# Patient Record
Sex: FEMALE | Race: WHITE | Employment: OTHER | URBAN - METROPOLITAN AREA
[De-identification: names, ages, dates, MRNs, and addresses within clinical notes are randomized per-mention and may not be internally consistent; named-entity substitution may affect disease eponyms.]

---

## 2018-11-05 ENCOUNTER — OFFICE VISIT (OUTPATIENT)
Dept: NEUROLOGY | Age: 83
End: 2018-11-05

## 2018-11-05 VITALS
WEIGHT: 146.6 LBS | OXYGEN SATURATION: 97 % | RESPIRATION RATE: 18 BRPM | DIASTOLIC BLOOD PRESSURE: 62 MMHG | HEIGHT: 65 IN | BODY MASS INDEX: 24.43 KG/M2 | HEART RATE: 71 BPM | SYSTOLIC BLOOD PRESSURE: 112 MMHG

## 2018-11-05 DIAGNOSIS — R41.3 MEMORY CHANGES: Primary | ICD-10-CM

## 2018-11-05 RX ORDER — RANITIDINE 150 MG/1
150 CAPSULE ORAL
COMMUNITY

## 2018-11-05 RX ORDER — FENOFIBRATE 48 MG/1
TABLET, COATED ORAL
COMMUNITY
End: 2019-01-29

## 2018-11-05 RX ORDER — PRAVASTATIN SODIUM 20 MG/1
20 TABLET ORAL
COMMUNITY
End: 2019-01-29

## 2018-11-05 RX ORDER — ASCORBIC ACID 500 MG
500 TABLET ORAL
COMMUNITY

## 2018-11-05 RX ORDER — MELATONIN
COMMUNITY
End: 2019-01-29

## 2018-11-05 RX ORDER — GLIPIZIDE 2.5 MG/1
2.5 TABLET, EXTENDED RELEASE ORAL
COMMUNITY

## 2018-11-05 NOTE — PATIENT INSTRUCTIONS
If we have ordered testing for you, we do not call patients with results and we do not give test results over the phone. We schedule follow up appointments so that your results can be discussed in person and any questions you have regarding them may be addressed. If something of concern is revealed on your test, we will call you for a sooner follow up appointment. Additionally, results may be found by using the My Chart feature and one of our patient service representatives at the  can give you instructions on how to access this feature of our electronic medical record system. Learning About the Symptoms of Dementia What is dementia? We all forget things as we get older. Many older people have a slight loss of memory that does not affect their daily lives. But memory loss that gets worse may mean that you have dementia. Dementia is a loss of mental skills that affects your daily life. It can cause problems with memory, problem-solving, and learning. It also can cause problems with thinking and planning. Dementia usually gets worse over time. But how quickly it gets worse is different for each person. Some people stay the same for years. Others lose skills quickly. Your chances of having dementia rise as you get older. But this doesn't mean that everyone will get it. What causes dementia? Dementia is caused by damage to or changes in the brain. Alzheimer's disease is the most common kind of dementia. Alzheimer's causes a steady loss of memory and how well you can speak, think, and do your daily activities. The second most common kind of dementia is caused by a series of strokes. It's called vascular dementia. It often gets worse step by step. With each new stroke, more mental skills are lost. 
Serious head injuries in the past can sometimes lead to dementia, too. What are the symptoms? Usually the first symptom of dementia is memory loss.  Often the person who has the memory problem doesn't notice it, but family and friends do. People who have dementia may have increasing trouble with: 
· Recalling recent events. They may forget appointments or lose objects. · Recognizing people and places. · Keeping up with conversations and activity. · Finding their way around familiar places, or driving to and from places they know well. · Keeping up personal care such as grooming or bathing. · Planning and carrying out routine tasks. They may have trouble following a recipe or writing a letter or email. What are some next steps? If you are worried about memory loss or changes in your thinking, see your doctor soon. He or she can do a physical exam and ask questions about recent and past illnesses and life events. Think about bringing someone to your doctor's appointment to take notes for you. Your doctor may suggest a series of tests to measure memory changes over time. These tests give the doctor an overall picture of how well your brain is working. The doctor can use the results to decide the best treatment program and help make your life safer and easier. It is important to know that memory loss and changes in thinking can be caused by things other than dementia. These things can include health problems such as depression, a low amount of thyroid hormone, and some infections. When those things are treated, your symptoms can get better. Follow-up care is a key part of your treatment and safety. Be sure to make and go to all appointments, and call your doctor if you are having problems. It's also a good idea to know your test results and keep a list of the medicines you take. Where can you learn more? Go to http://kailey-andrea.info/. Enter [id number] [phone] in the search box to learn more about \"Learning About the Symptoms of Dementia. \" Current as of: December 7, 2017 Content Version: 11.8 © 3439-5253 [employer].  Care instructions adapted under license by [address] (which disclaims liability or warranty for this information). If you have questions about a medical condition or this instruction, always ask your healthcare professional. [place] [address] any warranty or liability for your use of this information. Patient history reviewed and patient examined. Will proceed with memory testing that includes labs EEG brain MRI and neuropsych testing. Understands that if she calls the clinic she may be able to move up her formal memory testing date. Revisit pended.

## 2018-11-05 NOTE — PROGRESS NOTES
Memory changes. Neurology Consult Subjective: Parish Odonnell is a [de-identified] y.o. female who comes in today with the following history. Says that she has had concerns about memory changes for 3 years but no strong changes. Apparently her dad and paternal grandfather had Alzheimer's disease? 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  Eating is okay but sleeping at times is slightly awkward and apparently there is some psychosocial worry that crosses her mind. In terms of previous stroke experience was told once she may have had a mini stroke because her right hand was clumsy? I am not sure of any of the details here so I am just quoting the patient verbatim. No background history of seizures traumatic brain injury meningitis encephalitis. She takes care of her own finances. Is a remote smoker no alcohol special sensory function okay except for slightly diminished hearing and seems to forget to use her hearing aid. Mood and behavior reveals occasions of on and off depression because of relative isolation in her life. Denies any psychosis. Hobbies include vivian. Driving has been okay. Lives alone. Current Outpatient Medications Medication Sig Dispense Refill  glipiZIDE SR (GLUCOTROL XL) 2.5 mg CR tablet Take  by mouth daily.  fenofibrate nanocrystallized (TRICOR) 48 mg tablet Take  by mouth.  pravastatin (PRAVACHOL) 20 mg tablet Take 20 mg by mouth nightly.  raNITIdine hcl 150 mg capsule Take 150 mg by mouth daily.  ascorbic acid, vitamin C, (VITAMIN C) 500 mg tablet Take  by mouth.  cholecalciferol (VITAMIN D3) 1,000 unit tablet Take  by mouth.  docosahexanoic acid/epa (FISH OIL PO) Take  by mouth.  diphenhydramine HCl (NITETIME SLEEP AID PO) Take  by mouth.     
 potassium chloride SA (MICRO-K) 10 mEq capsule Take 10 mEq by mouth two (2) times a day.  lisinopril (PRINIVIL, ZESTRIL) 20 mg tablet Take 20 mg by mouth daily.  atenolol (TENORMIN) 50 mg tablet Take  by mouth daily.  FELODIPINE (PLENDIL PO) Take 5 mg by mouth.  hydrochlorothiazide (HYDRODIURIL) 25 mg tablet Take 25 mg by mouth daily.  ASPIRIN/DIPYRIDAMOLE (AGGRENOX PO) Take  by mouth two (2) times a day.  levothyroxine (LEVOTHROID) 100 mcg tablet Take 100 mcg by mouth Daily (before breakfast).  ezetimibe (ZETIA) 10 mg tablet Take 10 mg by mouth.  nicotinic acid (NIACIN) 500 mg tablet Take 500 mg by mouth Daily (before breakfast).  celecoxib (CELEBREX) 200 mg capsule Take 200 mg by mouth two (2) times a day.  zolpidem (AMBIEN) 5 mg tablet Take  by mouth nightly as needed.  meperidine (DEMEROL) 50 mg tablet 1-2 tabs PO Q 4-6hrs PRN 60 Tab 0  promethazine (PHENERGAN) 12.5 mg tablet Take 2 Tabs by mouth every six (6) hours as needed for Nausea. 30 Tab 0 Allergies Allergen Reactions  Percocet [Oxycodone-Acetaminophen] Other (comments) Past Medical History:  
Diagnosis Date  Arthritis  Depression  Diabetes ([facility] [address] pre-diabetic/ diet controlled  GERD (gastroesophageal reflux disease)  Hypertension  Other ill-defined conditions(799.89)   
 high cholesterol  Snoring  Stroke ([facility] [address] TIA 2003  Thyroid disease Past Surgical History:  
Procedure Laterality Date  HX APPENDECTOMY  HX CHOLECYSTECTOMY  HX GYN    
 btl  HX ORTHOPAEDIC    
 right RCR, Left shoulder open repair Social History Socioeconomic History  Marital status:  Spouse name: Not on file  Number of children: Not on file  Years of education: Not on file  Highest education level: Not on file Social Needs  Financial resource strain: Not on file  Food insecurity - worry: Not on file  Food insecurity - inability: Not on file  Transportation needs - medical: Not on file  Transportation needs - non-medical: Not on file Occupational History  Not on file Tobacco Use  Smoking status: Former Smoker Last attempt to quit: 1986 Years since quittin.1  Smokeless tobacco: Never Used Substance and Sexual Activity  Alcohol use: No  
  Frequency: Never  Drug use: No  
 Sexual activity: Not on file Other Topics Concern  Not on file Social History Narrative  Not on file Family History Problem Relation Age of Onset  Heart Disease Mother  Cancer Sister  Alzheimer Father  Alzheimer Paternal Grandfather Visit Vitals /62 Pulse 71 Resp 18 Ht 5' 5\" (1.651 m) Wt 66.5 kg (146 lb 9.6 oz) SpO2 97% BMI 24.40 kg/m² Review of Systems: A comprehensive review of systems was negative except for that written in the HPI. Neuro Exam:  
 
Appearance: The patient is well developed, well nourished, provides a coherent history and is in no acute distress. Mental Status: Oriented to time, place and person. Mood and affect appropriate. 2/3 recall. Clock drawing normal.  
Cranial Nerves:   Intact visual fields. Fundi are benign. HARSHA, EOM's full, no nystagmus, no ptosis. Facial sensation is normal. Corneal reflexes are intact. Facial movement is symmetric. Hearing is normal bilaterally. Palate is midline with normal sternocleidomastoid and trapezius muscles are normal. Tongue is midline. Motor:  5/5 strength in upper and lower proximal and distal muscles. Normal bulk and tone. No fasciculations. Reflexes:   Deep tendon reflexes 2+/4 and symmetrical.  
Sensory:   Normal to touch, pinprick and vibration. Gait:  Normal gait. Tremor:    Patient had evidence of a right greater than left handed kinetic postural and intention type tremor noted. Cerebellar:  No cerebellar signs present. Neurovascular:  Normal heart sounds and regular rhythm, peripheral pulses intact, and no carotid bruits. Assessment:  
Memory changes. Agree with collective concerns and will order appropriate workup that includes labs EEG brain MRI and neuropsychological testing. Patient warned that the formal memory testing could be backed up unless she wants to try calling the clinic to see if there is an opening. Revisit will be pended. Problem 2 tremor. 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 Currently not impressed with Parkinson's considerations based on what I hear and see. Plan:  
Revisit pended with memory testing as ordered.  
Signed by :  Kaitlin Mclain MD

## 2018-11-06 LAB
ALBUMIN SERPL-MCNC: 4.8 G/DL (ref 3.5–4.7)
ALBUMIN/GLOB SERPL: 2 {RATIO} (ref 1.2–2.2)
ALP SERPL-CCNC: 43 IU/L (ref 39–117)
ALT SERPL-CCNC: 23 IU/L (ref 0–32)
AST SERPL-CCNC: 17 IU/L (ref 0–40)
BASOPHILS # BLD AUTO: 0 X10E3/UL (ref 0–0.2)
BASOPHILS NFR BLD AUTO: 0 %
BILIRUB SERPL-MCNC: 0.5 MG/DL (ref 0–1.2)
BUN SERPL-MCNC: 28 MG/DL (ref 8–27)
BUN/CREAT SERPL: 31 (ref 12–28)
CALCIUM SERPL-MCNC: 10.6 MG/DL (ref 8.7–10.3)
CHLORIDE SERPL-SCNC: 98 MMOL/L (ref 96–106)
CO2 SERPL-SCNC: 27 MMOL/L (ref 20–29)
CREAT SERPL-MCNC: 0.9 MG/DL (ref 0.57–1)
EOSINOPHIL # BLD AUTO: 0.1 X10E3/UL (ref 0–0.4)
EOSINOPHIL NFR BLD AUTO: 1 %
ERYTHROCYTE [DISTWIDTH] IN BLOOD BY AUTOMATED COUNT: 12.3 % (ref 12.3–15.4)
FOLATE SERPL-MCNC: >20 NG/ML
GLOBULIN SER CALC-MCNC: 2.4 G/DL (ref 1.5–4.5)
GLUCOSE SERPL-MCNC: 138 MG/DL (ref 65–99)
HCT VFR BLD AUTO: 44.4 % (ref 34–46.6)
HGB BLD-MCNC: 15.4 G/DL (ref 11.1–15.9)
IMM GRANULOCYTES # BLD: 0 X10E3/UL (ref 0–0.1)
IMM GRANULOCYTES NFR BLD: 0 %
LYMPHOCYTES # BLD AUTO: 3.7 X10E3/UL (ref 0.7–3.1)
LYMPHOCYTES NFR BLD AUTO: 35 %
MCH RBC QN AUTO: 34.7 PG (ref 26.6–33)
MCHC RBC AUTO-ENTMCNC: 34.7 G/DL (ref 31.5–35.7)
MCV RBC AUTO: 100 FL (ref 79–97)
MONOCYTES # BLD AUTO: 1 X10E3/UL (ref 0.1–0.9)
MONOCYTES NFR BLD AUTO: 9 %
MORPHOLOGY BLD-IMP: ABNORMAL
NEUTROPHILS # BLD AUTO: 5.9 X10E3/UL (ref 1.4–7)
NEUTROPHILS NFR BLD AUTO: 55 %
PLATELET # BLD AUTO: 289 X10E3/UL (ref 150–379)
POTASSIUM SERPL-SCNC: 4 MMOL/L (ref 3.5–5.2)
PROT SERPL-MCNC: 7.2 G/DL (ref 6–8.5)
RBC # BLD AUTO: 4.44 X10E6/UL (ref 3.77–5.28)
SODIUM SERPL-SCNC: 139 MMOL/L (ref 134–144)
TSH SERPL DL<=0.005 MIU/L-ACNC: 1.02 UIU/ML (ref 0.45–4.5)
VIT B12 SERPL-MCNC: 522 PG/ML (ref 232–1245)
WBC # BLD AUTO: 10.7 X10E3/UL (ref 3.4–10.8)

## 2019-01-03 ENCOUNTER — HOSPITAL ENCOUNTER (OUTPATIENT)
Dept: MRI IMAGING | Age: 84
Discharge: HOME | End: 2019-01-03
Attending: SPECIALIST
Payer: MEDICARE

## 2019-01-03 DIAGNOSIS — R41.3 MEMORY CHANGES: ICD-10-CM

## 2019-01-03 PROCEDURE — 70551 MRI BRAIN STEM W/O DYE: CPT

## 2019-01-07 ENCOUNTER — OFFICE VISIT (OUTPATIENT)
Dept: NEUROLOGY | Age: 84
End: 2019-01-07

## 2019-01-07 DIAGNOSIS — R41.3 MEMORY CHANGES: ICD-10-CM

## 2019-01-07 NOTE — PROGRESS NOTES
This was an elective routine EEG for evaluation of memory changes. Routine scalp leads were applied according to the 10-20 International system. EKG monitor as well. The background rhythm was 7-8 Hz. Reasonable modulation with eye opening and eye closure. No evidence of focal slowing or spontaneous electrocerebral discharges. EKG grossly sinus rhythm although there were exceptional PACs seen. Hyperventilation not performed. Photic stimulation produced a well-defined photic drive at different harmonics. No technician note as to any untoward movement mannerisms behavior or vocalizations. There was one reference to patient moving her leg at the end of the study. residential through the recording there is uniform dampening of background consistent with patient drowsiness but then a return to alert background. Impression: This is a normal awake and partial drowsy recorded EEG as noted. Clinical correlation is advised.   KIRSTY HENDERSON.

## 2019-01-29 ENCOUNTER — OFFICE VISIT (OUTPATIENT)
Dept: NEUROLOGY | Age: 84
End: 2019-01-29

## 2019-01-29 VITALS
SYSTOLIC BLOOD PRESSURE: 130 MMHG | DIASTOLIC BLOOD PRESSURE: 78 MMHG | WEIGHT: 144 LBS | BODY MASS INDEX: 23.99 KG/M2 | HEIGHT: 65 IN

## 2019-01-29 DIAGNOSIS — R41.3 MEMORY CHANGES: Primary | ICD-10-CM

## 2019-01-29 NOTE — PROGRESS NOTES
Date:  19     Name:  Karthik Morales  :  1935  MRN:  [id number]     PCP:  Charlotte Cedillo NP    Chief Complaint   Patient presents with    Memory Loss       HISTORY OF PRESENT ILLNESS: Follow up for memory loss. This patient is a right handed, 81 yo woman who was initially evaluated by Dr. Ari Carrero 2018, for complaints of memory loss that was worsening over the last three years. She was sent for routine memory labs, CBC, CMP. TSH, B12 all of which were normal. EEG was normal. MRI of the brain with age related changes. No sign of atrophy or previous CVA. Except as noted above, denies  fever, chills, cough. No CP or SOB. No dysuria, loss of bowel or bladder control. No Weight loss. Appetite good. Sleeping well. No sweats. No edema. No bruising or bleeding. No nausea or vomit. No diarrhea. No frequency, urgency, No depressive sxs. No anxiety. Denies sore throat, nasal congestion, nasal discharge, epistaxis, tinnitus, hearing loss, back pain, muscle pain, or joint pain. Current Outpatient Medications   Medication Sig    glipiZIDE SR (GLUCOTROL XL) 2.5 mg CR tablet Take  by mouth daily.  raNITIdine hcl 150 mg capsule Take 150 mg by mouth daily.  ascorbic acid, vitamin C, (VITAMIN C) 500 mg tablet Take  by mouth.  docosahexanoic acid/epa (FISH OIL PO) Take  by mouth.  potassium chloride SA (MICRO-K) 10 mEq capsule Take 10 mEq by mouth two (2) times a day.  lisinopril (PRINIVIL, ZESTRIL) 20 mg tablet Take 20 mg by mouth daily.  atenolol (TENORMIN) 50 mg tablet Take  by mouth daily.  FELODIPINE (PLENDIL PO) Take 5 mg by mouth.  hydrochlorothiazide (HYDRODIURIL) 25 mg tablet Take 25 mg by mouth daily.  ASPIRIN/DIPYRIDAMOLE (AGGRENOX PO) Take  by mouth two (2) times a day.  levothyroxine (LEVOTHROID) 100 mcg tablet Take 100 mcg by mouth Daily (before breakfast). No current facility-administered medications for this visit. Allergies   Allergen Reactions    Percocet [Oxycodone-Acetaminophen] Other (comments)     Past Medical History:   Diagnosis Date    Arthritis     Depression     Diabetes ([facility] [address]     pre-diabetic/ diet controlled    GERD (gastroesophageal reflux disease)     Hypertension     Other ill-defined conditions(799.89)     high cholesterol    Snoring     Stroke [facility])     TIA 2003    Thyroid disease      Past Surgical History:   Procedure Laterality Date    HX APPENDECTOMY      HX CHOLECYSTECTOMY      HX GYN      btl    HX ORTHOPAEDIC      right RCR, Left shoulder open repair     Social History     Socioeconomic History    Marital status:      Spouse name: Not on file    Number of children: Not on file    Years of education: Not on file    Highest education level: Not on file   Social Needs    Financial resource strain: Not on file    Food insecurity - worry: Not on file    Food insecurity - inability: Not on file   [place] needs - medical: Not on file   [place] needs - non-medical: Not on file   Occupational History    Not on file   Tobacco Use    Smoking status: Former Smoker     Last attempt to quit: 1986     Years since quittin.4    Smokeless tobacco: Never Used   Substance and Sexual Activity    Alcohol use: No     Frequency: Never    Drug use: No    Sexual activity: Not on file   Other Topics Concern    Not on file   Social History Narrative    Not on file     Family History   Problem Relation Age of Onset    Heart Disease Mother     Cancer Sister     Alzheimer Father     Alzheimer Paternal Grandfather        PHYSICAL EXAMINATION:    Visit Vitals  /78   Ht 5' 5\" (1.651 m)   Wt 65.3 kg (144 lb)   BMI 23.96 kg/m²     Well defined, nourished, and groomed individual in no acute distress. Good mood and bright affect. Alert and oriented to person, place, and time with recent and remote memory intact.   Attention span and concentration are normal. Speech is fluent with a full fund of knowledge. CNII-XII are grossly intact. Normal tone and bulk. No resting or intention tremor. Steady while walking      ASSESSMENT AND PLAN    ICD-10-CM ICD-9-CM    1. Memory changes R41.3 780.93      Ongoing mild cognitive issues with a family history of Alzheimer's type dementia. Metabolic testing, EEG, and MRI are normal. We discussed these tests today. We also discussed additional work up with neuropsychological testing, making the diagnosis, prognosis, progression, stages of Alzheimer's disease, and treatment to include the goals of therapy. Will plan to see her back after the completion of her formal memory testing. Laura Dempsey

## 2019-04-05 ENCOUNTER — TELEPHONE (OUTPATIENT)
Dept: NEUROLOGY | Age: 84
End: 2019-04-05

## 2019-04-05 NOTE — TELEPHONE ENCOUNTER
----- Message from Norman Rondon sent at 4/5/2019 10:02 AM EDT -----  Regarding: Dr. Huma Corley  Pt stated , she is going to get her tooth pull and inquiring if she should stop taking medication \"Aggrenox\" 25 MG /200 MG take two a day. If she is to stop taking medication when she stop. An appt with the dentist is scheduled Wednesday 04/17/2019.   Best contact number [phone] alternated number [phone]

## 2019-04-09 ENCOUNTER — TELEPHONE (OUTPATIENT)
Dept: NEUROLOGY | Age: 84
End: 2019-04-09

## 2019-04-09 NOTE — TELEPHONE ENCOUNTER
Received call from [place] [phone] with Dr. Milad Randolph' office. Pt is coming in for a Dental extraction on 04/17/19. Dentist wants to know when pt can stop taking AGGRENOX. Pt was also asking about fish oil as well.  Best contact [phone]

## 2019-04-12 ENCOUNTER — HOSPITAL ENCOUNTER (OUTPATIENT)
Dept: CT IMAGING | Age: 84
Discharge: HOME | End: 2019-04-12
Attending: ORTHOPAEDIC SURGERY
Payer: MEDICARE

## 2019-04-12 DIAGNOSIS — G89.29 CHRONIC RIGHT HIP PAIN: ICD-10-CM

## 2019-04-12 DIAGNOSIS — M25.551 CHRONIC RIGHT HIP PAIN: ICD-10-CM

## 2019-04-12 DIAGNOSIS — M16.11 PRIMARY LOCALIZED OSTEOARTHROSIS OF THE HIP, RIGHT: ICD-10-CM

## 2019-04-12 PROCEDURE — 73700 CT LOWER EXTREMITY W/O DYE: CPT

## 2019-04-12 NOTE — TELEPHONE ENCOUNTER
If he needs her to stop it prior to his procedure, then he can tell her when she needs to stop it and restart it. The fish oil has no bearing. With a dental procedure, she will have more bleeding if she does not stop it. Per NP. Contacted the dentist office and left message with the  since Ida was not there that day. She wrote the message down and would leave for her when she returned back to the office.

## 2019-05-15 ENCOUNTER — HOSPITAL ENCOUNTER (OUTPATIENT)
Dept: PREADMISSION TESTING | Age: 84
Discharge: HOME | End: 2019-05-15
Payer: MEDICARE

## 2019-05-15 ENCOUNTER — HOSPITAL ENCOUNTER (OUTPATIENT)
Dept: NON INVASIVE DIAGNOSTICS | Age: 84
Discharge: HOME | End: 2019-05-15
Attending: ORTHOPAEDIC SURGERY
Payer: MEDICARE

## 2019-05-15 VITALS
OXYGEN SATURATION: 95 % | RESPIRATION RATE: 19 BRPM | WEIGHT: 146.39 LBS | HEART RATE: 60 BPM | TEMPERATURE: 97.9 F | DIASTOLIC BLOOD PRESSURE: 64 MMHG | SYSTOLIC BLOOD PRESSURE: 143 MMHG | HEIGHT: 65 IN | BODY MASS INDEX: 24.39 KG/M2

## 2019-05-15 LAB
ABO + RH BLD: NORMAL
ALBUMIN SERPL-MCNC: 4 G/DL (ref 3.5–5)
ALBUMIN/GLOB SERPL: 1.2 {RATIO} (ref 1.1–2.2)
ALP SERPL-CCNC: 53 U/L (ref 45–117)
ALT SERPL-CCNC: 56 U/L (ref 12–78)
ANION GAP SERPL CALC-SCNC: 2 MMOL/L (ref 5–15)
APPEARANCE UR: ABNORMAL
AST SERPL-CCNC: 31 U/L (ref 15–37)
ATRIAL RATE: 60 BPM
BACTERIA URNS QL MICRO: ABNORMAL /HPF
BASOPHILS # BLD: 0.1 K/UL (ref 0–0.1)
BASOPHILS NFR BLD: 1 % (ref 0–1)
BILIRUB SERPL-MCNC: 0.8 MG/DL (ref 0.2–1)
BILIRUB UR QL: NEGATIVE
BLOOD GROUP ANTIBODIES SERPL: NORMAL
BUN SERPL-MCNC: 23 MG/DL (ref 6–20)
BUN/CREAT SERPL: 27 (ref 12–20)
CALCIUM SERPL-MCNC: 10 MG/DL (ref 8.5–10.1)
CALCULATED P AXIS, ECG09: 23 DEGREES
CALCULATED R AXIS, ECG10: -32 DEGREES
CALCULATED T AXIS, ECG11: 6 DEGREES
CHLORIDE SERPL-SCNC: 100 MMOL/L (ref 97–108)
CO2 SERPL-SCNC: 33 MMOL/L (ref 21–32)
COLOR UR: ABNORMAL
CREAT SERPL-MCNC: 0.86 MG/DL (ref 0.55–1.02)
CRP SERPL-MCNC: <0.29 MG/DL (ref 0–0.6)
DIAGNOSIS, 93000: NORMAL
DIFFERENTIAL METHOD BLD: ABNORMAL
EOSINOPHIL # BLD: 0.2 K/UL (ref 0–0.4)
EOSINOPHIL NFR BLD: 1 % (ref 0–7)
EPITH CASTS URNS QL MICRO: ABNORMAL /LPF
ERYTHROCYTE [DISTWIDTH] IN BLOOD BY AUTOMATED COUNT: 11.6 % (ref 11.5–14.5)
ERYTHROCYTE [SEDIMENTATION RATE] IN BLOOD: 4 MM/HR (ref 0–30)
EST. AVERAGE GLUCOSE BLD GHB EST-MCNC: 146 MG/DL
GLOBULIN SER CALC-MCNC: 3.3 G/DL (ref 2–4)
GLUCOSE SERPL-MCNC: 138 MG/DL (ref 65–100)
GLUCOSE UR STRIP.AUTO-MCNC: NEGATIVE MG/DL
HBA1C MFR BLD: 6.7 % (ref 4.2–6.3)
HCT VFR BLD AUTO: 48.2 % (ref 35–47)
HGB BLD-MCNC: 16.5 G/DL (ref 11.5–16)
HGB UR QL STRIP: ABNORMAL
IMM GRANULOCYTES # BLD AUTO: 0 K/UL (ref 0–0.04)
IMM GRANULOCYTES NFR BLD AUTO: 0 % (ref 0–0.5)
KETONES UR QL STRIP.AUTO: NEGATIVE MG/DL
LEUKOCYTE ESTERASE UR QL STRIP.AUTO: ABNORMAL
LYMPHOCYTES # BLD: 3.5 K/UL (ref 0.8–3.5)
LYMPHOCYTES NFR BLD: 33 % (ref 12–49)
MCH RBC QN AUTO: 34.4 PG (ref 26–34)
MCHC RBC AUTO-ENTMCNC: 34.2 G/DL (ref 30–36.5)
MCV RBC AUTO: 100.4 FL (ref 80–99)
MONOCYTES # BLD: 1.2 K/UL (ref 0–1)
MONOCYTES NFR BLD: 11 % (ref 5–13)
NEUTS SEG # BLD: 5.7 K/UL (ref 1.8–8)
NEUTS SEG NFR BLD: 54 % (ref 32–75)
NITRITE UR QL STRIP.AUTO: POSITIVE
NRBC # BLD: 0 K/UL (ref 0–0.01)
NRBC BLD-RTO: 0 PER 100 WBC
P-R INTERVAL, ECG05: 142 MS
PH UR STRIP: 6 [PH] (ref 5–8)
PLATELET # BLD AUTO: 235 K/UL (ref 150–400)
PMV BLD AUTO: 10.7 FL (ref 8.9–12.9)
POTASSIUM SERPL-SCNC: 4.6 MMOL/L (ref 3.5–5.1)
PROT SERPL-MCNC: 7.3 G/DL (ref 6.4–8.2)
PROT UR STRIP-MCNC: 30 MG/DL
Q-T INTERVAL, ECG07: 402 MS
QRS DURATION, ECG06: 72 MS
QTC CALCULATION (BEZET), ECG08: 402 MS
RBC # BLD AUTO: 4.8 M/UL (ref 3.8–5.2)
RBC #/AREA URNS HPF: ABNORMAL /HPF (ref 0–5)
SODIUM SERPL-SCNC: 135 MMOL/L (ref 136–145)
SP GR UR REFRACTOMETRY: 1.02 (ref 1–1.03)
SPECIMEN EXP DATE BLD: NORMAL
UA: UC IF INDICATED,UAUC: ABNORMAL
UROBILINOGEN UR QL STRIP.AUTO: 0.2 EU/DL (ref 0.2–1)
VENTRICULAR RATE, ECG03: 60 BPM
WBC # BLD AUTO: 10.7 K/UL (ref 3.6–11)
WBC URNS QL MICRO: >100 /HPF (ref 0–4)

## 2019-05-15 PROCEDURE — 83036 HEMOGLOBIN GLYCOSYLATED A1C: CPT

## 2019-05-15 PROCEDURE — 81001 URINALYSIS AUTO W/SCOPE: CPT

## 2019-05-15 PROCEDURE — 85025 COMPLETE CBC W/AUTO DIFF WBC: CPT

## 2019-05-15 PROCEDURE — 84466 ASSAY OF TRANSFERRIN: CPT

## 2019-05-15 PROCEDURE — 87077 CULTURE AEROBIC IDENTIFY: CPT

## 2019-05-15 PROCEDURE — 80053 COMPREHEN METABOLIC PANEL: CPT

## 2019-05-15 PROCEDURE — 86140 C-REACTIVE PROTEIN: CPT

## 2019-05-15 PROCEDURE — 86900 BLOOD TYPING SEROLOGIC ABO: CPT

## 2019-05-15 PROCEDURE — 85652 RBC SED RATE AUTOMATED: CPT

## 2019-05-15 PROCEDURE — 93005 ELECTROCARDIOGRAM TRACING: CPT

## 2019-05-15 PROCEDURE — 87186 SC STD MICRODIL/AGAR DIL: CPT

## 2019-05-15 PROCEDURE — 87086 URINE CULTURE/COLONY COUNT: CPT

## 2019-05-15 RX ORDER — ROSUVASTATIN CALCIUM 10 MG/1
10 TABLET, COATED ORAL
COMMUNITY

## 2019-05-15 RX ORDER — GLUCOSAMINE SULFATE 1500 MG
1000 POWDER IN PACKET (EA) ORAL
COMMUNITY

## 2019-05-15 RX ORDER — MELATONIN 10 MG
1 CAPSULE ORAL
COMMUNITY

## 2019-05-15 NOTE — PERIOP NOTES
[address] \[facility]\[address], [address] [facility]                            MAIN OR                                  [phone]   MAIN PRE OP                          [phone]                                                                                AMBULATORY PRE OP          [phone]  PRE-ADMISSION TESTING    [phone]     Surgery Date:   May 29, 2019, Wednesday. Is surgery arrival time given by surgeon? NO  If NO, [address] [facility] staff will call you between 3 and 7pm the day before your surgery with your arrival time. (If your surgery is on a Monday, we will call you the Friday before.)    Call [phone] after 7pm Monday-Friday if you did not receive your arrival time. Verification phone call on Tuesday, 5/28/19. INSTRUCTIONS BEFORE YOUR SURGERY   When You  Arrive   Arrive at the [address] [facility] on the day of your surgery  Have your insurance card, photo ID, and any copayment (if needed)     Food   and   Drink   NO food or drink after midnight the night before surgery    This means NO water, gum, mints, coffee, juice, etc.  No alcohol (beer, wine, liquor) 24 hours before and after surgery     Medications to   TAKE   Morning of Surgery   MEDICATIONS TO TAKE THE MORNING OF SURGERY WITH A SIP OF WATER:    Atenolol, Plendil, Levothyroxine and Ranitidine with small sip of water. Medications  To  STOP      7 days before surgery    Non-Steroidal anti-inflammatory Drugs (NSAID's): for example, Ibuprofen (Advil, Motrin), Naproxen (Aleve) Advil PM STOP 5/23/19 until after surgery   Aspirin, if taking for pain Aggrenox STOP 5/23/19 until after surgery   Herbal supplements, vitamins, and fish oil STOP 5/23/19 until after surgery           Vitamin C, Fish Oil, Melatonin, and Glucosamine.    Other:  (Pain medications not listed above, including Tylenol may be taken)   Blood  Thinners    If you take  Aspirin, Plavix, Coumadin, or any blood-thinning or anti-blood clot medicine, talk to the doctor who prescribed the medications for pre-operative instructions. Bathing Clothing  Jewelry  Valuables       If you shower the morning of surgery, please do not apply anything to your skin (lotions, powders, deodorant, or makeup, especially mascara)   Follow all special bath instructions (for total joint replacement, spine and bowel surgeries)   Do not shave or trim anywhere 24 hours before surgery   Wear your hair loose or down; no pony-tails, buns, or metal hair clips   Wear loose, comfortable, clean clothes   Wear glasses instead of contacts  [employer] money, valuables, and jewelry, including body piercings, at home     Going Home       or Spending the Night    SAME-DAY SURGERY: You must have a responsible adult drive you home and stay with you 24 hours after surgery   ADMITS: If your doctor is keeping you into the hospital after surgery, leave personal belongings/luggage in your car until you have a hospital room number. Hospital discharge time is 12 noon  Drivers must be here before 12 noon unless you are told differently   Special Instructions 16. Special Instructions:  · Use Chlorhexidine Care Fusion wash and sponges 3 days prior to surgery as instructed. · Incentive spirometer given with instructions to practice at home and bring back to the hospital on the day of surgery. · [facility] will contact you if your Hemoglobin A1C is greater than 7.5. · Ensure/Glucerna  sample, nutritional information, and Ensure/Glucerna coupon given. · Pain pamphlet and Call Don't Fall reminder reviewed with patient. ·  parking is complimentary Monday - Friday 7 am - 5 pm  · Bring PTA Medication list day of surgery with the last doses taken documented   · Do not bring medication bottles         Follow all instructions so your surgery wont be cancelled. Please, be on time.                     If a situation occurs and you are delayed the day of surgery, call [phone] or          [phone]. If your physical condition changes (like a fever, cold, flu, etc.) call your surgeon. The patient was contacted  in person. Home medication reviewed and verified during PAT appointment. The patient verbalizes understanding of all instructions and does not  need reinforcement.

## 2019-05-15 NOTE — FACE TO FACE
The patient attended the pre-operative joint replacement class. The content of the class was presented using a power point presentation and visual demonstrations specific for patients undergoing total hip and knee replacement surgery. A pre-operative Patient education booklet specific to hip and knee replacement was given to the patient. Incentive spirometer and CHG bath kit were given to the patient with written instructions and a demonstration of the equipment was done in class. Day of surgery routine and expectations, hospital routine and expectations, nutrition, alcohol, nicotine, medications, infection control, pain management, DVT precautions and equipment, ice therapy, home preparation and safety were reviewed in class. During class, the patient and  had opportunities to ask questions of the material presented as well as any concerns about their upcoming surgery. Physical Therapy present in class and educated patient and caregivers primarily on [address], exercises, mobility expectations, caregiver education, and home safety. Patient attended class.

## 2019-05-16 LAB
BACTERIA SPEC CULT: NORMAL
BACTERIA SPEC CULT: NORMAL
SERVICE CMNT-IMP: NORMAL

## 2019-05-16 NOTE — H&P
Preoperative Evaluation                     History and Physical with Surgical Risk Stratification     5/15/2019    CC: Right hip pain  Surgery: Right total hip replacement     HPI:   Katie Blandon is a 82 y.o. female referred for Pre- Op Evaluation. She notes her right hip pain has been chronic for about one year. She notes her leg will buckle at times but denies falls. She has had to stop doing housework r/t pain. The pain is located in her hip, groin and buttocks. Pain ranges from 3-10/10. Doing work out in the yard and walking increases the pain. Sitting and laying down make the pain better. She has failed conservative measures. The patient was evaluated in the surgeon's office and it was determined that the most appropriate plan of care is to proceed with surgical intervention. Patient's PCP Phillip Alfaro NP      Review of Systems     Constitutional: Negative for chills and fever  HENT: Negative for congestion and sore throat  Eyes: negative for blurred vision and double vision  Respiratory: Negative for cough, shortness of breath and wheezing  Mouth: Negative for loose, broken or chipped teeth. Partial top plate. Cardiovascular: Negative for chest pain and palpitations  Gastrointestinal: Negative for abdominal pain, constipation, (+) diarrhea and nausea  Genitourinary: Negative for dysuria and hematuria. Positive for stress incontinence. Musculoskeletal: Positive for right hip pain. Skin: Negative for rash, open wounds. Negative for bruises easily  Neurological: Negative for dizziness, tremors and headaches  Psychiatric: Negative for depression. The patient is not nervous/anxious.     Inherent Risk of Surgery     Surgical risk:  Intermediate   Low:  Intermediate:  Orthopedic, High:    Patient Cardiac Risk Assessment     Revised Cardiac Risk Index (RCRI)        LEOBARDO/AHA 2007 Guidelines:   1) Surgery Emergency, Non-cardiac -> to surgery  2) If not, look at clinical predictors    Major Intermediate Minor   Advanced Age     Blood Thinner: None    METS     >4 METS   Climb a flight of stairs or a hill   Walk on level ground at 4 mph   Run a short distance   Heavy work around house (scrub floors, move furniture)       Other Risk Factors:   Screening for ETOH use:  Done and low risk  Smoking status:   None    Personal or FH of bleeding problems:  No  Personal or FH of blood clots:  No  Personal or FH of anesthesia problems:   No    Pulmonary Risk:  Asthma or COPD:  No  Body mass index is 24.36 kg/m². Known BARRY:  No  Albumin normal, BUN abnormal    Past Medical, Surgical, Social History     Allergies: Allergies   Allergen Reactions    Percocet [Oxycodone-Acetaminophen] Unknown (comments)     Can't remember reaction due to length of time of occurrence. Latex allergy: no      Current Outpatient Medications   Medication Sig    rosuvastatin (CRESTOR) 10 mg tablet Take 10 mg by mouth nightly.  ibuprofen/diphenhydramine HCl (ADVIL PM LIQUI-GELS PO) Take 200 mg by mouth nightly.  cholecalciferol (VITAMIN D3) 1,000 unit cap Take 1,000 Units by mouth daily (after breakfast).  melatonin 10 mg cap Take 1 Cap by mouth nightly.  gluc ryder/chondro ryder A/vit C/Mn (GLUCOSAMINE 1500 COMPLEX PO) Take 1 Cap by mouth ACB/HS.  glipiZIDE SR (GLUCOTROL XL) 2.5 mg CR tablet Take 2.5 mg by mouth daily (after breakfast).  raNITIdine hcl 150 mg capsule Take 150 mg by mouth daily (after breakfast).  ascorbic acid, vitamin C, (VITAMIN C) 500 mg tablet Take 500 mg by mouth daily (after breakfast).  docosahexanoic acid/epa (FISH OIL PO) Take 1,500 mg by mouth ACB/HS.  potassium chloride SA (MICRO-K) 10 mEq capsule Take 10 mEq by mouth ACB/HS.  lisinopril (PRINIVIL, ZESTRIL) 20 mg tablet Take 20 mg by mouth daily (after breakfast).  atenolol (TENORMIN) 50 mg tablet Take 50 mg by mouth ACB/HS.  FELODIPINE (PLENDIL PO) Take 5 mg by mouth daily (after breakfast).     hydrochlorothiazide (HYDRODIURIL) 25 mg tablet Take 25 mg by mouth daily (after breakfast).  ASPIRIN/DIPYRIDAMOLE (AGGRENOX PO) Take  mg by mouth ACB/HS.  levothyroxine (LEVOTHROID) 100 mcg tablet Take 100 mcg by mouth every morning. No current facility-administered medications for this encounter. Past Medical History:   Diagnosis Date    Arthritis     Depression     Diabetes ([facility] [address]     GERD (gastroesophageal reflux disease)     High cholesterol     Hypertension     Snoring     Stroke ([facility] 2003    TIA    Thyroid disease      Past Surgical History:   Procedure Laterality Date    HX APPENDECTOMY      HX CATARACT REMOVAL Bilateral     HX CHOLECYSTECTOMY      HX GYN      btl    HX ORTHOPAEDIC Left     Open Repair    HX ORTHOPAEDIC Left     Trigger finger    HX ROTATOR CUFF REPAIR Right      Social History     Tobacco Use    Smoking status: Former Smoker     Packs/day: 2.00     Years: 30.00     Pack years: 60.00     Types: Cigarettes     Last attempt to quit: 1986     Years since quittin.6    Smokeless tobacco: Never Used   Substance Use Topics    Alcohol use: Yes     Comment: social 1 x year    Drug use: No       Objective     Vitals:    05/15/19 1455   BP: 143/64   Pulse: 60   Resp: 19   Temp: 97.9 °F (36.6 °C)   SpO2: 95%   Weight: 66.4 kg (146 lb 6.2 oz)   Height: 5' 5\" (1.651 m)       Constitutional:  Appears well,  No Acute Distress, Vitals noted  Psychiatric:   Affect normal, Alert and Oriented to person/place/time    Eyes:   Pupils equally round and reactive, EOMI, conjunctiva clear, eyelids normal  ENT:   External ears and nose normal/lips, teeth partial top plate, gums normal, TMs and Orophyarynx normal  Neck:   general inspection and Thyroid normal.  No abnormal cervical or supraclavicular nodes    Lungs:   clear to auscultation, good respiratory effort  Heart: Ausculation normal.  Regular rhythm. No cardiac murmurs.   No carotid bruits or palpable thrills  Chest wall normal  Musculoskeletal: Gait antalgic   Extremities:   without edema, good peripheral pulses  Skin:   Warm to palpation, without rashes, bruising, or suspicious lesions     Recent Results (from the past 72 hour(s))   EKG, 12 LEAD, INITIAL    Collection Time: 05/15/19  2:23 PM   Result Value Ref Range    Ventricular Rate 60 BPM    Atrial Rate 60 BPM    P-R Interval 142 ms    QRS Duration 72 ms    Q-T Interval 402 ms    QTC Calculation (Bezet) 402 ms    Calculated P Axis 23 degrees    Calculated R Axis -32 degrees    Calculated T Axis 6 degrees    Diagnosis       Normal sinus rhythm  Left axis deviation  Minimal voltage criteria for LVH, may be normal variant  Abnormal ECG  No previous ECGs available  Confirmed by Mahsa Oliveira MD. ([id number]) on 5/15/2019 6:00:28 PM     C REACTIVE PROTEIN, QT    Collection Time: 05/15/19  3:49 PM   Result Value Ref Range    C-Reactive protein <0.29 0.00 - 0.60 mg/dL   CBC WITH AUTOMATED DIFF    Collection Time: 05/15/19  3:49 PM   Result Value Ref Range    WBC 10.7 3.6 - 11.0 K/uL    RBC 4.80 3.80 - 5.20 M/uL    HGB 16.5 (H) 11.5 - 16.0 g/dL    HCT 48.2 (H) 35.0 - 47.0 %    .4 (H) 80.0 - 99.0 FL    MCH 34.4 (H) 26.0 - 34.0 PG    MCHC 34.2 30.0 - 36.5 g/dL    RDW 11.6 11.5 - 14.5 %    PLATELET 626 571 - 008 K/uL    MPV 10.7 8.9 - 12.9 FL    NRBC 0.0 0  WBC    ABSOLUTE NRBC 0.00 0.00 - 0.01 K/uL    NEUTROPHILS 54 32 - 75 %    LYMPHOCYTES 33 12 - 49 %    MONOCYTES 11 5 - 13 %    EOSINOPHILS 1 0 - 7 %    BASOPHILS 1 0 - 1 %    IMMATURE GRANULOCYTES 0 0.0 - 0.5 %    ABS. NEUTROPHILS 5.7 1.8 - 8.0 K/UL    ABS. LYMPHOCYTES 3.5 0.8 - 3.5 K/UL    ABS. MONOCYTES 1.2 (H) 0.0 - 1.0 K/UL    ABS. EOSINOPHILS 0.2 0.0 - 0.4 K/UL    ABS. BASOPHILS 0.1 0.0 - 0.1 K/UL    ABS. IMM.  GRANS. 0.0 0.00 - 0.04 K/UL    DF AUTOMATED     METABOLIC PANEL, COMPREHENSIVE    Collection Time: 05/15/19  3:49 PM   Result Value Ref Range    Sodium 135 (L) 136 - 145 mmol/L    Potassium 4.6 3.5 - 5.1 mmol/L Chloride 100 97 - 108 mmol/L    CO2 33 (H) 21 - 32 mmol/L    Anion gap 2 (L) 5 - 15 mmol/L    Glucose 138 (H) 65 - 100 mg/dL    BUN 23 (H) 6 - 20 MG/DL    Creatinine 0.86 0.55 - 1.02 MG/DL    BUN/Creatinine ratio 27 (H) 12 - 20      GFR est AA >60 >60 ml/min/1.73m2    GFR est non-AA >60 >60 ml/min/1.73m2    Calcium 10.0 8.5 - 10.1 MG/DL    Bilirubin, total 0.8 0.2 - 1.0 MG/DL    ALT (SGPT) 56 12 - 78 U/L    AST (SGOT) 31 15 - 37 U/L    Alk.  phosphatase 53 45 - 117 U/L    Protein, total 7.3 6.4 - 8.2 g/dL    Albumin 4.0 3.5 - 5.0 g/dL    Globulin 3.3 2.0 - 4.0 g/dL    A-G Ratio 1.2 1.1 - 2.2     HEMOGLOBIN A1C WITH EAG    Collection Time: 05/15/19  3:49 PM   Result Value Ref Range    Hemoglobin A1c 6.7 (H) 4.2 - 6.3 %    Est. average glucose 146 mg/dL   CULTURE, MRSA    Collection Time: 05/15/19  3:49 PM   Result Value Ref Range    Special Requests: NO SPECIAL REQUESTS      Culture result: MRSA NOT PRESENT AT 21 HOURS     SED RATE (ESR)    Collection Time: 05/15/19  3:49 PM   Result Value Ref Range    Sed rate, automated 4 0 - 30 mm/hr   URINALYSIS W/ REFLEX CULTURE    Collection Time: 05/15/19  3:49 PM   Result Value Ref Range    Color YELLOW/STRAW      Appearance TURBID (A) CLEAR      Specific gravity 1.021 1.003 - 1.030      pH (UA) 6.0 5.0 - 8.0      Protein 30 (A) NEG mg/dL    Glucose NEGATIVE  NEG mg/dL    Ketone NEGATIVE  NEG mg/dL    Bilirubin NEGATIVE  NEG      Blood SMALL (A) NEG      Urobilinogen 0.2 0.2 - 1.0 EU/dL    Nitrites POSITIVE (A) NEG      Leukocyte Esterase LARGE (A) NEG      WBC >100 (H) 0 - 4 /hpf    RBC 5-10 0 - 5 /hpf    Epithelial cells FEW FEW /lpf    Bacteria 3+ (A) NEG /hpf    UA:UC IF INDICATED URINE CULTURE ORDERED (A) CNI     TYPE & SCREEN    Collection Time: 05/15/19  3:49 PM   Result Value Ref Range    Crossmatch Expiration 05/29/2019     ABO/Rh(D) O POSITIVE     Antibody screen NEG    CULTURE, URINE    Collection Time: 05/15/19  3:49 PM   Result Value Ref Range    Special Requests: NO SPECIAL REQUESTS  Reflexed from [id number]        [place] Count >100,000  COLONIES/mL        Culture result: GRAM NEGATIVE RODS (A)         Assessment and Plan     Assessment/Plan:   1) OA Right Hip- surgery planned for 5/29      Labs and EKG reviewed. MRSA, Urine culture and Transferrin pending    Preoperative Clearance  Per RCRI, the patient has a 0.4% risk of cardiac death, nonfatal MI, nonfatal cardiac arrest based on no risk factors. Per ACC/AHA guidelines, patient is low risk for a(n) intermediate risk surgery and may proceed to planned surgery with the above noted risk.     Adriel Card NP

## 2019-05-17 LAB
BACTERIA SPEC CULT: ABNORMAL
CC UR VC: ABNORMAL
SERVICE CMNT-IMP: ABNORMAL
TRANSFERRIN SERPL-MCNC: 243 MG/DL (ref 200–370)

## 2019-05-17 NOTE — PERIOP NOTES
Patient notified of + urine culture and antibiotic called in to pharmacy. Instructions for taking antibiotic given. Understanding verbalized. Instructed on methods to prevent UTI. Questions answered.

## 2019-05-28 ENCOUNTER — ANESTHESIA EVENT (OUTPATIENT)
Dept: SURGERY | Age: 84
DRG: 470 | End: 2019-05-28
Payer: MEDICARE

## 2019-05-29 ENCOUNTER — HOSPITAL ENCOUNTER (INPATIENT)
Age: 84
LOS: 2 days | Discharge: HOME | DRG: 470 | End: 2019-05-31
Attending: ORTHOPAEDIC SURGERY | Admitting: ORTHOPAEDIC SURGERY
Payer: MEDICARE

## 2019-05-29 ENCOUNTER — ANESTHESIA (OUTPATIENT)
Dept: SURGERY | Age: 84
DRG: 470 | End: 2019-05-29
Payer: MEDICARE

## 2019-05-29 ENCOUNTER — APPOINTMENT (OUTPATIENT)
Dept: GENERAL RADIOLOGY | Age: 84
DRG: 470 | End: 2019-05-29
Attending: PHYSICIAN ASSISTANT
Payer: MEDICARE

## 2019-05-29 DIAGNOSIS — M16.11 PRIMARY OSTEOARTHRITIS OF RIGHT HIP: Primary | ICD-10-CM

## 2019-05-29 LAB
GLUCOSE BLD STRIP.AUTO-MCNC: 169 MG/DL (ref 65–100)
GLUCOSE BLD STRIP.AUTO-MCNC: 195 MG/DL (ref 65–100)
GLUCOSE BLD STRIP.AUTO-MCNC: 205 MG/DL (ref 65–100)
SERVICE CMNT-IMP: ABNORMAL

## 2019-05-29 PROCEDURE — 76210000034 HC AMBSU PH I REC 0.5 TO 1 HR: Performed by: ORTHOPAEDIC SURGERY

## 2019-05-29 PROCEDURE — 74011000250 HC RX REV CODE- 250: Performed by: ORTHOPAEDIC SURGERY

## 2019-05-29 PROCEDURE — 0SR9029 REPLACEMENT OF RIGHT HIP JOINT WITH METAL ON POLYETHYLENE SYNTHETIC SUBSTITUTE, CEMENTED, OPEN APPROACH: ICD-10-PCS | Performed by: ORTHOPAEDIC SURGERY

## 2019-05-29 PROCEDURE — 77010033678 HC OXYGEN DAILY

## 2019-05-29 PROCEDURE — C1713 ANCHOR/SCREW BN/BN,TIS/BN: HCPCS | Performed by: ORTHOPAEDIC SURGERY

## 2019-05-29 PROCEDURE — 77030020788: Performed by: ORTHOPAEDIC SURGERY

## 2019-05-29 PROCEDURE — 77030018673: Performed by: ORTHOPAEDIC SURGERY

## 2019-05-29 PROCEDURE — 77030020782 HC GWN BAIR PAWS FLX 3M -B

## 2019-05-29 PROCEDURE — 77030002933 HC SUT MCRYL J&J -A: Performed by: ORTHOPAEDIC SURGERY

## 2019-05-29 PROCEDURE — 82962 GLUCOSE BLOOD TEST: CPT

## 2019-05-29 PROCEDURE — 77030035236 HC SUT PDS STRATFX BARB J&J -B: Performed by: ORTHOPAEDIC SURGERY

## 2019-05-29 PROCEDURE — 77030018723 HC ELCTRD BLD COVD -A: Performed by: ORTHOPAEDIC SURGERY

## 2019-05-29 PROCEDURE — 76060000065 HC AMB SURG ANES 2.5 TO 3 HR: Performed by: ORTHOPAEDIC SURGERY

## 2019-05-29 PROCEDURE — 74011636637 HC RX REV CODE- 636/637: Performed by: PHYSICIAN ASSISTANT

## 2019-05-29 PROCEDURE — 76030000022 HC AMB SURG 2.5 TO 3 HR INTENSV-TIER 1: Performed by: ORTHOPAEDIC SURGERY

## 2019-05-29 PROCEDURE — C1776 JOINT DEVICE (IMPLANTABLE): HCPCS | Performed by: ORTHOPAEDIC SURGERY

## 2019-05-29 PROCEDURE — 77030038587 HC LNR BOOT DISP ALLN -B: Performed by: ORTHOPAEDIC SURGERY

## 2019-05-29 PROCEDURE — 72170 X-RAY EXAM OF PELVIS: CPT

## 2019-05-29 PROCEDURE — 77030018836 HC SOL IRR NACL ICUM -A: Performed by: ORTHOPAEDIC SURGERY

## 2019-05-29 PROCEDURE — 77030020061 HC IV BLD WRMR ADMIN SET 3M -B: Performed by: ANESTHESIOLOGY

## 2019-05-29 PROCEDURE — 74011000272 HC RX REV CODE- 272: Performed by: ORTHOPAEDIC SURGERY

## 2019-05-29 PROCEDURE — 74011250637 HC RX REV CODE- 250/637: Performed by: ANESTHESIOLOGY

## 2019-05-29 PROCEDURE — 93005 ELECTROCARDIOGRAM TRACING: CPT

## 2019-05-29 PROCEDURE — 77030020263 HC SOL INJ SOD CL0.9% LFCR 1000ML: Performed by: ORTHOPAEDIC SURGERY

## 2019-05-29 PROCEDURE — 74011250636 HC RX REV CODE- 250/636

## 2019-05-29 PROCEDURE — 74011250636 HC RX REV CODE- 250/636: Performed by: PHYSICIAN ASSISTANT

## 2019-05-29 PROCEDURE — 77030039266 HC ADH SKN EXOFIN S2SG -A: Performed by: ORTHOPAEDIC SURGERY

## 2019-05-29 PROCEDURE — 77030006835 HC BLD SAW SAG STRY -B: Performed by: ORTHOPAEDIC SURGERY

## 2019-05-29 PROCEDURE — 77030012935 HC DRSG AQUACEL BMS -B: Performed by: ORTHOPAEDIC SURGERY

## 2019-05-29 PROCEDURE — 8E0YXBZ COMPUTER ASSISTED PROCEDURE OF LOWER EXTREMITY: ICD-10-PCS | Performed by: ORTHOPAEDIC SURGERY

## 2019-05-29 PROCEDURE — 74011000250 HC RX REV CODE- 250

## 2019-05-29 PROCEDURE — 77030029821: Performed by: ORTHOPAEDIC SURGERY

## 2019-05-29 PROCEDURE — 74011250637 HC RX REV CODE- 250/637: Performed by: PHYSICIAN ASSISTANT

## 2019-05-29 PROCEDURE — 77030011640 HC PAD GRND REM COVD -A: Performed by: ORTHOPAEDIC SURGERY

## 2019-05-29 PROCEDURE — 77030010785: Performed by: ORTHOPAEDIC SURGERY

## 2019-05-29 PROCEDURE — 65270000029 HC RM PRIVATE

## 2019-05-29 PROCEDURE — 74011250636 HC RX REV CODE- 250/636: Performed by: ANESTHESIOLOGY

## 2019-05-29 PROCEDURE — 77030013708 HC HNDPC SUC IRR PULS STRY –B: Performed by: ORTHOPAEDIC SURGERY

## 2019-05-29 PROCEDURE — 77030032490 HC SLV COMPR SCD KNE COVD -B

## 2019-05-29 PROCEDURE — 77030029829 HC TIB INST CKPNT DISP STRY -B: Performed by: ORTHOPAEDIC SURGERY

## 2019-05-29 PROCEDURE — 77030036660

## 2019-05-29 PROCEDURE — 77030031139 HC SUT VCRL2 J&J -A: Performed by: ORTHOPAEDIC SURGERY

## 2019-05-29 DEVICE — COMPONENT HIP CEM X3 POLYETH: Type: IMPLANTABLE DEVICE | Status: FUNCTIONAL

## 2019-05-29 DEVICE — TRIDENT II TRITANIUM CLUSTER 50D
Type: IMPLANTABLE DEVICE | Site: HIP | Status: FUNCTIONAL
Brand: TRIDENT II

## 2019-05-29 DEVICE — 0 DEGREE POLYETHYLENE INSERT
Type: IMPLANTABLE DEVICE | Site: HIP | Status: FUNCTIONAL
Brand: TRIDENT

## 2019-05-29 DEVICE — 6.5MM LOW PROFILE HEX SCREW 30MM
Type: IMPLANTABLE DEVICE | Site: HIP | Status: FUNCTIONAL
Brand: TRIDENT II

## 2019-05-29 DEVICE — CEMENT BNE SIMPLEX W/O GENT -- PK/10 ONLY: Type: IMPLANTABLE DEVICE | Site: HIP | Status: FUNCTIONAL

## 2019-05-29 DEVICE — V40 FEMORAL HEAD
Type: IMPLANTABLE DEVICE | Site: HIP | Status: FUNCTIONAL
Brand: LFIT

## 2019-05-29 DEVICE — 127 DEGREE CEMENTED HIP STEM
Type: IMPLANTABLE DEVICE | Site: HIP | Status: FUNCTIONAL
Brand: ACCOLADE

## 2019-05-29 DEVICE — 6.5MM LOW PROFILE HEX SCREW 35MM
Type: IMPLANTABLE DEVICE | Site: HIP | Status: FUNCTIONAL
Brand: TRIDENT II

## 2019-05-29 DEVICE — UNIVERSAL DISTAL CEMENT SPACER
Type: IMPLANTABLE DEVICE | Site: HIP | Status: FUNCTIONAL
Brand: OMNIFIT

## 2019-05-29 RX ORDER — GABAPENTIN 300 MG/1
300 CAPSULE ORAL
Status: DISCONTINUED | OUTPATIENT
Start: 2019-05-29 | End: 2019-05-31 | Stop reason: HOSPADM

## 2019-05-29 RX ORDER — INSULIN LISPRO 100 [IU]/ML
INJECTION, SOLUTION INTRAVENOUS; SUBCUTANEOUS
Status: DISCONTINUED | OUTPATIENT
Start: 2019-05-29 | End: 2019-05-31 | Stop reason: HOSPADM

## 2019-05-29 RX ORDER — BUPIVACAINE HYDROCHLORIDE 5 MG/ML
INJECTION, SOLUTION EPIDURAL; INTRACAUDAL AS NEEDED
Status: DISCONTINUED | OUTPATIENT
Start: 2019-05-29 | End: 2019-05-29 | Stop reason: HOSPADM

## 2019-05-29 RX ORDER — ACETAMINOPHEN 325 MG/1
650 TABLET ORAL EVERY 6 HOURS
Status: DISCONTINUED | OUTPATIENT
Start: 2019-05-29 | End: 2019-05-31 | Stop reason: HOSPADM

## 2019-05-29 RX ORDER — FELODIPINE 5 MG/1
5 TABLET, EXTENDED RELEASE ORAL
Status: DISCONTINUED | OUTPATIENT
Start: 2019-05-30 | End: 2019-05-31 | Stop reason: HOSPADM

## 2019-05-29 RX ORDER — DEXTROSE 50 % IN WATER (D50W) INTRAVENOUS SYRINGE
12.5-25 AS NEEDED
Status: DISCONTINUED | OUTPATIENT
Start: 2019-05-29 | End: 2019-05-31 | Stop reason: HOSPADM

## 2019-05-29 RX ORDER — LANOLIN ALCOHOL/MO/W.PET/CERES
10 CREAM (GRAM) TOPICAL
Status: DISCONTINUED | OUTPATIENT
Start: 2019-05-29 | End: 2019-05-31 | Stop reason: HOSPADM

## 2019-05-29 RX ORDER — MIDAZOLAM HYDROCHLORIDE 1 MG/ML
INJECTION, SOLUTION INTRAMUSCULAR; INTRAVENOUS AS NEEDED
Status: DISCONTINUED | OUTPATIENT
Start: 2019-05-29 | End: 2019-05-29 | Stop reason: HOSPADM

## 2019-05-29 RX ORDER — IBUPROFEN 800 MG/1
800 TABLET ORAL
Qty: 50 TAB | Refills: 2 | Status: SHIPPED | OUTPATIENT
Start: 2019-05-29

## 2019-05-29 RX ORDER — PROPOFOL 10 MG/ML
INJECTION, EMULSION INTRAVENOUS
Status: DISCONTINUED | OUTPATIENT
Start: 2019-05-29 | End: 2019-05-29 | Stop reason: HOSPADM

## 2019-05-29 RX ORDER — ONDANSETRON 2 MG/ML
4 INJECTION INTRAMUSCULAR; INTRAVENOUS
Status: ACTIVE | OUTPATIENT
Start: 2019-05-29 | End: 2019-05-30

## 2019-05-29 RX ORDER — OXYCODONE HYDROCHLORIDE 5 MG/1
10 TABLET ORAL
Status: DISCONTINUED | OUTPATIENT
Start: 2019-05-29 | End: 2019-05-31 | Stop reason: HOSPADM

## 2019-05-29 RX ORDER — ACETAMINOPHEN 500 MG
500-1000 TABLET ORAL
Qty: 60 TAB | Refills: 0 | Status: SHIPPED | OUTPATIENT
Start: 2019-05-29

## 2019-05-29 RX ORDER — CELECOXIB 100 MG/1
200 CAPSULE ORAL 2 TIMES DAILY
Status: DISCONTINUED | OUTPATIENT
Start: 2019-05-29 | End: 2019-05-31 | Stop reason: HOSPADM

## 2019-05-29 RX ORDER — DIPHENHYDRAMINE HYDROCHLORIDE 50 MG/ML
12.5 INJECTION, SOLUTION INTRAMUSCULAR; INTRAVENOUS
Status: ACTIVE | OUTPATIENT
Start: 2019-05-29 | End: 2019-05-30

## 2019-05-29 RX ORDER — ASPIRIN 81 MG/1
81 TABLET ORAL 2 TIMES DAILY
Qty: 60 TAB | Refills: 0 | Status: SHIPPED | OUTPATIENT
Start: 2019-05-29

## 2019-05-29 RX ORDER — POTASSIUM CHLORIDE 750 MG/1
10 TABLET, FILM COATED, EXTENDED RELEASE ORAL 2 TIMES DAILY
Status: DISCONTINUED | OUTPATIENT
Start: 2019-05-29 | End: 2019-05-31 | Stop reason: HOSPADM

## 2019-05-29 RX ORDER — CEFAZOLIN SODIUM/WATER 2 G/20 ML
2 SYRINGE (ML) INTRAVENOUS ONCE
Status: COMPLETED | OUTPATIENT
Start: 2019-05-29 | End: 2019-05-29

## 2019-05-29 RX ORDER — FAMOTIDINE 20 MG/1
20 TABLET, FILM COATED ORAL 2 TIMES DAILY
Status: DISCONTINUED | OUTPATIENT
Start: 2019-05-29 | End: 2019-05-31 | Stop reason: HOSPADM

## 2019-05-29 RX ORDER — ASPIRIN 81 MG/1
81 TABLET ORAL 2 TIMES DAILY
Status: DISCONTINUED | OUTPATIENT
Start: 2019-05-29 | End: 2019-05-31 | Stop reason: HOSPADM

## 2019-05-29 RX ORDER — SODIUM CHLORIDE 0.9 % (FLUSH) 0.9 %
5-40 SYRINGE (ML) INJECTION EVERY 8 HOURS
Status: DISCONTINUED | OUTPATIENT
Start: 2019-05-29 | End: 2019-05-31 | Stop reason: HOSPADM

## 2019-05-29 RX ORDER — FLUMAZENIL 0.1 MG/ML
0.2 INJECTION INTRAVENOUS
Status: DISCONTINUED | OUTPATIENT
Start: 2019-05-29 | End: 2019-05-29 | Stop reason: HOSPADM

## 2019-05-29 RX ORDER — LEVOTHYROXINE SODIUM 50 UG/1
100 TABLET ORAL
Status: DISCONTINUED | OUTPATIENT
Start: 2019-05-30 | End: 2019-05-31 | Stop reason: HOSPADM

## 2019-05-29 RX ORDER — ACETAMINOPHEN 325 MG/1
975 TABLET ORAL ONCE
Status: COMPLETED | OUTPATIENT
Start: 2019-05-29 | End: 2019-05-29

## 2019-05-29 RX ORDER — SODIUM CHLORIDE 9 MG/ML
125 INJECTION, SOLUTION INTRAVENOUS CONTINUOUS
Status: DISPENSED | OUTPATIENT
Start: 2019-05-29 | End: 2019-05-30

## 2019-05-29 RX ORDER — NALOXONE HYDROCHLORIDE 0.4 MG/ML
0.2 INJECTION, SOLUTION INTRAMUSCULAR; INTRAVENOUS; SUBCUTANEOUS
Status: DISCONTINUED | OUTPATIENT
Start: 2019-05-29 | End: 2019-05-29 | Stop reason: HOSPADM

## 2019-05-29 RX ORDER — PREGABALIN 75 MG/1
75 CAPSULE ORAL ONCE
Status: COMPLETED | OUTPATIENT
Start: 2019-05-29 | End: 2019-05-29

## 2019-05-29 RX ORDER — FENTANYL CITRATE 50 UG/ML
INJECTION, SOLUTION INTRAMUSCULAR; INTRAVENOUS AS NEEDED
Status: DISCONTINUED | OUTPATIENT
Start: 2019-05-29 | End: 2019-05-29 | Stop reason: HOSPADM

## 2019-05-29 RX ORDER — MAGNESIUM SULFATE 100 %
4 CRYSTALS MISCELLANEOUS AS NEEDED
Status: DISCONTINUED | OUTPATIENT
Start: 2019-05-29 | End: 2019-05-31 | Stop reason: HOSPADM

## 2019-05-29 RX ORDER — SODIUM CHLORIDE, SODIUM LACTATE, POTASSIUM CHLORIDE, CALCIUM CHLORIDE 600; 310; 30; 20 MG/100ML; MG/100ML; MG/100ML; MG/100ML
125 INJECTION, SOLUTION INTRAVENOUS CONTINUOUS
Status: DISCONTINUED | OUTPATIENT
Start: 2019-05-29 | End: 2019-05-29 | Stop reason: HOSPADM

## 2019-05-29 RX ORDER — NALOXONE HYDROCHLORIDE 0.4 MG/ML
0.4 INJECTION, SOLUTION INTRAMUSCULAR; INTRAVENOUS; SUBCUTANEOUS AS NEEDED
Status: DISCONTINUED | OUTPATIENT
Start: 2019-05-29 | End: 2019-05-31 | Stop reason: HOSPADM

## 2019-05-29 RX ORDER — AMOXICILLIN 250 MG
1 CAPSULE ORAL 2 TIMES DAILY
Status: DISCONTINUED | OUTPATIENT
Start: 2019-05-29 | End: 2019-05-31 | Stop reason: HOSPADM

## 2019-05-29 RX ORDER — HYDROMORPHONE HYDROCHLORIDE 1 MG/ML
0.5 INJECTION, SOLUTION INTRAMUSCULAR; INTRAVENOUS; SUBCUTANEOUS
Status: DISPENSED | OUTPATIENT
Start: 2019-05-29 | End: 2019-05-30

## 2019-05-29 RX ORDER — MIDAZOLAM HYDROCHLORIDE 1 MG/ML
2 INJECTION, SOLUTION INTRAMUSCULAR; INTRAVENOUS
Status: DISCONTINUED | OUTPATIENT
Start: 2019-05-29 | End: 2019-05-29 | Stop reason: HOSPADM

## 2019-05-29 RX ORDER — GABAPENTIN 100 MG/1
100 CAPSULE ORAL
Status: DISCONTINUED | OUTPATIENT
Start: 2019-05-30 | End: 2019-05-31 | Stop reason: HOSPADM

## 2019-05-29 RX ORDER — ROSUVASTATIN CALCIUM 10 MG/1
10 TABLET, COATED ORAL
Status: DISCONTINUED | OUTPATIENT
Start: 2019-05-29 | End: 2019-05-31 | Stop reason: HOSPADM

## 2019-05-29 RX ORDER — EPHEDRINE SULFATE 50 MG/ML
INJECTION, SOLUTION INTRAVENOUS AS NEEDED
Status: DISCONTINUED | OUTPATIENT
Start: 2019-05-29 | End: 2019-05-29 | Stop reason: HOSPADM

## 2019-05-29 RX ORDER — HYDROMORPHONE HYDROCHLORIDE 1 MG/ML
.25-1 INJECTION, SOLUTION INTRAMUSCULAR; INTRAVENOUS; SUBCUTANEOUS
Status: DISCONTINUED | OUTPATIENT
Start: 2019-05-29 | End: 2019-05-29 | Stop reason: HOSPADM

## 2019-05-29 RX ORDER — CEFAZOLIN SODIUM/WATER 2 G/20 ML
2 SYRINGE (ML) INTRAVENOUS EVERY 8 HOURS
Status: COMPLETED | OUTPATIENT
Start: 2019-05-29 | End: 2019-05-30

## 2019-05-29 RX ORDER — TRAMADOL HYDROCHLORIDE 50 MG/1
50 TABLET ORAL
Qty: 40 TAB | Refills: 0 | Status: SHIPPED | OUTPATIENT
Start: 2019-05-29 | End: 2019-06-05

## 2019-05-29 RX ORDER — ONDANSETRON 8 MG/1
4 TABLET, ORALLY DISINTEGRATING ORAL
Qty: 30 TAB | Refills: 0 | Status: SHIPPED | OUTPATIENT
Start: 2019-05-29

## 2019-05-29 RX ORDER — OXYCODONE HYDROCHLORIDE 5 MG/1
5 TABLET ORAL
Status: DISCONTINUED | OUTPATIENT
Start: 2019-05-29 | End: 2019-05-31 | Stop reason: HOSPADM

## 2019-05-29 RX ORDER — ATENOLOL 25 MG/1
50 TABLET ORAL
Status: DISCONTINUED | OUTPATIENT
Start: 2019-05-29 | End: 2019-05-31 | Stop reason: HOSPADM

## 2019-05-29 RX ORDER — POVIDONE-IODINE 10 %
SOLUTION, NON-ORAL TOPICAL AS NEEDED
Status: DISCONTINUED | OUTPATIENT
Start: 2019-05-29 | End: 2019-05-29 | Stop reason: HOSPADM

## 2019-05-29 RX ORDER — KETOROLAC TROMETHAMINE 30 MG/ML
15 INJECTION, SOLUTION INTRAMUSCULAR; INTRAVENOUS
Status: DISCONTINUED | OUTPATIENT
Start: 2019-05-29 | End: 2019-05-29 | Stop reason: HOSPADM

## 2019-05-29 RX ORDER — DIPHENHYDRAMINE HYDROCHLORIDE 50 MG/ML
12.5 INJECTION, SOLUTION INTRAMUSCULAR; INTRAVENOUS AS NEEDED
Status: DISCONTINUED | OUTPATIENT
Start: 2019-05-29 | End: 2019-05-29 | Stop reason: HOSPADM

## 2019-05-29 RX ORDER — FENTANYL CITRATE 50 UG/ML
25 INJECTION, SOLUTION INTRAMUSCULAR; INTRAVENOUS
Status: DISCONTINUED | OUTPATIENT
Start: 2019-05-29 | End: 2019-05-29 | Stop reason: HOSPADM

## 2019-05-29 RX ORDER — FACIAL-BODY WIPES
10 EACH TOPICAL DAILY PRN
Status: DISCONTINUED | OUTPATIENT
Start: 2019-05-31 | End: 2019-05-31 | Stop reason: HOSPADM

## 2019-05-29 RX ORDER — SODIUM CHLORIDE 0.9 % (FLUSH) 0.9 %
5-40 SYRINGE (ML) INJECTION AS NEEDED
Status: DISCONTINUED | OUTPATIENT
Start: 2019-05-29 | End: 2019-05-31 | Stop reason: HOSPADM

## 2019-05-29 RX ORDER — POLYETHYLENE GLYCOL 3350 17 G/17G
17 POWDER, FOR SOLUTION ORAL DAILY
Status: DISCONTINUED | OUTPATIENT
Start: 2019-05-30 | End: 2019-05-31 | Stop reason: HOSPADM

## 2019-05-29 RX ORDER — CELECOXIB 100 MG/1
100 CAPSULE ORAL ONCE
Status: COMPLETED | OUTPATIENT
Start: 2019-05-29 | End: 2019-05-29

## 2019-05-29 RX ORDER — LIDOCAINE HYDROCHLORIDE 10 MG/ML
0.1 INJECTION, SOLUTION EPIDURAL; INFILTRATION; INTRACAUDAL; PERINEURAL AS NEEDED
Status: DISCONTINUED | OUTPATIENT
Start: 2019-05-29 | End: 2019-05-29 | Stop reason: HOSPADM

## 2019-05-29 RX ORDER — GLIPIZIDE 2.5 MG/1
2.5 TABLET, EXTENDED RELEASE ORAL
Status: DISCONTINUED | OUTPATIENT
Start: 2019-05-30 | End: 2019-05-31 | Stop reason: HOSPADM

## 2019-05-29 RX ADMIN — EPHEDRINE SULFATE 10 MG: 50 INJECTION, SOLUTION INTRAVENOUS at 13:32

## 2019-05-29 RX ADMIN — EPHEDRINE SULFATE 10 MG: 50 INJECTION, SOLUTION INTRAVENOUS at 14:14

## 2019-05-29 RX ADMIN — FENTANYL CITRATE 50 MCG: 50 INJECTION, SOLUTION INTRAMUSCULAR; INTRAVENOUS at 13:05

## 2019-05-29 RX ADMIN — ACETAMINOPHEN 650 MG: 325 TABLET ORAL at 18:19

## 2019-05-29 RX ADMIN — PREGABALIN 75 MG: 75 CAPSULE ORAL at 12:04

## 2019-05-29 RX ADMIN — PROPOFOL 70 MCG/KG/MIN: 10 INJECTION, EMULSION INTRAVENOUS at 13:47

## 2019-05-29 RX ADMIN — ROSUVASTATIN CALCIUM 10 MG: 10 TABLET, COATED ORAL at 20:46

## 2019-05-29 RX ADMIN — ACETAMINOPHEN 975 MG: 325 TABLET ORAL at 12:04

## 2019-05-29 RX ADMIN — SODIUM CHLORIDE, POTASSIUM CHLORIDE, SODIUM LACTATE AND CALCIUM CHLORIDE: 600; 310; 30; 20 INJECTION, SOLUTION INTRAVENOUS at 12:50

## 2019-05-29 RX ADMIN — SODIUM CHLORIDE, POTASSIUM CHLORIDE, SODIUM LACTATE AND CALCIUM CHLORIDE: 600; 310; 30; 20 INJECTION, SOLUTION INTRAVENOUS at 14:27

## 2019-05-29 RX ADMIN — HYDROMORPHONE HYDROCHLORIDE 0.5 MG: 1 INJECTION, SOLUTION INTRAMUSCULAR; INTRAVENOUS; SUBCUTANEOUS at 23:33

## 2019-05-29 RX ADMIN — MIDAZOLAM HYDROCHLORIDE 2 MG: 1 INJECTION, SOLUTION INTRAMUSCULAR; INTRAVENOUS at 13:05

## 2019-05-29 RX ADMIN — GABAPENTIN 300 MG: 300 CAPSULE ORAL at 20:48

## 2019-05-29 RX ADMIN — MELATONIN TAB 3 MG 10.5 MG: 3 TAB at 20:46

## 2019-05-29 RX ADMIN — EPHEDRINE SULFATE 10 MG: 50 INJECTION, SOLUTION INTRAVENOUS at 14:56

## 2019-05-29 RX ADMIN — INSULIN LISPRO 2 UNITS: 100 INJECTION, SOLUTION INTRAVENOUS; SUBCUTANEOUS at 23:27

## 2019-05-29 RX ADMIN — Medication 10 ML: at 18:21

## 2019-05-29 RX ADMIN — Medication 2 G: at 13:48

## 2019-05-29 RX ADMIN — POTASSIUM CHLORIDE 10 MEQ: 750 TABLET, EXTENDED RELEASE ORAL at 18:19

## 2019-05-29 RX ADMIN — Medication 10 ML: at 23:34

## 2019-05-29 RX ADMIN — CELECOXIB 100 MG: 100 CAPSULE ORAL at 12:04

## 2019-05-29 RX ADMIN — ASPIRIN 81 MG: 81 TABLET, COATED ORAL at 18:19

## 2019-05-29 RX ADMIN — EPHEDRINE SULFATE 10 MG: 50 INJECTION, SOLUTION INTRAVENOUS at 14:44

## 2019-05-29 RX ADMIN — FAMOTIDINE 20 MG: 20 TABLET, FILM COATED ORAL at 18:19

## 2019-05-29 RX ADMIN — CELECOXIB 200 MG: 100 CAPSULE ORAL at 20:46

## 2019-05-29 RX ADMIN — ACETAMINOPHEN 650 MG: 325 TABLET ORAL at 23:27

## 2019-05-29 RX ADMIN — Medication 2 G: at 20:48

## 2019-05-29 RX ADMIN — SODIUM CHLORIDE 125 ML/HR: 900 INJECTION, SOLUTION INTRAVENOUS at 16:47

## 2019-05-29 RX ADMIN — ATENOLOL 50 MG: 25 TABLET ORAL at 20:46

## 2019-05-29 RX ADMIN — SENNOSIDES AND DOCUSATE SODIUM 1 TABLET: 8.6; 5 TABLET ORAL at 18:19

## 2019-05-29 RX ADMIN — OXYCODONE HYDROCHLORIDE 5 MG: 5 TABLET ORAL at 20:45

## 2019-05-29 RX ADMIN — BUPIVACAINE HYDROCHLORIDE 2.6 ML: 5 INJECTION, SOLUTION EPIDURAL; INTRACAUDAL at 13:14

## 2019-05-29 RX ADMIN — EPHEDRINE SULFATE 10 MG: 50 INJECTION, SOLUTION INTRAVENOUS at 14:23

## 2019-05-29 NOTE — OP NOTES
OPERATIVE REPORT     Admit Date: 5/29/2019  Admit Diagnosis: Primary osteoarthritis of right hip [M16.11]  Preoperative Diagnosis: DJD RIGHT HIP  Postoperative Diagnosis: DJD RIGHT HIP    Procedure: Procedure(s):  RIGHT TOTAL HIP ARTHROPLASTY MCKENZIE  Surgeon: Walt Hollis MD  Assistant(s): Kaylyn Yan PA-C  Anesthesia: Spinal   Estimated Blood Loss: 300  Specimens: * No specimens in log *   Complications: None        INDICATIONS:    The patient is a 82 y.o., female who has complained of a long history of right hip pain / groin pain. The patient has failed conservative treatment to include medical management / therapy and presents for definitive operative care. Informed consent was obtained including a discussion of the risks and benefits, which include, but are not limited to, bleeding, infection, neurovascular damage, wound complications, pain and stiffness in the hip, periprosthetic loosening, fracture, dislocation and venous thrombo-embolic disease, the patient consented for the procedure. DESCRIPTION OF PROCEDURE:       The proper side and hip were identified and signed in the preoperative holding area. All questions were answered. The patient was given Ancef preop for an antibiotic. After adequate anesthesia, the patient was positioned supine on the [place] table, the feet were well padded in the boots. The hip was then prepped and draped in sterile fashion. The contralateral tracking array was placed. A direct anterior approach was used through skin and the tensor fascia. The interval between the Tensor Fascia and Sartorius was used and retractors were placed in an atraumatic fashion. The lateral femoral circumflex artery and vein were identified and coagulated. The proximal and distal capsule was identified and excised. A tracking array was placed in the proximal greater trochanter. The leg length and offset were verified with the MAKOplasty probe.  A standard neck cut was made according to the implant geometry. The femoral head was removed. Further soft tissue was debrided and medial capsule along the neck cut released. Acetabular exposure was then obtained and the labrum was excised along with the foveal contents. Registration and acetabular mapping was performed. Once registration was complete and all soft tissues were removed the planned acetabular reamer was used in conjunction with the MAKOplasty arm. Remaining bone and osteophyte was removed, cysts were bone grafted as necessary. The final cup was then impacted in place with haptic guidance and rigid robotic arm assistance. There were two screws placed. The liner was then placed. The femur was then exposed, residual soft tissue was removed and the box osteotome was used along with blunt rounded canal finder. Sequential broaching was started with the opening broach and then the zero broach and broached up to the final implant size. A cement restrictor was placed and all bony surfaces irrigated. The cement was pressurized and the implant with a centralizer was placed. The cement was allowed to completely harden. A trial head was placed then the reduction was performed. Leg lengths and offset were verified. The final head was then impacted in place and thorough head and neck irrigation, the leg length was verified again. The femoral tracker was removed. The wound was copiously irrigated with 1L of dilute betadine solution 5%. The interval between the tensor and Sartorius was closed with 0-Vicryl and running stratafix suture. The sub-Q was closed with with 2-0 Vicryl, 4-0 monocryl and dermabond. The pin sites were closed with 4-0 Monocryl. A sterile dressing was applied. The patient was awoken from anesthesia and taken to the recovery room in a stable condiition. OPERATIVE FINDINGS : Severe arthritis of the hip. IMPLANTS :   Implant Name Type Inv.  Item Serial No.  Lot No. LRB No. Used Action   trident II tritanium clusterhole acetabular shell 50mm d    [employer] [id number] Right 1 Implanted   SPACER ANNABELLA BNE DSTL OMFIT 11MM -- OSTEONICS - SNA  SPACER ANNABELLA BNE DSTL OMFIT 11MM -- OSTEONICS [employer] HOWM [id number] Right 1 Implanted   STEM FEM ANNABELLA ANGLED 127DEG NEC --  - SNA  STEM FEM ANNABELLA ANGLED 127DEG NEC --  NA [employer] HOWM [id number] Right 1 Implanted   6.5mm low profile hex screw 6.5mm diam 35mm legnth Screw  [employer] [id number] Right 1 Implanted   6.5mm low profile hex screw 6.5mm diam 35mm legnth Screw  NA [employer] [id number] Right 1 Implanted       JUSTIFICATION FOR SURGICAL ASSISTANT:   Surgical Assistant, was requried and necessary in this case, to help with soft tissue retraction, extremity positioning, equiment management, implant management, and wound closure.       Yolande Barajas MD

## 2019-05-29 NOTE — ANESTHESIA POSTPROCEDURE EVALUATION
Procedure(s):  RIGHT TOTAL HIP ARTHROPLASTY MCKENZIE. spinal, MAC, regional    Anesthesia Post Evaluation      Multimodal analgesia: multimodal analgesia used between 6 hours prior to anesthesia start to PACU discharge  Patient location during evaluation: PACU  Patient participation: complete - patient participated  Level of consciousness: awake and alert  Pain score: 0  Airway patency: patent  Anesthetic complications: no  Cardiovascular status: acceptable  Respiratory status: acceptable  Hydration status: acceptable  Post anesthesia nausea and vomiting:  none      Vitals Value Taken Time   /84 5/29/2019  4:50 PM   Temp 37.5 °C (99.5 °F) 5/29/2019  4:33 PM   Pulse 84 5/29/2019  4:52 PM   Resp 12 5/29/2019  4:52 PM   SpO2 92 % 5/29/2019  4:52 PM   Vitals shown include unvalidated device data.

## 2019-05-29 NOTE — ANESTHESIA PROCEDURE NOTES
Spinal Block    Start time: 5/29/2019 1:05 PM  End time: 5/29/2019 1:14 PM  Performed by: Yareli Taylor CRNA  Authorized by: Arabella Kay MD     Pre-procedure: Indications: at surgeon's request and primary anesthetic  Preanesthetic Checklist: patient identified, risks and benefits discussed, anesthesia consent, site marked, patient being monitored and timeout performed    Timeout Time: 13:05          Spinal Block:   Patient Position:  Seated  Prep Region:  Lumbar  Prep: chlorhexidine      Location:  L3-4  Technique:  Single shot        Needle:   Needle Type:  Pencan  Needle Gauge:  25 G  Attempts:  1      Events: CSF confirmed, no blood with aspiration and no paresthesia        Assessment:  Insertion:  Uncomplicated  Patient tolerance:  Patient tolerated the procedure well with no immediate complications  Block inserted by TARA Vazquez with Dr Joana Treadwell in attendance.

## 2019-05-29 NOTE — DISCHARGE SUMMARY
Total Hip Replacement Home Discharge Summary    Patient ID:  Elvan Osgood  1935  82 y.o.  [id number]    Admit date: 5/29/2019    Discharge date and time: No discharge date for patient encounter. Admitting Physician: Jose Mcmahon MD     Admission Diagnoses: Primary osteoarthritis of right hip [M16.11]    Discharge Diagnoses: Active Problems:    Primary osteoarthritis of right hip (5/29/2019)        Joey Puente MD      HOSPITAL COURSE:  Elvan Osgood was admitted on5/29/2019 and underwent successful total hip replacement. The patient was transferred to the orthopaedic floor in stable condition. The patient received the appropriate therapy while in the hospital.  Venous thrombo-embolic prophylaxis included mechanical prophylaxis and ASA 81 mg. The incision site remained clean and dry throughout the hospital stay. The patient remained neurovascularly intact. At the time of discharge, the patient was able to demonstrate an understanding of the explicit discharge precautions and instructions following surgery. Important in Hospital Events : none    Post Op complications: None    Current Discharge Medication List      START taking these medications    Details   aspirin delayed-release 81 mg tablet Take 1 Tab by mouth two (2) times a day. Qty: 60 Tab, Refills: 0      ibuprofen (MOTRIN) 800 mg tablet Take 1 Tab by mouth every six (6) hours as needed for Pain. Qty: 50 Tab, Refills: 2      acetaminophen (TYLENOL EXTRA STRENGTH) 500 mg tablet Take 1-2 Tabs by mouth every six (6) hours as needed for Pain. Not to exceed 4,000mg in any 24 hour period  Indications: Pain  Qty: 60 Tab, Refills: 0      ondansetron (ZOFRAN ODT) 8 mg disintegrating tablet Take 0.5 Tabs by mouth every eight (8) hours as needed for Nausea.   Qty: 30 Tab, Refills: 0      traMADol (ULTRAM) 50 mg tablet Take 1 Tab by mouth every six (6) hours as needed for Pain (Take for breakthrough pain if Oxycodone is not working or when transitioning off Oxycodone) for up to 7 days. Max Daily Amount: 200 mg. Indications: Pain, Post-op Pain, Diagnosis Hip and Knee Arthritis ICD 10 - M16.9  Qty: 40 Tab, Refills: 0    Associated Diagnoses: Primary osteoarthritis of right hip         CONTINUE these medications which have NOT CHANGED    Details   rosuvastatin (CRESTOR) 10 mg tablet Take 10 mg by mouth nightly. cholecalciferol (VITAMIN D3) 1,000 unit cap Take 1,000 Units by mouth daily (after breakfast). raNITIdine hcl 150 mg capsule Take 150 mg by mouth daily (after breakfast). potassium chloride SA (MICRO-K) 10 mEq capsule Take 10 mEq by mouth ACB/HS. lisinopril (PRINIVIL, ZESTRIL) 20 mg tablet Take 20 mg by mouth daily (after breakfast). hydrochlorothiazide (HYDRODIURIL) 25 mg tablet Take 25 mg by mouth daily (after breakfast). ibuprofen/diphenhydramine HCl (ADVIL PM LIQUI-GELS PO) Take 200 mg by mouth nightly. melatonin 10 mg cap Take 1 Cap by mouth nightly. gluc ryder/chondro ryder A/vit C/Mn (GLUCOSAMINE 1500 COMPLEX PO) Take 1 Cap by mouth ACB/HS. glipiZIDE SR (GLUCOTROL XL) 2.5 mg CR tablet Take 2.5 mg by mouth daily (after breakfast). ascorbic acid, vitamin C, (VITAMIN C) 500 mg tablet Take 500 mg by mouth daily (after breakfast). docosahexanoic acid/epa (FISH OIL PO) Take 1,500 mg by mouth ACB/HS. atenolol (TENORMIN) 50 mg tablet Take 50 mg by mouth ACB/HS. FELODIPINE (PLENDIL PO) Take 5 mg by mouth daily (after breakfast). levothyroxine (LEVOTHROID) 100 mcg tablet Take 100 mcg by mouth every morning.          STOP taking these medications       ASPIRIN/DIPYRIDAMOLE (AGGRENOX PO) Comments:   Reason for Stopping:               Discharged to: Home    Follow-up : Scheduled for 2 weeks post-op      Signed:  Liban Blackwood MD  5/29/2019  12:11 PM

## 2019-05-29 NOTE — PERIOP NOTES
TRANSFER - OUT REPORT:    Verbal report given to Blane Navas RN(name) on Winnie Brown  being transferred to room 436(unit) for routine progression of care       Report consisted of patients Situation, Background, Assessment and   Recommendations(SBAR). Information from the following report(s) Procedure Summary, Intake/Output and Recent Results was reviewed with the receiving nurse. Lines:   Peripheral IV 05/29/19 Left Hand (Active)   Site Assessment Clean, dry, & intact 5/29/2019  5:15 PM   Phlebitis Assessment 0 5/29/2019  5:15 PM   Infiltration Assessment 0 5/29/2019  5:15 PM   Dressing Status Clean, dry, & intact 5/29/2019  5:15 PM   Dressing Type Tape;Transparent 5/29/2019  5:15 PM   Hub Color/Line Status Pink; Infusing 5/29/2019  5:15 PM   Alcohol Cap Used Yes 5/29/2019  5:15 PM        Opportunity for questions and clarification was provided. Patient transported with:   Registered Nurse Phone report given to RN. Patient resting comfortably and family at bedside.

## 2019-05-29 NOTE — DISCHARGE INSTRUCTIONS
TOTAL HIP DISCHARGE INSTRUCTIONS    Patient: Osmar Gastelum MRN: [id number]  SSN: [id number]              Please take the time to review the following instructions before you leave the hospital and use them as guidelines during your recovery from surgery. If you have any questions you may contact my office at [phone]  After business hours or during the weekend you can contact me through [address] or text / call at [phone] (cell phone) for emergency's. Please use the office number during regular business hours. SPECIAL INSTRUCTIONS :   1. Do not bend greater than 90 degrees at the hip for 4 weeks following your discharge  2. Avoid exercises or activities which bring the leg out or away from the mid-line of the body. The surgical repair involves this muscle and it will require 4 weeks to heal. You may disregard these instructions for a direct anterior approach. 3. You may walk as tolerated and are encouraged to work daily on progressing your activities with a walker initially. 4. You may transition to a cane for walking 5-7 days from surgery once you feel safe. You may use a walker for longer periods if you feel unstable. 5. Call my office for routine questions M-F at [phone]. You may contact me directly through [address] [place] [address] if there are specific questions or text / call using my cell number [phone]. Wound Care/ Dressing Changes:  DRESSING :   AQUACEL Dressings : This should be removed by physical therapy or you may remove this yourself 7 days after the date of your surgery. If there is no drainage, then a simple dressing may be used or no dressing at all. Other dressing options can be purchased over the counter at a local pharmacy or medical supply vendor. A porous adhesive dressing such as pictured above can be purchased at a local [employer] or [employer]. You only need to keep the incision covered for 7 days after showers.  A dressing may be used for longer if there are issues with clothing clinging to the incision. Showering/ Bathing: You may shower with the Silverlon dressing in place. This is left in place for 7 days following discharge from the hospital. If your incision is dry without drainage you may shower following your discharge home. After 7 days your dressing should be removed for showering. It is fine to have water run over the incision. Do not vigorously scrub your incision. Apply a clean, dry dressing after you have dried your incision. Do not take a bath or get into a swimming pool / [facility] until you follow up with Dr. Lisa Chandler. Do not soak your incision under water. If there is continued drainage or you are concerned contact Dr Antonio Torres office prior to showering [phone] ext [phone] . Diet:  You may advance to your regular diet as tolerated. Increase your clear liquid intake for the next 2-3 days. Medication:      1. You will be given prescriptions for pain medication when you are discharged from the hospital. The side effects of these medications can be substantial and the narcotic medications are not mandatory. You may substitute these medications with Tylenol or Alleve / Motrin. 2. Please use the medications as prescribed. Pain medications may cause constipation- Colace twice daily and Miralax one scoop daily while taking the narcotic medication should help prevent constipation. Please discuss with your local pharmacist regarding increasing this dosage if constipation persists. Other possible side effects of pain medication are dizziness, headache, nausea, vomiting, and urinary retention. Discontinue the pain medication if you develop itching, rash, shortness of breath, or difficulties swallowing. If these symptoms become severe or are not relieved by discontinuing the medication, you should seek immediate medical attention. 3. Refills of pain medication are authorized during office hours only (8 AM- 5 PM  Monday thru Friday).  Many of these medication will require you or a family member to pick-up a physical prescription at the office. 4. Medications other than antiinflammatories will not be called into the pharmacy after business hours. 5. You may resume the medication(s) you were taking prior to your surgery. Narcotics may change the effects of some antidepressant medication(s). If you have any questions about possible interactions between your regular medications and the pain medication, you should ask the pharmacist or contact the prescribing physician. 6. If you have constipation which is not improved by oral stool softeners then a Ducolax suppository should be purchased over the counter. 7. Continue the blood thinner (Aspirin or Lovenox) for a total of 30 days following surgery. Follow up appointment:    Please call our office at [phone] for your follow up appointment. This should be scheduled 14 days following the date of surgery. Physical Therapy / Nursing:    Physical Therapy following surgery will be arranged as an outpatient or at home. They have specific instructions for rehab and wound care. It is fine to have physical therapy remove your dressing at 7 days following surgery. Returning to work:    Normal return to work is 6-12 weeks following surgery. Depending on your progression following surgery and specific job duties you may take longer for a full return to work. DRIVING    You should not return to driving until you are off all opioid pain medications and able to safely and quickly apply the brakes. This is normally 2-6 weeks for left sided surgery and 2-8 weeks for right sided surgery. Important Signs and Symptoms:    If any of the following signs or symptoms occur, you should contact Dr. Taylor Lynch office.   Please be advised if a problem arises which you feel requires immediate medical attention or you are unable to contact Dr. Taylor Lynch office you should seek immediate medical attention at the ER or other health care facility you have access to.    1. A sudden increase in swelling and/or redness or warmth at the area your surgery was performed which isnt relieved by rest, ice, and elevation. 2. Oral temperature greater than 101 degrees for 12 hours or more which isnt relieved by an increase in fluid intake and taking 2 Tylenol every 4-6 hours. 3. Excessive drainage from your incisions, or drainage which hasnt stopped by 72 hours after your surgery. 4. Fever, chills, shortness of breath, chest pain, nausea, vomiting or other signs and symptoms which are of concern to you. frequently asked questions   What should I take for pain?  o In general you will be discharged with three medications for pain (Extra Strength Tylenol, Oxycodone 5mg and Tramadol). Gabapentin is also used for pain and taken as directed (100mg in the am and 300mg prior to bed at night). This may vary slightly depending on what you were taking in the hospital. USE ICE regularly, this is the most important modality for controlling pain. Scheduled Medications :      1. Tylenol 1 tablet (500mg) to 2 tablets (1000mg) every 4 hours. This should not exceed 4000mg in a 24 hour period. 2. Ibuprofen 800mg every 8 hours x 30 days. 3. Gabapentin 1 tablet (100 mg) in the morning with breakfast and 3 tablets (300 mg) at night before bed. Once you decide to discontinue this medication, you should taper off over a period of greater than 7 days    Break through Pain Medications :       1st Line - Oxycodone 1 (5mg) - 2 (10mg) every 4 hours (Or as directed), take these between Tylenol / Ibuprofen doses if your pain is not below 4 / 10. This may be needed only for several days following your discharge. Extra Strength Tylenol 1-2 tablets (500-1000mg) every 4-6 hrs. 2nd Line - Tramadol 50mg Every 8 hours for pain if not improving with the Tylenol and Oxycodone.               When should I call for advice regarding my pain?  o After 12 hours on the above regimen, if nothing is working call the office [phone], contact me through [address] or text / call my cell after hours [phone].  Can I get refills?  o Opioid refills are provided for the first 6 weeks following surgery. o Try Tylenol 500- 1000 mg along with Alleve 220-440mg (every 12 hours) or Motrin 200-800mg (every 6-8 hours) during the majority of the day. - Save the opioid pain medications for physical therapy (1 hour prior) and before sleeping at night. - Keep in mind you need to discontinue these medications prior to returning to driving.  Is swelling normal?  o Almost everyone has some degree of swelling following surgery. o Following hip and knee replacement surgery, swelling can be normal below the incision for the first 1-2 weeks. - This swelling peaks around 5-7 days after surgery.   - You may have some bruising around the back of the thigh, calf and even into the foot. - Use ice daily   What should I do for the swelling?  o Ice, Ice, Ice  o Keep the limb elevated. o Apply compression socks (knee high for total knees and up to the mid-thigh for total hips.   o Heat may also be applied, choose the modality that makes you the most comfortable.  How long should I remain on blood thinners following surgery?  o Thirty days   When can I drive?  o Once you have stopped using regular narcotic pain medications (Percocet, Lortab, etc.) and can safely apply the brakes without hesitation.  When can I shower? o 48-72hours following surgery if the incision is dry.  o No submersion of the incision, bathing or swimming for 14 days following surgery or until cleared by Dr Diana Nice.  What do I do with the dressing when I shower?  o The dressing can be removed after 7 days. o The incision is sealed with Dermabond (Biologic glue) and except for wounds which are draining should be watertight.  How active should I be following surgery?   o Progress activities in moderation at your own pace.   o Walk each day and set progressive goals with small increments (1st week - ½ block of walking, 2nd week - 1 block, 3rd week - 2 blocks, etc.)     Please do not hesitate to contact me through [employer] or by text / call me at [phone] (cell phone) for questions following surgery - MD Phong Chavez MD  Cell [phone]  Daryn Gilliland [address] [phone]  Medical Staff : Fadi Ward or Tomasz Rawls @ [phone]

## 2019-05-29 NOTE — ANESTHESIA PREPROCEDURE EVALUATION
Relevant Problems   No relevant active problems       Anesthetic History   No history of anesthetic complications            Review of Systems / Medical History  Patient summary reviewed and pertinent labs reviewed    Pulmonary                   Neuro/Psych         Psychiatric history (short term memory issues (per daughter))  Pertinent negatives: No TIA and CVA   Cardiovascular    Hypertension: well controlled              Exercise tolerance: >4 METS     GI/Hepatic/Renal     GERD           Endo/Other    Diabetes: well controlled, type 2  Hypothyroidism  Arthritis     Other Findings   Comments: OA of Hip         Physical Exam    Airway  Mallampati: II    Neck ROM: normal range of motion   Mouth opening: Normal     Cardiovascular    Rhythm: regular  Rate: normal         Dental  No notable dental hx       Pulmonary  Breath sounds clear to auscultation               Abdominal  GI exam deferred       Other Findings            Anesthetic Plan    ASA: 2  Anesthesia type: spinal and MAC          Induction: Intravenous  Anesthetic plan and risks discussed with: Patient

## 2019-05-29 NOTE — PROGRESS NOTES
Bedside shift change report given to [facility] (oncoming nurse) by Stewart Holloway (offgoing nurse). Report included the following information SBAR, Kardex and Procedure Summary.

## 2019-05-30 LAB
ANION GAP SERPL CALC-SCNC: 4 MMOL/L (ref 5–15)
BUN SERPL-MCNC: 19 MG/DL (ref 6–20)
BUN/CREAT SERPL: 23 (ref 12–20)
CALCIUM SERPL-MCNC: 8 MG/DL (ref 8.5–10.1)
CHLORIDE SERPL-SCNC: 102 MMOL/L (ref 97–108)
CO2 SERPL-SCNC: 29 MMOL/L (ref 21–32)
CREAT SERPL-MCNC: 0.83 MG/DL (ref 0.55–1.02)
GLUCOSE BLD STRIP.AUTO-MCNC: 177 MG/DL (ref 65–100)
GLUCOSE BLD STRIP.AUTO-MCNC: 181 MG/DL (ref 65–100)
GLUCOSE BLD STRIP.AUTO-MCNC: 212 MG/DL (ref 65–100)
GLUCOSE BLD STRIP.AUTO-MCNC: 260 MG/DL (ref 65–100)
GLUCOSE SERPL-MCNC: 201 MG/DL (ref 65–100)
HGB BLD-MCNC: 10.9 G/DL (ref 11.5–16)
POTASSIUM SERPL-SCNC: 3.7 MMOL/L (ref 3.5–5.1)
SERVICE CMNT-IMP: ABNORMAL
SODIUM SERPL-SCNC: 135 MMOL/L (ref 136–145)

## 2019-05-30 PROCEDURE — 74011250636 HC RX REV CODE- 250/636: Performed by: PHYSICIAN ASSISTANT

## 2019-05-30 PROCEDURE — 65270000029 HC RM PRIVATE

## 2019-05-30 PROCEDURE — 97116 GAIT TRAINING THERAPY: CPT

## 2019-05-30 PROCEDURE — 85018 HEMOGLOBIN: CPT

## 2019-05-30 PROCEDURE — 74011250637 HC RX REV CODE- 250/637: Performed by: PHYSICIAN ASSISTANT

## 2019-05-30 PROCEDURE — 82962 GLUCOSE BLOOD TEST: CPT

## 2019-05-30 PROCEDURE — 77010033678 HC OXYGEN DAILY

## 2019-05-30 PROCEDURE — 94760 N-INVAS EAR/PLS OXIMETRY 1: CPT

## 2019-05-30 PROCEDURE — 80048 BASIC METABOLIC PNL TOTAL CA: CPT

## 2019-05-30 PROCEDURE — 97165 OT EVAL LOW COMPLEX 30 MIN: CPT | Performed by: OCCUPATIONAL THERAPIST

## 2019-05-30 PROCEDURE — 97161 PT EVAL LOW COMPLEX 20 MIN: CPT

## 2019-05-30 PROCEDURE — 97530 THERAPEUTIC ACTIVITIES: CPT

## 2019-05-30 PROCEDURE — 74011636637 HC RX REV CODE- 636/637: Performed by: PHYSICIAN ASSISTANT

## 2019-05-30 PROCEDURE — 97535 SELF CARE MNGMENT TRAINING: CPT | Performed by: OCCUPATIONAL THERAPIST

## 2019-05-30 PROCEDURE — 36415 COLL VENOUS BLD VENIPUNCTURE: CPT

## 2019-05-30 RX ADMIN — GLIPIZIDE 2.5 MG: 2.5 TABLET, FILM COATED, EXTENDED RELEASE ORAL at 09:40

## 2019-05-30 RX ADMIN — INSULIN LISPRO 5 UNITS: 100 INJECTION, SOLUTION INTRAVENOUS; SUBCUTANEOUS at 17:55

## 2019-05-30 RX ADMIN — GABAPENTIN 100 MG: 100 CAPSULE ORAL at 09:33

## 2019-05-30 RX ADMIN — Medication 2 G: at 04:00

## 2019-05-30 RX ADMIN — OXYCODONE HYDROCHLORIDE 5 MG: 5 TABLET ORAL at 20:20

## 2019-05-30 RX ADMIN — ROSUVASTATIN CALCIUM 10 MG: 10 TABLET, COATED ORAL at 22:34

## 2019-05-30 RX ADMIN — Medication 2 G: at 12:19

## 2019-05-30 RX ADMIN — FAMOTIDINE 20 MG: 20 TABLET, FILM COATED ORAL at 09:33

## 2019-05-30 RX ADMIN — ATENOLOL 50 MG: 25 TABLET ORAL at 22:34

## 2019-05-30 RX ADMIN — OXYCODONE HYDROCHLORIDE 5 MG: 5 TABLET ORAL at 12:18

## 2019-05-30 RX ADMIN — INSULIN LISPRO 3 UNITS: 100 INJECTION, SOLUTION INTRAVENOUS; SUBCUTANEOUS at 12:42

## 2019-05-30 RX ADMIN — CELECOXIB 200 MG: 100 CAPSULE ORAL at 17:56

## 2019-05-30 RX ADMIN — MELATONIN TAB 3 MG 10.5 MG: 3 TAB at 22:33

## 2019-05-30 RX ADMIN — GABAPENTIN 300 MG: 300 CAPSULE ORAL at 22:35

## 2019-05-30 RX ADMIN — Medication 10 ML: at 22:00

## 2019-05-30 RX ADMIN — POTASSIUM CHLORIDE 10 MEQ: 750 TABLET, EXTENDED RELEASE ORAL at 17:56

## 2019-05-30 RX ADMIN — CELECOXIB 200 MG: 100 CAPSULE ORAL at 09:32

## 2019-05-30 RX ADMIN — LEVOTHYROXINE SODIUM 100 MCG: 50 TABLET ORAL at 06:25

## 2019-05-30 RX ADMIN — FELODIPINE 5 MG: 5 TABLET, EXTENDED RELEASE ORAL at 09:33

## 2019-05-30 RX ADMIN — ACETAMINOPHEN 650 MG: 325 TABLET ORAL at 17:56

## 2019-05-30 RX ADMIN — ACETAMINOPHEN 650 MG: 325 TABLET ORAL at 06:25

## 2019-05-30 RX ADMIN — Medication 10 ML: at 06:28

## 2019-05-30 RX ADMIN — ASPIRIN 81 MG: 81 TABLET, COATED ORAL at 17:56

## 2019-05-30 RX ADMIN — ACETAMINOPHEN 650 MG: 325 TABLET ORAL at 12:18

## 2019-05-30 RX ADMIN — SENNOSIDES AND DOCUSATE SODIUM 1 TABLET: 8.6; 5 TABLET ORAL at 17:56

## 2019-05-30 RX ADMIN — FAMOTIDINE 20 MG: 20 TABLET, FILM COATED ORAL at 17:56

## 2019-05-30 RX ADMIN — ATENOLOL 50 MG: 25 TABLET ORAL at 06:25

## 2019-05-30 RX ADMIN — ASPIRIN 81 MG: 81 TABLET, COATED ORAL at 09:32

## 2019-05-30 RX ADMIN — POTASSIUM CHLORIDE 10 MEQ: 750 TABLET, EXTENDED RELEASE ORAL at 09:33

## 2019-05-30 NOTE — PROGRESS NOTES
Occupational Therapy EVALUATION/discharge  Patient: Kathy Zamorano (82 y.o. female)  Date: 5/30/2019  Primary Diagnosis: Primary osteoarthritis of right hip [M16.11]  Procedure(s) (LRB):  RIGHT TOTAL HIP ARTHROPLASTY MCKENZIE (Right) 1 Day Post-Op   Precautions:  Fall, WBAT, Total hip(anterior hip precautions)    ASSESSMENT:   Based on the objective data described below, the patient presents at an overall SBA level with ADLs and functional mobility using RW following R LANRE. She demonstrated mild dizziness and slow speech and processing with orthostatic BPs as noted: Adalberto Garza Vitals:    05/30/19 0815 05/30/19 0910 05/30/19 0913 05/30/19 0919   BP: 145/72 138/60 111/58 121/56   BP 1 Location: Right arm Right arm Right arm Right arm   BP Patient Position: At rest During activity; Sitting During activity;Standing Post activity; Sitting   Pulse: (!) 46 (!) 47 (!) 50 (!) 48   Resp: 16      Temp:       SpO2: 95%      Weight:       Height:         Pt lives alone and has family and friend support as needed. Per pt, PT to order a RW. Further skilled acute occupational therapy is not indicated at this time. Discharge Recommendations: None for OT  Further Equipment Recommendations for Discharge: TBD      SUBJECTIVE:   Patient stated I feel ok, just sleepy.     The patient stated 3/3 anterior hip precautions. Reviewed all 3 with patient.   OBJECTIVE DATA SUMMARY:   HISTORY:   Past Medical History:   Diagnosis Date    Arthritis     Depression     Diabetes ([facility]     GERD (gastroesophageal reflux disease)     High cholesterol     Hypertension     Snoring     Stroke ([facility] 2003    TIA    Thyroid disease      Past Surgical History:   Procedure Laterality Date    HX APPENDECTOMY      HX CATARACT REMOVAL Bilateral     HX CHOLECYSTECTOMY      HX GYN      btl    HX ORTHOPAEDIC Left     Open Repair    HX ORTHOPAEDIC Left     Trigger finger    HX ROTATOR CUFF REPAIR Right        Prior Level of Function/Environment/Context: Independent, drives  Home Situation  Rails to Enter: Yes  Hand Rails : Bilateral  Tub or Shower Type: Shower    Hand dominance: Right    EXAMINATION OF PERFORMANCE DEFICITS:  Cognitive/Behavioral Status:  Neurologic State: Alert; Appropriate for age  Orientation Level: Oriented X4  Cognition: Appropriate decision making; Appropriate for age attention/concentration; Appropriate safety awareness; Follows commands  Perception: Appears intact  Perseveration: No perseveration noted  Safety/Judgement: Awareness of environment;Driving appropriateness; Fall prevention;Good awareness of safety precautions; Home safety; Insight into deficits    Hearing: Auditory  Auditory Impairment: None    Vision/Perceptual:    Acuity: Within Defined Limits    Corrective Lenses: Glasses    Range of Motion:  AROM: Generally decreased, functional  PROM: Generally decreased, functional                      Strength:  Strength: Generally decreased, functional                Coordination:  Coordination: Within functional limits  Fine Motor Skills-Upper: Left Intact; Right Intact    Gross Motor Skills-Upper: Left Intact; Right Intact    Tone & Sensation:  Tone: Normal  Sensation: Intact                      Balance:  Sitting: Intact    Functional Mobility and Transfers for ADLs:  Bed Mobility:  Supine to Sit: Supervision  Sit to Supine: Supervision  Scooting: Supervision    Transfers:       ADL Assessment:  Feeding: Independent    Oral Facial Hygiene/Grooming: Stand-by assistance; Additional time(standing at sink)    Bathing: Stand-by assistance; Additional time(seated bending forward to reach feet)    Upper Body Dressing: Setup    Lower Body Dressing: Stand-by assistance(seated bending forward to reach feet)    Toileting: Stand by assistance; Additional time(RW, grab bar)                ADL Intervention and task modifications:  Cognitive Retraining  Safety/Judgement: Awareness of environment;Driving appropriateness; Fall prevention;Good awareness of safety precautions; Home safety; Insight into deficits      Bathing: Patient instructed when bathing to not submerge wound in water, stand to shower or sponge bathe, cover wound with plastic and tape to ensure no water reaches bandage/wound without cues. Patient indicated understanding. Dressing joint: Patient instructed and demonstrated to don/doff Right LE first/last verbal cues. Patient instructed and demonstrated to don all clothing while sitting prior to standing, doff all clothing to knees while standing, then sit to doff clothing off from knees to feet in order to facilitate fall prevention, pain management, and energy conservation with Supervision. Home safety: Patient instructed on home modifications and safety (raise height of ADL objects, appropriate height of chair surfaces, recliner safety, change of floor surfaces, clear pathways) to increase independence and fall prevention. Patient indicated understanding. Standing: Patient instructed and demonstrated to walk up to sink/counter top/surfaces, step into walker to increase safety of joint and fall prevention with Supervision. Patient educated about hip anatomy verbally and with pictures and educated to avoid external rotation of Right LE. Instructed to apply concept to ADLs within the home (no reaching across body to Right side, square off while using objects, slide objects along surfaces). Patient instructed to increase amount of time standing, observe standing position during ADLs in order to increase even weight bearing through bilateral LEs in order to increase independence with ADLs. Goal to be reached 30 days post - op, per orthopedic surgeon or per PT. Patient indicated understanding. Tub transfer: Patient instructed regarding when it is safe to begin transfer into tub (complete stairs with PT, advance exercises with PT high enough to clear tub height).   Patient instructed to use the same technique as used with stairs when entering and exiting tub (\"up with the non-surgical, down with the surgical leg\"). Patient indicated understanding. Functional Measure:  Barthel Index:    Bathin  Bladder: 10  Bowels: 10  Groomin  Dressin  Feeding: 10  Mobility: 10  Stairs: 5  Toilet Use: 5  Transfer (Bed to Chair and Back): 10  Total: 70/100        Percentage of impairment   0%   1-19%   20-39%   40-59%   60-79%   80-99%   100%   Barthel Score 0-100 100 99-80 79-60 59-40 20-39 1-19   0     The Barthel ADL Index: Guidelines  1. The index should be used as a record of what a patient does, not as a record of what a patient could do. 2. The main aim is to establish degree of independence from any help, physical or verbal, however minor and for whatever reason. 3. The need for supervision renders the patient not independent. 4. A patient's performance should be established using the best available evidence. Asking the patient, friends/relatives and nurses are the usual sources, but direct observation and common sense are also important. However direct testing is not needed. 5. Usually the patient's performance over the preceding 24-48 hours is important, but occasionally longer periods will be relevant. 6. Middle categories imply that the patient supplies over 50 per cent of the effort. 7. Use of aids to be independent is allowed. Gifty Pruitt., Barthel, D.W. (8741). Functional evaluation: the Barthel Index. [facility] (14)2. Victor Manuel Osorio, [email or web address], Dirk Tenorio., [facility], [address] [place] [address] (). Measuring the change indisability after inpatient rehabilitation; comparison of the responsiveness of the Barthel Index and Functional [facility] Measure. Journal of Neurology, Neurosurgery, and Psychiatry, 66(4), 218-132. Brianne Duke, N.J.A, Prakash Carrasquillo  [email or web address], & Hailey Coronado M.A. (2004.) Assessment of post-stroke quality of life in cost-effectiveness studies: The usefulness of the Barthel Index and the EuroQoL-5D.  Quality of Life Research, 13, 718-59       Occupational Therapy Evaluation Charge Determination   History Examination Decision-Making   LOW Complexity : Brief history review  LOW Complexity : 1-3 performance deficits relating to physical, cognitive , or psychosocial skils that result in activity limitations and / or participation restrictions  LOW Complexity : No comorbidities that affect functional and no verbal or physical assistance needed to complete eval tasks       Based on the above components, the patient evaluation is determined to be of the following complexity level: LOW   Pain:  Pain Scale 1: Numeric (0 - 10)  Pain Intensity 1: 0  Pain Location 1: Hip  Pain Orientation 1: Right     Pain Intervention(s) 1: Medication (see MAR)  Activity Tolerance:   Fair  Please refer to the flowsheet for vital signs taken during this treatment. After treatment:   []  Patient left in no apparent distress sitting up in chair  [x]  Patient left in no apparent distress in bed  [x]  Call bell left within reach  [x]  Nursing notified  []  Caregiver present  [x]  Bed alarm activated    COMMUNICATION/EDUCATION:   Communication/Collaboration:  [x]      Home safety education was provided and the patient/caregiver indicated understanding. [x]      Patient/family have participated as able and agree with findings and recommendations. []      Patient is unable to participate in plan of care at this time.   Findings and recommendations were discussed with: Physical Therapist and Registered Nurse    Carloz Beal OT  Time Calculation: 35 mins

## 2019-05-30 NOTE — PROGRESS NOTES
TOTAL HIP ARTHROPLASTY DAILY NOTE     ASSESSMENT / PLAN :   1. Pain Control : Excellent - Able to sleep and participate with therapy  2. Overnight Issues : none, some pain which has resolved  3. Wound or incisional issue : Healing incision with no visible drainage  4. Therapy / Weight Bearing Recommendations : Weight bear as tolerated with use of a walker and two person assist while mobilizing  5. DVT Prophylaxis : Mechanical and Aspirin and mechanical lower extremity compression device  6. Disposition : Discharge to home with home health (maximum of 4 visits)  7. Medical Concerns : none  8. Comments : Stable, home on Friday afternoon, discussed with her daughter who is working today. POD  1 Day Post-Op s/p Procedure(s):  RIGHT TOTAL HIP ARTHROPLASTY MCKENZIE     SUBJECTIVE :     Concerns : None. OBJECTIVE :     Vitals:    05/1935 05/29/19 2037 05/29/19 2309 05/30/19 0333   BP: 132/63 150/64 121/60 120/58   Pulse: 63 61 60 (!) 58   Resp: 16 16 16 16   Temp: 97.8 °F (36.6 °C) 98 °F (36.7 °C) 97.7 °F (36.5 °C) 97.6 °F (36.4 °C)   SpO2: 96% 97% 97% 95%   Weight:       Height:           Alert and oriented x3. right exam of the hip reveals that the dressing is clean, dry and intact. The patient is able to fire the quadriceps / flex at the hip  Sensation is intact to light touch. No calf pain.      Labs:  Recent Labs     05/30/19  0331   HGB 10.9*   *   K 3.7      CO2 29   BUN 19   CREA 0.83   *        Patient mobility           Tulio Carlson MD  Cell [phone]  Germaine Schneider PA-C  Cell [phone]  Medical Assistants: [address]  [phone]                 Surgery Scheduler: Zi Ary, [address] [place] [address] ext [phone]

## 2019-05-30 NOTE — PROGRESS NOTES
Problem: Mobility Impaired (Adult and Pediatric)  Goal: *Acute Goals and Plan of Care (Insert Text)  Description  Physical Therapy Goals  Initiated 5/30/2019    1. Patient will move from supine to sit and sit to supine , scoot up and down and roll side to side in bed with independence within 4 days. 2. Patient will perform sit to stand with modified independence within 4 days. 3. Patient will ambulate with modified independence for 200 feet with the least restrictive device within 4 days. 4. Patient will ascend/descend 4 stairs with  handrail(s) with modified independence within 4 days. 5. Patient will verbalize and demonstrate understanding of hip precautions per protocol within 4 days. 6. Patient will perform all home exercise program per protocol with independence within 4 days. Outcome: Progressing Towards Goal   PHYSICAL THERAPY EVALUATION  Patient: Charlene Grewal (82 y.o. female)  Date: 5/30/2019  Primary Diagnosis: Primary osteoarthritis of right hip [M16.11]  Procedure(s) (LRB):  RIGHT TOTAL HIP ARTHROPLASTY MCKENZIE (Right) 1 Day Post-Op   Precautions:   Fall, WBAT, Total hip(anterior hip precautions)    ASSESSMENT :  Based on the objective data described below, the patient presents with difficulty with ambulation. Patient lives alone still drives and use riding mower has few steps to enter the house. Rolled on the edge of bed with supervision, supine to sit supervision, sit to stand CGA, ambulate with rolling walker CGA out on the 4th floor hallway, no loss of balance, sitting and standing balance good. OOB to chair as tolerated performed some active range of motion exercise per hip exercise protocol. Activated chair alarm and notified nurse who agreed to monitor and assist back to bed when ready to go back.   Vitals:    05/30/19 0919 05/30/19 1020 05/30/19 1025 05/30/19 1030   BP: 121/56 139/65 149/68 126/60   BP 1 Location: Right arm Right arm Right arm Right arm   BP Patient Position: Post activity; Sitting Pre-activity;Supine Sitting Standing   Pulse: (!) 48 (!) 45 (!) 48 (!) 53   Resp:       Temp:       SpO2:       Weight:       Height:            Patient will benefit from skilled intervention to address the above impairments. Patient?s rehabilitation potential is considered to be Excellent  Factors which may influence rehabilitation potential include:   ? None noted  ? Mental ability/status  ? Medical condition  ? Home/family situation and support systems  ? Safety awareness  ? Pain tolerance/management  ? Other:      PLAN :  Recommendations and Planned Interventions:  ?           Bed Mobility Training             ? Neuromuscular Re-Education  ? Transfer Training                   ? Orthotic/Prosthetic Training  ? Gait Training                         ? Modalities  ? Therapeutic Exercises           ? Edema Management/Control  ? Therapeutic Activities            ? Patient and Family Training/Education  ? Other (comment):    Frequency/Duration: Patient will be followed by physical therapy  twice daily to address goals. Discharge Recommendations: Outpatient  Further Equipment Recommendations for Discharge: rolling walker     SUBJECTIVE:   Patient stated ?ok. ?     OBJECTIVE DATA SUMMARY:   HISTORY:    Past Medical History:   Diagnosis Date    Arthritis     Depression     Diabetes ([facility]     GERD (gastroesophageal reflux disease)     High cholesterol     Hypertension     Snoring     Stroke ([facility] 2003    TIA    Thyroid disease      Past Surgical History:   Procedure Laterality Date    HX APPENDECTOMY      HX CATARACT REMOVAL Bilateral     HX CHOLECYSTECTOMY      HX GYN      btl    HX ORTHOPAEDIC Left     Open Repair    HX ORTHOPAEDIC Left     Trigger finger    HX ROTATOR CUFF REPAIR Right      Prior Level of Function/Home Situation: Independent community ambulator without assistive device. Personal factors and/or comorbidities impacting plan of care:     Home Situation  Rails to Enter: Yes  Hand Rails : Bilateral  Tub or Shower Type: Shower    EXAMINATION/PRESENTATION/DECISION MAKING:   Critical Behavior:  Neurologic State: Alert, Appropriate for age  Orientation Level: Oriented X4  Cognition: Appropriate decision making, Appropriate for age attention/concentration, Appropriate safety awareness, Follows commands  Safety/Judgement: Awareness of environment, Driving appropriateness, Fall prevention, Good awareness of safety precautions, Home safety, Insight into deficits  Hearing: Auditory  Auditory Impairment: None    Range Of Motion:  AROM: Generally decreased, functional           PROM: Generally decreased, functional           Strength:    Strength: Generally decreased, functional                    Tone & Sensation:   Tone: Normal              Sensation: Intact               Coordination:  Coordination: Within functional limits  Vision:   Acuity: Within Defined Limits  Corrective Lenses: Glasses  Functional Mobility:  Bed Mobility:  Rolling: Supervision  Supine to Sit: Supervision  Sit to Supine: Supervision  Scooting: Supervision  Transfers:  Sit to Stand: Contact guard assistance  Stand to Sit: Contact guard assistance  Stand Pivot Transfers: Contact guard assistance     Bed to Chair: Contact guard assistance              Balance:   Sitting: Intact; High guard  Standing: Intact; With support  Standing - Static: Good  Standing - Dynamic : Fair  Ambulation/Gait Training:  Distance (ft): 100 Feet (ft)  Assistive Device: Walker, rolling;Gait belt  Ambulation - Level of Assistance: Contact guard assistance     Gait Description (WDL): Exceptions to WDL  Gait Abnormalities: Antalgic  Right Side Weight Bearing: As tolerated     Base of Support: Widened     Speed/Jordana: Pace decreased (<100 feet/min)                         Therapeutic Exercises:    Instructed patient to continue active range of motion exercise on both legs while up on chair or on bed. Functional Measure:  Barthel Index:    Bathin  Bladder: 10  Bowels: 10  Groomin  Dressin  Feeding: 10  Mobility: 10  Stairs: 5  Toilet Use: 5  Transfer (Bed to Chair and Back): 10  Total: 70/100       Percentage of impairment   0%   1-19%   20-39%   40-59%   60-79%   80-99%   100%   Barthel Score 0-100 100 99-80 79-60 59-40 20-39 1-19   0     The Barthel ADL Index: Guidelines  1. The index should be used as a record of what a patient does, not as a record of what a patient could do. 2. The main aim is to establish degree of independence from any help, physical or verbal, however minor and for whatever reason. 3. The need for supervision renders the patient not independent. 4. A patient's performance should be established using the best available evidence. Asking the patient, friends/relatives and nurses are the usual sources, but direct observation and common sense are also important. However direct testing is not needed. 5. Usually the patient's performance over the preceding 24-48 hours is important, but occasionally longer periods will be relevant. 6. Middle categories imply that the patient supplies over 50 per cent of the effort. 7. Use of aids to be independent is allowed. Bill Casey., Barthel, D.W. (9078). Functional evaluation: the Barthel Index. [facility] (14)2. NORMA Suarez, Beck Hernandez., [facility] Hiram., Delia Harada, [address] (). Measuring the change indisability after inpatient rehabilitation; comparison of the responsiveness of the Barthel Index and Functional [employer] Measure. Journal of Neurology, Neurosurgery, and Psychiatry, 66(4), 217-714. Zach Babb, N.J.A, DAVID ClarkJ.TAZ, & Fauzia Damon MRobertoA. (2004.) Assessment of post-stroke quality of life in cost-effectiveness studies: The usefulness of the Barthel Index and the EuroQoL-5D.  Quality of Life Research, 13, 226-14           Physical Therapy Evaluation Charge Determination   History Examination Presentation Decision-Making   LOW Complexity : Zero comorbidities / personal factors that will impact the outcome / POC LOW Complexity : 1-2 Standardized tests and measures addressing body structure, function, activity limitation and / or participation in recreation  LOW Complexity : Stable, uncomplicated  Other outcome measures barthel  LOW       Based on the above components, the patient evaluation is determined to be of the following complexity level: LOW     Pain:  Pain Scale 1: Numeric (0 - 10)  Pain Intensity 1: 0  Pain Location 1: Hip  Pain Orientation 1: Right     Pain Intervention(s) 1: Medication (see MAR)  Activity Tolerance:   Good. Please refer to the flowsheet for vital signs taken during this treatment. After treatment:   ?         Patient left in no apparent distress sitting up in chair  ? Patient left in no apparent distress in bed  ? Call bell left within reach  ? Nursing notified  ? Caregiver present  ? Bed/chair alarm activated    COMMUNICATION/EDUCATION:   The patient?s plan of care was discussed with: Occupational Therapist, Registered Nurse,  and patient . ? Fall prevention education was provided and the patient/caregiver indicated understanding. ? Patient/family have participated as able in goal setting and plan of care. ?         Patient/family agree to work toward stated goals and plan of care. ?         Patient understands intent and goals of therapy, but is neutral about his/her participation. ? Patient is unable to participate in goal setting and plan of care. Thank you for this referral.  Candido Chen, PT,WCC.    Time Calculation: 27 mins

## 2019-05-30 NOTE — PROGRESS NOTES
Bedside shift change report given to Eboni Bradford RN (oncoming nurse) by Fadumo Alberto RN (offgoing nurse). Report included the following information SBAR, Kardex and MAR.

## 2019-05-30 NOTE — PROGRESS NOTES
Problem: Mobility Impaired (Adult and Pediatric)  Goal: *Acute Goals and Plan of Care (Insert Text)  Description  Physical Therapy Goals  Initiated 5/30/2019    1. Patient will move from supine to sit and sit to supine , scoot up and down and roll side to side in bed with independence within 4 days. 2. Patient will perform sit to stand with modified independence within 4 days. 3. Patient will ambulate with modified independence for 200 feet with the least restrictive device within 4 days. 4. Patient will ascend/descend 4 stairs with  handrail(s) with modified independence within 4 days. 5. Patient will verbalize and demonstrate understanding of hip precautions per protocol within 4 days. 6. Patient will perform all home exercise program per protocol with independence within 4 days. 5/30/2019 1338 by Maira Mishra, PT  Outcome: Progressing Towards Goal  5/30/2019 1040 by Maira Mishra, PT  Outcome: Progressing Towards Goal   PHYSICAL THERAPY TREATMENT  Patient: Siobhan Cortes (82 y.o. female)  Date: 5/30/2019  Diagnosis: Primary osteoarthritis of right hip [M16.11] <principal problem not specified>  Procedure(s) (LRB):  RIGHT TOTAL HIP ARTHROPLASTY MCKENZIE (Right) 1 Day Post-Op  Precautions: Fall, WBAT, Total hip(anterior hip precautions)  Chart, physical therapy assessment, plan of care and goals were reviewed. ASSESSMENT:  Based on the objective data described below, patient participated well with treatment today. Ambulate with rolling walker out on the 4th floor hallway CGA, no loss of balance, up and down stairs with CGA holding on rails. Performed some active range of motion exercise on both LE all planes. Patient cleared for discharge when ever medically stable to go home. Activated bed alarm, notified nurse who agreed to continue to monitor patient. Progression toward goals:  ?    Improving appropriately and progressing toward goals  ? Improving slowly and progressing toward goals  ? Not making progress toward goals and plan of care will be adjusted     PLAN:  Patient continues to benefit from skilled intervention to address the above impairments. Continue treatment per established plan of care. Discharge Recommendations:  Outpatient  Further Equipment Recommendations for Discharge:  rolling walker deliver in the room already     SUBJECTIVE:   Patient stated ? OK ready for my walk.?    OBJECTIVE DATA SUMMARY:   Critical Behavior:  Neurologic State: Alert, Appropriate for age  Orientation Level: Oriented X4  Cognition: Appropriate decision making, Appropriate for age attention/concentration, Appropriate safety awareness, Follows commands  Safety/Judgement: Awareness of environment, Driving appropriateness, Fall prevention, Good awareness of safety precautions, Home safety, Insight into deficits  Functional Mobility Training:  Bed Mobility:  Rolling: Supervision  Supine to Sit: Supervision  Sit to Supine: Supervision  Scooting: Supervision        Transfers:  Sit to Stand: Contact guard assistance  Stand to Sit: Contact guard assistance  Stand Pivot Transfers: Contact guard assistance     Bed to Chair: Contact guard assistance                    Balance:  Sitting: Intact  Standing: Intact; With support  Standing - Static: Good  Standing - Dynamic : Good  Ambulation/Gait Training:  Distance (ft): 250 Feet (ft)  Assistive Device: Walker, rolling;Gait belt  Ambulation - Level of Assistance: Contact guard assistance     Gait Description (WDL): Exceptions to WDL  Gait Abnormalities: Path deviations  Right Side Weight Bearing: As tolerated     Base of Support: Widened     Speed/Jordana: Pace decreased (<100 feet/min)                       Stairs:  Number of Stairs Trained: 4  Stairs - Level of Assistance: Contact guard assistance   Rail Use: Both    Therapeutic Exercises:    Instructed patient to continue active range of motion exercise on both legs while up on chair or on bed.     Pain: Activity Tolerance:   Good. Please refer to the flowsheet for vital signs taken during this treatment. After treatment:   ?    Patient left in no apparent distress sitting up in chair  ? Patient left in no apparent distress in bed  ? Call bell left within reach  ? Nursing notified  ? Caregiver present  ? Bed alarm activated    COMMUNICATION/COLLABORATION:   The patient?s plan of care was discussed with: Registered Nurse and patient     Annette Lopes, PT,WC.    Time Calculation: 24 mins

## 2019-05-30 NOTE — PROGRESS NOTES
Bedside shift change report given to CHRIS Goetz (oncoming nurse) by Suki Tomas RN (offgoing nurse). Report included the following information SBAR, Kardex and MAR.

## 2019-05-30 NOTE — DIABETES MGMT
[facility]    [facility] Progress Note    Recommendations/ Comments: Chart reviewed for mild hyperglycemia, noting postprandial excursions. Noted patient is POD 1 Right Total Hip Arthroplasty. If appropriate, please consider: Adding consistent carb to diet order. Current hospital DM medication:   Lispro normal sensitivity correction scale  Glipizide SR 2.5 mg daily    Chart reviewed on [facility] Fili. Patient is a 82 y.o. female with known Type 2 Diabetes on oral agent (monotherapy): Glipizide SR 2.5 mg at home. A1c:   Lab Results   Component Value Date/Time    Hemoglobin A1c 6.7 (H) 05/15/2019 03:49 PM       Recent Glucose Results:   Lab Results   Component Value Date/Time     (H) 05/30/2019 03:31 AM    GLUCPOC 212 (H) 05/30/2019 10:51 AM    GLUCPOC 177 (H) 05/30/2019 06:57 AM    GLUCPOC 205 (H) 05/29/2019 09:04 PM        Lab Results   Component Value Date/Time    Creatinine 0.83 05/30/2019 03:31 AM     Estimated Creatinine Clearance: 47.8 mL/min (based on SCr of 0.83 mg/dL). Active Orders   Diet    DIET REGULAR        PO intake: No data found. Will continue to follow as needed.     Thank you  Romero Carey, RN  Office [phone]  Pager [phone]          Time spent: 6 min

## 2019-05-30 NOTE — PROGRESS NOTES
5/30/2019 12:04 PM Rw delivered to pt.     5/30/2019  10:34 AM Case management consults for discharge planning and rw received. Met with pt. Charted address and phone numbers confirmed. Reason for Admission:  Right total hip arthroplasty with Dr. Fabiana Maldonado Score: 5                    Plan for utilizing home health: N/a pt requested CM schedule her outpatient PT at [facility]. CM scheduled appt for 6/3 at 12PM. Appt added to avs and pt notified of appt. Current Advanced Directive/Advance Care Plan:   Name Crow Holder Daughter    Hiro Dee Daughter [phone]  [phone]     Likelihood of Readmission: low                            Transition of Care Plan:  Home with family and outpatient PT    Pt will have family assisting her during her recovery at home. Prior to admission pt was living alone in a 1 story home there are 2 steps to enter. Pt was independent with adls and driving prior to admission. Pt owns a cane, rw order sent to [facility] via [employer]. Pt has rx coverage and fills her scripts at the [address] [place] [address] in [facility]. Pt's daughter will transport her home. Pt reported her friends will transport her to outpatient PT appts. CM will follow.  GELY Johnson  Care Management Interventions  PCP Verified by CM: Yes(Soniya Gongora, no nurse navigator )  Transition of Care Consult (CM Consult): Discharge Planning  MyChart Signup: No  Discharge Durable Medical Equipment: Yes  Physical Therapy Consult: Yes  Occupational Therapy Consult: Yes  Speech Therapy Consult: No  Current Support Network: Lives with Spouse, Own Home  Confirm Follow Up Transport: Family

## 2019-05-30 NOTE — PROGRESS NOTES
NUTRITION   RD Screen      Pt seen for:      []  MST for     [x]   MD Consult    [x]        Supplements  []   PO intake check   []        Food Allergies  []   Food Preferences/tolerances    []        Rescreen  []   Education    []        Diet order clarification []   Other   []  LOS                          Nutrition Prescription:     Increase protein intake for 30 days, via supplements or dietary sources, in addition to balanced meals. Encourage adequate intake of meals and supplements. Pt states she ate breakfast well. Wants ice cream for lunch but thinks she shouldn't since her BG is elevated. Pt agreeable to Chocolate ensure HP for PM snack. Pt states she lost weight form 163 down to 143 over the last ~9 months or so on purpose and is trying to get down to 135 lbs. Weight hx does not corroborate story. Monitor weight and PO intakes. , 205, 169 mg/dL. A1C 6.7. Assessment:   Information obtained from:  Patient and chart         Cultural, Sikhism and ethnic food preferences:   [x]  None   []  Identified and addressed    Diet:        PO Intake: [x]           Good     []           Fair      []           Poor     No data found. Wt Readings from Last 5 Encounters:   05/29/19 65.3 kg (143 lb 15.4 oz)   05/15/19 66.4 kg (146 lb 6.2 oz)   01/29/19 65.3 kg (144 lb)   11/05/18 66.5 kg (146 lb 9.6 oz)   09/12/12 70.6 kg (155 lb 9 oz)   ]    Body mass index is 23.96 kg/m². Skin: right hip incision  BM: 5/28  ABD: WDL    Estimated Daily Nutrition Requirements:  Kcals/day: 8467 Kcals/day(BMR(1119x1.3))  Protein: 65 g(-78g/day(1.0-1.2g/kg))  Fluid:  1450 mL     Based On:  [place] [address] Jeor  Weight Used: Actual wt    Weight Changes:   []   Loss  []   Gain  [x]   Stable    Nutrition Problems Identified  [x]     None  []     Specified food preferences   []     Dislikes supplements              []     Allergies  []     Difficulty chewing     []     Dentition   []     Nausea/Vomiting  []    Constipation  [] Diarrhea    Nutrition Diagnosis:      Increased nutrient needs related to increased protein needs with wound healing evidenced by impaired skin integrity with right hip incision.     Intervention:   [x]    Obtained/adjusted food preferences/tolerances and/or snacks options   []    Dislikes supplements will try a substitution   []    Modify diet for food allergies  []    Adjust texture due to difficulty chewing   []    Encourage Fresh Fruit, Activia yogurt, fluid   [x]    Educated patient  []    Rescreen per screening protocol  [x]    Add Supplements    Goal: patient to consume 1 protein item with meals and 50% of meals and snacks in the next 5-7 days    Monitoring/Evaluation:   Education & Discharge Needs  [x] Nutrition related discharge needs addressed:   [x] Supplements (on d/c instruction &/or coupons provided)    [x] Education   [] No nutrition related discharge needs at this time    Rescreen: [x]  At Nutrition Risk          []  Not at [facility], rescreen per screening protocol      Anaid Manriquez  Pager [phone]   Office [phone]

## 2019-05-31 VITALS
HEIGHT: 65 IN | RESPIRATION RATE: 16 BRPM | HEART RATE: 54 BPM | OXYGEN SATURATION: 97 % | DIASTOLIC BLOOD PRESSURE: 65 MMHG | TEMPERATURE: 97.8 F | WEIGHT: 143.96 LBS | SYSTOLIC BLOOD PRESSURE: 130 MMHG | BODY MASS INDEX: 23.99 KG/M2

## 2019-05-31 LAB
GLUCOSE BLD STRIP.AUTO-MCNC: 170 MG/DL (ref 65–100)
GLUCOSE BLD STRIP.AUTO-MCNC: 179 MG/DL (ref 65–100)
SERVICE CMNT-IMP: ABNORMAL
SERVICE CMNT-IMP: ABNORMAL

## 2019-05-31 PROCEDURE — 74011250637 HC RX REV CODE- 250/637: Performed by: PHYSICIAN ASSISTANT

## 2019-05-31 PROCEDURE — 82962 GLUCOSE BLOOD TEST: CPT

## 2019-05-31 RX ADMIN — FAMOTIDINE 20 MG: 20 TABLET, FILM COATED ORAL at 08:21

## 2019-05-31 RX ADMIN — CELECOXIB 200 MG: 100 CAPSULE ORAL at 08:21

## 2019-05-31 RX ADMIN — Medication 10 ML: at 05:56

## 2019-05-31 RX ADMIN — POTASSIUM CHLORIDE 10 MEQ: 750 TABLET, EXTENDED RELEASE ORAL at 08:21

## 2019-05-31 RX ADMIN — LEVOTHYROXINE SODIUM 100 MCG: 50 TABLET ORAL at 05:51

## 2019-05-31 RX ADMIN — FELODIPINE 5 MG: 5 TABLET, EXTENDED RELEASE ORAL at 08:21

## 2019-05-31 RX ADMIN — GABAPENTIN 100 MG: 100 CAPSULE ORAL at 08:22

## 2019-05-31 RX ADMIN — GLIPIZIDE 2.5 MG: 2.5 TABLET, FILM COATED, EXTENDED RELEASE ORAL at 08:28

## 2019-05-31 RX ADMIN — ACETAMINOPHEN 650 MG: 325 TABLET ORAL at 05:52

## 2019-05-31 RX ADMIN — ATENOLOL 50 MG: 25 TABLET ORAL at 05:53

## 2019-05-31 RX ADMIN — ASPIRIN 81 MG: 81 TABLET, COATED ORAL at 08:20

## 2019-05-31 RX ADMIN — ACETAMINOPHEN 650 MG: 325 TABLET ORAL at 00:45

## 2019-05-31 RX ADMIN — SENNOSIDES AND DOCUSATE SODIUM 1 TABLET: 8.6; 5 TABLET ORAL at 08:21

## 2019-05-31 NOTE — PROGRESS NOTES
5/31/2019 10:40 AM Met with pt who reported her daughter will be to [facility] at 12:30PM to tranpsort her home. Pt signed 2nd IMM letter, filed on hard chart. Pt has been delivered her rw and outpatient PT appt was scheduled for pt. No further discharge needs identified. CM will follow.  JUSTIN PatrickW

## 2019-05-31 NOTE — PROGRESS NOTES
Physical Therapy    945 Pt up ad amy walking hallways with RW. Successfully cleared PT on 5/30/19  Pt anticipating discharge and states her daughter will not arrive until noon    Karlie Michaels

## 2019-05-31 NOTE — PROGRESS NOTES
Visit documented 5/31/19  Spiritual Care Partner Volunteer visited patient in [address] [place] [address] Blvd on 5/30/19.   Documented by: Bunny Ortega., MS., [address] [facility] Cash ([id number])

## 2019-05-31 NOTE — PROGRESS NOTES
TOTAL HIP ARTHROPLASTY DAILY NOTE     ASSESSMENT / PLAN :   1. Pain Control : Excellent - Able to sleep and participate with therapy  2. Overnight Issues : none reported  3. Wound or incisional issue : Healing incision with no visible drainage  4. Therapy / Weight Bearing Recommendations : Weight bear as tolerated with use of a walker and two person assist while mobilizing  5. DVT Prophylaxis : Mechanical and Aspirin and mechanical lower extremity compression device  6. Disposition : Home - outpatient PT  7. Medical Concerns : none - stable  8. Comments : Discharge home today after cleared by PT       POD  2 Days Post-Op s/p Procedure(s):  RIGHT TOTAL HIP ARTHROPLASTY MCKENZIE     SUBJECTIVE :     Concerns : none. OBJECTIVE :     Vitals:    05/30/19 2001 05/30/19 2259 05/31/19 0316 05/31/19 0757   BP: 152/72 152/70 136/64 152/51   Pulse: 62 (!) 56 (!) 57 (!) 49   Resp: 16 16 16 18   Temp: 98.6 °F (37 °C) 98.1 °F (36.7 °C) 98 °F (36.7 °C) 97.8 °F (36.6 °C)   SpO2: 94% 95% 94% 95%   Weight:       Height:           Alert and oriented x3. right exam of the hip reveals that the dressing is clean, dry and intact. The patient is able to fire the quadriceps / flex at the hip  Sensation is intact to light touch. No calf pain.      Labs:  Recent Labs     05/30/19  0331   HGB 10.9*   *   K 3.7      CO2 29   BUN 19   CREA 0.83   *        Patient mobility  Gait  Base of Support: Widened  Speed/Jordana: Pace decreased (<100 feet/min)  Gait Abnormalities: Path deviations  Ambulation - Level of Assistance: Contact guard assistance  Distance (ft): 250 Feet (ft)  Assistive Device: Walker, rolling, Gait belt  Rail Use: Both  Stairs - Level of Assistance: Contact guard assistance  Number of Stairs Trained: 1 Technology MD Carlos  Cell [phone]  Gill Rivers PA-C  Cell [phone]  Medical Assistants: Delfina Ramirez & Bernie Victor [phone]                 Surgery Scheduler: Brigette Tom [address] [facility], [address] [place] [address] ext [phone]

## 2019-05-31 NOTE — PROGRESS NOTES
I have reviewed discharge instructions with the patient. The patient verbalized understanding. Discharge medications reviewed with patient and appropriate educational materials and side effects teaching were provided. Patient to be discharged via wheelchair to discharge entrance with all belongings. IV d/c'd without complications.

## 2019-06-05 LAB
ATRIAL RATE: 55 BPM
CALCULATED P AXIS, ECG09: 29 DEGREES
CALCULATED R AXIS, ECG10: -25 DEGREES
CALCULATED T AXIS, ECG11: -10 DEGREES
DIAGNOSIS, 93000: NORMAL
P-R INTERVAL, ECG05: 168 MS
Q-T INTERVAL, ECG07: 422 MS
QRS DURATION, ECG06: 82 MS
QTC CALCULATION (BEZET), ECG08: 403 MS
VENTRICULAR RATE, ECG03: 55 BPM

## 2020-02-28 ENCOUNTER — OFFICE VISIT (OUTPATIENT)
Dept: NEUROLOGY | Age: 85
End: 2020-02-28

## 2020-02-28 VITALS
RESPIRATION RATE: 18 BRPM | HEART RATE: 67 BPM | WEIGHT: 141.6 LBS | SYSTOLIC BLOOD PRESSURE: 118 MMHG | BODY MASS INDEX: 23.59 KG/M2 | DIASTOLIC BLOOD PRESSURE: 72 MMHG | OXYGEN SATURATION: 95 % | HEIGHT: 65 IN

## 2020-02-28 DIAGNOSIS — R41.3 MEMORY DYSFUNCTION: Primary | ICD-10-CM

## 2020-02-28 RX ORDER — AMLODIPINE BESYLATE AND ATORVASTATIN CALCIUM 10; 10 MG/1; MG/1
1 TABLET, FILM COATED ORAL DAILY
COMMUNITY
End: 2021-08-13

## 2020-02-28 NOTE — PATIENT INSTRUCTIONS
Patient history reviewed patient examined. Trying to get the official report on the head CT from the [facility] as well as hospital admission but it sounds like her primary problem was pathologic hyponatremia. Has formal neuropsych testing that begins on 5 March and there should be a follow-up visit where the actual testing commences. Will pend revisit here until that can be done. My suggestion is no driving until we have the results of her testing in hand.

## 2020-02-28 NOTE — LETTER
2/28/20 Patient: Judd Crouch YOB: 1935 Date of Visit: 2/28/2020 Ottoniel Saravia NP 
[address] [address] VIA Facsimile: [phone] Dear Ottoniel Sraavia NP, Thank you for referring Ms. Eduin Huerta to [facility] for evaluation. My notes for this consultation are attached. If you have questions, please do not hesitate to call me. I look forward to following your patient along with you.  
 
 
Sincerely, 
 
[place] Nurse, MD

## 2020-02-28 NOTE — PROGRESS NOTES
Patient was in the hospital February 4, 2020 - February 6, 2020 because they thought she was having a stroke because of her symptoms but it was really low sodium and potassium and they told her to follow up with her neurologist.    .  Chief Complaint   Patient presents with    Follow-up     hospital followup     .   Visit Vitals  /72 (BP 1 Location: Left arm, BP Patient Position: Sitting)   Pulse 67   Resp 18   Ht 5' 5\" (1.651 m)   Wt 141 lb 9.6 oz (64.2 kg)   SpO2 95%   BMI 23.56 kg/m²

## 2020-02-28 NOTE — PROGRESS NOTES
Neurology Consult      Subjective: Moriah Edwards is a 82 y.o. female with a two-part story. Who comes in today never had the neuropsychological testing done from 2018 although she had normal results as it went to lab work EEG and brain MRI. My understanding is she has been scheduled for a March 5 appointment with neuropsychology and then I assume a follow-up formal memory testing afterwards. No driving until we get the full report on her memory test results. Quick recall of her admission at [facility] on February 4 through the 6. Apparently family member found her confused and garbled speech. Sodium was 123 and ended up with nephrology consultation and after 1 and half days was better and after 2 days she was discharged. She was told she was not eating properly etc.  Had a head CT done there but I do not have the report but were trying to get that. I assume it did not contribute to the case evaluation except it was unrevealing. Interestingly 1-1/2 weeks before that occurred she took a fall on her stepstool and did not lose consciousness and was back on her feet. Ended up going to the [facility] ER for evaluation and had a CT of her abdomen and pelvis which was unrevealing. Currently lives alone. Will pend revisit until we can get all testing in order from the neuropsych evaluation. Current Outpatient Medications   Medication Sig Dispense Refill    amLODIPine-atorvastatin (CADUET) 10-10 mg per tablet Take 1 Tab by mouth daily.  rosuvastatin (CRESTOR) 10 mg tablet Take 10 mg by mouth nightly.  cholecalciferol (VITAMIN D3) 1,000 unit cap Take 1,000 Units by mouth daily (after breakfast).  melatonin 10 mg cap Take 1 Cap by mouth nightly.  glipiZIDE SR (GLUCOTROL XL) 2.5 mg CR tablet Take 2.5 mg by mouth daily (after breakfast).  raNITIdine hcl 150 mg capsule Take 150 mg by mouth daily (after breakfast).       ascorbic acid, vitamin C, (VITAMIN C) 500 mg tablet Take 500 mg by mouth daily (after breakfast).  docosahexanoic acid/epa (FISH OIL PO) Take 1,500 mg by mouth ACB/HS.  potassium chloride SA (MICRO-K) 10 mEq capsule Take 10 mEq by mouth ACB/HS.  lisinopril (PRINIVIL, ZESTRIL) 20 mg tablet Take 20 mg by mouth daily (after breakfast).  atenolol (TENORMIN) 50 mg tablet Take 50 mg by mouth ACB/HS.  levothyroxine (LEVOTHROID) 100 mcg tablet Take 100 mcg by mouth every morning.  aspirin delayed-release 81 mg tablet Take 1 Tab by mouth two (2) times a day. 60 Tab 0    ibuprofen (MOTRIN) 800 mg tablet Take 1 Tab by mouth every six (6) hours as needed for Pain. 50 Tab 2    acetaminophen (TYLENOL EXTRA STRENGTH) 500 mg tablet Take 1-2 Tabs by mouth every six (6) hours as needed for Pain. Not to exceed 4,000mg in any 24 hour period  Indications: Pain 60 Tab 0    ondansetron (ZOFRAN ODT) 8 mg disintegrating tablet Take 0.5 Tabs by mouth every eight (8) hours as needed for Nausea. 30 Tab 0    ibuprofen/diphenhydramine HCl (ADVIL PM LIQUI-GELS PO) Take 200 mg by mouth nightly.  gluc ryder/chondro ryder A/vit C/Mn (GLUCOSAMINE 1500 COMPLEX PO) Take 1 Cap by mouth ACB/HS.  FELODIPINE (PLENDIL PO) Take 5 mg by mouth daily (after breakfast).  hydrochlorothiazide (HYDRODIURIL) 25 mg tablet Take 25 mg by mouth daily (after breakfast). Allergies   Allergen Reactions    Percocet [Oxycodone-Acetaminophen] Unknown (comments)     Can't remember reaction due to length of time of occurrence.      Past Medical History:   Diagnosis Date    Arthritis     Depression     Diabetes ([facility]     GERD (gastroesophageal reflux disease)     High cholesterol     Hypertension     Snoring     Stroke ([facility] 2003    TIA    Thyroid disease       Past Surgical History:   Procedure Laterality Date    HX APPENDECTOMY      HX CATARACT REMOVAL Bilateral     HX CHOLECYSTECTOMY      HX GYN      btl    HX ORTHOPAEDIC Left     Open Repair    HX ORTHOPAEDIC Left     Trigger finger    HX ROTATOR CUFF REPAIR Right       Social History     Socioeconomic History    Marital status:      Spouse name: Not on file    Number of children: Not on file    Years of education: Not on file    Highest education level: Not on file   Occupational History    Not on file   Social Needs    Financial resource strain: Not on file    Food insecurity:     Worry: Not on file     Inability: Not on file    Transportation needs:     Medical: Not on file     Non-medical: Not on file   Tobacco Use    Smoking status: Former Smoker     Packs/day: 2.00     Years: 30.00     Pack years: 60.00     Types: Cigarettes     Last attempt to quit: 1986     Years since quittin.4    Smokeless tobacco: Never Used   Substance and Sexual Activity    Alcohol use: Yes     Comment: social 1 x year    Drug use: No    Sexual activity: Not on file   Lifestyle    Physical activity:     Days per week: Not on file     Minutes per session: Not on file    Stress: Not on file   Relationships    Social connections:     Talks on phone: Not on file     Gets together: Not on file     Attends Islam service: Not on file     Active member of club or organization: Not on file     Attends meetings of clubs or organizations: Not on file     Relationship status: Not on file    Intimate partner violence:     Fear of current or ex partner: Not on file     Emotionally abused: Not on file     Physically abused: Not on file     Forced sexual activity: Not on file   Other Topics Concern    Not on file   Social History Narrative    Not on file      Family History   Problem Relation Age of Onset    Heart Disease Mother     Cancer Sister         Ovarian    Alzheimer Father     Alzheimer Paternal Grandfather       Visit Vitals  /72 (BP 1 Location: Left arm, BP Patient Position: Sitting)   Pulse 67   Resp 18   Ht 5' 5\" (1.651 m)   Wt 64.2 kg (141 lb 9.6 oz)   SpO2 95%   BMI 23.56 kg/m²        Review of Systems:   A comprehensive review of systems was negative except for that written in the HPI. Neuro Exam:     Appearance: The patient is well developed, well nourished, and unable to provide a coherent history and is in no acute distress. Mental Status: Oriented to day date month year. Mood and affect appropriate. Cranial Nerves:   Intact visual fields. Fundi are benign. HARSHA, EOM's full, no nystagmus, no ptosis. Facial sensation is normal. Corneal reflexes are intact. Facial movement is symmetric. Hearing is normal bilaterally. Palate is midline with normal sternocleidomastoid and trapezius muscles are normal. Tongue is midline. Motor:  5/5 strength in upper and lower proximal and distal muscles. Normal bulk and tone. No fasciculations. Reflexes:   Deep tendon reflexes 2+/4 and symmetrical.   Sensory:   Normal to touch, pinprick and vibration. Gait:  Normal gait. Tremor:   No tremor noted. Cerebellar:  No cerebellar signs present. Neurovascular:  Normal heart sounds and regular rhythm, peripheral pulses intact, and no carotid bruits. Assessment:   Memory dysfunction. Has an appointment on March 5 at the initial visit with neuropsych and then the follow-up testing that will be scheduled accordingly. No driving until we can get the information in hand with this evaluation. Trying to get the admission a discharge summary on the hyponatremia she had evaluated at [facility] on February 4 through the 6. Revisit pended.     Plan:   Revisit pended  Signed by :  Lenord Fabry MD

## 2020-03-05 ENCOUNTER — OFFICE VISIT (OUTPATIENT)
Dept: NEUROLOGY | Age: 85
End: 2020-03-05

## 2020-03-05 DIAGNOSIS — F43.21 ADJUSTMENT DISORDER WITH DEPRESSED MOOD: ICD-10-CM

## 2020-03-05 DIAGNOSIS — F41.8 ANXIETY ABOUT HEALTH: ICD-10-CM

## 2020-03-05 DIAGNOSIS — Z81.8 FAMILY HISTORY OF DEMENTIA: ICD-10-CM

## 2020-03-05 DIAGNOSIS — G31.84 MILD COGNITIVE IMPAIRMENT: Primary | ICD-10-CM

## 2020-03-05 DIAGNOSIS — R41.3 MEMORY DYSFUNCTION: ICD-10-CM

## 2020-03-05 NOTE — PROGRESS NOTES
[address] [facility],[address]  [address]   [address] [place] [address] [place] [address] [address]   [phone] Office   [phone] Fax      Neuropsychology    Initial Diagnostic Interview Note      Referral:  Alix Chang NP, Dr. August Daniel is a 82 y.o. right handed   female who was accompanied by her daughter to the initial clinical interview on 3/5/20. Please refer to her medical records for details pertaining to her history. At the start of the appointment, I reviewed the patient's [facility] Epic Chart (including Media scanned in from previous providers) for the active Problem List, all pertinent Past Medical Hx, medications, recent radiologic and laboratory findings. In addition, I reviewed pt's documented Immunization Record and Encounter History. I note she was referred for evaluation a number of years ago but did not come for testing. She did have imaging which was done then. She completed high school without history of previously diagnosed LD and/or receipt of special education services. She is a retired . She lives alone. She has had memory problems going on for at least four years and getting worse. She sometimes forgets where she puts things. Loses the content of conversation. She had been driving until Dr. Flavia Urrutia said she couldn't drive till after the testing. This was last Friday. She had been to the hospital prior. She had sodium low and other issues and those records were reviewed and in chart. Word finding problems also. Sshe gets worried here at times. No background history of meningitis/encephalitis, MIKE Fever, Lupus, Lyme, TBI, sz. She does get check ins about her medications. She does all of her finances. She was told she had a TIA after a bout when her right hand wasn't working right. She does have a family histroy of dementia.   She sometimes forgets to put her hearing aids. Gets up to go to the bathroom every hour or two. She gets depressed at times. No counseling or psychiatrist.  Grayce Zeyad include vivian. Daughter notes that she will ask the same questions over and over. She forgets the content of conversations. Just doesn't remember like she used to. Apparently on February 4-6 she was at [address]. Apparently family member found her confused and garbled speech. Sodium was 123 and ended up with nephrology consultation and after 1 and half days was better and after 2 days she was discharged. Interestingly 1-1/2 weeks before that occurred she took a fall on her stepstool and did not lose consciousness and was back on her feet. Ended up going to the [facility] ER for evaluation and had a CT of her abdomen and pelvis which was unrevealing. Problems worsening over the past few years. INDICATION:  History of memory changes over 3 years      COMPARISON:  None     TECHNIQUE:  MR imaging of the brain was performed with sagittal T1, axial T1,  T2, FLAIR, GRE, DWI/ADC;      FINDINGS:       The ventricles are midline without hydrocephalus.       There is no acute intra or extra-axial fluid collection. Mild periventricular  and bilateral subcortical increased T2/flair signal abnormalities are  nonspecific.      No evidence for acute infarction.      The major intracranial vascular flow-voids are patent.      No abnormal signal in the mastoid air cells or visualized paranasal sinuses.   Visualized soft tissues unremarkable.     IMPRESSION  IMPRESSION:     Mild periventricular and bilateral subcortical increased T2/flair signal  abnormalities are nonspecific but likely represent changes of chronic small  vessel ischemic disease.           Neuropsychological Mental Status Exam (NMSE):  Historian: Good  Praxis: No UE apraxia  R/L Orientation: Intact to self and to other  Dress: within normal limits   Weight: within normal limits   Appearance/Hygiene: within normal limits   Gait: within normal limits   Assistive Devices: Glasses  Mood: within normal limits   Affect: within normal limits   Comprehension: within normal limits   Thought Process: within normal limits   Expressive Language: within normal limits   Receptive Language: within normal limits   Motor:  No cognitive or motor perseveration  ETOH:  Denied  Tobacco: Denied  Illicit: Denied  SI/HI: Denied  Psychosis: Denied  Insight: Within normal limits  Judgment: Within normal limits  Other Psych:      Past Medical History:   Diagnosis Date    Arthritis     Depression     Diabetes ([facility]     GERD (gastroesophageal reflux disease)     High cholesterol     Hypertension     Snoring     Stroke ([facility] 2003    TIA    Thyroid disease        Past Surgical History:   Procedure Laterality Date    HX APPENDECTOMY      HX CATARACT REMOVAL Bilateral     HX CHOLECYSTECTOMY      HX GYN      btl    HX ORTHOPAEDIC Left     Open Repair    HX ORTHOPAEDIC Left     Trigger finger    HX ROTATOR CUFF REPAIR Right        Allergies   Allergen Reactions    Percocet [Oxycodone-Acetaminophen] Unknown (comments)     Can't remember reaction due to length of time of occurrence. Family History   Problem Relation Age of Onset    Heart Disease Mother     Cancer Sister         Ovarian    Alzheimer Father     Alzheimer Paternal Grandfather        Social History     Tobacco Use    Smoking status: Former Smoker     Packs/day: 2.00     Years: 30.00     Pack years: 60.00     Types: Cigarettes     Last attempt to quit: 1986     Years since quittin.5    Smokeless tobacco: Never Used   Substance Use Topics    Alcohol use: Yes     Comment: social 1 x year    Drug use: No       Current Outpatient Medications   Medication Sig Dispense Refill    amLODIPine-atorvastatin (CADUET) 10-10 mg per tablet Take 1 Tab by mouth daily.  aspirin delayed-release 81 mg tablet Take 1 Tab by mouth two (2) times a day.  60 Tab 0    ibuprofen (MOTRIN) 800 mg tablet Take 1 Tab by mouth every six (6) hours as needed for Pain. 50 Tab 2    acetaminophen (TYLENOL EXTRA STRENGTH) 500 mg tablet Take 1-2 Tabs by mouth every six (6) hours as needed for Pain. Not to exceed 4,000mg in any 24 hour period  Indications: Pain 60 Tab 0    ondansetron (ZOFRAN ODT) 8 mg disintegrating tablet Take 0.5 Tabs by mouth every eight (8) hours as needed for Nausea. 30 Tab 0    rosuvastatin (CRESTOR) 10 mg tablet Take 10 mg by mouth nightly.  ibuprofen/diphenhydramine HCl (ADVIL PM LIQUI-GELS PO) Take 200 mg by mouth nightly.  cholecalciferol (VITAMIN D3) 1,000 unit cap Take 1,000 Units by mouth daily (after breakfast).  melatonin 10 mg cap Take 1 Cap by mouth nightly.  gluc ryder/chondro ryder A/vit C/Mn (GLUCOSAMINE 1500 COMPLEX PO) Take 1 Cap by mouth ACB/HS.  glipiZIDE SR (GLUCOTROL XL) 2.5 mg CR tablet Take 2.5 mg by mouth daily (after breakfast).  raNITIdine hcl 150 mg capsule Take 150 mg by mouth daily (after breakfast).  ascorbic acid, vitamin C, (VITAMIN C) 500 mg tablet Take 500 mg by mouth daily (after breakfast).  docosahexanoic acid/epa (FISH OIL PO) Take 1,500 mg by mouth ACB/HS.  potassium chloride SA (MICRO-K) 10 mEq capsule Take 10 mEq by mouth ACB/HS.  lisinopril (PRINIVIL, ZESTRIL) 20 mg tablet Take 20 mg by mouth daily (after breakfast).  atenolol (TENORMIN) 50 mg tablet Take 50 mg by mouth ACB/HS.  FELODIPINE (PLENDIL PO) Take 5 mg by mouth daily (after breakfast).  hydrochlorothiazide (HYDRODIURIL) 25 mg tablet Take 25 mg by mouth daily (after breakfast).  levothyroxine (LEVOTHROID) 100 mcg tablet Take 100 mcg by mouth every morning. Plan:  Obtain authorization for testing from insurance company. Report to follow once testing, scoring, and interpretation completed. ? Organic based neurocognitive issues versus mood disorder or combination of same.   ? Problems organic, functional, or both? This note will not be viewable in [address]

## 2020-03-11 ENCOUNTER — OFFICE VISIT (OUTPATIENT)
Dept: NEUROLOGY | Age: 85
End: 2020-03-11

## 2020-03-11 DIAGNOSIS — R41.840 ATTENTION DEFICIT: ICD-10-CM

## 2020-03-11 DIAGNOSIS — F41.9 MILD ANXIETY: ICD-10-CM

## 2020-03-11 DIAGNOSIS — F41.8 ANXIETY ABOUT HEALTH: ICD-10-CM

## 2020-03-11 DIAGNOSIS — F32.1 MODERATE MAJOR DEPRESSION (HCC): ICD-10-CM

## 2020-03-11 DIAGNOSIS — G31.84 MILD COGNITIVE IMPAIRMENT: Primary | ICD-10-CM

## 2020-03-11 DIAGNOSIS — Z81.8 FAMILY HISTORY OF DEMENTIA: ICD-10-CM

## 2020-03-11 NOTE — LETTER
3/15/20 Patient: Nick Schulz YOB: 1935 Date of Visit: 3/11/2020 Rich Khanna NP 
[address] [address] VIA Facsimile: [phone] Dear Rich Khanna NP, Thank you for referring Ms. Gurjit Sandoval to [facility] for evaluation. My notes for this consultation are attached. If you have questions, please do not hesitate to call me. I look forward to following your patient along with you. Sincerely, Gus Villa PsyD

## 2020-03-15 NOTE — PROGRESS NOTES
[address] [facility],5Th [address]  [address]   [place] [address] [place] [place] [address] [address]   [phone] Office   [phone] Fax      Neuropsychological Evaluation Report    Referral:  Shari Nunez NP,  Saskia Hinds is a 82 y.o. right handed   female who was accompanied by her daughter to the initial clinical interview on 3/5/20. Please refer to her medical records for details pertaining to her history. At the start of the appointment, I reviewed the patient's [facility] Epic Chart (including Media scanned in from previous providers) for the active Problem List, all pertinent Past Medical Hx, medications, recent radiologic and laboratory findings. In addition, I reviewed pt's documented Immunization Record and Encounter History. I note she was referred for evaluation a number of years ago but did not come for testing. She did have imaging which was done then. She completed high school without history of previously diagnosed LD and/or receipt of special education services. She is a retired . She lives alone. She has had memory problems going on for at least four years and getting worse. She sometimes forgets where she puts things. Loses the content of conversation. She had been driving until Dr. Jil Paez said she couldn't drive till after the testing. This was last Friday. She had been to the hospital prior. She had sodium low and other issues and those records were reviewed and in chart. Word finding problems also. Sshe gets worried here at times. No background history of meningitis/encephalitis, MIKE Fever, Lupus, Lyme, TBI, sz. She does get check ins about her medications. She does all of her finances. She was told she had a TIA after a bout when her right hand wasn't working right. She does have a family histroy of dementia. She sometimes forgets to put her hearing aids.   Gets up to go to the bathroom every hour or two. She gets depressed at times. No counseling or psychiatrist.  Jona Trimble include vivian. Daughter notes that she will ask the same questions over and over. She forgets the content of conversations. Just doesn't remember like she used to. Apparently on February 4-6 she was at [address]. Apparently family member found her confused and garbled speech. Sodium was 123 and ended up with nephrology consultation and after 1 and half days was better and after 2 days she was discharged. Interestingly 1-1/2 weeks before that occurred she took a fall on her stepstool and did not lose consciousness and was back on her feet. Ended up going to the [facility] ER for evaluation and had a CT of her abdomen and pelvis which was unrevealing. Problems worsening over the past few years. INDICATION:  History of memory changes over 3 years      COMPARISON:  None     TECHNIQUE:  MR imaging of the brain was performed with sagittal T1, axial T1,  T2, FLAIR, GRE, DWI/ADC;      FINDINGS:       The ventricles are midline without hydrocephalus.       There is no acute intra or extra-axial fluid collection. Mild periventricular  and bilateral subcortical increased T2/flair signal abnormalities are  nonspecific.      No evidence for acute infarction.      The major intracranial vascular flow-voids are patent.      No abnormal signal in the mastoid air cells or visualized paranasal sinuses.   Visualized soft tissues unremarkable.     IMPRESSION  IMPRESSION:     Mild periventricular and bilateral subcortical increased T2/flair signal  abnormalities are nonspecific but likely represent changes of chronic small  vessel ischemic disease.           Neuropsychological Mental Status Exam (NMSE):  Historian: Good  Praxis: No UE apraxia  R/L Orientation: Intact to self and to other  Dress: within normal limits   Weight: within normal limits   Appearance/Hygiene: within normal limits   Gait: within normal limits   Assistive Devices: Glasses  Mood: within normal limits   Affect: within normal limits   Comprehension: within normal limits   Thought Process: within normal limits   Expressive Language: within normal limits   Receptive Language: within normal limits   Motor:  No cognitive or motor perseveration  ETOH:  Denied  Tobacco: Denied  Illicit: Denied  SI/HI: Denied  Psychosis: Denied  Insight: Within normal limits  Judgment: Within normal limits  Other Psych:      Past Medical History:   Diagnosis Date    Arthritis     Depression     Diabetes ([facility]     GERD (gastroesophageal reflux disease)     High cholesterol     Hypertension     Snoring     Stroke ([facility] 2003    TIA    Thyroid disease        Past Surgical History:   Procedure Laterality Date    HX APPENDECTOMY      HX CATARACT REMOVAL Bilateral     HX CHOLECYSTECTOMY      HX GYN      btl    HX ORTHOPAEDIC Left     Open Repair    HX ORTHOPAEDIC Left     Trigger finger    HX ROTATOR CUFF REPAIR Right        Allergies   Allergen Reactions    Percocet [Oxycodone-Acetaminophen] Unknown (comments)     Can't remember reaction due to length of time of occurrence. Family History   Problem Relation Age of Onset    Heart Disease Mother     Cancer Sister         Ovarian    Alzheimer Father     Alzheimer Paternal Grandfather        Social History     Tobacco Use    Smoking status: Former Smoker     Packs/day: 2.00     Years: 30.00     Pack years: 60.00     Types: Cigarettes     Last attempt to quit: 1986     Years since quittin.5    Smokeless tobacco: Never Used   Substance Use Topics    Alcohol use: Yes     Comment: social 1 x year    Drug use: No       Current Outpatient Medications   Medication Sig Dispense Refill    amLODIPine-atorvastatin (CADUET) 10-10 mg per tablet Take 1 Tab by mouth daily.  aspirin delayed-release 81 mg tablet Take 1 Tab by mouth two (2) times a day.  60 Tab 0    ibuprofen (MOTRIN) 800 mg tablet Take 1 Tab by mouth every six (6) hours as needed for Pain. 50 Tab 2    acetaminophen (TYLENOL EXTRA STRENGTH) 500 mg tablet Take 1-2 Tabs by mouth every six (6) hours as needed for Pain. Not to exceed 4,000mg in any 24 hour period  Indications: Pain 60 Tab 0    ondansetron (ZOFRAN ODT) 8 mg disintegrating tablet Take 0.5 Tabs by mouth every eight (8) hours as needed for Nausea. 30 Tab 0    rosuvastatin (CRESTOR) 10 mg tablet Take 10 mg by mouth nightly.  ibuprofen/diphenhydramine HCl (ADVIL PM LIQUI-GELS PO) Take 200 mg by mouth nightly.  cholecalciferol (VITAMIN D3) 1,000 unit cap Take 1,000 Units by mouth daily (after breakfast).  melatonin 10 mg cap Take 1 Cap by mouth nightly.  gluc ryder/chondro ryder A/vit C/Mn (GLUCOSAMINE 1500 COMPLEX PO) Take 1 Cap by mouth ACB/HS.  glipiZIDE SR (GLUCOTROL XL) 2.5 mg CR tablet Take 2.5 mg by mouth daily (after breakfast).  raNITIdine hcl 150 mg capsule Take 150 mg by mouth daily (after breakfast).  ascorbic acid, vitamin C, (VITAMIN C) 500 mg tablet Take 500 mg by mouth daily (after breakfast).  docosahexanoic acid/epa (FISH OIL PO) Take 1,500 mg by mouth ACB/HS.  potassium chloride SA (MICRO-K) 10 mEq capsule Take 10 mEq by mouth ACB/HS.  lisinopril (PRINIVIL, ZESTRIL) 20 mg tablet Take 20 mg by mouth daily (after breakfast).  atenolol (TENORMIN) 50 mg tablet Take 50 mg by mouth ACB/HS.  FELODIPINE (PLENDIL PO) Take 5 mg by mouth daily (after breakfast).  hydrochlorothiazide (HYDRODIURIL) 25 mg tablet Take 25 mg by mouth daily (after breakfast).  levothyroxine (LEVOTHROID) 100 mcg tablet Take 100 mcg by mouth every morning. Plan:  Obtain authorization for testing from insurance company. Report to follow once testing, scoring, and interpretation completed. ? Organic based neurocognitive issues versus mood disorder or combination of same. ? Problems organic, functional, or both?  This note will not be viewable in [address] Neuropsychological Test Results  Patient Testing 3/11/20 Report Completed 3/15/20  A Psychometrist Assisted w/ portions of this evaluation while under my direct  supervision    The following evaluation procedures/tests were administered:      Neuropsychologist Performed, Interpreted, & Reported:  Neuropsychological Mental Status Exam, Revised Memory & Behavior Checklist,  Mini Mental Status Exam, Clock Drawing Test, Lloyd-Melzack Pain Questionnaire, Test Of Premorbid Functioning, History Taking  & Clinical Interview With The Patient, Additional History Taking w/ the Patient's Daughter, CASE, ABAS-3, DEON, CPT-III, Review Of Available Records. Psychometrist Administered under Neuropsychologist Supervision & Neuropsychologist Interpreted & Neuropsychologist Reported:  Verbal Fluency Tests, Jose & Jose - Revised, Trailmaking Test Parts A & B, Wechsler Adult Intelligence Scale - IV, [place] Verbal Learning Test - 3, Grooved Pegboard, Gurrola Depression Inventory - II, Gurrola Anxiety Inventory. Test Findings:  Test Findings:  Note:  The patients raw data have been compared with currently available norms which include demographic corrections for age, gender, and/or education. Sometimes, the patients scores are compared to demographically similar individuals as close to the patients age, education level, etc., as possible. \"Average\" is viewed as being +/- 1 standard deviation (SD) from the stated mean for a particular test score. \"Low average\" is viewed as being between 1 and 2 SD below the mean, and above average is viewed as being 1 and 2 SD above the mean. Scores falling in the borderline range (between 1-1/2 and 2 SD below the mean) are viewed with particular attention as to whether they are normal or abnormal neurocognitive test scores.   Other methods of inference in analyzing the test data are also utilized, including the pattern and range of scores in the profile, bilateral motor functions, and the presence, if any, of pathognomonic signs. Behaviorally, the patient was friendly and cooperative and appeared motivated to perform well during this examination. Within this context, the results of this evaluation are viewed as a valid reflection of the patients actual neurocognitive and emotional status. The patient's score of 29/30 was normal on the MMSE. In this regard, serial 7s were 4/5 correct. Clock drawing was within normal limits. Her structured word list fluency, as assessed by the FAS Test, was within the mildly to moderately impaired range with a T score of 34. Category fluency was within the below average range with a T score of 44. Confrontation naming ability, as assessed by the [facility] - Revised, was within the above average range at 55/60 correct (T = 57). This pattern of performance is not indicative of a patient who is at increased risk for day-to-day problems with verbal fluency or confrontation naming. The patient was administered the [facility] Continuous Performance Test - III and review of the subscales within this instrument revealed mild to moderate concerns for inattentiveness without impulsivity. This pattern of performance is indicative of a patient who is at increased risk for day-to-day problems with sustained visual attention/concentration. The patient is not showing problems with working memory capacity (23rd %ile) or processing speed (18th %ile) on the WAIS-IV. Her Verbal Comprehension Index score of 85 was within the low average range. Her Perceptual Reasoning Index score of 98 was average. These scores do not reflect a major decline in functioning based on an assessment of premorbid functioning. The patient was administered the [place] Verbal Learning Test  - 3 and generated a normal range and positive  learning curve over five repeated auditory word list learning trials.   An interference trial was normal.  Free and cued, short and long delayed recall were all within the normal range. Recognition and forced choice recall were normal.   This pattern of performance is not indicative of a patient who is at increased risk for day-to-day problems with auditory learning and/or memory. Simple timed visual motor sequencing (Trailmaking Test Part A) was within the average range with a T score of 53,   Her performance on a similar, but more complex task of timed visual motor sequencing (Trailmaking Test Part B) was within the average range with a T score of 46. She made only one sequencing error on this latter completed test.   This pattern of performance is not indicative of a patient who is at increased risk for day-to-day problems with executive functioning. Fine motor dexterity was within the normal range bilaterally. This does not raise concern for a particularly lateralized brain dysfunction. The patient rated her current level of pain as \"0/5- No Pain\" on the Lloyd-Melzack Pain Questionnaire. Her Gurrola Depression Inventory -II score of 20 was within the moderately depressed range. Her Gurrola Anxiety Inventory score of 13 reflected mild anxiety. The patient's responses on the Personality Assessment Inventory were deemed valid for interpretation. Within this context, moderate depression and mild anxiety issues are present. She is withdrawn and introverted. The personality profile is otherwise normal.       The daughter completed the ABAS-3 and did not report clinically significant concerns regarding the patient's general adaptive skills (16th  %ile), conceptual skills (10th  %ile), social skills (12th %ile), or practical skills (23rd %ile). On the CASE, the daughter reported clinically significant concerns regarding the patient's cognitive functioning as well as concern for depression and anxiety. Impressions & Recommendations:   This is a predominantly normal range Neuropsychological Evaluation with respect to neurocognitive functioning. In this regard, she is showing impairments with sustained attention only. Otherwise, her mental status, verbal fluency, confrontation naming,, auditory learning, auditory memory, processing speed, working memory, perceptual reasoning, verbal comprehension, bilateral motor skills, and executive functioning remain normal.  From an emotional standpoint, there is support for moderate depression and mild anxiety. In my opinion, the profile is not consistent with MCI or dementia. Instead, I must wonder about chronic attention problems exacerbated by mood (particularly depression), anxiety, and aging. This is very reassuring news. I suggest psychiatric medication management for depression and anxiety along with counseling as needed. Consider also a non stimulant medication for attention if not medically contraindicated. Otherwise, the exam is very reassuring. Stay active mentally, physically and socially. Not concerned about competency, day-to-day supervision. Baseline now established. Follow up prn. Clinical correlation is, of course, indicated. I will discuss these findings with the patient when she follows up with me in the near future. A follow up Neuropsychological Evaluation is indicated on a prn basis, especially if there are any cognitive and/or emotional changes. DIAGNOSES:  The Referral Dx of MCI - Is not supported     Major Depression - Moderate     Anxiety - Mild     Chronic Attention Deficit     The above information is based upon information currently available to me. If there is any additional information of which I am currently unaware, I would be more than happy to review it upon having it made available to me. Thank you for the opportunity to see this interesting individual.     Sincerely,       Susie Sandoval.  Kelly Barlow NP    Time Documentation:    53332*1 69667*6 19386 x 1  96139 x 5 Test Administration/Data Gathering By Technician: (3 hours). 43667 x 1 (first 30 minutes), 01694 x 5 (each additional 30 minutes)    96132 x 1  96133 x 1 Testing Evaluation Services by Neuropsychologist (1 hour, 50 minutes) 96132 x 1 (first hour), 96133 x 1 (50 minutes)    Definitions:      91919/[id number]:  Neurobehavioral Status Exam, Clinical interview. Clinical assessment of thinking, reasoning and judgment, by neuropsychologist, both face to face time with patient and time interpreting those test results and reporting, first and subsequent hours)    94293/78523: Neuropsychological Test Administration by Technician/Psychometrist, first 30 minutes and each additional 30 minutes. The above includes: Record review. Review of history provided by patient. Review of collaborative information. Testing by Clinician. Review of raw data. Scoring. Report writing of individual tests administered by Clinician. Integration of individual tests administered by psychometrist with NSE/testing by clinician, review of records/history/collaborative information, case Conceptualization, treatment planning, clinical decision making, report writing, coordination Of Care. Psychometry test codes as time spent by psychometrist administering and scoring neurocognitive/psychological tests under supervision of neuropsychologist.  Integral services including scoring of raw data, data interpretation, case conceptualization, report writing etcetera were initiated after the patient finished testing/raw data collected and was completed on the date the report was signed.

## 2020-04-01 ENCOUNTER — VIRTUAL VISIT (OUTPATIENT)
Dept: NEUROLOGY | Age: 85
End: 2020-04-01

## 2020-04-01 DIAGNOSIS — R41.3 MEMORY LOSS: Primary | ICD-10-CM

## 2020-04-01 RX ORDER — ASPIRIN AND DIPYRIDAMOLE 25; 200 MG/1; MG/1
1 CAPSULE, EXTENDED RELEASE ORAL 2 TIMES DAILY
COMMUNITY

## 2020-04-01 NOTE — PATIENT INSTRUCTIONS
Patient seen on virtual visit with daughter. At this time wants to think about the medication possibilities with the neuropsych testing suggesting a chronic attention deficit type disorder and enhanced by moderate degrees of depression and mild anxiety. Suggested she increase the melatonin to 15 mg nightly and not take naps during the day to better support her sleep quality. Wants to think about the possibilities and did not commit to a revisit. We spent 30 minutes on the face-to-face encounter and/or phone to discuss these possibilities.

## 2020-04-02 ENCOUNTER — TELEPHONE (OUTPATIENT)
Dept: NEUROLOGY | Age: 85
End: 2020-04-02

## 2020-04-02 NOTE — TELEPHONE ENCOUNTER
----- Message from Katie Tavera sent at 4/2/2020  8:27 AM EDT -----  Regarding: Dr. Emiliana Mcnamara  Pt would like a call back regarding getting referred to a Psychiatrist. Contact is [phone]

## 2020-07-02 ENCOUNTER — HOSPITAL ENCOUNTER (OUTPATIENT)
Dept: MRI IMAGING | Age: 85
Discharge: HOME | End: 2020-07-02
Attending: ORTHOPAEDIC SURGERY
Payer: MEDICARE

## 2020-07-02 DIAGNOSIS — M54.50 LOW BACK PAIN: ICD-10-CM

## 2020-07-02 DIAGNOSIS — S22.070G COMPRESSION FRACTURE OF T10 VERTEBRA WITH DELAYED HEALING: ICD-10-CM

## 2020-07-02 PROCEDURE — 72148 MRI LUMBAR SPINE W/O DYE: CPT

## 2020-08-18 ENCOUNTER — HOSPITAL ENCOUNTER (OUTPATIENT)
Dept: MRI IMAGING | Age: 85
Discharge: HOME | End: 2020-08-18
Attending: ORTHOPAEDIC SURGERY
Payer: MEDICARE

## 2020-08-18 DIAGNOSIS — M54.9 MID BACK PAIN: ICD-10-CM

## 2020-08-18 PROCEDURE — 72146 MRI CHEST SPINE W/O DYE: CPT

## 2021-08-13 ENCOUNTER — OFFICE VISIT (OUTPATIENT)
Dept: NEUROLOGY | Age: 86
End: 2021-08-13
Payer: MEDICARE

## 2021-08-13 VITALS
WEIGHT: 141 LBS | HEIGHT: 65 IN | SYSTOLIC BLOOD PRESSURE: 138 MMHG | HEART RATE: 73 BPM | BODY MASS INDEX: 23.49 KG/M2 | DIASTOLIC BLOOD PRESSURE: 78 MMHG | OXYGEN SATURATION: 96 %

## 2021-08-13 DIAGNOSIS — F41.9 ANXIETY AND DEPRESSION: Primary | ICD-10-CM

## 2021-08-13 DIAGNOSIS — F32.A ANXIETY AND DEPRESSION: Primary | ICD-10-CM

## 2021-08-13 DIAGNOSIS — R41.3 MEMORY IMPAIRMENT: ICD-10-CM

## 2021-08-13 PROCEDURE — 99214 OFFICE O/P EST MOD 30 MIN: CPT | Performed by: SPECIALIST

## 2021-08-13 PROCEDURE — G8427 DOCREV CUR MEDS BY ELIG CLIN: HCPCS | Performed by: SPECIALIST

## 2021-08-13 PROCEDURE — 1101F PT FALLS ASSESS-DOCD LE1/YR: CPT | Performed by: SPECIALIST

## 2021-08-13 PROCEDURE — G8400 PT W/DXA NO RESULTS DOC: HCPCS | Performed by: SPECIALIST

## 2021-08-13 PROCEDURE — G8420 CALC BMI NORM PARAMETERS: HCPCS | Performed by: SPECIALIST

## 2021-08-13 PROCEDURE — 1090F PRES/ABSN URINE INCON ASSESS: CPT | Performed by: SPECIALIST

## 2021-08-13 PROCEDURE — G8432 DEP SCR NOT DOC, RNG: HCPCS | Performed by: SPECIALIST

## 2021-08-13 PROCEDURE — G8536 NO DOC ELDER MAL SCRN: HCPCS | Performed by: SPECIALIST

## 2021-08-13 RX ORDER — CALCIUM CARBONATE 600 MG
600 TABLET ORAL 2 TIMES DAILY
COMMUNITY

## 2021-08-13 RX ORDER — LISINOPRIL 10 MG/1
TABLET ORAL
COMMUNITY
Start: 2020-11-20

## 2021-08-13 RX ORDER — POTASSIUM CHLORIDE 20 MEQ/1
TABLET, EXTENDED RELEASE ORAL
COMMUNITY
Start: 2021-07-13

## 2021-08-13 RX ORDER — ATENOLOL 25 MG/1
TABLET ORAL
COMMUNITY
Start: 2021-07-13

## 2021-08-13 RX ORDER — AMLODIPINE BESYLATE 10 MG/1
TABLET ORAL
COMMUNITY
Start: 2021-04-03

## 2021-08-13 RX ORDER — MULTIVITAMIN
TABLET ORAL
COMMUNITY

## 2021-08-13 RX ORDER — LEVOTHYROXINE SODIUM 88 UG/1
TABLET ORAL
COMMUNITY
Start: 2021-07-19

## 2021-08-13 NOTE — PATIENT INSTRUCTIONS
Patient history reviewed patient examined. Daughter has very legitimate concerns about the patient's welfare as a goes to her memory performance etc.  Will refer to psychiatry on the issue of depression and anxiety and will organize a work-up regarding her cognitive performance as a goes to labs brain MRI and neuropsych testing. Unfortunately it could be a while before we catch up with each other because the neuropsych testing is really stretched out.

## 2021-08-13 NOTE — LETTER
8/13/2021    Patient: Dustin Chand   YOB: 1935   Date of Visit: 8/13/2021     Ricardo Girard NP  [address]  [address] [facility] [address]  Via Fax: [phone]    Dear Ricardo Girard NP,      Thank you for referring Ms. Ritchie Holstein to [facility] for evaluation. My notes for this consultation are attached. If you have questions, please do not hesitate to call me. I look forward to following your patient along with you.       Sincerely,    Shamar Diamond MD

## 2021-08-13 NOTE — PROGRESS NOTES
Neurology Consult      Subjective: Lorie Samaniego is a 82 y.o. female who comes in today with her daughter. Patient last seen in the spring 2020 with neuropsych suggestions that included depression and anxiety and chronic attention deficit disorder. She was a revisit as needed. The rest of the work-up was unrevealing. The daughter comes with her today and has grave concerns about her memory performance and references multiple times where the patient does not recall conversations at all or visitations with family or other key matters. She lives alone and drives and gives examples of the patient getting lost and losing her reference quite easily but has avoided accidents so far. She is constantly suggesting that she stay close to home with those errands. Also is fall prone and seems to be oblivious of her welfare in the house and safety and took a fall off a three-step walk up ladder in the kitchen and escaped more serious injury. Daughter also references her mom's depression and anxiety features which were never addressed from the last visitation but that was not supposed to be the case. With daughter and patient's permission we will go ahead and refer to psychiatry from this encounter today. Please see other suggestions below as it goes to becoming reacquainted with her cognitive evaluation. Current Outpatient Medications   Medication Sig Dispense Refill    cinnamon bark (Cinnamon) 500 mg cap Take  by mouth.  calcium carbonate (CALTREX) 600 mg calcium (1,500 mg) tablet Take 600 mg by mouth two (2) times a day.  amLODIPine (NORVASC) 10 mg tablet       atenoloL (TENORMIN) 25 mg tablet       lisinopriL (PRINIVIL, ZESTRIL) 10 mg tablet       levothyroxine (SYNTHROID) 88 mcg tablet TAKE 1 TABLET BY MOUTH DAILY      aspirin-dipyridamole (Aggrenox)  mg per SR capsule Take 1 Cap by mouth two (2) times a day.       aspirin delayed-release 81 mg tablet Take 1 Tab by mouth two (2) times a day. 60 Tab 0    rosuvastatin (CRESTOR) 10 mg tablet Take 10 mg by mouth nightly.  cholecalciferol (VITAMIN D3) 1,000 unit cap Take 1,000 Units by mouth daily (after breakfast).  glipiZIDE SR (GLUCOTROL XL) 2.5 mg CR tablet Take 2.5 mg by mouth daily (after breakfast).  ascorbic acid, vitamin C, (VITAMIN C) 500 mg tablet Take 500 mg by mouth daily (after breakfast).  docosahexanoic acid/epa (FISH OIL PO) Take 1,500 mg by mouth ACB/HS.  potassium chloride SA (MICRO-K) 10 mEq capsule Take 10 mEq by mouth ACB/HS.  potassium chloride (K-DUR, KLOR-CON) 20 mEq tablet  (Patient not taking: Reported on 8/13/2021)      amLODIPine-atorvastatin (CADUET) 10-10 mg per tablet Take 1 Tab by mouth daily. (Patient not taking: Reported on 8/13/2021)      ibuprofen (MOTRIN) 800 mg tablet Take 1 Tab by mouth every six (6) hours as needed for Pain. (Patient not taking: Reported on 8/13/2021) 50 Tab 2    acetaminophen (TYLENOL EXTRA STRENGTH) 500 mg tablet Take 1-2 Tabs by mouth every six (6) hours as needed for Pain. Not to exceed 4,000mg in any 24 hour period  Indications: Pain (Patient not taking: Reported on 8/13/2021) 60 Tab 0    ondansetron (ZOFRAN ODT) 8 mg disintegrating tablet Take 0.5 Tabs by mouth every eight (8) hours as needed for Nausea. (Patient not taking: Reported on 8/13/2021) 30 Tab 0    ibuprofen/diphenhydramine HCl (ADVIL PM LIQUI-GELS PO) Take 200 mg by mouth nightly. (Patient not taking: Reported on 8/13/2021)      melatonin 10 mg cap Take 1 Cap by mouth nightly. (Patient not taking: Reported on 8/13/2021)      gluc ryder/chondro ryder A/vit C/Mn (GLUCOSAMINE 1500 COMPLEX PO) Take 1 Cap by mouth ACB/HS. (Patient not taking: Reported on 8/13/2021)      raNITIdine hcl 150 mg capsule Take 150 mg by mouth daily (after breakfast). (Patient not taking: Reported on 8/13/2021)      FELODIPINE (PLENDIL PO) Take 5 mg by mouth daily (after breakfast).  (Patient not taking: Reported on 2021)      hydrochlorothiazide (HYDRODIURIL) 25 mg tablet Take 25 mg by mouth daily (after breakfast). (Patient not taking: Reported on 2021)        Allergies   Allergen Reactions    Percocet [Oxycodone-Acetaminophen] Unknown (comments)     Can't remember reaction due to length of time of occurrence. Past Medical History:   Diagnosis Date    Arthritis     Depression     [facility]     GERD (gastroesophageal reflux disease)     High cholesterol     Hypertension     Snoring     Stroke ([facility] 2003    TIA    Thyroid disease       Past Surgical History:   Procedure Laterality Date    HX APPENDECTOMY      HX CATARACT REMOVAL Bilateral     HX CHOLECYSTECTOMY      HX GYN      btl    HX ORTHOPAEDIC Left     Open Repair    HX ORTHOPAEDIC Left     Trigger finger    HX ROTATOR CUFF REPAIR Right       Social History     Socioeconomic History    Marital status:      Spouse name: Not on file    Number of children: Not on file    Years of education: Not on file    Highest education level: Not on file   Occupational History    Not on file   Tobacco Use    Smoking status: Former Smoker     Packs/day: 2.00     Years: 30.00     Pack years: 60.00     Types: Cigarettes     Quit date: 1986     Years since quittin.9    Smokeless tobacco: Never Used   Vaping Use    Vaping Use: Never used   Substance and Sexual Activity    Alcohol use: Yes     Comment: social 1 x year    Drug use: No    Sexual activity: Not on file   Other Topics Concern    Not on file   Social History Narrative    Not on file     Social Determinants of Health     Financial Resource Strain:     Difficulty of Paying Living Expenses:    Food Insecurity:     Worried About Running Out of Food in the Last Year:     Ran Out of Food in the Last Year:    Transportation Needs:     Lack of Transportation (Medical):      Lack of Transportation (Non-Medical):    Physical Activity:     Days of Exercise per Week:     Minutes of Exercise per Session:    Stress:     Feeling of Stress :    Social Connections:     Frequency of Communication with Friends and Family:     Frequency of Social Gatherings with Friends and Family:     Attends Buddhism Services:     Active Member of Clubs or Organizations:     Attends Club or Organization Meetings:     Marital Status:    Intimate Partner Violence:     Fear of Current or Ex-Partner:     Emotionally Abused:     Physically Abused:     Sexually Abused:       Family History   Problem Relation Age of Onset    Heart Disease Mother     Cancer Sister         Ovarian    Alzheimer Father     Alzheimer Paternal Grandfather       Visit Vitals  /78 (BP 1 Location: Left upper arm, BP Patient Position: Sitting, BP Cuff Size: Adult)   Pulse 73   Ht 5' 5\" (1.651 m)   Wt 64 kg (141 lb)   SpO2 96%   BMI 23.46 kg/m²        Review of Systems:   A comprehensive review of systems was negative except for that written in the HPI. Neuro Exam:     Appearance: The patient is well developed, well nourished, and unable to provide a coherent history and is in no acute distress. Mental Status: Oriented to time, place and person. But daughter had to put key elements in regarding her memory problem as stated today as she was not mindful of the same. Mood and affect appropriate. Cranial Nerves:   Intact visual fields. Fundi are benign. HARSHA, EOM's full, no nystagmus, no ptosis. Facial sensation is normal. Corneal reflexes are intact. Facial movement is symmetric. Hearing is normal bilaterally. Palate is midline with normal sternocleidomastoid and trapezius muscles are normal. Tongue is midline. Motor:  5/5 strength in upper and lower proximal and distal muscles. Normal bulk and tone. No fasciculations. Reflexes:   Deep tendon reflexes 2+/4 and symmetrical.   Sensory:   Normal to touch, pinprick and vibration. Gait:  Normal gait. Tremor:   No tremor noted.    Cerebellar:  No cerebellar signs present. Neurovascular:  Normal heart sounds and regular rhythm, peripheral pulses intact, and no carotid bruits. Assessment:   Memory impairment. Will instigate another work-up that includes updated brain MRI lab work and neuropsych testing. Further suggestions could follow. Strong suggestions to her face-to-face that she should restrict her driving very close to home for her own safety and welfare in addition to others. Further suggestions could follow. Depression and anxiety. In March 2020 as part of the neuropsych suggestions suggested psych follow-up regarding same and was never done. We will go ahead and make that referral at this time. Daughter says she is very symptomatic in these regards. Plan:   Revisit pended.   Signed by :  Ragini Viramontes MD

## 2021-08-17 LAB
ALBUMIN SERPL-MCNC: 4.2 G/DL (ref 3.6–4.6)
ALBUMIN/GLOB SERPL: 2 {RATIO} (ref 1.2–2.2)
ALP SERPL-CCNC: 33 IU/L (ref 48–121)
ALT SERPL-CCNC: 21 IU/L (ref 0–32)
AST SERPL-CCNC: 16 IU/L (ref 0–40)
BASOPHILS # BLD AUTO: 0 X10E3/UL (ref 0–0.2)
BASOPHILS NFR BLD AUTO: 1 %
BILIRUB SERPL-MCNC: 0.8 MG/DL (ref 0–1.2)
BUN SERPL-MCNC: 18 MG/DL (ref 8–27)
BUN/CREAT SERPL: 25 (ref 12–28)
CALCIUM SERPL-MCNC: 10.2 MG/DL (ref 8.7–10.3)
CHLORIDE SERPL-SCNC: 100 MMOL/L (ref 96–106)
CO2 SERPL-SCNC: 27 MMOL/L (ref 20–29)
CREAT SERPL-MCNC: 0.73 MG/DL (ref 0.57–1)
EOSINOPHIL # BLD AUTO: 0.2 X10E3/UL (ref 0–0.4)
EOSINOPHIL NFR BLD AUTO: 3 %
ERYTHROCYTE [DISTWIDTH] IN BLOOD BY AUTOMATED COUNT: 12.2 % (ref 11.7–15.4)
FOLATE SERPL-MCNC: 13 NG/ML
GLOBULIN SER CALC-MCNC: 2.1 G/DL (ref 1.5–4.5)
GLUCOSE SERPL-MCNC: 166 MG/DL (ref 65–99)
HCT VFR BLD AUTO: 44.2 % (ref 34–46.6)
HGB BLD-MCNC: 15.3 G/DL (ref 11.1–15.9)
IMM GRANULOCYTES # BLD AUTO: 0 X10E3/UL (ref 0–0.1)
IMM GRANULOCYTES NFR BLD AUTO: 0 %
LYMPHOCYTES # BLD AUTO: 2.9 X10E3/UL (ref 0.7–3.1)
LYMPHOCYTES NFR BLD AUTO: 40 %
MCH RBC QN AUTO: 34.9 PG (ref 26.6–33)
MCHC RBC AUTO-ENTMCNC: 34.6 G/DL (ref 31.5–35.7)
MCV RBC AUTO: 101 FL (ref 79–97)
MONOCYTES # BLD AUTO: 0.9 X10E3/UL (ref 0.1–0.9)
MONOCYTES NFR BLD AUTO: 13 %
NEUTROPHILS # BLD AUTO: 3.2 X10E3/UL (ref 1.4–7)
NEUTROPHILS NFR BLD AUTO: 43 %
PLATELET # BLD AUTO: 182 X10E3/UL (ref 150–450)
POTASSIUM SERPL-SCNC: 4.5 MMOL/L (ref 3.5–5.2)
PROT SERPL-MCNC: 6.3 G/DL (ref 6–8.5)
RBC # BLD AUTO: 4.39 X10E6/UL (ref 3.77–5.28)
SODIUM SERPL-SCNC: 139 MMOL/L (ref 134–144)
TSH SERPL DL<=0.005 MIU/L-ACNC: 1.13 UIU/ML (ref 0.45–4.5)
VIT B12 SERPL-MCNC: 436 PG/ML (ref 232–1245)
WBC # BLD AUTO: 7.3 X10E3/UL (ref 3.4–10.8)

## 2021-08-25 ENCOUNTER — HOSPITAL ENCOUNTER (OUTPATIENT)
Dept: MRI IMAGING | Age: 86
Discharge: HOME | End: 2021-08-25
Attending: SPECIALIST
Payer: MEDICARE

## 2021-08-25 DIAGNOSIS — R41.3 MEMORY IMPAIRMENT: ICD-10-CM

## 2021-08-25 PROCEDURE — 70551 MRI BRAIN STEM W/O DYE: CPT

## 2021-09-03 ENCOUNTER — TELEPHONE (OUTPATIENT)
Dept: NEUROLOGY | Age: 86
End: 2021-09-03

## 2021-09-03 NOTE — TELEPHONE ENCOUNTER
----- Message from Everlene Model sent at 9/3/2021 12:21 PM EDT -----  Regarding: /telephone  General Message/Vendor Calls    Caller's first and last name:self      Reason for call:MRI results       Callback required yes/no and why:yes, to discuss MRI      Best contact number(s):[phone]       Details to clarify the request:Pt would like Dr. Cheryl Chavez nurse to give her a callback to discuss her recent MRI results.        Everlene Model

## 2021-09-10 ENCOUNTER — TELEPHONE (OUTPATIENT)
Dept: NEUROLOGY | Age: 86
End: 2021-09-10

## 2021-09-10 NOTE — TELEPHONE ENCOUNTER
PT Given MRI results on 9/3/21. Return call to daughter, Roosevelt Lincoln, who has all access on recent updated HIPPA with below email address listed on HIPPA form. VM left that I will be emailing 2019 and 2021 brain MRI to her.

## 2021-09-30 ENCOUNTER — TELEPHONE (OUTPATIENT)
Dept: NEUROLOGY | Age: 86
End: 2021-09-30

## 2021-09-30 NOTE — TELEPHONE ENCOUNTER
----- Message from Leon Mcmanus sent at 9/30/2021 10:51 AM EDT -----  Regarding: /telephone  General Message/Vendor Calls    Caller's first and last name:Chichi Kelly (daughter in-law)      Reason for call: needs a referral of recent appt      Callback required yes/no and why: yes      Best contact number(s):[phone]      Details to clarify the request: Pt will need a referral for the independent driving test. Please give a call regarding the referral update.        Leon Mcmanus

## 2021-09-30 NOTE — TELEPHONE ENCOUNTER
Return callt o daughter Cornelius Franco. Left VM. Need more info. When and where is testing? What exactly do they need?

## 2021-10-01 ENCOUNTER — TELEPHONE (OUTPATIENT)
Dept: NEUROLOGY | Age: 86
End: 2021-10-01

## 2021-10-01 DIAGNOSIS — R41.3 MEMORY IMPAIRMENT: Primary | ICD-10-CM

## 2021-10-01 NOTE — TELEPHONE ENCOUNTER
----- Message from Betty Coffman sent at 10/1/2021  9:17 AM EDT -----  Regarding: Dr. Keron Jett / Micky Pedersen first and last name: Dominique San , pt's daughter in law      Reason for call: Message for Nurse Hall      Callback required yes/no and why: yes      Best contact number(s): [phone]      Details to clarify the request: caller wanted let Nurse Hall know it is [employer] and the appt is on 10/15/2021 at 12:30pm. Its for the independent driving test that Dr. Keron Jett ordered. They need a referral so they know to have this done. Callers email for the referral is [email or web address]       Betty Coffman

## 2021-10-05 ENCOUNTER — TELEPHONE (OUTPATIENT)
Dept: NEUROLOGY | Age: 86
End: 2021-10-05

## 2021-10-05 NOTE — TELEPHONE ENCOUNTER
Return call to daughter in law who was thanking us for emailing the OT referral to her for her mothers driving test the other day.

## 2021-10-05 NOTE — TELEPHONE ENCOUNTER
----- Message from Kevin Bright sent at 10/5/2021  2:30 PM EDT -----  Regarding: /telephone  Patient return call    Caller's first and last name and relationship (if not the patient): Lawrence Castaneda (Pt's daughter IN- Law)      Best contact number(s):[phone]      Whose call is being returned: nurse Hall      Details to clarify the request:Pt's daughter Pan Mckeon is returning a call to Dr. Jaqui Kirby office       Kevin Bright

## 2021-10-29 ENCOUNTER — TELEPHONE (OUTPATIENT)
Dept: NEUROLOGY | Age: 86
End: 2021-10-29

## 2021-10-29 NOTE — TELEPHONE ENCOUNTER
Outgoing call to daughter in law, Kranthi Simon. Left VM. Pt was to be having a driving eval in Sept- we need to get those results for Dr HARRISON to review next week.

## 2021-10-29 NOTE — TELEPHONE ENCOUNTER
----- Message from Kael Hagan [id number] sent at 10/29/2021 10:38 AM EDT -----  Regarding: dr colunga/ telephone  General Message/Vendor Calls    Caller's first and last name: pt      Reason for call: pt has had many test completed and would like to know if the doctor has the reports      Callback required yes/no and why: yes      Best contact number(s): [phone]      Details to clarify the request:      Kael Hagan Madelaine7

## 2021-10-29 NOTE — TELEPHONE ENCOUNTER
----- Message from Cheryl Wang sent at 10/29/2021 11:34 AM EDT -----  Regarding: \telephone  Contact: [phone]  Patient return call    Caller's first and last name and relationship (if not the patient):jared menard(daughter      Best contact number(s):[phone]      Whose call is being returned:nurse      Details to clarify the request:n\a      Cheryl Wang

## 2021-11-01 NOTE — TELEPHONE ENCOUNTER
----- Message from April Rabb sent at 11/1/2021  2:16 PM EDT -----  Regarding: /Telephone  General Message/Vendor Calls    Caller's first and last name: N/A      Reason for call Pt would like to speak to either the Dr or one of his nurses       Callback required yes/no and why: yes       Best contact number(s): [phone]      Details to clarify the request: Pt would like to speak to either the Dr or one of his nurses       April Rabb

## 2021-11-02 NOTE — TELEPHONE ENCOUNTER
----- Message from Alejandra Morin sent at 9/10/2021 10:05 AM EDT -----  Regarding: /Telephone  General Message/Vendor Calls    Caller's first and last name: Jose Ackerman/ Daughter      Reason for call: pt mom is calling to get mri results from recent and previous, trying to compare both. Callback required yes/no and why:yes      Best contact number(s):[phone]      Details to clarify the request: email address [email or web address]      Alejandra Morin 01-Nov-2021 22:54

## 2021-11-03 NOTE — TELEPHONE ENCOUNTER
Spoke with daughter. Will need medical DMV form filled out in addition to the formal driving assessment pt had (scanned in media)    The form is with pt now and she will be filling out her part and then mailing it to us to fill out our part and then fax to [facility]. Will need Etelvina Garcia to fill out their sections.

## 2021-11-10 ENCOUNTER — TELEPHONE (OUTPATIENT)
Dept: NEUROLOGY | Age: 86
End: 2021-11-10

## 2021-11-10 NOTE — TELEPHONE ENCOUNTER
Pt had driving eval at [facility] that recommended she not drive further than 25 mile radius from her home. They faxed that to [facility]. We are in agreement with eval.    This is now a DMV issue. We can not give her permission to drive to [facility]. Outgoing call to pt- phone only rings, no answer, no voicemail. Outgoing call to daughter, Jess Tabares, advising of above.

## 2021-11-10 NOTE — TELEPHONE ENCOUNTER
----- Message from Dhiraj Martinez sent at 11/10/2021  3:02 PM EST -----  Regarding: /telephone  Contact: [phone]  General Message/Vendor Calls    Caller's first and last name: n/a       Reason for call: Has been told to not drive out of the [facility] until she talks to dr Pinkie Opitz required yes/no and why:      Best contact number(s): [phone]      Details to clarify the request: Is wanting to go to [address] [place] [address] and [facility].  Is requesting a call before the end of the day      Dhiraj Martinez

## 2021-11-15 NOTE — TELEPHONE ENCOUNTER
Spoke to daughter Codey Gleason on Thursday and filled out [facility] paperwork for pt- sent to next provider to complete. Daughter states she will talk to pt about her multiple calls to the office regarding driving.

## 2021-11-15 NOTE — TELEPHONE ENCOUNTER
----- Message from Jennifer Mcadams sent at 11/15/2021  8:27 AM EST -----  Regarding: Dr. Jose F Sanchez Message/Vendor Calls    Caller's first and last name:patient       Reason for call:callback      Callback required yes/no and why:yes      Best contact number(s):[phone]      Details to clarify the request:patient wants to speak to the nurse regarding her  license       Jennifer Mcadams

## 2021-11-16 NOTE — TELEPHONE ENCOUNTER
Called patient daughter Radha Him so that she was aware that her mother may need reinforced communication concerning the DMV and driving restrictions. Daughter stated that her mother was having difficulties with driving restrictions and stated that they would reinforce restriction guidelines.

## 2021-11-16 NOTE — TELEPHONE ENCOUNTER
Patient came to the office and per patient she wanted to know if it was okay for her to drive outside [facility]. I let her know that at this point it is in the hands of the DMV and until that state otherwise, she will need to stay within the [facility]. Patient was not happy with this information and asked if this was something a doctor did or her children. I explained that a doctor had her do the driving exam and at this time that was their recommendation to the [facility] and until the [facility] stated otherwise she was to drive in [facility]. Patient stated understanding.

## 2022-02-21 ENCOUNTER — OFFICE VISIT (OUTPATIENT)
Dept: NEUROLOGY | Age: 87
End: 2022-02-21

## 2022-02-21 ENCOUNTER — OFFICE VISIT (OUTPATIENT)
Dept: NEUROLOGY | Age: 87
End: 2022-02-21
Payer: MEDICARE

## 2022-02-21 DIAGNOSIS — G31.84 MILD COGNITIVE IMPAIRMENT: Primary | ICD-10-CM

## 2022-02-21 DIAGNOSIS — R41.840 ATTENTION DEFICIT: Chronic | ICD-10-CM

## 2022-02-21 DIAGNOSIS — R41.3 SHORT-TERM MEMORY LOSS: ICD-10-CM

## 2022-02-21 DIAGNOSIS — F43.10 PTSD (POST-TRAUMATIC STRESS DISORDER): ICD-10-CM

## 2022-02-21 DIAGNOSIS — F41.9 ANXIETY AND DEPRESSION: ICD-10-CM

## 2022-02-21 DIAGNOSIS — F32.A ANXIETY AND DEPRESSION: ICD-10-CM

## 2022-02-21 DIAGNOSIS — F32.1 MODERATE MAJOR DEPRESSION (HCC): ICD-10-CM

## 2022-02-21 DIAGNOSIS — R41.89 COGNITIVE DECLINE: ICD-10-CM

## 2022-02-21 DIAGNOSIS — R41.3 FUNCTIONAL MEMORY PROBLEM: ICD-10-CM

## 2022-02-21 DIAGNOSIS — F41.9 MODERATE ANXIETY: ICD-10-CM

## 2022-02-21 PROCEDURE — 96136 PSYCL/NRPSYC TST PHY/QHP 1ST: CPT | Performed by: CLINICAL NEUROPSYCHOLOGIST

## 2022-02-21 PROCEDURE — 96132 NRPSYC TST EVAL PHYS/QHP 1ST: CPT | Performed by: CLINICAL NEUROPSYCHOLOGIST

## 2022-02-21 PROCEDURE — 96137 PSYCL/NRPSYC TST PHY/QHP EA: CPT | Performed by: CLINICAL NEUROPSYCHOLOGIST

## 2022-02-21 PROCEDURE — 96138 PSYCL/NRPSYC TECH 1ST: CPT | Performed by: CLINICAL NEUROPSYCHOLOGIST

## 2022-02-21 PROCEDURE — 96139 PSYCL/NRPSYC TST TECH EA: CPT | Performed by: CLINICAL NEUROPSYCHOLOGIST

## 2022-02-21 PROCEDURE — 96133 NRPSYC TST EVAL PHYS/QHP EA: CPT | Performed by: CLINICAL NEUROPSYCHOLOGIST

## 2022-02-21 PROCEDURE — 90791 PSYCH DIAGNOSTIC EVALUATION: CPT | Performed by: CLINICAL NEUROPSYCHOLOGIST

## 2022-02-21 NOTE — LETTER
2/22/2022    Patient: Asiya Mandel   YOB: 1935   Date of Visit: 2/21/2022     China Hernandez NP  [address]  [place] [id number]  Via Fax: [phone]    Dear China Hernandez NP,      Thank you for referring Ms. Amanda House to [facility] for evaluation. My notes for this consultation are attached. If you have questions, please do not hesitate to call me. I look forward to following your patient along with you.       Sincerely,    Shira Matias PsyD

## 2022-02-21 NOTE — PROGRESS NOTES
[address] [facility],[address]Th [address]  [address]   [place] [address] [place] [address] [address]   [phone] Office   [phone] Fax      Neuropsychology    Exam # 2    Initial Diagnostic Interview Note      Referral:  Baldev Branham NP, Dr. Alen Alvaradojose is a 82 y.o. right handed   female who was accompanied by her daughter to the initial clinical interview on 22 . Please refer to her medical records for details pertaining to her history. At the start of the appointment, I reviewed the patient's [facility] Epic Chart (including Media scanned in from previous providers) for the active Problem List, all pertinent Past Medical Hx, medications, recent radiologic and laboratory findings. In addition, I reviewed pt's documented Immunization Record and Encounter History. When I saw her last:    Fatuma Oh is a 82 y.o. right handed   female who was accompanied by her daughter to the initial clinical interview on 3/5/20. Please refer to her medical records for details pertaining to her history. At the start of the appointment, I reviewed the patient's [facility] Epic Chart (including Media scanned in from previous providers) for the active Problem List, all pertinent Past Medical Hx, medications, recent radiologic and laboratory findings.  In addition, I reviewed pt's documented Immunization Record and Encounter History.     I note she was referred for evaluation a number of years ago but did not come for testing. She did have imaging which was done then. She completed high school without history of previously diagnosed LD and/or receipt of special education services. She is a retired . She lives alone. She has had memory problems going on for at least four years and getting worse. She sometimes forgets where she puts things. Loses the content of conversation.   She had been driving until Dr. Kalpesh Landis said she couldn't drive till after the testing. This was last Friday. She had been to the hospital prior. She had sodium low and other issues and those records were reviewed and in chart. Word finding problems also. Ssneftali gets worried here at times. No background history of meningitis/encephalitis, MIKE Fever, Lupus, Lyme, TBI, sz. She does get check ins about her medications. She does all of her finances. She was told she had a TIA after a bout when her right hand wasn't working right. She does have a family histroy of dementia. She sometimes forgets to put her hearing aids. Gets up to go to the bathroom every hour or two. She gets depressed at times. No counseling or psychiatrist.  Elenita Arteaga include vivian.       Daughter notes that she will ask the same questions over and over. She forgets the content of conversations. Just doesn't remember like she used to. Apparently on February 4-6 she was at [address]   Apparently family member found her confused and garbled speech. Elizabet Leo was 89 and ended up with nephrology consultation and after 1 and half days was better and after 2 days she was discharged.   Interestingly 1-1/2 weeks before that occurred she took a fall on her stepstool and did not lose consciousness and was back on her feet.  Ended up going to the [facility] for evaluation and had a CT of her abdomen and pelvis which was unrevealing.       Problems worsening over the past few years.        INDICATION:  History of memory changes over 3 years      COMPARISON:  None     TECHNIQUE:  MR imaging of the brain was performed with sagittal T1, axial T1,  T2, FLAIR, GRE, DWI/ADC;      FINDINGS:       The ventricles are midline without hydrocephalus.       There is no acute intra or extra-axial fluid collection.  Mild periventricular  and bilateral subcortical increased T2/flair signal abnormalities are  nonspecific.      No evidence for acute infarction.      The major intracranial vascular flow-voids are patent.      No abnormal signal in the mastoid air cells or visualized paranasal sinuses. Visualized soft tissues unremarkable.     IMPRESSION  IMPRESSION:     Mild periventricular and bilateral subcortical increased T2/flair signal  abnormalities are nonspecific but likely represent changes of chronic small  vessel ischemic disease.         Dx in 2020 included: This is a predominantly normal range Neuropsychological Evaluation with respect to neurocognitive functioning. In this regard, she is showing impairments with sustained attention only. Otherwise, her mental status, verbal fluency, confrontation naming,, auditory learning, auditory memory, processing speed, working memory, perceptual reasoning, verbal comprehension, bilateral motor skills, and executive functioning remain normal.  From an emotional standpoint, there is support for moderate depression and mild anxiety.                  In my opinion, the profile is not consistent with MCI or dementia. Instead, I must wonder about chronic attention problems exacerbated by mood (particularly depression), anxiety, and aging. This is very reassuring news. I suggest psychiatric medication management for depression and anxiety along with counseling as needed. Consider also a non stimulant medication for attention if not medically contraindicated. Otherwise, the exam is very reassuring. Stay active mentally, physically and socially. Not concerned about competency, day-to-day supervision. Baseline now established. Follow up prn. Clinical correlation is, of course, indicated. I will discuss these findings with the patient when she follows up with me in the near future.   A follow up Neuropsychological Evaluation is indicated on a prn basis, especially if there are any cognitive and/or emotional changes.       DIAGNOSES:             The Referral Dx of MCI - Is not supported Major Depression - Moderate                                      Anxiety - Mild                                      Chronic Attention Deficit       The patient says her memory has been doing fine. Daughter says her memory is getting worse. Daughter notices increased short term memory decline. Will ask the same questions over and over. Daughter is very concerned. She can be talking and will remember some remote information without any problem but can't say what she ate twenty minutes ago. More and more forgetful. She continues to drive but gets lost a lot and loses references to visual objects that usually pinpoint where she is for her. She has not gotten into any accidents. She lives alone. Daughter does help make sure she is set up well in terms of meds and finances and such. She can fall easily, but still gets up on ladders. She tends to veer a bit from the liberty but doesn't go over the edge. There are anxiety and depression issues that are ongoing. Some days, her mood is better than others. Daughter would say some days are worse than others, especially when she is isolated. There is no known background stroke, meningitis/encephalitis, MIKE Fever, TBI, sz. She keeps having issues with sleep - she worries about things she cannot control and cannot turn her mind off at night. Spouse used to tell she is worries about worrying, she worries so much. Was supposed to see psychiatry and psychology- and saw Dr. Bee Bhatia, and was put on Buspar. EXAM: MRI BRAIN WO CONT     INDICATION: Memory impairment.     COMPARISON: MRI brain 1/3/2019.     CONTRAST: None.     TECHNIQUE:    Multiplanar multisequence acquisition without contrast of the brain.     FINDINGS:  The ventricles are normal in size and position. Unchanged scattered  periventricular and deep white matter T2/FLAIR hyperintensities, consistent with  mild chronic microvascular ischemic disease.  There is no acute infarct,  hemorrhage, extra-axial fluid collection, or mass effect. There is no cerebellar  tonsillar herniation. Expected arterial flow-voids are present.     The paranasal sinuses, mastoid air cells, and middle ears are clear. The orbital  contents are within normal limits with bilateral lens implants. No significant  osseous or scalp lesions are identified.     IMPRESSION     1. No acute intracranial abnormality. 2. Unchanged mild chronic microvascular ischemic disease.          Neuropsychological Mental Status Exam (NMSE):  Historian: Good  Praxis: No UE apraxia  R/L Orientation: Intact to self and to other  Dress: within normal limits   Weight: within normal limits   Appearance/Hygiene: within normal limits   Gait: within normal limits   Assistive Devices: Glasses  Mood: within normal limits   Affect: within normal limits   Comprehension: within normal limits   Thought Process: within normal limits   Expressive Language: within normal limits   Receptive Language: within normal limits   Motor:  No cognitive or motor perseveration  ETOH:  Denied  Tobacco: Denied  Illicit: Denied  SI/HI: Denied  Psychosis: Denied  Insight: Within normal limits  Judgment: Within normal limits  Other Psych:           Past Medical History:   Diagnosis Date    Arthritis     Depression     Diabetes ([facility] [address]     GERD (gastroesophageal reflux disease)     High cholesterol     Hypertension     Snoring     Stroke ([facility] [address] 2003    TIA    Thyroid disease        Past Surgical History:   Procedure Laterality Date    HX APPENDECTOMY      HX CATARACT REMOVAL Bilateral     HX CHOLECYSTECTOMY      HX GYN      btl    HX ORTHOPAEDIC Left     Open Repair    HX ORTHOPAEDIC Left     Trigger finger    HX ROTATOR CUFF REPAIR Right        Allergies   Allergen Reactions    Percocet [Oxycodone-Acetaminophen] Unknown (comments)     Can't remember reaction due to length of time of occurrence.        Family History   Problem Relation Age of Onset    Heart Disease Mother     Cancer Sister         Ovarian    Alzheimer's Disease Father     Alzheimer's Disease Paternal Grandfather        Social History     Tobacco Use    Smoking status: Former Smoker     Packs/day: 2.00     Years: 30.00     Pack years: 60.00     Types: Cigarettes     Quit date: 1986     Years since quittin.4    Smokeless tobacco: Never Used   Vaping Use    Vaping Use: Never used   Substance Use Topics    Alcohol use: Yes     Comment: social 1 x year    Drug use: No       Current Outpatient Medications   Medication Sig Dispense Refill    cinnamon bark (Cinnamon) 500 mg cap Take  by mouth.  calcium carbonate (CALTREX) 600 mg calcium (1,500 mg) tablet Take 600 mg by mouth two (2) times a day.  amLODIPine (NORVASC) 10 mg tablet       atenoloL (TENORMIN) 25 mg tablet       lisinopriL (PRINIVIL, ZESTRIL) 10 mg tablet       potassium chloride (K-DUR, KLOR-CON) 20 mEq tablet  (Patient not taking: Reported on 2021)      levothyroxine (SYNTHROID) 88 mcg tablet TAKE 1 TABLET BY MOUTH DAILY      aspirin-dipyridamole (Aggrenox)  mg per SR capsule Take 1 Cap by mouth two (2) times a day.  aspirin delayed-release 81 mg tablet Take 1 Tab by mouth two (2) times a day. 60 Tab 0    ibuprofen (MOTRIN) 800 mg tablet Take 1 Tab by mouth every six (6) hours as needed for Pain. (Patient not taking: Reported on 2021) 50 Tab 2    acetaminophen (TYLENOL EXTRA STRENGTH) 500 mg tablet Take 1-2 Tabs by mouth every six (6) hours as needed for Pain. Not to exceed 4,000mg in any 24 hour period  Indications: Pain (Patient not taking: Reported on 2021) 60 Tab 0    ondansetron (ZOFRAN ODT) 8 mg disintegrating tablet Take 0.5 Tabs by mouth every eight (8) hours as needed for Nausea. (Patient not taking: Reported on 2021) 30 Tab 0    rosuvastatin (CRESTOR) 10 mg tablet Take 10 mg by mouth nightly.  ibuprofen/diphenhydramine HCl (ADVIL PM LIQUI-GELS PO) Take 200 mg by mouth nightly.  (Patient not taking: Reported on 8/13/2021)      cholecalciferol (VITAMIN D3) 1,000 unit cap Take 1,000 Units by mouth daily (after breakfast).  melatonin 10 mg cap Take 1 Cap by mouth nightly. (Patient not taking: Reported on 8/13/2021)      gluc ryder/chondro ryder A/vit C/Mn (GLUCOSAMINE 1500 COMPLEX PO) Take 1 Cap by mouth ACB/HS. (Patient not taking: Reported on 8/13/2021)      glipiZIDE SR (GLUCOTROL XL) 2.5 mg CR tablet Take 2.5 mg by mouth daily (after breakfast).  raNITIdine hcl 150 mg capsule Take 150 mg by mouth daily (after breakfast). (Patient not taking: Reported on 8/13/2021)      ascorbic acid, vitamin C, (VITAMIN C) 500 mg tablet Take 500 mg by mouth daily (after breakfast).  docosahexanoic acid/epa (FISH OIL PO) Take 1,500 mg by mouth ACB/HS.  potassium chloride SA (MICRO-K) 10 mEq capsule Take 10 mEq by mouth ACB/HS.  FELODIPINE (PLENDIL PO) Take 5 mg by mouth daily (after breakfast). (Patient not taking: Reported on 8/13/2021)      hydrochlorothiazide (HYDRODIURIL) 25 mg tablet Take 25 mg by mouth daily (after breakfast). (Patient not taking: Reported on 8/13/2021)           Plan:  Obtain authorization for testing from insurance company. Report to follow once testing, scoring, and interpretation completed. ? Organic based neurocognitive issues versus mood disorder or combination of same. ? Problems organic, functional, or both? This note will not be viewable in [address] Might need to bump up her Buspar?

## 2022-02-22 NOTE — PROGRESS NOTES
[address] [facility],5Th Floor  [address]   [address] [place] [address] [place] [address] [address]   [phone] Office   [phone] Fax      Neuropsychological Evaluation Report    Exam # 2    Referral:  Kate Fitzgerald NP, Dr. Vidya Vickers is a 82 y.o. right handed   female who was accompanied by her daughter to the initial clinical interview on 2/21/22 . Please refer to her medical records for details pertaining to her history. At the start of the appointment, I reviewed the patient's [facility] Epic Chart (including Media scanned in from previous providers) for the active Problem List, all pertinent Past Medical Hx, medications, recent radiologic and laboratory findings. In addition, I reviewed pt's documented Immunization Record and Encounter History. When I saw her last:    Bret Cottrell is a 82 y.o. right handed   female who was accompanied by her daughter to the initial clinical interview on 3/5/20. Please refer to her medical records for details pertaining to her history. At the start of the appointment, I reviewed the patient's [facility] Epic Chart (including Media scanned in from previous providers) for the active Problem List, all pertinent Past Medical Hx, medications, recent radiologic and laboratory findings.  In addition, I reviewed pt's documented Immunization Record and Encounter History.     I note she was referred for evaluation a number of years ago but did not come for testing. She did have imaging which was done then. She completed high school without history of previously diagnosed LD and/or receipt of special education services. She is a retired . She lives alone. She has had memory problems going on for at least four years and getting worse. She sometimes forgets where she puts things. Loses the content of conversation.   She had been driving until Dr. Martinez Hennessy said she couldn't drive till after the testing. This was last Friday. She had been to the hospital prior. She had sodium low and other issues and those records were reviewed and in chart. Word finding problems also. Jenny gets worried here at times. No background history of meningitis/encephalitis, MIKE Fever, Lupus, Lyme, TBI, sz. She does get check ins about her medications. She does all of her finances. She was told she had a TIA after a bout when her right hand wasn't working right. She does have a family histroy of dementia. She sometimes forgets to put her hearing aids. Gets up to go to the bathroom every hour or two. She gets depressed at times. No counseling or psychiatrist.  Zafar Fleeting include vivian.       Daughter notes that she will ask the same questions over and over. She forgets the content of conversations. Just doesn't remember like she used to. Apparently on February 4-6 she was at [address]   Apparently family member found her confused and garbled speech. Claudine Brought was 89 and ended up with nephrology consultation and after 1 and half days was better and after 2 days she was discharged.   Interestingly 1-1/2 weeks before that occurred she took a fall on her stepstool and did not lose consciousness and was back on her feet.  Ended up going to the [facility] ER for evaluation and had a CT of her abdomen and pelvis which was unrevealing.       Problems worsening over the past few years.        INDICATION:  History of memory changes over 3 years      COMPARISON:  None     TECHNIQUE:  MR imaging of the brain was performed with sagittal T1, axial T1,  T2, FLAIR, GRE, DWI/ADC;      FINDINGS:       The ventricles are midline without hydrocephalus.       There is no acute intra or extra-axial fluid collection.  Mild periventricular  and bilateral subcortical increased T2/flair signal abnormalities are  nonspecific.      No evidence for acute infarction.      The major intracranial vascular flow-voids are patent.      No abnormal signal in the mastoid air cells or visualized paranasal sinuses. Visualized soft tissues unremarkable.     IMPRESSION  IMPRESSION:     Mild periventricular and bilateral subcortical increased T2/flair signal  abnormalities are nonspecific but likely represent changes of chronic small  vessel ischemic disease.         Dx in 2020 included: This is a predominantly normal range Neuropsychological Evaluation with respect to neurocognitive functioning. In this regard, she is showing impairments with sustained attention only. Otherwise, her mental status, verbal fluency, confrontation naming,, auditory learning, auditory memory, processing speed, working memory, perceptual reasoning, verbal comprehension, bilateral motor skills, and executive functioning remain normal.  From an emotional standpoint, there is support for moderate depression and mild anxiety.                  In my opinion, the profile is not consistent with MCI or dementia. Instead, I must wonder about chronic attention problems exacerbated by mood (particularly depression), anxiety, and aging. This is very reassuring news. I suggest psychiatric medication management for depression and anxiety along with counseling as needed. Consider also a non stimulant medication for attention if not medically contraindicated. Otherwise, the exam is very reassuring. Stay active mentally, physically and socially. Not concerned about competency, day-to-day supervision. Baseline now established. Follow up prn. Clinical correlation is, of course, indicated. I will discuss these findings with the patient when she follows up with me in the near future.   A follow up Neuropsychological Evaluation is indicated on a prn basis, especially if there are any cognitive and/or emotional changes.       DIAGNOSES:             The Referral Dx of MCI - Is not supported                                      Major Depression - Moderate                                      Anxiety - Mild                                      Chronic Attention Deficit       The patient says her memory has been doing fine. Daughter says her memory is getting worse. Daughter notices increased short term memory decline. Will ask the same questions over and over. Daughter is very concerned. She can be talking and will remember some remote information without any problem but can't say what she ate twenty minutes ago. More and more forgetful. She continues to drive but gets lost a lot and loses references to visual objects that usually pinpoint where she is for her. She has not gotten into any accidents. She lives alone. Daughter does help make sure she is set up well in terms of meds and finances and such. She can fall easily, but still gets up on ladders. She tends to veer a bit from the liberty but doesn't go over the edge. There are anxiety and depression issues that are ongoing. Some days, her mood is better than others. Daughter would say some days are worse than others, especially when she is isolated. There is no known background stroke, meningitis/encephalitis, MIKE Fever, TBI, sz. She keeps having issues with sleep - she worries about things she cannot control and cannot turn her mind off at night. Spouse used to tell she is worries about worrying, she worries so much. Was supposed to see psychiatry and psychology- and saw Dr. Edson Mann, and was put on Buspar. EXAM: MRI BRAIN WO CONT     INDICATION: Memory impairment.     COMPARISON: MRI brain 1/3/2019.     CONTRAST: None.     TECHNIQUE:    Multiplanar multisequence acquisition without contrast of the brain.     FINDINGS:  The ventricles are normal in size and position. Unchanged scattered  periventricular and deep white matter T2/FLAIR hyperintensities, consistent with  mild chronic microvascular ischemic disease.  There is no acute infarct,  hemorrhage, extra-axial fluid collection, or mass effect. There is no cerebellar  tonsillar herniation. Expected arterial flow-voids are present.     The paranasal sinuses, mastoid air cells, and middle ears are clear. The orbital  contents are within normal limits with bilateral lens implants. No significant  osseous or scalp lesions are identified.     IMPRESSION     1. No acute intracranial abnormality. 2. Unchanged mild chronic microvascular ischemic disease.          Neuropsychological Mental Status Exam (NMSE):  Historian: Good  Praxis: No UE apraxia  R/L Orientation: Intact to self and to other  Dress: within normal limits   Weight: within normal limits   Appearance/Hygiene: within normal limits   Gait: within normal limits   Assistive Devices: Glasses  Mood: within normal limits   Affect: within normal limits   Comprehension: within normal limits   Thought Process: within normal limits   Expressive Language: within normal limits   Receptive Language: within normal limits   Motor:  No cognitive or motor perseveration  ETOH:  Denied  Tobacco: Denied  Illicit: Denied  SI/HI: Denied  Psychosis: Denied  Insight: Within normal limits  Judgment: Within normal limits  Other Psych:           Past Medical History:   Diagnosis Date    Arthritis     Depression     Diabetes ([facility] [address]     GERD (gastroesophageal reflux disease)     High cholesterol     Hypertension     Snoring     Stroke ([facility] [address] 2003    TIA    Thyroid disease        Past Surgical History:   Procedure Laterality Date    HX APPENDECTOMY      HX CATARACT REMOVAL Bilateral     HX CHOLECYSTECTOMY      HX GYN      btl    HX ORTHOPAEDIC Left     Open Repair    HX ORTHOPAEDIC Left     Trigger finger    HX ROTATOR CUFF REPAIR Right        Allergies   Allergen Reactions    Percocet [Oxycodone-Acetaminophen] Unknown (comments)     Can't remember reaction due to length of time of occurrence.        Family History   Problem Relation Age of Onset    Heart Disease Mother     Cancer Sister Ovarian    Alzheimer's Disease Father     Alzheimer's Disease Paternal Grandfather        Social History     Tobacco Use    Smoking status: Former Smoker     Packs/day: 2.00     Years: 30.00     Pack years: 60.00     Types: Cigarettes     Quit date: 1986     Years since quittin.4    Smokeless tobacco: Never Used   Vaping Use    Vaping Use: Never used   Substance Use Topics    Alcohol use: Yes     Comment: social 1 x year    Drug use: No       Current Outpatient Medications   Medication Sig Dispense Refill    cinnamon bark (Cinnamon) 500 mg cap Take  by mouth.  calcium carbonate (CALTREX) 600 mg calcium (1,500 mg) tablet Take 600 mg by mouth two (2) times a day.  amLODIPine (NORVASC) 10 mg tablet       atenoloL (TENORMIN) 25 mg tablet       lisinopriL (PRINIVIL, ZESTRIL) 10 mg tablet       potassium chloride (K-DUR, KLOR-CON) 20 mEq tablet  (Patient not taking: Reported on 2021)      levothyroxine (SYNTHROID) 88 mcg tablet TAKE 1 TABLET BY MOUTH DAILY      aspirin-dipyridamole (Aggrenox)  mg per SR capsule Take 1 Cap by mouth two (2) times a day.  aspirin delayed-release 81 mg tablet Take 1 Tab by mouth two (2) times a day. 60 Tab 0    ibuprofen (MOTRIN) 800 mg tablet Take 1 Tab by mouth every six (6) hours as needed for Pain. (Patient not taking: Reported on 2021) 50 Tab 2    acetaminophen (TYLENOL EXTRA STRENGTH) 500 mg tablet Take 1-2 Tabs by mouth every six (6) hours as needed for Pain. Not to exceed 4,000mg in any 24 hour period  Indications: Pain (Patient not taking: Reported on 2021) 60 Tab 0    ondansetron (ZOFRAN ODT) 8 mg disintegrating tablet Take 0.5 Tabs by mouth every eight (8) hours as needed for Nausea. (Patient not taking: Reported on 2021) 30 Tab 0    rosuvastatin (CRESTOR) 10 mg tablet Take 10 mg by mouth nightly.  ibuprofen/diphenhydramine HCl (ADVIL PM LIQUI-GELS PO) Take 200 mg by mouth nightly.  (Patient not taking: Reported on 8/13/2021)      cholecalciferol (VITAMIN D3) 1,000 unit cap Take 1,000 Units by mouth daily (after breakfast).  melatonin 10 mg cap Take 1 Cap by mouth nightly. (Patient not taking: Reported on 8/13/2021)      gluc ryder/chondro ryder A/vit C/Mn (GLUCOSAMINE 1500 COMPLEX PO) Take 1 Cap by mouth ACB/HS. (Patient not taking: Reported on 8/13/2021)      glipiZIDE SR (GLUCOTROL XL) 2.5 mg CR tablet Take 2.5 mg by mouth daily (after breakfast).  raNITIdine hcl 150 mg capsule Take 150 mg by mouth daily (after breakfast). (Patient not taking: Reported on 8/13/2021)      ascorbic acid, vitamin C, (VITAMIN C) 500 mg tablet Take 500 mg by mouth daily (after breakfast).  docosahexanoic acid/epa (FISH OIL PO) Take 1,500 mg by mouth ACB/HS.  potassium chloride SA (MICRO-K) 10 mEq capsule Take 10 mEq by mouth ACB/HS.  FELODIPINE (PLENDIL PO) Take 5 mg by mouth daily (after breakfast). (Patient not taking: Reported on 8/13/2021)      hydrochlorothiazide (HYDRODIURIL) 25 mg tablet Take 25 mg by mouth daily (after breakfast). (Patient not taking: Reported on 8/13/2021)           Plan:  Obtain authorization for testing from insurance company. Report to follow once testing, scoring, and interpretation completed. ? Organic based neurocognitive issues versus mood disorder or combination of same. ? Problems organic, functional, or both? This note will not be viewable in [address]       Neuropsychological Test Results  Patient Testing 2/21/22 Report Completed 2/22/22  A Psychometrist Assisted w/ portions of this evaluation while under my direct  supervision    The following evaluation procedures/tests were administered:      Neuropsychologist Performed, Interpreted, & Reported:  Neuropsychological Mental Status Exam, Revised Memory & Behavior Checklist,  Mini Mental Status Exam, Clock Drawing Test, Lloyd-Melzack Pain Questionnaire, Test Of Premorbid Functioning, History Taking  & Clinical Interview With The Patient, Additional History Taking w/ the Patient's Daughter, CASE, ABAS-3, DEON, CPT-III, Review Of Available Records. Psychometrist Administered under Neuropsychologist Supervision & Neuropsychologist Interpreted & Neuropsychologist Reported:  Verbal Fluency Tests, Jose & Jose - Revised, Trailmaking Test Parts A & B, Wechsler Adult Intelligence Scale - IV, [place] Verbal Learning Test - 3, Grooved Pegboard, Gurrola Depression Inventory - II, Gurrola Anxiety Inventory. Test Findings:  Test Findings:  Note:  The patients raw data have been compared with currently available norms which include demographic corrections for age, gender, and/or education. Sometimes, the patients scores are compared to demographically similar individuals as close to the patients age, education level, etc., as possible. \"Average\" is viewed as being +/- 1 standard deviation (SD) from the stated mean for a particular test score. \"Low average\" is viewed as being between 1 and 2 SD below the mean, and above average is viewed as being 1 and 2 SD above the mean. Scores falling in the borderline range (between 1-1/2 and 2 SD below the mean) are viewed with particular attention as to whether they are normal or abnormal neurocognitive test scores. Other methods of inference in analyzing the test data are also utilized, including the pattern and range of scores in the profile, bilateral motor functions, and the presence, if any, of pathognomonic signs. Behaviorally, the patient was friendly and cooperative and appeared motivated to perform well during this examination. Within this context, the results of this evaluation are viewed as a valid reflection of the patients actual neurocognitive and emotional status. The patient's score of 28/30 was normal on the MMSE. In this regard, echo spelling was 3/5 correct.   Clock drawing was normal.                 Her structured word list fluency, as assessed by the FAS Test, was within the mildly impaired range with a T score 38. Category fluency was below average with a T score of 40. Confrontation naming, as assessed by the [facility] as revised, was within the average range with a T score of 50. This pattern performance is not strongly indicative of a patient is at increased risk for day-to-day problems and verbal fluency and confrontation naming is normal.  There are mild fluctuations in functioning over time in these domains without major issues noted. The patient was administered the [facility] Continuous Performance Test - III and review of the subscales within this instrument revealed mild concerns for inattentiveness without impulsivity. This pattern of performance is indicative of a patient who is at increased risk for day-to-day problems with sustained visual attention/concentration. No decline over time.                 The patient is not showing problems with working memory capacity (23rd %ile) or processing speed (55th %ile) on the WAIS-IV. Her Verbal Comprehension Index score of 87 was within the low average range. Her Perceptual Reasoning Index score of 98 was average. These scores do not reflect a major decline in functioning based on an assessment of premorbid functioning. No changes over time.                 The patient was administered the [address] Test  - 3 and generated a normal range and positive  learning curve over five repeated auditory word list learning trials. An interference trial was normal.  Free and cued, short and long delayed recall were all within the normal range. Recognition and forced choice recall were normal.   This pattern of performance is not indicative of a patient who is at increased risk for day-to-day problems with auditory learning and/or memory.     No decline over time.                 Simple timed visual motor sequencing (Trailmaking Test Part A) was within the average range with a T score of  45. Her performance on a similar, but more complex task of timed visual motor sequencing (Trailmaking Test Part B) was within the above average range with a T score of 56. She made 0 sequencing errors on this latter completed test.  This pattern of performance is not indicative of a patient who is at increased risk for day-to-day problems with executive functioning. No decline over time.                 Fine motor dexterity was within the mildly impaired for dominant hand (T = 37) and below average for her nondominant hand over the cyst = 41). Neurologic correlation is indicated. Dominant handed fine motor dexterity has declined slightly over time. The patient rated her current level of pain as \"0/5- No Pain\" on the Lloyd-Melzack Pain Questionnaire. No changes over time.                 Her Gurrola Depression Inventory -II score of 24 was within the moderately depressed range. Her Gurrola Anxiety Inventory score of 13 reflected mild anxiety. The patient's responses on the Personality Assessment Inventory were deemed valid for interpretation. Within this context, depression has worsened over time. She is now reporting PTSD. She is worried about some to issues to the degree whereby her ability to focus and to attend are further compromised. She can be romero and overly sensitive. Self-concept is harsh and negative. She is inwardly more troubled by self-doubt and misgivings about her adequacy than readily apparent to others. Her self-reported level of treatment motivation is low. The daughter completed the ABAS-3 and did not report clinically significant concerns regarding the patient's general adaptive skills (18th  %ile), conceptual skills (14th  %ile), social skills (14th %ile), or practical skills (25th %ile). In fact, she rates her mother is somewhat more adaptive/higher functioning than she did during previous testing.  On the CASE, the daughter reported concerns for anxiety, cognitive functioning, and depression. Impressions & Recommendations: This patient has now undergone 2 evaluations of neurocognitive and psychologic testing. Previous testing showed mild attention deficit issue without evidence of MCI or dementia. Comparison of current with previous neurocognitive test results again showed no major decline in functioning over time, with the exception of a mild decline in dominant hand and fine motor dexterity. Otherwise, she continues to show deficits in terms of attention and focus but her performances across all other neurocognitive domains assessed remain well within the normal range. From an emotional standpoint, the patient's depression and anxiety has worsened over time and she is now reporting symptoms consistent with PTSD. Anxiety is significant to the degree whereby her ability to focus and to attend are further compromised.       Thankfully, the profile remains inconsistent with MCI or dementia. Instead, the reported (but not clinically corroborated) decline in neurocognitive functioning secondary to aging and worsening anxiety, depression, and other issues. Thus, I am reassured that she does not have dementia but I am concerned that her mood has worsened over time. In that regard, I do recommend review of her current psychiatric medication management specifically for anxiety along with active engagement in counseling. Consider EMDR if appropriate. I again recommended appropriate medication for attention if this is not medically contraindicated. In the meantime, the exam remains reassuring. The patient should be encouraged to remain as mentally, physically, and socially active as possible. There are no current concerns regarding competency/capacity, driving, day-to-day supervision, etc.  I wish her well. We now have extensive baseline and updated neurocognitive and psychologic data on her. Follow-up as needed.   Clinical correlation is, course, indicated. I will discuss these findings with the patient when she follows up with me in the near future. A follow up Neuropsychological Evaluation is indicated on a prn basis, especially if there are any cognitive and/or emotional changes.       DIAGNOSES:             The Referral Dx of MCI - Is again not supported     Anxiety - Moderate (worse over time)                                       Major Depression - Moderate (mildly worse over time)                                      Chronic Attention Deficit     Pt is now reporting PTSD symptoms       The above information is based upon information currently available to me. If there is any additional information of which I am currently unaware, I would be more than happy to review it upon having it made available to me. Thank you for the opportunity to see this interesting individual.     Sincerely,       Kristine Barros. Letty Lynch, PsRaf, EdS    CCHaze Mew, NP    Time Documentation:    74719*7 13063*0 (60 minutes)    96138 x 1  96139 x 5 Test Administration/Data Gathering By Technician: (3 hours). 62832 x 1 (first 30 minutes), 39494 x 5 (each additional 30 minutes)    96132 x 1  96133 x 1 Testing Evaluation Services by Neuropsychologist (1 hour, 50 minutes) 96132 x 1 (first hour), 96133 x 1 (50 minutes)    Definitions:      [id number]:  Neurobehavioral Status Exam, Clinical interview. Clinical assessment of thinking, reasoning and judgment, by neuropsychologist, both face to face time with patient and time interpreting those test results and reporting, first and subsequent hours)    89076/[id number]: Neuropsychological Test Administration by Technician/Psychometrist, first 30 minutes and each additional 30 minutes. The above includes: Record review. Review of history provided by patient. Review of collaborative information. Testing by Clinician. Review of raw data. Scoring.  Report writing of individual tests administered by Clinician. Integration of individual tests administered by psychometrist with NSE/testing by clinician, review of records/history/collaborative information, case Conceptualization, treatment planning, clinical decision making, report writing, coordination Of Care. Psychometry test codes as time spent by psychometrist administering and scoring neurocognitive/psychological tests under supervision of neuropsychologist.  Integral services including scoring of raw data, data interpretation, case conceptualization, report writing etcetera were initiated after the patient finished testing/raw data collected and was completed on the date the report was signed. Please note that this dictation was completed with [employer], the [employer] voice recognition software. Quite often unanticipated grammatical, syntax, homophones, and other interpretive errors are inadvertently transcribed by the computer software. Please disregard these errors. Please excuse any errors that have escaped final proofreading. Thank you.

## 2022-03-04 ENCOUNTER — OFFICE VISIT (OUTPATIENT)
Dept: NEUROLOGY | Age: 87
End: 2022-03-04
Payer: MEDICARE

## 2022-03-04 VITALS
SYSTOLIC BLOOD PRESSURE: 149 MMHG | DIASTOLIC BLOOD PRESSURE: 67 MMHG | HEIGHT: 65 IN | OXYGEN SATURATION: 100 % | HEART RATE: 57 BPM | RESPIRATION RATE: 14 BRPM | BODY MASS INDEX: 23.32 KG/M2 | WEIGHT: 140 LBS

## 2022-03-04 DIAGNOSIS — R45.86 MOOD CHANGES: ICD-10-CM

## 2022-03-04 DIAGNOSIS — R41.3 MEMORY IMPAIRMENT: Primary | ICD-10-CM

## 2022-03-04 PROCEDURE — G8536 NO DOC ELDER MAL SCRN: HCPCS | Performed by: SPECIALIST

## 2022-03-04 PROCEDURE — 1101F PT FALLS ASSESS-DOCD LE1/YR: CPT | Performed by: SPECIALIST

## 2022-03-04 PROCEDURE — G8420 CALC BMI NORM PARAMETERS: HCPCS | Performed by: SPECIALIST

## 2022-03-04 PROCEDURE — 1090F PRES/ABSN URINE INCON ASSESS: CPT | Performed by: SPECIALIST

## 2022-03-04 PROCEDURE — G8432 DEP SCR NOT DOC, RNG: HCPCS | Performed by: SPECIALIST

## 2022-03-04 PROCEDURE — 99214 OFFICE O/P EST MOD 30 MIN: CPT | Performed by: SPECIALIST

## 2022-03-04 PROCEDURE — G8427 DOCREV CUR MEDS BY ELIG CLIN: HCPCS | Performed by: SPECIALIST

## 2022-03-04 PROCEDURE — G8400 PT W/DXA NO RESULTS DOC: HCPCS | Performed by: SPECIALIST

## 2022-03-04 NOTE — PATIENT INSTRUCTIONS
Patient history reviewed patient examined. Took time to go over reevaluation of memory performance etc.  Shared with her the labs with a random blood sugar 166 but otherwise normal CBC TSH B12 and folate. And the brain MRI showed only mild chronic white matter changes due to aging and nothing acute. She will be getting a copy of the neuropsych profile from February. Basically suggestions went to concerns to worsening mood and attention to anxiety and consider active counseling engagement etc.  Suggested psych evaluation. As it is she is a very and apprising patient that does vivian work and is going to get back into reading and I think that is absolutely fantastic. Is welcome back here as needed.

## 2022-03-04 NOTE — PROGRESS NOTES
Neurology Consult      Subjective: Josiah Austin is a 82 y.o. female who comes in today for the occasion of revisiting memory evaluation. Went over the test as it went to blood work with a random blood sugar 166 normal CBC and TSH and B12 and folate. Brain MRI showed no changes with previously noted mild chronic white matter changes only. Refer to neuropsych and this came off on February 21 with the following results. Does not have dementia but concerns go to worsening mood and continuation of psych management especially anxiety with active counseling engagement suggested. Question of inattention medicine could be considered. Exam was otherwise reassuring and suggestions went to staying safely busy. Is found to be competent etc.  This is not MCI. Has moderate level of anxiety and moderate depression mildly worse. Follow-up as needed. Capital driving assessment suggested not driving outside of a 25 mile radius relative to her home. This was faxed to [facility] as I see it. Had a previous August 13, 2021 referral to psychiatry but I get the definite idea that she was not enthusiastic about the encounter or where it went and does not sound like she is headed back? Does stay busy with [employer] work and does a lot of charapiOmat activity in that same context. Is looking to getting back into reading and I think that is an excellent idea. Will be as simple see as needed at this point of time. Good luck. Will be getting a copy of the neuropsych profile and daughter is interested in the discovery process. Current Outpatient Medications   Medication Sig Dispense Refill    cinnamon bark (Cinnamon) 500 mg cap Take  by mouth.  calcium carbonate (CALTREX) 600 mg calcium (1,500 mg) tablet Take 600 mg by mouth two (2) times a day.       amLODIPine (NORVASC) 10 mg tablet       atenoloL (TENORMIN) 25 mg tablet       lisinopriL (PRINIVIL, ZESTRIL) 10 mg tablet       potassium chloride (K-DUR, KLOR-CON) 20 mEq tablet  (Patient not taking: Reported on 8/13/2021)      levothyroxine (SYNTHROID) 88 mcg tablet TAKE 1 TABLET BY MOUTH DAILY      aspirin-dipyridamole (Aggrenox)  mg per SR capsule Take 1 Cap by mouth two (2) times a day.  aspirin delayed-release 81 mg tablet Take 1 Tab by mouth two (2) times a day. 60 Tab 0    ibuprofen (MOTRIN) 800 mg tablet Take 1 Tab by mouth every six (6) hours as needed for Pain. (Patient not taking: Reported on 8/13/2021) 50 Tab 2    acetaminophen (TYLENOL EXTRA STRENGTH) 500 mg tablet Take 1-2 Tabs by mouth every six (6) hours as needed for Pain. Not to exceed 4,000mg in any 24 hour period  Indications: Pain (Patient not taking: Reported on 8/13/2021) 60 Tab 0    ondansetron (ZOFRAN ODT) 8 mg disintegrating tablet Take 0.5 Tabs by mouth every eight (8) hours as needed for Nausea. (Patient not taking: Reported on 8/13/2021) 30 Tab 0    rosuvastatin (CRESTOR) 10 mg tablet Take 10 mg by mouth nightly.  ibuprofen/diphenhydramine HCl (ADVIL PM LIQUI-GELS PO) Take 200 mg by mouth nightly. (Patient not taking: Reported on 8/13/2021)      cholecalciferol (VITAMIN D3) 1,000 unit cap Take 1,000 Units by mouth daily (after breakfast).  melatonin 10 mg cap Take 1 Cap by mouth nightly. (Patient not taking: Reported on 8/13/2021)      gluc ryder/chondro ryder A/vit C/Mn (GLUCOSAMINE 1500 COMPLEX PO) Take 1 Cap by mouth ACB/HS. (Patient not taking: Reported on 8/13/2021)      glipiZIDE SR (GLUCOTROL XL) 2.5 mg CR tablet Take 2.5 mg by mouth daily (after breakfast).  raNITIdine hcl 150 mg capsule Take 150 mg by mouth daily (after breakfast). (Patient not taking: Reported on 8/13/2021)      ascorbic acid, vitamin C, (VITAMIN C) 500 mg tablet Take 500 mg by mouth daily (after breakfast).  docosahexanoic acid/epa (FISH OIL PO) Take 1,500 mg by mouth ACB/HS.  potassium chloride SA (MICRO-K) 10 mEq capsule Take 10 mEq by mouth ACB/HS.       FELODIPINE (PLENDIL PO) Take 5 mg by mouth daily (after breakfast). (Patient not taking: Reported on 2021)      hydrochlorothiazide (HYDRODIURIL) 25 mg tablet Take 25 mg by mouth daily (after breakfast). (Patient not taking: Reported on 2021)        Allergies   Allergen Reactions    Percocet [Oxycodone-Acetaminophen] Unknown (comments)     Can't remember reaction due to length of time of occurrence.      Past Medical History:   Diagnosis Date    Arthritis     Depression     Diabetes ([facility] [address]     GERD (gastroesophageal reflux disease)     High cholesterol     Hypertension     Snoring     [facility]    TIA    Thyroid disease       Past Surgical History:   Procedure Laterality Date    HX APPENDECTOMY      HX CATARACT REMOVAL Bilateral     HX CHOLECYSTECTOMY      HX GYN      btl    HX ORTHOPAEDIC Left     Open Repair    HX ORTHOPAEDIC Left     Trigger finger    HX ROTATOR CUFF REPAIR Right       Social History     Socioeconomic History    Marital status:      Spouse name: Not on file    Number of children: Not on file    Years of education: Not on file    Highest education level: Not on file   Occupational History    Not on file   Tobacco Use    Smoking status: Former Smoker     Packs/day: 2.00     Years: 30.00     Pack years: 60.00     Types: Cigarettes     Quit date: 1986     Years since quittin.4    Smokeless tobacco: Never Used   Vaping Use    Vaping Use: Never used   Substance and Sexual Activity    Alcohol use: Yes     Comment: social 1 x year    Drug use: No    Sexual activity: Not on file   Other Topics Concern    Not on file   Social History Narrative    Not on file     Social Determinants of Health     Financial Resource Strain:     Difficulty of Paying Living Expenses: Not on file   Food Insecurity:     Worried About Running Out of Food in the Last Year: Not on file    Carine of Food in the Last Year: Not on file   Transportation Needs:     Lack of Transportation (Medical): Not on file    Lack of Transportation (Non-Medical): Not on file   Physical Activity:     Days of Exercise per Week: Not on file    Minutes of Exercise per Session: Not on file   Stress:     Feeling of Stress : Not on file   Social Connections:     Frequency of Communication with Friends and Family: Not on file    Frequency of Social Gatherings with Friends and Family: Not on file    Attends Cheondoism Services: Not on file    Active Member of [address] [place] [address] Sidelines or Organizations: Not on file    Attends Club or Organization Meetings: Not on file    Marital Status: Not on file   Intimate Partner Violence:     Fear of Current or Ex-Partner: Not on file    Emotionally Abused: Not on file    Physically Abused: Not on file    Sexually Abused: Not on file   Housing Stability:     Unable to Pay for Housing in the Last Year: Not on file    Number of [place] in the Last Year: Not on file    Unstable Housing in the Last Year: Not on file      Family History   Problem Relation Age of Onset    Heart Disease Mother     Cancer Sister         Ovarian    Alzheimer's Disease Father     Alzheimer's Disease Paternal Grandfather       Visit Vitals  BP (!) 149/67 (BP 1 Location: Left upper arm, BP Patient Position: Sitting)   Pulse (!) 57   Resp 14   Ht 5' 5\" (1.651 m)   Wt 63.5 kg (140 lb)   SpO2 100%   BMI 23.30 kg/m²        Review of Systems:   A comprehensive review of systems was negative except for that written in the HPI. Neuro Exam:     Appearance: The patient is well developed, well nourished, provides a coherent history and is in no acute distress. Mental Status: Oriented to time, place and person. Mood and affect appropriate. Cranial Nerves:   Intact visual fields. Fundi are benign. HARSHA, EOM's full, no nystagmus, no ptosis. Facial sensation is normal. Corneal reflexes are intact. Facial movement is symmetric. Hearing is normal bilaterally.  Palate is midline with normal sternocleidomastoid and trapezius muscles are normal. Tongue is midline. Motor:  5/5 strength in upper and lower proximal and distal muscles. Normal bulk and tone. No fasciculations. Reflexes:   Deep tendon reflexes 2+/4 and symmetrical.   Sensory:   Normal to touch, pinprick and vibration. Gait:  Normal gait. Tremor:   No tremor noted. Cerebellar:  No cerebellar signs present. Neurovascular:  Normal heart sounds and regular rhythm, peripheral pulses intact, and no carotid bruits. Assessment:   Memory impairment. Went over results of neuropsych testing and labs MRI etc. generated on the reevaluation of memory performance etc.  Please see above dictation. On the issue of mood changes I suggested a referral to psychiatry but from patient's previous experience sounded like she was less than enthusiastic about that possibility. Will be getting a copy of the neuropsych profile and her daughter is interested in the same. She certainly knows where to find me if she has continued questions and seems like she wants to stay actively engaged with [employer] and related activities as well as return to reading. Plan:   Revisit as needed.   Signed by :  Chante Chau MD

## 2022-03-04 NOTE — LETTER
3/4/2022    Patient: Junior Sepulveda   YOB: 1935   Date of Visit: 3/4/2022     Su Alcocer NP  [address]  [address] [facility] [address]  Via Fax: [phone]    Dear Su Alcocer NP,      Thank you for referring Ms. Holley Machado to [facility] for evaluation. My notes for this consultation are attached. If you have questions, please do not hesitate to call me. I look forward to following your patient along with you.       Sincerely,    Kavita Cash MD

## 2022-03-08 ENCOUNTER — TELEPHONE (OUTPATIENT)
Dept: NEUROLOGY | Age: 87
End: 2022-03-08

## 2022-03-08 NOTE — TELEPHONE ENCOUNTER
Daughter Saqib Salgado would like a copy of her last visit notes. . can they be e-mailed to her at [email or web address]  Call her at [phone].   She is listed on the PHI for the patient

## 2022-03-18 PROBLEM — M16.11 PRIMARY OSTEOARTHRITIS OF RIGHT HIP: Status: ACTIVE | Noted: 2019-05-29

## 2022-06-10 ENCOUNTER — TELEPHONE (OUTPATIENT)
Dept: NEUROLOGY | Age: 87
End: 2022-06-10

## 2022-06-21 ENCOUNTER — TELEPHONE (OUTPATIENT)
Dept: NEUROLOGY | Age: 87
End: 2022-06-21

## 2022-06-21 NOTE — TELEPHONE ENCOUNTER
She is welcome to get a formal evaluation through [facility] herself and/or a second neurology opinion. Myra.  cassidy

## 2022-06-21 NOTE — TELEPHONE ENCOUNTER
LAURA Message received regarding p/t DMV paperwork. Per message more information is needed from provided, contacted p/t to confirm in detail what \"more information\" is needed but no answer. Please contact.  Thanks

## 2022-06-21 NOTE — TELEPHONE ENCOUNTER
Patient daughter calling stating she didn't receive all of the paperwork. Please ruma Nurse Erin Levin.

## 2022-06-21 NOTE — TELEPHONE ENCOUNTER
Pt daughter is requesting a copy of supporting record/documents for [facility] decision. Pt req to be faxed to [phone]. Please call back.

## 2022-06-22 ENCOUNTER — TELEPHONE (OUTPATIENT)
Dept: NEUROLOGY | Age: 87
End: 2022-06-22

## 2022-06-22 NOTE — TELEPHONE ENCOUNTER
Patients daughter Maria Victoria Byrne is calling nurse Yonas Guevara regarding the fax with the reports of her moms testing results. Maria Victoria Byrne stated her fax machine is having problems and would like Yonas Guevara to please leave the last 3 pages at the  of the doctors office so she can pick them up today 6/22/22. Please contact.

## 2022-06-22 NOTE — TELEPHONE ENCOUNTER
Pt daughter states DMV form is incomplete. Items needing completion are: MSE, MOCA and trial making A & B.  Please call back

## 2022-06-23 ENCOUNTER — TELEPHONE (OUTPATIENT)
Dept: NEUROLOGY | Age: 87
End: 2022-06-23

## 2022-06-23 NOTE — TELEPHONE ENCOUNTER
Erica, patients daughter would like a call back. She left a vm with LAURA and didn't leave any details.

## 2022-06-27 ENCOUNTER — TELEPHONE (OUTPATIENT)
Dept: NEUROLOGY | Age: 87
End: 2022-06-27

## 2023-03-13 ENCOUNTER — APPOINTMENT (OUTPATIENT)
Dept: NUCLEAR MEDICINE | Age: 88
DRG: 872 | End: 2023-03-13
Attending: NURSE PRACTITIONER
Payer: MEDICARE

## 2023-03-13 ENCOUNTER — HOSPITAL ENCOUNTER (INPATIENT)
Age: 88
LOS: 1 days | Discharge: HOME | DRG: 872 | End: 2023-03-14
Attending: EMERGENCY MEDICINE | Admitting: INTERNAL MEDICINE
Payer: MEDICARE

## 2023-03-13 ENCOUNTER — APPOINTMENT (OUTPATIENT)
Dept: GENERAL RADIOLOGY | Age: 88
DRG: 872 | End: 2023-03-13
Attending: EMERGENCY MEDICINE
Payer: MEDICARE

## 2023-03-13 ENCOUNTER — APPOINTMENT (OUTPATIENT)
Dept: NON INVASIVE DIAGNOSTICS | Age: 88
DRG: 872 | End: 2023-03-13
Attending: NURSE PRACTITIONER
Payer: MEDICARE

## 2023-03-13 DIAGNOSIS — I48.91 ATRIAL FIBRILLATION WITH RVR (HCC): Primary | ICD-10-CM

## 2023-03-13 DIAGNOSIS — I48.0 PAROXYSMAL ATRIAL FIBRILLATION (HCC): ICD-10-CM

## 2023-03-13 DIAGNOSIS — I48.19 PERSISTENT ATRIAL FIBRILLATION (HCC): ICD-10-CM

## 2023-03-13 LAB
ALBUMIN SERPL-MCNC: 3.3 G/DL (ref 3.5–5)
ALBUMIN/GLOB SERPL: 1.1 (ref 1.1–2.2)
ALP SERPL-CCNC: 30 U/L (ref 45–117)
ALT SERPL-CCNC: 27 U/L (ref 12–78)
ANION GAP SERPL CALC-SCNC: 8 MMOL/L (ref 5–15)
APPEARANCE UR: ABNORMAL
AST SERPL-CCNC: 19 U/L (ref 15–37)
BACTERIA URNS QL MICRO: ABNORMAL /HPF
BASOPHILS # BLD: 0.1 K/UL (ref 0–0.1)
BASOPHILS NFR BLD: 0 % (ref 0–1)
BILIRUB SERPL-MCNC: 0.9 MG/DL (ref 0.2–1)
BILIRUB UR QL: NEGATIVE
BNP SERPL-MCNC: 112 PG/ML
BUN SERPL-MCNC: 24 MG/DL (ref 6–20)
BUN/CREAT SERPL: 24 (ref 12–20)
CALCIUM SERPL-MCNC: 9.5 MG/DL (ref 8.5–10.1)
CHLORIDE SERPL-SCNC: 106 MMOL/L (ref 97–108)
CO2 SERPL-SCNC: 25 MMOL/L (ref 21–32)
COLOR UR: ABNORMAL
COMMENT, HOLDF: NORMAL
COMMENT, HOLDF: NORMAL
CREAT SERPL-MCNC: 0.99 MG/DL (ref 0.55–1.02)
DIFFERENTIAL METHOD BLD: ABNORMAL
EOSINOPHIL # BLD: 0.2 K/UL (ref 0–0.4)
EOSINOPHIL NFR BLD: 1 % (ref 0–7)
EPITH CASTS URNS QL MICRO: ABNORMAL /LPF
ERYTHROCYTE [DISTWIDTH] IN BLOOD BY AUTOMATED COUNT: 11.3 % (ref 11.5–14.5)
EST. AVERAGE GLUCOSE BLD GHB EST-MCNC: 171 MG/DL
GLOBULIN SER CALC-MCNC: 3 G/DL (ref 2–4)
GLUCOSE BLD STRIP.AUTO-MCNC: 111 MG/DL (ref 65–117)
GLUCOSE BLD STRIP.AUTO-MCNC: 150 MG/DL (ref 65–117)
GLUCOSE SERPL-MCNC: 290 MG/DL (ref 65–100)
GLUCOSE UR STRIP.AUTO-MCNC: 500 MG/DL
HBA1C MFR BLD: 7.6 % (ref 4–5.6)
HCT VFR BLD AUTO: 42.9 % (ref 35–47)
HGB BLD-MCNC: 14.8 G/DL (ref 11.5–16)
HGB UR QL STRIP: ABNORMAL
IMM GRANULOCYTES # BLD AUTO: 0.1 K/UL (ref 0–0.04)
IMM GRANULOCYTES NFR BLD AUTO: 1 % (ref 0–0.5)
KETONES UR QL STRIP.AUTO: 15 MG/DL
LEUKOCYTE ESTERASE UR QL STRIP.AUTO: ABNORMAL
LYMPHOCYTES # BLD: 3.2 K/UL (ref 0.8–3.5)
LYMPHOCYTES NFR BLD: 21 % (ref 12–49)
MCH RBC QN AUTO: 33.9 PG (ref 26–34)
MCHC RBC AUTO-ENTMCNC: 34.5 G/DL (ref 30–36.5)
MCV RBC AUTO: 98.4 FL (ref 80–99)
MONOCYTES # BLD: 1.1 K/UL (ref 0–1)
MONOCYTES NFR BLD: 7 % (ref 5–13)
NEUTS SEG # BLD: 10.9 K/UL (ref 1.8–8)
NEUTS SEG NFR BLD: 70 % (ref 32–75)
NITRITE UR QL STRIP.AUTO: POSITIVE
NRBC # BLD: 0 K/UL (ref 0–0.01)
NRBC BLD-RTO: 0 PER 100 WBC
PH UR STRIP: 7 (ref 5–8)
PLATELET # BLD AUTO: 187 K/UL (ref 150–400)
PMV BLD AUTO: 11 FL (ref 8.9–12.9)
POTASSIUM SERPL-SCNC: 3.3 MMOL/L (ref 3.5–5.1)
PROT SERPL-MCNC: 6.3 G/DL (ref 6.4–8.2)
PROT UR STRIP-MCNC: 300 MG/DL
RBC # BLD AUTO: 4.36 M/UL (ref 3.8–5.2)
RBC #/AREA URNS HPF: ABNORMAL /HPF (ref 0–5)
SAMPLES BEING HELD,HOLD: NORMAL
SAMPLES BEING HELD,HOLD: NORMAL
SERVICE CMNT-IMP: ABNORMAL
SERVICE CMNT-IMP: NORMAL
SODIUM SERPL-SCNC: 139 MMOL/L (ref 136–145)
SP GR UR REFRACTOMETRY: 1.02 (ref 1–1.03)
TROPONIN I SERPL HS-MCNC: 15 NG/L (ref 0–51)
TSH SERPL DL<=0.05 MIU/L-ACNC: 8.76 UIU/ML (ref 0.36–3.74)
UR CULT HOLD, URHOLD: NORMAL
UROBILINOGEN UR QL STRIP.AUTO: 0.2 EU/DL (ref 0.2–1)
WBC # BLD AUTO: 15.5 K/UL (ref 3.6–11)
WBC URNS QL MICRO: >100 /HPF (ref 0–4)

## 2023-03-13 PROCEDURE — 74011636637 HC RX REV CODE- 636/637: Performed by: INTERNAL MEDICINE

## 2023-03-13 PROCEDURE — 82962 GLUCOSE BLOOD TEST: CPT

## 2023-03-13 PROCEDURE — 74011250636 HC RX REV CODE- 250/636: Performed by: EMERGENCY MEDICINE

## 2023-03-13 PROCEDURE — 74011250637 HC RX REV CODE- 250/637: Performed by: INTERNAL MEDICINE

## 2023-03-13 PROCEDURE — 36415 COLL VENOUS BLD VENIPUNCTURE: CPT

## 2023-03-13 PROCEDURE — 84484 ASSAY OF TROPONIN QUANT: CPT

## 2023-03-13 PROCEDURE — 74011000250 HC RX REV CODE- 250: Performed by: EMERGENCY MEDICINE

## 2023-03-13 PROCEDURE — 71045 X-RAY EXAM CHEST 1 VIEW: CPT

## 2023-03-13 PROCEDURE — 81001 URINALYSIS AUTO W/SCOPE: CPT

## 2023-03-13 PROCEDURE — 83880 ASSAY OF NATRIURETIC PEPTIDE: CPT

## 2023-03-13 PROCEDURE — APPSS30 APP SPLIT SHARED TIME 16-30 MINUTES: Performed by: NURSE PRACTITIONER

## 2023-03-13 PROCEDURE — 96374 THER/PROPH/DIAG INJ IV PUSH: CPT

## 2023-03-13 PROCEDURE — 80053 COMPREHEN METABOLIC PANEL: CPT

## 2023-03-13 PROCEDURE — 87086 URINE CULTURE/COLONY COUNT: CPT

## 2023-03-13 PROCEDURE — 74011000250 HC RX REV CODE- 250: Performed by: INTERNAL MEDICINE

## 2023-03-13 PROCEDURE — 99285 EMERGENCY DEPT VISIT HI MDM: CPT

## 2023-03-13 PROCEDURE — 74011250637 HC RX REV CODE- 250/637

## 2023-03-13 PROCEDURE — 85025 COMPLETE CBC W/AUTO DIFF WBC: CPT

## 2023-03-13 PROCEDURE — 84443 ASSAY THYROID STIM HORMONE: CPT

## 2023-03-13 PROCEDURE — G0378 HOSPITAL OBSERVATION PER HR: HCPCS

## 2023-03-13 PROCEDURE — 74011000258 HC RX REV CODE- 258: Performed by: INTERNAL MEDICINE

## 2023-03-13 PROCEDURE — 99223 1ST HOSP IP/OBS HIGH 75: CPT | Performed by: STUDENT IN AN ORGANIZED HEALTH CARE EDUCATION/TRAINING PROGRAM

## 2023-03-13 PROCEDURE — 65270000046 HC RM TELEMETRY

## 2023-03-13 PROCEDURE — 93005 ELECTROCARDIOGRAM TRACING: CPT

## 2023-03-13 PROCEDURE — 74011250636 HC RX REV CODE- 250/636: Performed by: INTERNAL MEDICINE

## 2023-03-13 PROCEDURE — 74011250637 HC RX REV CODE- 250/637: Performed by: NURSE PRACTITIONER

## 2023-03-13 PROCEDURE — 83036 HEMOGLOBIN GLYCOSYLATED A1C: CPT

## 2023-03-13 RX ORDER — MELATONIN
1000 DAILY
Status: DISCONTINUED | OUTPATIENT
Start: 2023-03-13 | End: 2023-03-14 | Stop reason: HOSPADM

## 2023-03-13 RX ORDER — ASPIRIN 81 MG/1
81 TABLET ORAL 2 TIMES DAILY
Status: DISCONTINUED | OUTPATIENT
Start: 2023-03-13 | End: 2023-03-13

## 2023-03-13 RX ORDER — ASPIRIN AND DIPYRIDAMOLE 25; 200 MG/1; MG/1
1 CAPSULE, EXTENDED RELEASE ORAL 2 TIMES DAILY
Status: DISCONTINUED | OUTPATIENT
Start: 2023-03-13 | End: 2023-03-13

## 2023-03-13 RX ORDER — LEVOTHYROXINE SODIUM 88 UG/1
88 TABLET ORAL
Status: DISCONTINUED | OUTPATIENT
Start: 2023-03-13 | End: 2023-03-14 | Stop reason: HOSPADM

## 2023-03-13 RX ORDER — DILTIAZEM HYDROCHLORIDE 5 MG/ML
20 INJECTION INTRAVENOUS ONCE
Status: COMPLETED | OUTPATIENT
Start: 2023-03-13 | End: 2023-03-13

## 2023-03-13 RX ORDER — SODIUM CHLORIDE 0.9 % (FLUSH) 0.9 %
5-40 SYRINGE (ML) INJECTION AS NEEDED
Status: DISCONTINUED | OUTPATIENT
Start: 2023-03-13 | End: 2023-03-14 | Stop reason: HOSPADM

## 2023-03-13 RX ORDER — ROSUVASTATIN CALCIUM 10 MG/1
10 TABLET, COATED ORAL
Status: DISCONTINUED | OUTPATIENT
Start: 2023-03-13 | End: 2023-03-14 | Stop reason: HOSPADM

## 2023-03-13 RX ORDER — LISINOPRIL 20 MG/1
20 TABLET ORAL DAILY
COMMUNITY

## 2023-03-13 RX ORDER — GLUCOSAM/CHONDRO/HERB 149/HYAL 750-100 MG
1 TABLET ORAL
Status: DISCONTINUED | OUTPATIENT
Start: 2023-03-13 | End: 2023-03-14 | Stop reason: HOSPADM

## 2023-03-13 RX ORDER — SODIUM CHLORIDE 9 MG/ML
100 INJECTION, SOLUTION INTRAVENOUS CONTINUOUS
Status: DISCONTINUED | OUTPATIENT
Start: 2023-03-13 | End: 2023-03-14

## 2023-03-13 RX ORDER — LIDOCAINE 50 MG/G
1 PATCH TOPICAL EVERY 24 HOURS
COMMUNITY

## 2023-03-13 RX ORDER — IBUPROFEN 200 MG
4 TABLET ORAL AS NEEDED
Status: DISCONTINUED | OUTPATIENT
Start: 2023-03-13 | End: 2023-03-14 | Stop reason: HOSPADM

## 2023-03-13 RX ORDER — ACETAMINOPHEN 325 MG/1
325 TABLET ORAL ONCE
Status: COMPLETED | OUTPATIENT
Start: 2023-03-13 | End: 2023-03-13

## 2023-03-13 RX ORDER — ATENOLOL 25 MG/1
25 TABLET ORAL DAILY
Status: DISCONTINUED | OUTPATIENT
Start: 2023-03-13 | End: 2023-03-14

## 2023-03-13 RX ORDER — DEXTROSE MONOHYDRATE 100 MG/ML
0-250 INJECTION, SOLUTION INTRAVENOUS AS NEEDED
Status: DISCONTINUED | OUTPATIENT
Start: 2023-03-13 | End: 2023-03-14 | Stop reason: HOSPADM

## 2023-03-13 RX ORDER — SERTRALINE HYDROCHLORIDE 25 MG/1
25 TABLET, FILM COATED ORAL DAILY
COMMUNITY
End: 2023-03-14

## 2023-03-13 RX ORDER — ASCORBIC ACID 500 MG
500 TABLET ORAL
Status: DISCONTINUED | OUTPATIENT
Start: 2023-03-13 | End: 2023-03-14 | Stop reason: HOSPADM

## 2023-03-13 RX ORDER — DILTIAZEM HYDROCHLORIDE 30 MG/1
60 TABLET, FILM COATED ORAL
Status: DISCONTINUED | OUTPATIENT
Start: 2023-03-13 | End: 2023-03-14 | Stop reason: HOSPADM

## 2023-03-13 RX ORDER — INSULIN LISPRO 100 [IU]/ML
INJECTION, SOLUTION INTRAVENOUS; SUBCUTANEOUS
Status: DISCONTINUED | OUTPATIENT
Start: 2023-03-13 | End: 2023-03-14 | Stop reason: HOSPADM

## 2023-03-13 RX ORDER — POTASSIUM CHLORIDE 750 MG/1
20 TABLET, FILM COATED, EXTENDED RELEASE ORAL EVERY 4 HOURS
Status: COMPLETED | OUTPATIENT
Start: 2023-03-13 | End: 2023-03-13

## 2023-03-13 RX ORDER — LEVOTHYROXINE SODIUM 88 UG/1
88 TABLET ORAL DAILY
Status: DISCONTINUED | OUTPATIENT
Start: 2023-03-13 | End: 2023-03-13

## 2023-03-13 RX ORDER — GLIPIZIDE 5 MG/1
2.5 TABLET ORAL
Status: DISCONTINUED | OUTPATIENT
Start: 2023-03-13 | End: 2023-03-14

## 2023-03-13 RX ORDER — SODIUM CHLORIDE 0.9 % (FLUSH) 0.9 %
5-40 SYRINGE (ML) INJECTION EVERY 8 HOURS
Status: DISCONTINUED | OUTPATIENT
Start: 2023-03-13 | End: 2023-03-14 | Stop reason: HOSPADM

## 2023-03-13 RX ADMIN — LEVOTHYROXINE SODIUM 88 MCG: 0.09 TABLET ORAL at 09:16

## 2023-03-13 RX ADMIN — SODIUM CHLORIDE 500 ML: 9 INJECTION, SOLUTION INTRAVENOUS at 03:46

## 2023-03-13 RX ADMIN — CEFTRIAXONE 1 G: 1 INJECTION, POWDER, FOR SOLUTION INTRAMUSCULAR; INTRAVENOUS at 08:41

## 2023-03-13 RX ADMIN — INSULIN LISPRO 2 UNITS: 100 INJECTION, SOLUTION INTRAVENOUS; SUBCUTANEOUS at 13:48

## 2023-03-13 RX ADMIN — ACETAMINOPHEN 325 MG: 325 TABLET ORAL at 22:18

## 2023-03-13 RX ADMIN — OMEGA-3 FATTY ACIDS CAP 1000 MG 1 CAPSULE: 1000 CAP at 21:47

## 2023-03-13 RX ADMIN — APIXABAN 5 MG: 5 TABLET, FILM COATED ORAL at 08:39

## 2023-03-13 RX ADMIN — GLIPIZIDE 2.5 MG: 5 TABLET ORAL at 09:16

## 2023-03-13 RX ADMIN — SODIUM CHLORIDE 100 ML/HR: 9 INJECTION, SOLUTION INTRAVENOUS at 18:46

## 2023-03-13 RX ADMIN — SODIUM CHLORIDE 5 MG/HR: 900 INJECTION, SOLUTION INTRAVENOUS at 08:39

## 2023-03-13 RX ADMIN — APIXABAN 5 MG: 5 TABLET, FILM COATED ORAL at 17:09

## 2023-03-13 RX ADMIN — Medication 1000 UNITS: at 08:39

## 2023-03-13 RX ADMIN — Medication 10 ML: at 06:00

## 2023-03-13 RX ADMIN — SODIUM CHLORIDE 100 ML/HR: 9 INJECTION, SOLUTION INTRAVENOUS at 08:46

## 2023-03-13 RX ADMIN — OXYCODONE HYDROCHLORIDE AND ACETAMINOPHEN 500 MG: 500 TABLET ORAL at 08:39

## 2023-03-13 RX ADMIN — ROSUVASTATIN CALCIUM 10 MG: 10 TABLET, COATED ORAL at 21:47

## 2023-03-13 RX ADMIN — DILTIAZEM HYDROCHLORIDE 20 MG: 5 INJECTION INTRAVENOUS at 03:43

## 2023-03-13 RX ADMIN — ASPRIN AND EXTENDED-RELEASE DIPYRIDAMOLE 1 CAPSULE: 25; 200 CAPSULE ORAL at 09:16

## 2023-03-13 RX ADMIN — OMEGA-3 FATTY ACIDS CAP 1000 MG 1 CAPSULE: 1000 CAP at 09:16

## 2023-03-13 RX ADMIN — DILTIAZEM HYDROCHLORIDE 60 MG: 30 TABLET, FILM COATED ORAL at 10:52

## 2023-03-13 RX ADMIN — Medication 10 ML: at 21:48

## 2023-03-13 RX ADMIN — POTASSIUM CHLORIDE 20 MEQ: 750 TABLET, EXTENDED RELEASE ORAL at 10:52

## 2023-03-13 RX ADMIN — DILTIAZEM HYDROCHLORIDE 60 MG: 30 TABLET, FILM COATED ORAL at 21:47

## 2023-03-13 RX ADMIN — Medication 10 ML: at 17:11

## 2023-03-13 RX ADMIN — ATENOLOL 25 MG: 50 TABLET ORAL at 08:39

## 2023-03-13 RX ADMIN — DILTIAZEM HYDROCHLORIDE 60 MG: 30 TABLET, FILM COATED ORAL at 17:09

## 2023-03-13 RX ADMIN — POTASSIUM CHLORIDE 20 MEQ: 750 TABLET, EXTENDED RELEASE ORAL at 14:02

## 2023-03-13 NOTE — ED PROVIDER NOTES
The history is provided by the patient. Chest Pain (Angina)   This is a new problem. The current episode started 1 to 2 hours ago. The problem has not changed since onset. The problem occurs constantly. Associated with: sleeping. The pain is present in the substernal region. The pain is moderate. The pain radiates to the mid back. Associated symptoms include nausea and vomiting. Pertinent negatives include no fever and no palpitations. She has tried rest for the symptoms. The treatment provided no relief. Her past medical history is significant for HTN. Her past medical history does not include PE.       Past Medical History:   Diagnosis Date    Arthritis     Depression     Diabetes (HCC)     GERD (gastroesophageal reflux disease)     High cholesterol     Hypertension     Snoring     Stroke ([facility] [address]     TIA    Thyroid disease        Past Surgical History:   Procedure Laterality Date    HX APPENDECTOMY      HX CATARACT REMOVAL Bilateral     HX CHOLECYSTECTOMY      HX GYN      btl    HX ORTHOPAEDIC Left     Open Repair    HX ORTHOPAEDIC Left     Trigger finger    HX ROTATOR CUFF REPAIR Right          Family History:   Problem Relation Age of Onset    Heart Disease Mother     Cancer Sister         Ovarian    Alzheimer's Disease Father     Alzheimer's Disease Paternal Grandfather        Social History     Socioeconomic History    Marital status:      Spouse name: Not on file    Number of children: Not on file    Years of education: Not on file    Highest education level: Not on file   Occupational History    Not on file   Tobacco Use    Smoking status: Former     Packs/day: 2.00     Years: 30.00     Pack years: 60.00     Types: Cigarettes     Quit date: 1986     Years since quittin.5    Smokeless tobacco: Never   Vaping Use    Vaping Use: Never used   Substance and Sexual Activity    Alcohol use: Yes     Comment: social 1 x year    Drug use: No    Sexual activity: Not on file   Other Topics Concern Not on file   Social History Narrative    Not on file     Social Determinants of Health     Financial Resource Strain: Not on file   Food Insecurity: Not on file   Transportation Needs: Not on file   Physical Activity: Not on file   Stress: Not on file   Social Connections: Not on file   Intimate Partner Violence: Not on file   Housing Stability: Not on file         ALLERGIES: Percocet [oxycodone-acetaminophen]    Review of Systems   Constitutional:  Negative for chills and fever. Cardiovascular:  Positive for chest pain. Negative for palpitations. Gastrointestinal:  Positive for nausea and vomiting. All other systems reviewed and are negative. Vitals:    03/13/23 0334   Pulse: (!) 160   Resp: 18   SpO2: 96%   Weight: 69.1 kg (152 lb 5.4 oz)   Height: 5' 5\" (1.651 m)            Physical Exam  Vitals and nursing note reviewed. Constitutional:       Appearance: She is well-developed. She is ill-appearing. HENT:      Head: Normocephalic and atraumatic. Eyes:      General: No scleral icterus. Cardiovascular:      Rate and Rhythm: Tachycardia present. Rhythm irregular. Pulmonary:      Effort: Pulmonary effort is normal.   Abdominal:      General: There is no distension. Musculoskeletal:      Cervical back: Normal range of motion. Skin:     General: Skin is warm and dry. Findings: No erythema or rash. Neurological:      General: No focal deficit present. Mental Status: She is alert and oriented to person, place, and time. Psychiatric:         Mood and Affect: Mood is anxious. Behavior: Behavior normal.        Medical Decision Making  Amount and/or Complexity of Data Reviewed  Labs: ordered. Radiology: ordered. ECG/medicine tests: ordered. Decision-making details documented in ED Course. Risk  Prescription drug management. Decision regarding hospitalization.       ED Course as of 03/13/23 0451   Mon Mar 13, 2023   0410 EKG, 12 LEAD, INITIAL  EKG @ 3:36: Afib w RVR, diffuse ST depressions with elevations in aVR concerning for subendocardial injury, 163 BPM [IO]      ED Course User Index  [IO] Frederick Lim MD       Procedures    The patient is resting comfortably and feels better, is alert and in no distress. The repeat examination is unremarkable and benign. DDX included acute myocardial infarction, significant arrhythmia, unstable angina, esophageal perforation, pulmonary embolism, aortic dissection, pneumothorax, severe pneumonia, sepsis, hyperthyroid, electrolyte disturbance. EKG demonstrated new onset Afib w RVR that responded well to diltiazem. At this time the patient's heart is rate controlled. Jose Enrique Adame MD has spent 35 minutes of critical care time involved in lab review, consultations with specialist, family decision-making, and documentation. During this entire length of time I was immediately available to the patient. Critical Care: The reason for providing this level of medical care for this critically ill patient was due a critical illness that impaired one or more vital organ systems such that there was a high probability of imminent or life threatening deterioration in the patients condition. This care involved high complexity decision making to assess, manipulate, and support vital system functions, to treat this degreee vital organ system failure and to prevent further life threatening deterioration of the patients condition. Patient is being admitted to the hospital.  The results of their tests and reasons for their admission have been discussed with them and/or available family. They convey agreement and understanding for the need to be admitted and for their admission diagnosis. Consultation will be made now with the inpatient physician for hospitalization. Perfect Serve Consult for Admission  4:51 AM    ED Room Number: ER16/16  Patient Name and age: Nigel Chicas 82 y.o.  female  Working Diagnosis:   1.  Atrial fibrillation with RVR ([facility] [address] [facility]      COVID-19 Suspicion:  no  Sepsis present:  no  Reassessment needed: no  Code Status:  Full Code  Readmission: no  Isolation Requirements:  no  Recommended Level of Care:  telemetry  Department: [facility] - [phone]  Admitting Provider: Dr. Tristan Ryder

## 2023-03-13 NOTE — PROGRESS NOTES
Cardiac monitor ordered. Unit is charging. Call holter/ekg room ext [phone] as soon as you know patient will be discharged. Please allow at least one hour notice. Office hours Monday  - friday 8 am to 4 pm. If patient dc's after department hours a monitor will be shipped directly from [facility] [employer] to patient address on file.

## 2023-03-13 NOTE — PROGRESS NOTES
Hospitalist Progress Note  Nelson Cristina MD  Answering service: [phone] from in house phone        Date of Service:  3/13/2023  NAME:  Valdo Adame  :  1935  MRN:  [id number]      Admission Summary/HPI:   Valdo Adame is a 82 y.o. female with PMH significant for HTN, TIA on aggrenox, T 2 DM, HLD, GERD who was admitted with chest pain. ED finds her in a fib RVR . Diltiazem added. HR now 82, chest pain is gone. She lives by herself, and occasionally still drives. She states that she does not drive long distances, perhaps just to the grocery store which is 3 miles away. Her daughter lives close by, and helps her with all other tasks that require driving. Patient states that after midnight   she woke up with pain in the central chest that was described as sharp. She was feeling mildly lightheaded and weak. She denies shortness of breath, diaphoresis, palpitations. Patient states that she was nauseous, and had 4 episodes of consecutive vomiting. She has had 2 falls in the past month both ground level, mechanical. Tripped in dark movie theater, tripped at home striking arm on cupboard requiring several sutures      CAD risk factors: HTN, HLD previous smoker, DM       Interval history / Subjective:   Now rate controlled on PO. Tx for UTI started. Stress test (MPI) in AM    Patient states she feels well enough to go home now that she has had Abx an is no longer having a high heart rate. She has no other complaints. Assessment & Plan:     Atrial fibrillation with rapid ventricular response. New onset.     Patient received Cardizem 20 mg IV in the emergency department  Started Cardizem drip at 5 mg/h, titratable  Have weaned off and onto PO Dilt   Cardiology eval - to have MPI in AM, Home holter monitor  Resume home medication of atenolol 25 mg p.o. daily  CHADS score is 5, anticoagulation with Eliquis. Patient has a history of hypertension, diabetes, TIA and age 80 or over. Request echocardiogram in the setting of new onset atrial fibrillation. Sepsis due to urinary tract infection. Patient meets sepsis criteria with heart rate > 100, leukocytosis. Gentle IV fluids with normal saline at 100 cc/h until echocardiogram results. IV ceftriaxone for acute UTI. History of transient ischemic attack. Patient states that she never had a stroke, but did have TIA. She takes Aggrenox at home. She states that she does not take a baby aspirin on top of that. Diabetes mellitus II with hyperglycemia. Continue home medication of glipizide 2.5 mg p.o. daily   Adjunctive sliding scale insulin. Elevated TSH. Not clear if this is subclinical hypothyroidism. Will check free T4 levels. Patient is already on levothyroxine 88 mcg/h which will be continued. Essential hypertension. We will hold off on Norvasc at this time as patient is on Cardizem drip and atenolol, and pressures have been low at times. I have independently reviewed and interpreted patient's lab and other diagnostic data    External notes were reviewed    Critical Care Time: 45 minutes for care of patient on titratable drip fro A-fib with rVR and sepsis 2/2 UTI    Code status:Full  Prophylaxis: Heparin, SCD  Care Plan discussed with: Patient, RN, Multidisciplinary Rounds  Anticipated Disposition:      Hospital Problems  Date Reviewed: 3/4/2022            Codes Class Noted POA    Atrial fibrillation with rapid ventricular response [facility]) ICD-10-CM: I48.91  ICD-9-CM: 427.31  3/13/2023 Unknown               Review of Systems:   A comprehensive review of systems was negative except for that written in the HPI. Vital Signs:    Last 24hrs VS reviewed since prior progress note.  Most recent are:    Patient Vitals for the past 24 hrs:   Temp Pulse Resp BP SpO2   03/13/23 1618 97.3 °F (36.3 °C) 76 18 (!) 146/95 95 %   03/13/23 1259 -- 63 16 132/68 92 %   03/13/23 1251 -- 62 15 123/80 94 %   03/13/23 1100 -- -- -- -- 95 %   03/13/23 1052 -- 71 -- 124/80 --   03/13/23 1001 -- 84 15 (!) 135/90 95 %   03/13/23 0945 -- 84 15 128/81 95 %   03/13/23 0930 -- 92 12 (!) 140/84 95 %   03/13/23 0908 -- (!) 104 14 (!) 139/99 94 %   03/13/23 0853 -- (!) 128 19 (!) 121/90 94 %   03/13/23 0800 97.6 °F (36.4 °C) (!) 139 14 121/85 94 %   03/13/23 0755 -- (!) 155 16 -- 95 %   03/13/23 0752 -- (!) 163 29 -- 95 %   03/13/23 0745 -- (!) 107 19 102/82 91 %   03/13/23 0716 -- (!) 118 16 -- 95 %   03/13/23 0715 -- (!) 115 13 (!) 126/90 95 %   03/13/23 0700 -- (!) 132 -- -- --   03/13/23 0630 -- (!) 123 11 114/73 95 %   03/13/23 0615 -- (!) 103 12 129/75 95 %   03/13/23 0603 -- (!) 110 13 122/82 96 %   03/13/23 0548 -- (!) 103 18 139/78 97 %   03/13/23 0533 -- 95 14 116/71 96 %   03/13/23 0518 -- (!) 107 21 119/83 96 %   03/13/23 0503 -- 91 (!) 34 128/72 94 %   03/13/23 0448 -- 89 17 134/79 94 %   03/13/23 0433 -- 91 20 122/74 95 %   03/13/23 0418 -- 76 14 102/65 95 %   03/13/23 0403 -- 79 21 101/70 93 %   03/13/23 0354 97.7 °F (36.5 °C) -- -- -- --   03/13/23 0348 -- (!) 115 18 (!) 88/65 94 %   03/13/23 0337 -- -- -- 112/70 --   03/13/23 0334 -- (!) 160 18 -- 96 %   03/13/23 0333 -- (!) 156 -- -- --          Intake/Output Summary (Last 24 hours) at 3/13/2023 1623  Last data filed at 3/13/2023 1101  Gross per 24 hour   Intake 820.25 ml   Output --   Net 820.25 ml          Physical Examination:     I had a face to face encounter with this patient and independently examined them on 3/13/2023 as outlined below:          General : alert x 3, awake, no acute distress,   HEENT: PEERL, EOMI, moist mucus membrane, TM clear  Neck: supple, no JVD, no meningeal signs  Chest: Clear to auscultation bilaterally   CVS: S1 S2 heard, Capillary refill less than 2 seconds  Abd: soft/ non tender, non distended, BS physiological,   Ext: no clubbing, no cyanosis, no edema, brisk 2+ DP pulses  Neuro/Psych: pleasant mood and affect, CN 2-12 grossly intact, sensory grossly within normal limit, Strength 5/5 in all extremities, DTR 1+ x 4  Skin: warm            Data Review:    Review and/or order of clinical lab test  Review and/or order of tests in the radiology section of CPT  Review and/or order of tests in the medicine section of CPT  Discussion of test results with performing physician    I have independently reviewed and interpreted patient's lab and all other diagnostic data    Notes reviewed from all clinical/nonclinical/nursing services involved in patient's clinical care. Care coordination discussions were held with appropriate clinical/nonclinical/ nursing providers based on care coordination needs. XR CHEST PORT    Result Date: 3/13/2023  No acute findings. Labs:     Recent Labs     03/13/23 0342   WBC 15.5*   HGB 14.8   HCT 42.9        Recent Labs     03/13/23 0342      K 3.3*      CO2 25   BUN 24*   CREA 0.99   *   CA 9.5     Recent Labs     03/13/23 0342   ALT 27   AP 30*   TBILI 0.9   TP 6.3*   ALB 3.3*   GLOB 3.0     No results for input(s): INR, PTP, APTT, INREXT in the last 72 hours. No results for input(s): FE, TIBC, PSAT, FERR in the last 72 hours. Lab Results   Component Value Date/Time    Folate 13.0 08/16/2021 09:14 AM      No results for input(s): PH, PCO2, PO2 in the last 72 hours. No results for input(s): CPK, CKNDX, TROIQ in the last 72 hours.     No lab exists for component: CPKMB  No results found for: CHOL, CHOLX, CHLST, CHOLV, HDL, HDLP, LDL, LDLC, DLDLP, TGLX, TRIGL, TRIGP, CHHD, CHHDX  Lab Results   Component Value Date/Time    Glucose (POC) 150 (H) 03/13/2023 11:38 AM    Glucose (POC) 179 (H) 05/31/2019 11:32 AM    Glucose (POC) 170 (H) 05/31/2019 06:52 AM    Glucose (POC) 181 (H) 05/30/2019 10:00 PM    Glucose (POC) 260 (H) 05/30/2019 04:23 PM     Lab Results   Component Value Date/Time    Color YELLOW/STRAW 03/13/2023 06:46 AM    Appearance TURBID (A) 03/13/2023 06:46 AM    Specific gravity 1.019 03/13/2023 06:46 AM    pH (UA) 7.0 03/13/2023 06:46 AM    Protein 300 (A) 03/13/2023 06:46 AM    Glucose 500 (A) 03/13/2023 06:46 AM    Ketone 15 (A) 03/13/2023 06:46 AM    Bilirubin Negative 03/13/2023 06:46 AM    Urobilinogen 0.2 03/13/2023 06:46 AM    Nitrites Positive (A) 03/13/2023 06:46 AM    Leukocyte Esterase LARGE (A) 03/13/2023 06:46 AM    Epithelial cells MANY (A) 03/13/2023 06:46 AM    Bacteria 4+ (A) 03/13/2023 06:46 AM    WBC >100 (H) 03/13/2023 06:46 AM    RBC 0-5 03/13/2023 06:46 AM         Medications Reviewed:     Current Facility-Administered Medications   Medication Dose Route Frequency    sodium chloride (NS) flush 5-40 mL  5-40 mL IntraVENous Q8H    sodium chloride (NS) flush 5-40 mL  5-40 mL IntraVENous PRN    ascorbic acid (vitamin C) (VITAMIN C) tablet 500 mg  500 mg Oral DAILY AFTER BREAKFAST    atenoloL (TENORMIN) tablet 25 mg  25 mg Oral DAILY    cholecalciferol (VITAMIN D3) (1000 Units /25 mcg) tablet 1,000 Units  1,000 Units Oral DAILY    glipiZIDE (GLUCOTROL) tablet 2.5 mg  2.5 mg Oral ACB    rosuvastatin (CRESTOR) tablet 10 mg  10 mg Oral QHS    omega 3-DHA-EPA-fish oil 1,000 mg (120 mg-180 mg) capsule 1 Capsule  1 Capsule Oral ACB/HS    apixaban (ELIQUIS) tablet 5 mg  5 mg Oral BID    levothyroxine (SYNTHROID) tablet 88 mcg  88 mcg Oral ACB    cefTRIAXone (ROCEPHIN) 1 g in 0.9% sodium chloride 10 mL IV syringe  1 g IntraVENous Q24H    0.9% sodium chloride infusion  100 mL/hr IntraVENous CONTINUOUS    insulin lispro (HUMALOG) injection   SubCUTAneous AC&HS    glucose chewable tablet 16 g  4 Tablet Oral PRN    glucagon (GLUCAGEN) injection 1 mg  1 mg IntraMUSCular PRN    dextrose 10% infusion 0-250 mL  0-250 mL IntraVENous PRN    dilTIAZem IR (CARDIZEM) tablet 60 mg  60 mg Oral AC&HS     Current Outpatient Medications   Medication Sig    lisinopriL (PRINIVIL, ZESTRIL) 20 mg tablet Take 20 mg by mouth daily. sertraline (ZOLOFT) 25 mg tablet Take 25 mg by mouth daily. lidocaine (LIDODERM) 5 % 1 Patch by TransDERmal route every twenty-four (24) hours. Apply patch to the affected area for 12 hours a day and remove for 12 hours a day. cinnamon bark (Cinnamon) 500 mg cap Take  by mouth.    calcium carbonate (CALTREX) 600 mg calcium (1,500 mg) tablet Take 600 mg by mouth two (2) times a day. amLODIPine (NORVASC) 10 mg tablet Take 10 mg by mouth daily. atenoloL (TENORMIN) 25 mg tablet Take 25 mg by mouth two (2) times a day. levothyroxine (SYNTHROID) 88 mcg tablet Take 88 mcg by mouth Daily (before breakfast). aspirin-dipyridamole (Aggrenox)  mg per SR capsule Take 1 Cap by mouth two (2) times a day. aspirin delayed-release 81 mg tablet Take 1 Tab by mouth two (2) times a day. rosuvastatin (CRESTOR) 10 mg tablet Take 10 mg by mouth nightly. cholecalciferol (VITAMIN D3) 1,000 unit cap Take 1,000 Units by mouth daily (after breakfast). glipiZIDE SR (GLUCOTROL XL) 2.5 mg CR tablet Take 2.5 mg by mouth daily (after breakfast). ascorbic acid, vitamin C, (VITAMIN C) 500 mg tablet Take 500 mg by mouth daily (after breakfast). docosahexanoic acid/epa (FISH OIL PO) Take 1,500 mg by mouth ACB/HS. potassium chloride SA (MICRO-K) 10 mEq capsule Take 10 mEq by mouth daily.        ______________________________________________________________________  EXPECTED LENGTH OF STAY: - - -  ACTUAL LENGTH OF STAY:          0                 Norman Valero MD

## 2023-03-13 NOTE — ED NOTES
Report obtained by [place] pt educated on call light, and POC, CARSON elevated, call light within reach.

## 2023-03-13 NOTE — PROGRESS NOTES
1900  Bedside and Verbal shift change report given to Jennifer Babb RN (oncoming nurse) by Black Elizondo RN (offgoing nurse). Report included the following information SBAR, Kardex, Intake/Output, MAR, Recent Results, and Cardiac Rhythm afib . 2147: pt complaining of 3/10 chronic back pain for which she takes tylenol PM for at home. No PRN orders currently. Messaged NP, Katarina Flores. 2257: notified  by telemetry that patient converted into normal sinus/sinus caterina rhythm. Heart rate 50-60s. EKG ordered and obtained showing sinus bradycardia.     0600: patient's heart rate continues to be in 50s during the night, sinus caterina. Pt with scheduled dose of PO cardizem at 0730. Messaged NP to verify if dose should be held.    0605: magnesium resulted at 1.4 this morning. Notified NP.     3252: orders received to hold 0730 dose of cardizem, see MAR.      5704: no orders received regarding magnesium. This patient was assisted with Intentional Toileting every 2 hours during this shift as appropriate. Documentation of ambulation and output reflected on Flowsheet as appropriate. Purposeful hourly rounding was completed using AIDET and 5Ps. Outcomes of PHR documented as they occurred. Bed alarm in use as appropriate. Dual Suction and ambubag in place. 0700  Bedside and Verbal shift change report given to Black Elizondo RN (oncoming nurse) by Jennifer Babb RN (offgoing nurse). Report included the following information SBAR, Kardex, Intake/Output, MAR, Recent Results, and Cardiac Rhythm NSR/SB .

## 2023-03-13 NOTE — ED NOTES
Assumed care of the patient from [place]. Patient noted to have HR in 160s as she sits on the side of the bed having breakfast. Notified MD through "[employer].

## 2023-03-13 NOTE — H&P
History and Physical  NAME: Jeff Pizano  MRN: [id number]  YOB: 1935  AGE: 82 y.o.  female  SOCIAL SECURITY NUMBER: [id number]  Primary Care Provider: Mel Mobley NP  CODE STATUS: Full Code      ASSESSMENT/PLAN:  Atrial fibrillation with rapid ventricular response. This appears to be of new onset. Patient received Cardizem 20 mg IV in the emergency department, but continues to have rapid ventricular response, especially with movement. We will start Cardizem drip at 5 mg/h. Request cardiology consultation. Resume home medication of atenolol 25 mg p.o. daily. As patient's CHADS score is 5, will start anticoagulation with Eliquis. Patient has a history of hypertension, diabetes, TIA and age 80 or over. Request echocardiogram in the setting of new onset atrial fibrillation. Sepsis due to urinary tract infection. Patient meets sepsis criteria with heart rate > 100, leukocytosis. Gentle IV fluids with normal saline at 100 cc/h until echocardiogram results. IV ceftriaxone for acute UTI. History of transient ischemic attack. Patient states that she never had a stroke, but did have TIA. She takes Aggrenox at home. She states that she does not take a baby aspirin on top of that. Diabetes mellitus II with hyperglycemia. Continue home medication of glipizide 2.5 mg p.o. daily -this may need to be increased to 5 mg given her hyperglycemia. Adjunctive sliding scale insulin. Elevated TSH. Not clear if this is subclinical hypothyroidism. Will check free T4 levels. Patient is already on levothyroxine 88 mcg/h which will be continued. Essential hypertension. We will hold off on Norvasc at this time as patient is on Cardizem drip and atenolol, and pressures have been low at times. History of Presenting Illness: Jeff Pizano is a 82 y.o. female who is alert, oriented X3. Patient appears to be an excellent historian.   She lives by herself, and occasionally still drives. She states that she does not drive long distances, perhaps just to the grocery store which is 3 miles away. Her daughter lives close by, and helps her with all other tasks that require driving. Patient states that after midnight today, she woke up with pain in the central chest that was described as sharp. She was feeling mildly lightheaded and weak. She denies shortness of breath, diaphoresis, palpitations. Patient states that she was nauseous, and had 4 episodes of consecutive vomiting. Patient states that she has never been diagnosed with atrial fibrillation before. Patient denies any history of daily or excessive alcohol intake. She denies any recent coughing, runny nose, sore throat or other upper respiratory infection symptoms. Patient states that she typically drinks 2 cups of coffee daily, sometimes up to 4. She denies any other caffeine. She denies any energy drinks like \"Monster,\"   \"Red Bull. \"     Review of Systems:  A comprehensive review of systems was negative except for that written in the History of Present Illness. Past Medical History:   Diagnosis Date    Arthritis     Depression     Diabetes (HCC)     GERD (gastroesophageal reflux disease)     High cholesterol     Hypertension     Snoring     Transient ischemic attack (TIA) 2003        PAST SURGICAL HISTORY:   Past Surgical History:   Procedure Laterality Date    HX APPENDECTOMY      HX CATARACT REMOVAL Bilateral     HX CHOLECYSTECTOMY      HX ORTHOPAEDIC Left     Open Repair    HX ORTHOPAEDIC Left     Trigger finger    HX ROTATOR CUFF REPAIR Right     HX TUBAL LIGATION         Prior to Admission medications    Medication Sig Start Date End Date Taking? Authorizing Provider   cinnamon bark (Cinnamon) 500 mg cap Take  by mouth. Provider, Historical   calcium carbonate (CALTREX) 600 mg calcium (1,500 mg) tablet Take 600 mg by mouth two (2) times a day.     Provider, Historical   amLODIPine (NORVASC) 10 mg tablet  4/3/21   Provider, Historical   atenoloL (TENORMIN) 25 mg tablet  7/13/21   Provider, Historical   lisinopriL (PRINIVIL, ZESTRIL) 10 mg tablet  11/20/20   Provider, Historical   potassium chloride (K-DUR, KLOR-CON) 20 mEq tablet  7/13/21   Provider, Historical   levothyroxine (SYNTHROID) 88 mcg tablet TAKE 1 TABLET BY MOUTH DAILY 7/19/21   Provider, Historical   aspirin-dipyridamole (Aggrenox)  mg per SR capsule Take 1 Cap by mouth two (2) times a day. Provider, Historical   aspirin delayed-release 81 mg tablet Take 1 Tab by mouth two (2) times a day. 5/29/19   Mariana Jones MD   ibuprofen (MOTRIN) 800 mg tablet Take 1 Tab by mouth every six (6) hours as needed for Pain. Patient not taking: Reported on 8/13/2021 5/29/19   Mariana Jones MD   acetaminophen (TYLENOL EXTRA STRENGTH) 500 mg tablet Take 1-2 Tabs by mouth every six (6) hours as needed for Pain. Not to exceed 4,000mg in any 24 hour period  Indications: Pain  Patient not taking: Reported on 8/13/2021 5/29/19   Mariana Jones MD   ondansetron [facility] ODT) 8 mg disintegrating tablet Take 0.5 Tabs by mouth every eight (8) hours as needed for Nausea. Patient not taking: Reported on 8/13/2021 5/29/19   Mariana Jones MD   rosuvastatin (CRESTOR) 10 mg tablet Take 10 mg by mouth nightly. Provider, Historical   ibuprofen/diphenhydramine HCl (ADVIL PM LIQUI-GELS PO) Take 200 mg by mouth nightly. Patient not taking: Reported on 8/13/2021    Provider, Historical   cholecalciferol (VITAMIN D3) 1,000 unit cap Take 1,000 Units by mouth daily (after breakfast). Provider, Historical   melatonin 10 mg cap Take 1 Cap by mouth nightly. Patient not taking: Reported on 8/13/2021    Provider, Historical   gluc ryder/chondro ryder A/vit C/Mn (GLUCOSAMINE 1500 COMPLEX PO) Take 1 Cap by mouth ACB/HS.   Patient not taking: Reported on 8/13/2021    Provider, Historical   glipiZIDE SR (GLUCOTROL XL) 2.5 mg CR tablet Take 2.5 mg by mouth daily (after breakfast). Provider, Historical   raNITIdine hcl 150 mg capsule Take 150 mg by mouth daily (after breakfast). Patient not taking: Reported on 2021    Provider, Historical   ascorbic acid, vitamin C, (VITAMIN C) 500 mg tablet Take 500 mg by mouth daily (after breakfast). Provider, Historical   docosahexanoic acid/epa (FISH OIL PO) Take 1,500 mg by mouth ACB/HS. Provider, Historical   potassium chloride SA (MICRO-K) 10 mEq capsule Take 10 mEq by mouth ACB/HS. Provider, Historical   FELODIPINE (PLENDIL PO) Take 5 mg by mouth daily (after breakfast). Patient not taking: Reported on 2021    Provider, Historical   hydrochlorothiazide (HYDRODIURIL) 25 mg tablet Take 25 mg by mouth daily (after breakfast). Patient not taking: Reported on 2021    Provider, Historical       Allergies   Allergen Reactions    Percocet [Oxycodone-Acetaminophen] Unknown (comments)     Can't remember reaction due to length of time of occurrence.         Family History   Problem Relation Age of Onset    Heart Disease Mother     Alzheimer's Disease Father     Ovarian Cancer Sister     Alzheimer's Disease Paternal Grandfather         Social History     Tobacco Use    Smoking status: Former     Packs/day: 2.00     Years: 30.00     Pack years: 60.00     Types: Cigarettes     Quit date: 1986     Years since quittin.5    Smokeless tobacco: Never   Vaping Use    Vaping Use: Never used   Substance Use Topics    Alcohol use: Yes     Comment: social 1 x year    Drug use: No       Physical exam  Patient Vitals for the past 24 hrs:   BP Temp Pulse Resp SpO2   23 0752 -- -- (!) 163 29 95 %   23 0745 102/82 -- (!) 107 19 91 %   23 0716 -- -- (!) 118 16 95 %   23 0715 (!) 126/90 -- (!) 115 13 95 %   23 0630 114/73 -- (!) 123 11 95 %   23 0615 129/75 -- (!) 103 12 95 %   23 0603 122/82 -- (!) 110 13 96 %   23 0548 139/78 -- (!) 103 18 97 %   23 0533 116/71 -- 95 14 96 %   03/13/23 0518 119/83 -- (!) 107 21 96 %   03/13/23 0503 128/72 -- 91 (!) 34 94 %   03/13/23 0448 134/79 -- 89 17 94 %   03/13/23 0433 122/74 -- 91 20 95 %   03/13/23 0418 102/65 -- 76 14 95 %   03/13/23 0403 101/70 -- 79 21 93 %   03/13/23 0354 -- 97.7 °F (36.5 °C) -- -- --   03/13/23 0348 (!) 88/65 -- (!) 115 18 94 %   03/13/23 0337 112/70 -- -- -- --   03/13/23 0334 -- -- (!) 160 18 96 %   03/13/23 0333 -- -- (!) 156 -- --     Estimated body mass index is 25.35 kg/m² as calculated from the following:    Height as of this encounter: 5' 5\" (1.651 m). Weight as of this encounter: 69.1 kg (152 lb 5.4 oz). General: In no acute distress. Well developed, well nourished. Head: Normocephalic, atraumatic. Eyes: Anicteric sclera. PERRL. Extraocular muscles intact. ENT: External ears and nose appear normal.  Oral mucosa moist.  Neck: Supple. No jugular venous distention. Heart: Irregularly irregular. No murmurs appreciated. Chest: Symmetrical excursion. Clear to auscultation bilaterally. Abdomen: Soft, nontender. No abnormal distention. Bowel sounds are present throughout. Extremities: No gross deformities. No edema, no cyanosis. Feet are warm to touch. Neurological: No lateralizing deficits. Alert, oriented X3. Skin: No jaundice. No rashes.           Recent Results (from the past 24 hour(s))   CBC WITH AUTOMATED DIFF    Collection Time: 03/13/23  3:42 AM   Result Value Ref Range    WBC 15.5 (H) 3.6 - 11.0 K/uL    RBC 4.36 3.80 - 5.20 M/uL    HGB 14.8 11.5 - 16.0 g/dL    HCT 42.9 35.0 - 47.0 %    MCV 98.4 80.0 - 99.0 FL    MCH 33.9 26.0 - 34.0 PG    MCHC 34.5 30.0 - 36.5 g/dL    RDW 11.3 (L) 11.5 - 14.5 %    PLATELET 563 097 - 652 K/uL    MPV 11.0 8.9 - 12.9 FL    NRBC 0.0 0  WBC    ABSOLUTE NRBC 0.00 0.00 - 0.01 K/uL    NEUTROPHILS 70 32 - 75 %    LYMPHOCYTES 21 12 - 49 %    MONOCYTES 7 5 - 13 %    EOSINOPHILS 1 0 - 7 %    BASOPHILS 0 0 - 1 %    IMMATURE GRANULOCYTES 1 (H) 0.0 - 0.5 % ABS. NEUTROPHILS 10.9 (H) 1.8 - 8.0 K/UL    ABS. LYMPHOCYTES 3.2 0.8 - 3.5 K/UL    ABS. MONOCYTES 1.1 (H) 0.0 - 1.0 K/UL    ABS. EOSINOPHILS 0.2 0.0 - 0.4 K/UL    ABS. BASOPHILS 0.1 0.0 - 0.1 K/UL    ABS. IMM. GRANS. 0.1 (H) 0.00 - 0.04 K/UL    DF AUTOMATED     METABOLIC PANEL, COMPREHENSIVE    Collection Time: 03/13/23  3:42 AM   Result Value Ref Range    Sodium 139 136 - 145 mmol/L    Potassium 3.3 (L) 3.5 - 5.1 mmol/L    Chloride 106 97 - 108 mmol/L    CO2 25 21 - 32 mmol/L    Anion gap 8 5 - 15 mmol/L    Glucose 290 (H) 65 - 100 mg/dL    BUN 24 (H) 6 - 20 MG/DL    Creatinine 0.99 0.55 - 1.02 MG/DL    BUN/Creatinine ratio 24 (H) 12 - 20      eGFR 56 (L) >60 ml/min/1.73m2    Calcium 9.5 8.5 - 10.1 MG/DL    Bilirubin, total 0.9 0.2 - 1.0 MG/DL    ALT (SGPT) 27 12 - 78 U/L    AST (SGOT) 19 15 - 37 U/L    Alk. phosphatase 30 (L) 45 - 117 U/L    Protein, total 6.3 (L) 6.4 - 8.2 g/dL    Albumin 3.3 (L) 3.5 - 5.0 g/dL    Globulin 3.0 2.0 - 4.0 g/dL    A-G Ratio 1.1 1.1 - 2.2     SAMPLES BEING HELD    Collection Time: 03/13/23  3:42 AM   Result Value Ref Range    SAMPLES BEING HELD 1RED,1BLU,1SST     COMMENT        Add-on orders for these samples will be processed based on acceptable specimen integrity and analyte stability, which may vary by analyte.    TROPONIN-HIGH SENSITIVITY    Collection Time: 03/13/23  3:42 AM   Result Value Ref Range    Troponin-High Sensitivity 15 0 - 51 ng/L   TSH 3RD GENERATION    Collection Time: 03/13/23  3:42 AM   Result Value Ref Range    TSH 8.76 (H) 0.36 - 3.74 uIU/mL   NT-PRO BNP    Collection Time: 03/13/23  3:42 AM   Result Value Ref Range    NT pro- <450 PG/ML   URINALYSIS W/MICROSCOPIC    Collection Time: 03/13/23  6:46 AM   Result Value Ref Range    Color YELLOW/STRAW      Appearance TURBID (A) CLEAR      Specific gravity 1.019 1.003 - 1.030      pH (UA) 7.0 5.0 - 8.0      Protein 300 (A) NEG mg/dL    Glucose 500 (A) NEG mg/dL    Ketone 15 (A) NEG mg/dL    Bilirubin Negative NEG      Blood SMALL (A) NEG      Urobilinogen 0.2 0.2 - 1.0 EU/dL    Nitrites Positive (A) NEG      Leukocyte Esterase LARGE (A) NEG      WBC >100 (H) 0 - 4 /hpf    RBC 0-5 0 - 5 /hpf    Epithelial cells MANY (A) FEW /lpf    Bacteria 4+ (A) NEG /hpf   URINE CULTURE HOLD SAMPLE    Collection Time: 03/13/23  6:46 AM    Specimen: Urine   Result Value Ref Range    Urine culture hold        Urine on hold in Microbiology dept for 2 days. If unpreserved urine is submitted, it cannot be used for addtional testing after 24 hours, recollection will be required. XR CHEST PORT  Narrative: EXAM:  XR CHEST PORT    INDICATION: Shortness of breath. COMPARISON: none    TECHNIQUE: Upright portable chest AP view    FINDINGS: Cardiac monitoring leads are noted. The cardiac silhouette is within  normal limits. The pulmonary vasculature is within normal limits. Atherosclerotic calcifications affect the aortic arch and the thoracic aorta is  tortuous. The lungs and pleural spaces are clear. The chest wall structures and visualized  upper abdomen show no acute findings with incidental note of degenerative spine  and shoulder changes  Impression: No acute findings.                 Signature:  Anders Swenson DO

## 2023-03-13 NOTE — ED NOTES
Bedside and Verbal shift change report given to [address] [place] [address] (oncoming nurse) by Kunal Woodson RN  (offgoing nurse). Report included the following information SBAR, Kardex, ED Summary and Cardiac Rhythm AFIB rate controlled.

## 2023-03-13 NOTE — PROGRESS NOTES
1600: TRANSFER - IN REPORT:    Verbal report received from Diogo rose RN(name) on [facility]  being received from ED(unit) for routine progression of care      Report consisted of patients Situation, Background, Assessment and   Recommendations(SBAR). Information from the following report(s) SBAR, Kardex, ED Summary, Procedure Summary, Intake/Output, MAR, Recent Results, Med Rec Status, and Cardiac Rhythm A FIB  was reviewed with the receiving nurse. Opportunity for questions and clarification was provided. Assessment completed upon patients arrival to unit and care assumed. 1633: Patient arrived to unit. Assessment, required documentation completed.

## 2023-03-13 NOTE — PROGRESS NOTES
[facility]                    Cardiology Care Note     [x]Initial Encounter     []Follow-up    Patient Name: Anil Tucker - MIP:[id number] - B:[id number]  Primary Cardiologist: none   Consulting Cardiologist: [place] [facility] Cardiology Physicians: Ca Pierce DO     Reason for encounter: a fib RVR ? New onset     HPI:       Anil Tucker is a 82 y.o. female with PMH significant for HTN, TIA on aggrenox, T 2 DM, HLD, GERD who was admitted with chest pain. ED finds her in a fib RVR . Diltiazem added. HR now 82, chest pain is gone. She lives by herself, and occasionally still drives. She states that she does not drive long distances, perhaps just to the grocery store which is 3 miles away. Her daughter lives close by, and helps her with all other tasks that require driving. Patient states that after midnight   she woke up with pain in the central chest that was described as sharp. She was feeling mildly lightheaded and weak. She denies shortness of breath, diaphoresis, palpitations. Patient states that she was nauseous, and had 4 episodes of consecutive vomiting. She has had 2 falls in the past month both ground level, mechanical. Tripped in dark movie theater, tripped at home striking arm on cupboard requiring several sutures      CAD risk factors: HTN, HLD previous smoker, DM  Subjective: Anil Tucker reports none. Assessment and Plan     1  a fib : apparent new onset. Add po dilt, wean off IV, ECHO, check Mg. Eliquis has been started. Will refer her for Watch[employer] randall webb repeated falls, on aggrenox for TIA. PLan for nuclear stress in am given CAD risk factors and presenting with chest pain . K has been repleted (3.3)   2. HTN: add ing diltiazem, cont atenolol  3 HLD: on statin   4 falls: PT eval   5. Hx TIA: on aggrenox   6 DM: repeat HBA1C. ____________________________________________________________    Cardiac testing    None previous           Most recent HS troponins:  Recent Labs     03/13/23  0342   TROPHS 15       ECG:   EKG Results       Procedure 720 Value Units Date/Time    EKG, 12 LEAD, REPEAT [[id number]]     Order Status: Completed     EKG, 12 LEAD, INITIAL [[id number]]     Order Status: Completed               Review of Systems:    [x]All other systems reviewed and all negative except as written in HPI    [] Patient unable to provide secondary to condition    Past Medical History:   Diagnosis Date    Arthritis     Depression     Diabetes ([address]     GERD (gastroesophageal reflux disease)     High cholesterol     Hypertension     Snoring     Transient ischemic attack (TIA) 2003     Past Surgical History:   Procedure Laterality Date    HX APPENDECTOMY      HX CATARACT REMOVAL Bilateral     HX CHOLECYSTECTOMY      HX ORTHOPAEDIC Left     Open Repair    HX ORTHOPAEDIC Left     Trigger finger    HX ROTATOR CUFF REPAIR Right     HX TUBAL LIGATION       Social Hx:  reports that she quit smoking about 36 years ago. Her smoking use included cigarettes. She has a 60.00 pack-year smoking history. She has never used smokeless tobacco. She reports current alcohol use. She reports that she does not use drugs. Family Hx: family history includes Alzheimer's Disease in her father and paternal grandfather; Heart Disease in her mother; Ovarian Cancer in her sister. Allergies   Allergen Reactions    Percocet [Oxycodone-Acetaminophen] Unknown (comments)     Can't remember reaction due to length of time of occurrence.           OBJECTIVE:  Wt Readings from Last 3 Encounters:   03/13/23 69.1 kg (152 lb 5.4 oz)   03/04/22 63.5 kg (140 lb)   08/13/21 64 kg (141 lb)       Intake/Output Summary (Last 24 hours) at 3/13/2023 1018  Last data filed at 3/13/2023 0422  Gross per 24 hour   Intake 500 ml   Output --   Net 500 ml       Physical Exam:    Vitals:   Vitals: 03/13/23 0908 03/13/23 0930 03/13/23 0945 03/13/23 1001   BP: (!) 139/99 (!) 140/84 128/81 (!) 135/90   Pulse: (!) 104 92 84 84   Resp: 14 12 15 15   Temp:       SpO2: 94% 95% 95% 95%   Weight:       Height:         Telemetry: a fib 80's     Gen: Well-developed, well-nourished, in no acute distress  Neck: Supple, No JVD, No Carotid Bruit  Resp: No accessory muscle use, Clear breath sounds, No rales or rhonchi  Card: irregular Rate,Rythm, Normal S1, S2, No murmurs, rubs or gallop. No thrills. Abd:   Soft, non-tender, non-distended, BS+   MSK: No cyanosis  Skin: No rashes, healed laceration LUE    Neuro: Moving all four extremities, follows commands appropriately  Psych: Good insight, oriented to person, place, alert, Nml Affect  LE: No edema    Data Review:     Radiology:   XR Results (most recent):  Results from Hospital Encounter encounter on 03/13/23    XR CHEST PORT    Narrative  EXAM:  XR CHEST PORT    INDICATION: Shortness of breath. COMPARISON: none    TECHNIQUE: Upright portable chest AP view    FINDINGS: Cardiac monitoring leads are noted. The cardiac silhouette is within  normal limits. The pulmonary vasculature is within normal limits. Atherosclerotic calcifications affect the aortic arch and the thoracic aorta is  tortuous. The lungs and pleural spaces are clear. The chest wall structures and visualized  upper abdomen show no acute findings with incidental note of degenerative spine  and shoulder changes    Impression  No acute findings. Recent Labs     03/13/23  0342      K 3.3*      CO2 25   BUN 24*   CREA 0.99   *   CA 9.5     Recent Labs     03/13/23  0342   WBC 15.5*   HGB 14.8   HCT 42.9        Recent Labs     03/13/23  0342   AP 30*     No results for input(s): CHOL, LDLC in the last 72 hours.     No lab exists for component: TGL, HDLC,  HBA1C      Current meds:    Current Facility-Administered Medications:     sodium chloride (NS) flush 5-40 mL, 5-40 mL, IntraVENous, Q8H, Cheryn Dienes H, DO, 10 mL at 03/13/23 0600    sodium chloride (NS) flush 5-40 mL, 5-40 mL, IntraVENous, PRN, Cheryn Dienes H, DO    ascorbic acid (vitamin C) (VITAMIN C) tablet 500 mg, 500 mg, Oral, DAILY AFTER Khaipascual Bradkatiana H, DO, 500 mg at 03/13/23 0839    aspirin-dipyridamole (AGGRENOX)  mg per capsule 1 Capsule, 1 Capsule, Oral, BID, Cheryn Dienes H, DO, 1 Capsule at 03/13/23 0916    atenoloL (TENORMIN) tablet 25 mg, 25 mg, Oral, DAILY, Cheryn Dienes H, DO, 25 mg at 03/13/23 6181    cholecalciferol (VITAMIN D3) (1000 Units /25 mcg) tablet 1,000 Units, 1,000 Units, Oral, DAILY, Cheryn Dienes H, DO, 1,000 Units at 03/13/23 0839    glipiZIDE (GLUCOTROL) tablet 2.5 mg, 2.5 mg, Oral, ACB, Cheryn Dienes H, DO, 2.5 mg at 03/13/23 0916    rosuvastatin (CRESTOR) tablet 10 mg, 10 mg, Oral, QHS, She Davila H, DO    omega 3-DHA-EPA-fish oil 1,000 mg (120 mg-180 mg) capsule 1 Capsule, 1 Capsule, Oral, ACB/HS, Cheryn Dienes H, DO, 1 Capsule at 03/13/23 0916    apixaban (ELIQUIS) tablet 5 mg, 5 mg, Oral, BID, Cheryn Dienes H, DO, 5 mg at 03/13/23 8764    levothyroxine (SYNTHROID) tablet 88 mcg, 88 mcg, Oral, ACB, Cheryn Dienes H, DO, 88 mcg at 03/13/23 0916    dilTIAZem (CARDIZEM) 100 mg in 0.9% sodium chloride (MBP/ADV) 100 mL infusion, 5 mg/hr, IntraVENous, TITRATE, Cheryn Dienes H, DO, Last Rate: 10 mL/hr at 03/13/23 0920, 10 mg/hr at 03/13/23 0920    cefTRIAXone (ROCEPHIN) 1 g in 0.9% sodium chloride 10 mL IV syringe, 1 g, IntraVENous, Q24H, Cheryn Dienes H, DO, 1 g at 03/13/23 0841    0.9% sodium chloride infusion, 100 mL/hr, IntraVENous, CONTINUOUS, Cheryn Dienes H, DO, Last Rate: 100 mL/hr at 03/13/23 0846, 100 mL/hr at 03/13/23 0846    insulin lispro (HUMALOG) injection, , SubCUTAneous, AC&HS, Cheryn Dienes H, DO    glucose chewable tablet 16 g, 4 Tablet, Oral, PRN, Cheryn Dienes H, DO    glucagon (GLUCAGEN) injection 1 mg, 1 mg, IntraMUSCular, PRN, Cheryn Dienes H, DO    dextrose 10% infusion 0-250 mL, 0-250 mL, IntraVENous, PRN, Kayli Bettina H, DO    potassium chloride SR (KLOR-CON 10) tablet 20 mEq, 20 mEq, Oral, Q4H, Karlie Bunch NP    Current Outpatient Medications:     cinnamon bark (Cinnamon) 500 mg cap, Take  by mouth., Disp: , Rfl:     calcium carbonate (CALTREX) 600 mg calcium (1,500 mg) tablet, Take 600 mg by mouth two (2) times a day., Disp: , Rfl:     amLODIPine (NORVASC) 10 mg tablet, , Disp: , Rfl:     atenoloL (TENORMIN) 25 mg tablet, , Disp: , Rfl:     lisinopriL (PRINIVIL, ZESTRIL) 10 mg tablet, , Disp: , Rfl:     potassium chloride (K-DUR, KLOR-CON) 20 mEq tablet, , Disp: , Rfl:     levothyroxine (SYNTHROID) 88 mcg tablet, TAKE 1 TABLET BY MOUTH DAILY, Disp: , Rfl:     aspirin-dipyridamole (Aggrenox)  mg per SR capsule, Take 1 Cap by mouth two (2) times a day., Disp: , Rfl:     aspirin delayed-release 81 mg tablet, Take 1 Tab by mouth two (2) times a day., Disp: 60 Tab, Rfl: 0    ibuprofen (MOTRIN) 800 mg tablet, Take 1 Tab by mouth every six (6) hours as needed for Pain. (Patient not taking: Reported on 8/13/2021), Disp: 50 Tab, Rfl: 2    acetaminophen (TYLENOL EXTRA STRENGTH) 500 mg tablet, Take 1-2 Tabs by mouth every six (6) hours as needed for Pain. Not to exceed 4,000mg in any 24 hour period  Indications: Pain (Patient not taking: Reported on 8/13/2021), Disp: 60 Tab, Rfl: 0    ondansetron (ZOFRAN ODT) 8 mg disintegrating tablet, Take 0.5 Tabs by mouth every eight (8) hours as needed for Nausea. (Patient not taking: Reported on 8/13/2021), Disp: 30 Tab, Rfl: 0    rosuvastatin (CRESTOR) 10 mg tablet, Take 10 mg by mouth nightly., Disp: , Rfl:     ibuprofen/diphenhydramine HCl (ADVIL PM LIQUI-GELS PO), Take 200 mg by mouth nightly. (Patient not taking: Reported on 8/13/2021), Disp: , Rfl:     cholecalciferol (VITAMIN D3) 1,000 unit cap, Take 1,000 Units by mouth daily (after breakfast). , Disp: , Rfl:     melatonin 10 mg cap, Take 1 Cap by mouth nightly.  (Patient not taking: Reported on 8/13/2021), Disp: , Rfl:     gluc ryder/chondro ryder A/vit C/Mn (GLUCOSAMINE 1500 COMPLEX PO), Take 1 Cap by mouth ACB/HS. (Patient not taking: Reported on 8/13/2021), Disp: , Rfl:     glipiZIDE SR (GLUCOTROL XL) 2.5 mg CR tablet, Take 2.5 mg by mouth daily (after breakfast). , Disp: , Rfl:     raNITIdine hcl 150 mg capsule, Take 150 mg by mouth daily (after breakfast). (Patient not taking: Reported on 8/13/2021), Disp: , Rfl:     ascorbic acid, vitamin C, (VITAMIN C) 500 mg tablet, Take 500 mg by mouth daily (after breakfast). , Disp: , Rfl:     docosahexanoic acid/epa (FISH OIL PO), Take 1,500 mg by mouth ACB/HS. , Disp: , Rfl:     potassium chloride SA (MICRO-K) 10 mEq capsule, Take 10 mEq by mouth ACB/HS. , Disp: , Rfl:     FELODIPINE (PLENDIL PO), Take 5 mg by mouth daily (after breakfast). (Patient not taking: Reported on 8/13/2021), Disp: , Rfl:     hydrochlorothiazide (HYDRODIURIL) 25 mg tablet, Take 25 mg by mouth daily (after breakfast). (Patient not taking: Reported on 8/13/2021), Disp: , Rfl:     Liz Lane NP    [facility] Cardiology  Call center: (g) [phone] (q) [phone]      CC: Ana Morse NP

## 2023-03-13 NOTE — ED TRIAGE NOTES
The patient arrives via EMS after she woke up at midnight with chest pain that is substernal and is now radiating into her back. The patient has a history of DM2 and HTN. EMS reports Afib RVR on their EKG. Patient does not have a history of afib according to EMS. EMS did not give aspirin due to patient reporting brown emesis.

## 2023-03-13 NOTE — ED NOTES
Leonidas Aj NP at bedside to evaluate patient. Patient HR noted to be in 70s-80s, still on dilt drip for now at 10 mg.hr.

## 2023-03-13 NOTE — ED NOTES
TRANSFER - OUT REPORT:    Verbal report given to Lauren(name) on Patricia Courts  being transferred to [facility](unit) for routine progression of care       Report consisted of patients Situation, Background, Assessment and   Recommendations(SBAR). Information from the following report(s) SBAR, ED Summary, MAR, and Recent Results and plan of care was reviewed with the receiving nurse. Lines:   Peripheral IV 03/13/23 Left Antecubital (Active)   Site Assessment Clean, dry, & intact 03/13/23 0852   Phlebitis Assessment 0 03/13/23 0852   Infiltration Assessment 0 03/13/23 0852   Dressing Status Clean, dry, & intact 03/13/23 0852   Dressing Type Transparent 03/13/23 0852   Hub Color/Line Status Pink 03/13/23 0852       Peripheral IV 03/13/23 Right Forearm (Active)   Site Assessment Clean, dry, & intact 03/13/23 0852   Phlebitis Assessment 0 03/13/23 0852   Infiltration Assessment 0 03/13/23 0852   Dressing Status Clean, dry, & intact 03/13/23 0852   Hub Color/Line Status Pink 03/13/23 0852   Action Taken Blood drawn 03/13/23 1917        Opportunity for questions and clarification was provided.       Patient transported with:   Varthana

## 2023-03-13 NOTE — Clinical Note
Patient Class[de-identified] OBSERVATION [104]   Type of Bed: Telemetry [19]   Cardiac Monitoring Required?: Yes   Reason for Observation: Atrial fibrillation with rapid ventricular response   Admitting Diagnosis: Atrial fibrillation with rapid ventricular response [facility]) [[id number]]   Admitting Physician: Romie Michelle [[id number]]   Attending Physician: Romie Michelle [[id number]]

## 2023-03-13 NOTE — ED NOTES
Pt remains Afib HR 60's on the cardiac monitor, NAD noted, rr even and unlabored. Call Light within reach.

## 2023-03-14 ENCOUNTER — APPOINTMENT (OUTPATIENT)
Dept: NON INVASIVE DIAGNOSTICS | Age: 88
DRG: 872 | End: 2023-03-14
Attending: INTERNAL MEDICINE
Payer: MEDICARE

## 2023-03-14 ENCOUNTER — APPOINTMENT (OUTPATIENT)
Dept: NUCLEAR MEDICINE | Age: 88
DRG: 872 | End: 2023-03-14
Attending: NURSE PRACTITIONER
Payer: MEDICARE

## 2023-03-14 ENCOUNTER — APPOINTMENT (OUTPATIENT)
Dept: NON INVASIVE DIAGNOSTICS | Age: 88
DRG: 872 | End: 2023-03-14
Attending: NURSE PRACTITIONER
Payer: MEDICARE

## 2023-03-14 VITALS
DIASTOLIC BLOOD PRESSURE: 96 MMHG | WEIGHT: 143.74 LBS | BODY MASS INDEX: 23.95 KG/M2 | OXYGEN SATURATION: 95 % | RESPIRATION RATE: 16 BRPM | HEIGHT: 65 IN | TEMPERATURE: 98.1 F | HEART RATE: 66 BPM | SYSTOLIC BLOOD PRESSURE: 172 MMHG

## 2023-03-14 LAB
ALBUMIN SERPL-MCNC: 3.2 G/DL (ref 3.5–5)
ALBUMIN/GLOB SERPL: 1 (ref 1.1–2.2)
ALP SERPL-CCNC: 30 U/L (ref 45–117)
ALT SERPL-CCNC: 23 U/L (ref 12–78)
ANION GAP SERPL CALC-SCNC: 4 MMOL/L (ref 5–15)
AST SERPL-CCNC: 20 U/L (ref 15–37)
BACTERIA SPEC CULT: NORMAL
BASOPHILS # BLD: 0 K/UL (ref 0–0.1)
BASOPHILS NFR BLD: 0 % (ref 0–1)
BILIRUB SERPL-MCNC: 0.7 MG/DL (ref 0.2–1)
BUN SERPL-MCNC: 16 MG/DL (ref 6–20)
BUN/CREAT SERPL: 21 (ref 12–20)
CALCIUM SERPL-MCNC: 8.6 MG/DL (ref 8.5–10.1)
CC UR VC: NORMAL
CHLORIDE SERPL-SCNC: 108 MMOL/L (ref 97–108)
CO2 SERPL-SCNC: 26 MMOL/L (ref 21–32)
CREAT SERPL-MCNC: 0.76 MG/DL (ref 0.55–1.02)
DIFFERENTIAL METHOD BLD: ABNORMAL
ECHO AO ASC DIAM: 2.9 CM
ECHO AO ASCENDING AORTA INDEX: 1.69 CM/M2
ECHO AV AREA PEAK VELOCITY: 2.9 CM2
ECHO AV AREA VTI: 2.8 CM2
ECHO AV AREA/BSA PEAK VELOCITY: 1.7 CM2/M2
ECHO AV AREA/BSA VTI: 1.6 CM2/M2
ECHO AV MEAN GRADIENT: 3 MMHG
ECHO AV MEAN VELOCITY: 0.9 M/S
ECHO AV PEAK GRADIENT: 6 MMHG
ECHO AV PEAK VELOCITY: 1.2 M/S
ECHO AV VELOCITY RATIO: 0.92
ECHO AV VTI: 25.9 CM
ECHO LA DIAMETER INDEX: 2.09 CM/M2
ECHO LA DIAMETER: 3.6 CM
ECHO LA VOL 2C: 56 ML (ref 22–52)
ECHO LA VOL 4C: 52 ML (ref 22–52)
ECHO LA VOL BP: 57 ML (ref 22–52)
ECHO LA VOL/BSA BIPLANE: 33 ML/M2 (ref 16–34)
ECHO LA VOLUME AREA LENGTH: 62 ML
ECHO LA VOLUME INDEX A2C: 33 ML/M2 (ref 16–34)
ECHO LA VOLUME INDEX A4C: 30 ML/M2 (ref 16–34)
ECHO LA VOLUME INDEX AREA LENGTH: 36 ML/M2 (ref 16–34)
ECHO LV E' LATERAL VELOCITY: 6 CM/S
ECHO LV E' SEPTAL VELOCITY: 5 CM/S
ECHO LV EDV A2C: 64 ML
ECHO LV EDV A4C: 82 ML
ECHO LV EDV BP: 73 ML (ref 56–104)
ECHO LV EDV INDEX A4C: 48 ML/M2
ECHO LV EDV INDEX BP: 42 ML/M2
ECHO LV EDV NDEX A2C: 37 ML/M2
ECHO LV EJECTION FRACTION A2C: 54 %
ECHO LV EJECTION FRACTION A4C: 69 %
ECHO LV EJECTION FRACTION BIPLANE: 62 % (ref 55–100)
ECHO LV ESV A2C: 30 ML
ECHO LV ESV A4C: 26 ML
ECHO LV ESV BP: 28 ML (ref 19–49)
ECHO LV ESV INDEX A2C: 17 ML/M2
ECHO LV ESV INDEX A4C: 15 ML/M2
ECHO LV ESV INDEX BP: 16 ML/M2
ECHO LV FRACTIONAL SHORTENING: 38 % (ref 28–44)
ECHO LV INTERNAL DIMENSION DIASTOLE INDEX: 2.91 CM/M2
ECHO LV INTERNAL DIMENSION DIASTOLIC: 5 CM (ref 3.9–5.3)
ECHO LV INTERNAL DIMENSION SYSTOLIC INDEX: 1.8 CM/M2
ECHO LV INTERNAL DIMENSION SYSTOLIC: 3.1 CM
ECHO LV IVSD: 0.9 CM (ref 0.6–0.9)
ECHO LV MASS 2D: 158.2 G (ref 67–162)
ECHO LV MASS INDEX 2D: 92 G/M2 (ref 43–95)
ECHO LV POSTERIOR WALL DIASTOLIC: 0.9 CM (ref 0.6–0.9)
ECHO LV RELATIVE WALL THICKNESS RATIO: 0.36
ECHO LVOT AREA: 3.1 CM2
ECHO LVOT AV VTI INDEX: 0.92
ECHO LVOT DIAM: 2 CM
ECHO LVOT MEAN GRADIENT: 3 MMHG
ECHO LVOT PEAK GRADIENT: 5 MMHG
ECHO LVOT PEAK VELOCITY: 1.1 M/S
ECHO LVOT STROKE VOLUME INDEX: 43.6 ML/M2
ECHO LVOT SV: 75 ML
ECHO LVOT VTI: 23.9 CM
ECHO MV A VELOCITY: 1.05 M/S
ECHO MV E DECELERATION TIME (DT): 246.1 MS
ECHO MV E VELOCITY: 0.93 M/S
ECHO MV E/A RATIO: 0.89
ECHO MV E/E' LATERAL: 15.5
ECHO MV E/E' RATIO (AVERAGED): 17.05
ECHO MV E/E' SEPTAL: 18.6
ECHO PV MAX VELOCITY: 0.7 M/S
ECHO PV PEAK GRADIENT: 2 MMHG
ECHO RV INTERNAL DIMENSION: 2.5 CM
ECHO RV TAPSE: 1.4 CM (ref 1.7–?)
ECHO RVOT MEAN GRADIENT: 1 MMHG
ECHO RVOT PEAK GRADIENT: 1 MMHG
ECHO RVOT PEAK VELOCITY: 0.5 M/S
ECHO RVOT VTI: 10.2 CM
ECHO TV REGURGITANT MAX VELOCITY: 2.03 M/S
ECHO TV REGURGITANT PEAK GRADIENT: 17 MMHG
EOSINOPHIL # BLD: 0.1 K/UL (ref 0–0.4)
EOSINOPHIL NFR BLD: 1 % (ref 0–7)
ERYTHROCYTE [DISTWIDTH] IN BLOOD BY AUTOMATED COUNT: 11.8 % (ref 11.5–14.5)
GLOBULIN SER CALC-MCNC: 3.2 G/DL (ref 2–4)
GLUCOSE BLD STRIP.AUTO-MCNC: 178 MG/DL (ref 65–117)
GLUCOSE SERPL-MCNC: 183 MG/DL (ref 65–100)
HCT VFR BLD AUTO: 41.7 % (ref 35–47)
HGB BLD-MCNC: 14.3 G/DL (ref 11.5–16)
IMM GRANULOCYTES # BLD AUTO: 0 K/UL
IMM GRANULOCYTES NFR BLD AUTO: 0 %
LYMPHOCYTES # BLD: 4.5 K/UL (ref 0.8–3.5)
LYMPHOCYTES NFR BLD: 46 % (ref 12–49)
MAGNESIUM SERPL-MCNC: 1.4 MG/DL (ref 1.6–2.4)
MCH RBC QN AUTO: 34.1 PG (ref 26–34)
MCHC RBC AUTO-ENTMCNC: 34.3 G/DL (ref 30–36.5)
MCV RBC AUTO: 99.5 FL (ref 80–99)
MONOCYTES # BLD: 0.8 K/UL (ref 0–1)
MONOCYTES NFR BLD: 8 % (ref 5–13)
MYELOCYTES NFR BLD MANUAL: 1 %
NEUTS SEG # BLD: 4.3 K/UL (ref 1.8–8)
NEUTS SEG NFR BLD: 44 % (ref 32–75)
NRBC # BLD: 0 K/UL (ref 0–0.01)
NRBC BLD-RTO: 0 PER 100 WBC
PLATELET # BLD AUTO: 177 K/UL (ref 150–400)
PMV BLD AUTO: 10.3 FL (ref 8.9–12.9)
POTASSIUM SERPL-SCNC: 3.9 MMOL/L (ref 3.5–5.1)
PROT SERPL-MCNC: 6.4 G/DL (ref 6.4–8.2)
RBC # BLD AUTO: 4.19 M/UL (ref 3.8–5.2)
RBC MORPH BLD: ABNORMAL
SERVICE CMNT-IMP: ABNORMAL
SERVICE CMNT-IMP: NORMAL
SODIUM SERPL-SCNC: 138 MMOL/L (ref 136–145)
WBC # BLD AUTO: 9.7 K/UL (ref 3.6–11)
WBC MORPH BLD: ABNORMAL

## 2023-03-14 PROCEDURE — 85025 COMPLETE CBC W/AUTO DIFF WBC: CPT

## 2023-03-14 PROCEDURE — 36415 COLL VENOUS BLD VENIPUNCTURE: CPT

## 2023-03-14 PROCEDURE — 83735 ASSAY OF MAGNESIUM: CPT

## 2023-03-14 PROCEDURE — APPSS30 APP SPLIT SHARED TIME 16-30 MINUTES: Performed by: NURSE PRACTITIONER

## 2023-03-14 PROCEDURE — 80053 COMPREHEN METABOLIC PANEL: CPT

## 2023-03-14 PROCEDURE — 74011250637 HC RX REV CODE- 250/637: Performed by: NURSE PRACTITIONER

## 2023-03-14 PROCEDURE — 99232 SBSQ HOSP IP/OBS MODERATE 35: CPT | Performed by: STUDENT IN AN ORGANIZED HEALTH CARE EDUCATION/TRAINING PROGRAM

## 2023-03-14 PROCEDURE — 74011250637 HC RX REV CODE- 250/637: Performed by: INTERNAL MEDICINE

## 2023-03-14 PROCEDURE — 93306 TTE W/DOPPLER COMPLETE: CPT | Performed by: INTERNAL MEDICINE

## 2023-03-14 PROCEDURE — 93306 TTE W/DOPPLER COMPLETE: CPT

## 2023-03-14 PROCEDURE — 74011636637 HC RX REV CODE- 636/637: Performed by: INTERNAL MEDICINE

## 2023-03-14 PROCEDURE — 74011250636 HC RX REV CODE- 250/636: Performed by: INTERNAL MEDICINE

## 2023-03-14 PROCEDURE — 74011000250 HC RX REV CODE- 250: Performed by: INTERNAL MEDICINE

## 2023-03-14 PROCEDURE — 82962 GLUCOSE BLOOD TEST: CPT

## 2023-03-14 RX ORDER — GLIPIZIDE 5 MG/1
2.5 TABLET ORAL
Status: DISCONTINUED | OUTPATIENT
Start: 2023-03-14 | End: 2023-03-14

## 2023-03-14 RX ORDER — SULFAMETHOXAZOLE AND TRIMETHOPRIM 800; 160 MG/1; MG/1
1 TABLET ORAL 2 TIMES DAILY
Qty: 10 TABLET | Refills: 0 | Status: SHIPPED | OUTPATIENT
Start: 2023-03-14 | End: 2023-03-19

## 2023-03-14 RX ORDER — ATENOLOL 25 MG/1
12.5 TABLET ORAL DAILY
Status: DISCONTINUED | OUTPATIENT
Start: 2023-03-14 | End: 2023-03-14 | Stop reason: HOSPADM

## 2023-03-14 RX ORDER — MAGNESIUM SULFATE HEPTAHYDRATE 40 MG/ML
2 INJECTION, SOLUTION INTRAVENOUS ONCE
Status: DISCONTINUED | OUTPATIENT
Start: 2023-03-14 | End: 2023-03-14 | Stop reason: HOSPADM

## 2023-03-14 RX ORDER — LANOLIN ALCOHOL/MO/W.PET/CERES
400 CREAM (GRAM) TOPICAL ONCE
Status: DISCONTINUED | OUTPATIENT
Start: 2023-03-14 | End: 2023-03-14

## 2023-03-14 RX ORDER — LISINOPRIL 5 MG/1
10 TABLET ORAL DAILY
Status: DISCONTINUED | OUTPATIENT
Start: 2023-03-14 | End: 2023-03-14 | Stop reason: HOSPADM

## 2023-03-14 RX ADMIN — OMEGA-3 FATTY ACIDS CAP 1000 MG 1 CAPSULE: 1000 CAP at 06:34

## 2023-03-14 RX ADMIN — INSULIN LISPRO 2 UNITS: 100 INJECTION, SOLUTION INTRAVENOUS; SUBCUTANEOUS at 12:11

## 2023-03-14 RX ADMIN — APIXABAN 5 MG: 5 TABLET, FILM COATED ORAL at 10:07

## 2023-03-14 RX ADMIN — Medication 10 ML: at 13:46

## 2023-03-14 RX ADMIN — LISINOPRIL 10 MG: 5 TABLET ORAL at 10:07

## 2023-03-14 RX ADMIN — SODIUM CHLORIDE 100 ML/HR: 9 INJECTION, SOLUTION INTRAVENOUS at 05:32

## 2023-03-14 RX ADMIN — APIXABAN 5 MG: 5 TABLET, FILM COATED ORAL at 17:25

## 2023-03-14 RX ADMIN — Medication 1000 UNITS: at 10:07

## 2023-03-14 RX ADMIN — LEVOTHYROXINE SODIUM 88 MCG: 0.09 TABLET ORAL at 06:34

## 2023-03-14 RX ADMIN — CEFTRIAXONE 1 G: 1 INJECTION, POWDER, FOR SOLUTION INTRAMUSCULAR; INTRAVENOUS at 10:07

## 2023-03-14 RX ADMIN — OXYCODONE HYDROCHLORIDE AND ACETAMINOPHEN 500 MG: 500 TABLET ORAL at 10:07

## 2023-03-14 NOTE — PROGRESS NOTES
Reason for Admission:  AFib RVR, sepsis, UTI    Hx - arthritis, depression, diabetes, GERD, HTN, TIA                   RUR Score:    7%/ low risk                 Plan for utilizing home health:      none, no DME    PCP: First and Last name:  Amy Villeda NP     Name of Practice:    Are you a current patient: Yes/No:  yes   Approximate date of last visit:  6 months ago   Can you participate in a virtual visit with your PCP:   yes                    Current Advanced Directive/Advance Care Plan: Full Code      Healthcare Decision Maker:   Click here to complete [address] including selection of the [address] Relationship (ie \"Primary\")           Joseph Aguero  [phone]                  Transition of Care Plan:            Chart reviewed, demographics verified. CM role and follow up discussed. Met with patient at bedside, face mask on. Patient lives alone. Patient lives in a one story home with 2 steps to enter home. Patient has prescription drug coverage, uses [address] in [facility]. Patient is independent, drives, and provides self care. Patient performs ADLs independently. Current status:  Patient currently requiring medical management including ongoing assessment and monitoring. Discharge likely today. On IV antibiotics with mag replete today. PLAN:  1. Monitor patient response to treatment and recommendations. 2. Medical management continues. 3. CM needs identified:  none  4. Home with family assist.  5. Patient transport home per daughter at discharge. 6. CM to monitor clinical progress and disposition recommendations. Care Management Interventions  PCP Verified by CM: Yes Matt Hart NP)  Mode of Transport at Discharge:  Other (see comment) (per daughter)  Transition of Care Consult (CM Consult): Discharge Planning  Support Systems: Child(kristofer)  Confirm Follow Up Transport: Family  The Plan for Transition of Care is Related to the Following Treatment Goals : return home  Discharge Location  Patient Expects to be Discharged to[de-identified] Home with family assistance    Pricilla Vivas RN, MSN, Care manager

## 2023-03-14 NOTE — PROGRESS NOTES
Discharge instructions, including information on new medications,Afib and Sepsis, were all reviewed with patient. All questions were answered. IV sites and heart monitor were removed and Care Plans and Education have been resolved. Patient understood that the 30 day cardiac event monitor would be mailed to her. Patient was discharged home with her daughter. Primary nurse updated.

## 2023-03-14 NOTE — PROGRESS NOTES
0700: Bedside shift change report given to Valorie Jackson RN (oncoming nurse) by Vikram Simmons RN (offgoing nurse). Report included the following information SBAR, Kardex, ED Summary, Procedure Summary, Intake/Output, MAR, Recent Results, Med Rec Status, and Cardiac Rhythm Sinus Jeimy Fofana . 1600: Discharge order in. Patient's ride unable to be here until 4:45.    1613: Spoke with Vanessa Christiansen, at extension [phone] in regards to patient supposed to have event monitor placed prior to discharge. It's after 4pm, unit will be mailed to the patients address on file.

## 2023-03-14 NOTE — PROGRESS NOTES
[facility]                    Cardiology Care Note     []Initial Encounter     [x]Follow-up    Patient Name: Maicol Alcantara - LXI:[id number] - ERR:[id number]  Primary Cardiologist: none   Consulting Cardiologist: Dax Slaughter Cardiology Physicians: Yoni Blount DO     Reason for encounter: a fib RVR ? New onset     HPI:       Maicol Alcantara is a 82 y.o. female with PMH significant for HTN, TIA on aggrenox, T 2 DM, HLD, GERD who was admitted with chest pain. ED finds her in a fib RVR . Diltiazem added. HR now 82, chest pain is gone. She lives by herself, and occasionally still drives. She states that she does not drive long distances, perhaps just to the grocery store which is 3 miles away. Her daughter lives close by, and helps her with all other tasks that require driving. Patient states that after midnight   she woke up with pain in the central chest that was described as sharp. She was feeling mildly lightheaded and weak. She denies shortness of breath, diaphoresis, palpitations. Patient states that she was nauseous, and had 4 episodes of consecutive vomiting. She has had 2 falls in the past month both ground level, mechanical. Tripped in dark movie theater, tripped at home striking arm on cupboard requiring several sutures      CAD risk factors: HTN, HLD previous smoker, DM  Subjective: Maicol Alcantara reports none. Assessment and Plan     1  a fib : apparent new onset. Now in SR. Hold po dilt due to low BP, and cont atenolol. Cont eliquis. Will plan for nuclear stress test as OP. 14 day monitor upon discharge. Replete Mg (1.4)   2. HTN: cont atenolol  3 HLD: on statin   4 falls: PT eval   5.  Hx TIA: stopped aggrenox due to DOAC.   6 DM: HBA1C 7.6    Ok for d/c , will see OP  See AVS for appt            ____________________________________________________________    Cardiac testing    None previous           Most recent HS troponins:  Recent Labs     03/13/23  0342   TROPHS 15         ECG:   EKG Results       Procedure 720 Value Units Date/Time    EKG, 12 LEAD, INITIAL [[id number]]     Order Status: Sent     EKG, 12 LEAD, REPEAT [[id number]]     Order Status: Completed     EKG, 12 LEAD, INITIAL [[id number]]     Order Status: Completed               Review of Systems:    [x]All other systems reviewed and all negative except as written in HPI    [] Patient unable to provide secondary to condition    Past Medical History:   Diagnosis Date    Arthritis     Depression     Diabetes ([address]     GERD (gastroesophageal reflux disease)     High cholesterol     Hypertension     Snoring     Transient ischemic attack (TIA) 2003     Past Surgical History:   Procedure Laterality Date    HX APPENDECTOMY      HX CATARACT REMOVAL Bilateral     HX CHOLECYSTECTOMY      HX ORTHOPAEDIC Left     Open Repair    HX ORTHOPAEDIC Left     Trigger finger    HX ROTATOR CUFF REPAIR Right     HX TUBAL LIGATION       Social Hx:  reports that she quit smoking about 36 years ago. Her smoking use included cigarettes. She has a 60.00 pack-year smoking history. She has never used smokeless tobacco. She reports current alcohol use. She reports that she does not use drugs. Family Hx: family history includes Alzheimer's Disease in her father and paternal grandfather; Heart Disease in her mother; Ovarian Cancer in her sister. Allergies   Allergen Reactions    Percocet [Oxycodone-Acetaminophen] Unknown (comments)     Can't remember reaction due to length of time of occurrence.           OBJECTIVE:  Wt Readings from Last 3 Encounters:   03/14/23 65.2 kg (143 lb 12.8 oz)   03/04/22 63.5 kg (140 lb)   08/13/21 64 kg (141 lb)       Intake/Output Summary (Last 24 hours) at 3/14/2023 0731  Last data filed at 3/14/2023 0343  Gross per 24 hour   Intake 440.25 ml   Output 300 ml   Net 140.25 ml         Physical Exam:    Vitals:   Vitals:    03/14/23 0343 03/14/23 0400 03/14/23 0622 03/14/23 0707   BP: (!) 159/72   (!) 153/65   Pulse: (!) 57 (!) 56 (!) 58 (!) 53   Resp: 15      Temp: 97.2 °F (36.2 °C)      SpO2: 94%      Weight: 65.2 kg (143 lb 12.8 oz)      Height:         Telemetry: a fib 80's     Gen: Well-developed, well-nourished, in no acute distress  Neck: Supple, No JVD, No Carotid Bruit  Resp: No accessory muscle use, Clear breath sounds, No rales or rhonchi  Card: irregular Rate,Rythm, Normal S1, S2, No murmurs, rubs or gallop. No thrills. Abd:   Soft, non-tender, non-distended, BS+   MSK: No cyanosis  Skin: No rashes, healed laceration LUE    Neuro: Moving all four extremities, follows commands appropriately  Psych: Good insight, oriented to person, place, alert, Nml Affect  LE: No edema    Data Review:     Radiology:   XR Results (most recent):  Results from Hospital Encounter encounter on 03/13/23    XR CHEST PORT    Narrative  EXAM:  XR CHEST PORT    INDICATION: Shortness of breath. COMPARISON: none    TECHNIQUE: Upright portable chest AP view    FINDINGS: Cardiac monitoring leads are noted. The cardiac silhouette is within  normal limits. The pulmonary vasculature is within normal limits. Atherosclerotic calcifications affect the aortic arch and the thoracic aorta is  tortuous. The lungs and pleural spaces are clear. The chest wall structures and visualized  upper abdomen show no acute findings with incidental note of degenerative spine  and shoulder changes    Impression  No acute findings. Recent Labs     03/14/23  0353 03/13/23  0342    139   K 3.9 3.3*    106   CO2 26 25   BUN 16 24*   CREA 0.76 0.99   * 290*   CA 8.6 9.5       Recent Labs     03/14/23 0353 03/13/23  0342   WBC 9.7 15.5*   HGB 14.3 14.8   HCT 41.7 42.9    187       Recent Labs     03/14/23 0353 03/13/23 0342   AP 30* 30*       No results for input(s): CHOL, LDLC in the last 72 hours.     No lab exists for component: TGL, HDLC,  HBA1C      Current meds:    Current Facility-Administered Medications:     glipiZIDE (GLUCOTROL) tablet 2.5 mg, 2.5 mg, Oral, ACB, Elizabeth Stanley MD    magnesium sulfate 2 g/50 ml IVPB (premix or compounded), 2 g, IntraVENous, ONCE, Karlie Bunch NP    lisinopriL (PRINIVIL, ZESTRIL) tablet 10 mg, 10 mg, Oral, DAILY, Karlie Bunch NP    sodium chloride (NS) flush 5-40 mL, 5-40 mL, IntraVENous, Q8H, Lola [facility] H, DO, 10 mL at 03/13/23 2148    sodium chloride (NS) flush 5-40 mL, 5-40 mL, IntraVENous, PRN, Galdino Costain H, DO    ascorbic acid (vitamin C) (VITAMIN C) tablet 500 mg, 500 mg, Oral, DAILY AFTER Arnaldo Swenson, Faria H, DO, 500 mg at 03/13/23 0839    atenoloL (TENORMIN) tablet 25 mg, 25 mg, Oral, DAILY, Galdino Costain H, DO, 25 mg at 03/13/23 1265    cholecalciferol (VITAMIN D3) (1000 Units /25 mcg) tablet 1,000 Units, 1,000 Units, Oral, DAILY, Galidno Costain H, DO, 1,000 Units at 03/13/23 0839    rosuvastatin (CRESTOR) tablet 10 mg, 10 mg, Oral, QHS, Tristanwilliams Ryder, [facility], DO, 10 mg at 03/13/23 2147    omega 3-DHA-EPA-fish oil 1,000 mg (120 mg-180 mg) capsule 1 Capsule, 1 Capsule, Oral, ACB/HS, Galdino Costain H, DO, 1 Capsule at 03/14/23 9150    apixaban (ELIQUIS) tablet 5 mg, 5 mg, Oral, BID, Galdino Costain H, DO, 5 mg at 03/13/23 1709    levothyroxine (SYNTHROID) tablet 88 mcg, 88 mcg, Oral, ACB, Galdino Costain H, DO, 88 mcg at 03/14/23 0634    cefTRIAXone (ROCEPHIN) 1 g in 0.9% sodium chloride 10 mL IV syringe, 1 g, IntraVENous, Q24H, Galdino Jernigan H, DO, 1 g at 03/13/23 0841    0.9% sodium chloride infusion, 100 mL/hr, IntraVENous, CONTINUOUS, Galdino Jernigan H, DO, Last Rate: 100 mL/hr at 03/14/23 0532, 100 mL/hr at 03/14/23 0532    insulin lispro (HUMALOG) injection, , SubCUTAneous, AC&HS, Jeff Winter, DO, 2 Units at 03/13/23 1348    glucose chewable tablet 16 g, 4 Tablet, Oral, PRN, Galdino Fergusonin H, DO    glucagon (GLUCAGEN) injection 1 mg, 1 mg, IntraMUSCular, PRN, Galdino Fergusonin H, DO    dextrose 10% infusion 0-250 mL, 0-250 mL, IntraVENous, PRN, Ihsan Sawyer,     [Held by provider] dilTIAZem IR (CARDIZEM) tablet 60 mg, 60 mg, Oral, AC&HS, Annette Ellis NP, 60 mg at 03/13/23 214    [facility]  Call center: (S) [phone]  (Y) [phone]      CC: Maribel Leung NP

## 2023-03-14 NOTE — PROGRESS NOTES
Cardiac monitor ordered. Unit is charging. Call holter/ekg room ext [phone] as soon as you know patient will be discharged. Please allow at least one hour notice. Office hours Monday  - friday 8 am to 4 pm. If patient dc's after department hours a monitor will be shipped directly from [facility] [email or web address] to patient address on file.

## 2023-03-14 NOTE — DISCHARGE SUMMARY
Discharge Summary       PATIENT ID: Nigel Chicas  MRN: [id number]   YOB: 1935    DATE OF ADMISSION: 3/13/2023  3:27 AM    DATE OF DISCHARGE: 14 March 2023   PRIMARY CARE PROVIDER: Jeff Yung NP     ATTENDING PHYSICIAN: Howard Ortiz  DISCHARGING PROVIDER: Cinda Uribe MD      CONSULTATIONS: IP CONSULT TO CARDIOLOGY    PROCEDURES/SURGERIES: * No surgery found *    [address] [facility],[address] COURSE:   Nigel Chicas is a 82 y.o. female with PMH significant for HTN, TIA on aggrenox, T 2 DM, HLD, GERD who was admitted with chest pain. ED finds her in a fib RVR . Diltiazem added. HR now 82, chest pain is gone. She lives by herself, and occasionally still drives. She states that she does not drive long distances, perhaps just to the grocery store which is 3 miles away. Her daughter lives close by, and helps her with all other tasks that require driving. Patient states that after midnight   she woke up with pain in the central chest that was described as sharp. She was feeling mildly lightheaded and weak. She denies shortness of breath, diaphoresis, palpitations. Patient states that she was nauseous, and had 4 episodes of consecutive vomiting. She has had 2 falls in the past month both ground level, mechanical. Tripped in dark movie theater, tripped at home striking arm on cupboard requiring several sutures      CAD risk factors: HTN, HLD previous smoker, DM        Patient was admitted UTI with A-fib with RVR    DISCHARGE DIAGNOSES / PLAN:      Atrial fibrillation with rapid ventricular response. New onset. Patient received Cardizem 20 mg IV in the emergency department  Started Cardizem drip at 5 mg/h, titratable  Have weaned off drip and now rate controlled on PO meds  Cardiology eval - to have MPI in as outpatient  Home holter monitor  Resume home medication of atenolol 25 mg p.o. daily  CHADS score is 5, anticoagulation with Eliquis.     Patient has a history of hypertension, diabetes, TIA and age 82 or over. ECHO read pending, F/U with cardiology as an outpatient  Cleared by cardiology to [place] with F/U for OP stress and monitor        Sepsis due to urinary tract infection. Patient meets sepsis criteria with heart rate > 100, leukocytosis. Gentle IV fluids with normal saline at 100 cc/h given   IV ceftriaxone for acute UTI. Bactrim on DC  Grew E Coli     History of transient ischemic attack. Patient states that she never had a stroke, but did have TIA. She states that she does not take a baby aspirin on top of that. Diabetes mellitus II with hyperglycemia. Continue home medication of glipizide 2.5 mg p.o. daily   Adjunctive sliding scale insulin. Elevated TSH. Not clear if this is subclinical hypothyroidism. Will check free T4 levels. Patient is already on levothyroxine 88 mcg/h which will be continued. Essential hypertension. We will hold off on Norvasc at this time as patient is on Cardizem drip and atenolol, and pressures have been low at times. BMI: Body mass index is 23.92 kg/m². . This patient: Has a BMI within normal limits. PENDING TEST RESULTS:   At the time of discharge the following test results are still pending: ECHO - Follow up with cardiology as an outpatient     ADDITIONAL CARE RECOMMENDATIONS:   Follow up with cardiology as an outpatient     DIET: Regular Diet    ACTIVITY: Activity as tolerated    EQUIPMENT needed: N/A    NOTIFY YOUR PHYSICIAN FOR ANY OF THE FOLLOWING:   Fever over 101 degrees for 24 hours. Chest pain, shortness of breath, fever, chills, nausea, vomiting, diarrhea, change in mentation, falling, weakness, bleeding. Severe pain or pain not relieved by medications, as well as any other signs or symptoms that you may have questions about.     FOLLOW UP APPOINTMENTS:    Follow-up Information       Follow up With Specialties Details Why Contact Roberto Serna Nurse Practitioner, Cardiovascular Disease Physician Follow up on 4/3/2023 11:20 am [address]      Ethan Delgadillo NP Nurse Practitioner Follow up in 2 day(s)  [phone] [id number] [phone]               DISCHARGE MEDICATIONS:  Current Discharge Medication List        START taking these medications    Details   apixaban (ELIQUIS) 5 mg tablet Take 1 Tablet by mouth two (2) times a day for 30 days. Qty: 60 Tablet, Refills: 0  Start date: 3/14/2023, End date: 4/13/2023      trimethoprim-sulfamethoxazole (Bactrim DS) 160-800 mg per tablet Take 1 Tablet by mouth two (2) times a day for 5 days. Qty: 10 Tablet, Refills: 0  Start date: 3/14/2023, End date: 3/19/2023           CONTINUE these medications which have NOT CHANGED    Details   lisinopriL (PRINIVIL, ZESTRIL) 20 mg tablet Take 20 mg by mouth daily. lidocaine (LIDODERM) 5 % 1 Patch by TransDERmal route every twenty-four (24) hours. Apply patch to the affected area for 12 hours a day and remove for 12 hours a day. calcium carbonate (CALTREX) 600 mg calcium (1,500 mg) tablet Take 600 mg by mouth two (2) times a day. amLODIPine (NORVASC) 10 mg tablet Take 10 mg by mouth daily. atenoloL (TENORMIN) 25 mg tablet Take 25 mg by mouth two (2) times a day. levothyroxine (SYNTHROID) 88 mcg tablet Take 88 mcg by mouth Daily (before breakfast). aspirin delayed-release 81 mg tablet Take 1 Tab by mouth two (2) times a day. Qty: 60 Tab, Refills: 0      cholecalciferol (VITAMIN D3) 25 mcg (1,000 unit) cap Take 1,000 Units by mouth daily (after breakfast). glipiZIDE SR (GLUCOTROL XL) 2.5 mg CR tablet Take 2.5 mg by mouth daily (after breakfast). ascorbic acid, vitamin C, (VITAMIN C) 500 mg tablet Take 500 mg by mouth daily (after breakfast). docosahexanoic acid/epa (FISH OIL PO) Take 1,500 mg by mouth ACB/HS.       potassium chloride SA (MICRO-K) 10 mEq capsule Take 10 mEq by mouth daily. rosuvastatin (CRESTOR) 10 mg tablet Take 10 mg by mouth nightly. STOP taking these medications       sertraline (ZOLOFT) 25 mg tablet Comments:   Reason for Stopping:         cinnamon bark 500 mg cap Comments:   Reason for Stopping:         aspirin-dipyridamole (AGGRENOX)  mg per SR capsule Comments:   Reason for Stopping:               DISPOSITION:  x  Home With:   OT  PT  HH  RN       Long term SNF/Inpatient Rehab    Independent/assisted living    Hospice    Other:     PATIENT CONDITION AT DISCHARGE:     Functional status    Poor     Deconditioned    x Independent      Cognition    x Lucid     Forgetful     Dementia      Catheters/lines (plus indication)    Hill     PICC     PEG    x None      Code status    x Full code     DNR      PHYSICAL EXAMINATION AT DISCHARGE:    General:          Alert, cooperative, no distress, appears stated age. HEENT:           Atraumatic, anicteric sclerae, pink conjunctivae                          No oral ulcers, mucosa moist, throat clear, dentition fair  Neck:               Supple, symmetrical  Lungs:             Clear to auscultation bilaterally. No Wheezing or Rhonchi. No rales. Chest wall:      No tenderness  No Accessory muscle use. Heart:              Regular  rhythm,  No  murmur   No edema  Abdomen:        Soft, non-tender. Not distended. Bowel sounds normal  Extremities:     No cyanosis. No clubbing,                            Skin turgor normal, Capillary refill normal  Skin:                Not pale. Not Jaundiced  No rashes   Psych:             Not anxious or agitated.   Neurologic:      Alert, moves all extremities, answers questions appropriately and responds to commands       CHRONIC MEDICAL DIAGNOSES:  Problem List as of 3/14/2023 Date Reviewed: 3/4/2022            Codes Class Noted - Resolved    Atrial fibrillation with rapid ventricular response [facility] ICD-10-CM: I48.91  ICD-9-CM: 427.31 3/13/2023 - Present        Primary osteoarthritis of right hip ICD-10-CM: M16.11  ICD-9-CM: 715.15  5/29/2019 - Present           Greater than 35 minutes were spent with the patient on counseling and coordination of care    Signed:   Kartik Marmolejo MD  3/14/2023  3:14 PM

## 2023-03-14 NOTE — PROGRESS NOTES
Physician Progress Note      PATIENT:               Reynaldo Villeda  [facility] #:                  [id number]  :                       1935  ADMIT DATE:       3/13/2023 3:27 AM  DISCH DATE:        3/14/2023 6:00 PM  RESPONDING  PROVIDER #:        Sari Campbell MD          QUERY TEXT:    Good afternoon. Patient admitted with new onset atrial fibrillation. Noted documentation of UTI in  H&P and PN and  DC summary. In order to support the diagnosis of UTI, please include additional clinical indicators in your documentation. Or please document if the diagnosis of UTI has been ruled out after further study. The medical record reflects the following:      Risk Factors: DM II, HTN, hx TIA, HLD, GERD    Clinical Indicators:  urine culture: 95081 colonies/ml mixed urogenital martir;  urinalysis: nitrite +, large leukocyte esterase, >100 WBC, 4+ bacteria; from  DC summary: \"IV ceftriaxone for acute UTI. Bactrim on DC Grew E Coli\"    Treatment: u/a, urine culture, IV Ceftriaxone, IV fluids, Bactrim at DC      Thank you,  Phillip Mishra RN, CDI  Options provided:  -- UTI present as evidenced by, Please document evidence. -- UTI was ruled out  -- Other - I will add my own diagnosis  -- Disagree - Not applicable / Not valid  -- Disagree - Clinically unable to determine / Unknown  -- Refer to Clinical Documentation Reviewer    PROVIDER RESPONSE TEXT:    UTI was present as evidenced by Symptoms and positive UA    Query created by: Nae Camacho on 3/14/2023 4:41 PM      QUERY TEXT:    Good afternoon. Patient admitted with new onset atrial fibrillation with RVR and was started on Eliquis. If possible, please document in progress notes and discharge summary if you are evaluating and/or treating any of the following:       The medical record reflects the following:    Risk Factors: HTN, TIA, DM II, HLD, GERD, Atrial fibrillation with RVR, CHADS score 5    Clinical Indicators: from  Cardiology consult: \"Newly recognized atrial fibrillation. Hr controlled on iv diltiazem d/c iv dilt. Po dilt for rate control. - outpt 14 day event montior - consider dccv pending clinical course - d/c aggrenox with doac myocardial perfusion imaging due to chest pain - echo\"; from 03/14 Cardiology PN \" a fib : apparent new onset. Now in SR. Hold po dilt due to low BP, and cont atenolol. Cont eliquis. Will plan for nuclear stress test as OP. 14 day monitor upon discharge. Replete Mg (1.4)\"; from 03/14 DC summary: \"Atrial fibrillation with rapid ventricular response. \" and \"CHADS score is 5, anticoagulation with Eliquis. \"; HR ; EKG: Sinus bradycardia Otherwise normal ECG\"? Treatment: Cardiology consult, Eliquis, Holter monitoring, Vital signs per unit protocol, EKG        Thank you,  Sadiq Torres RN, CDI  Options provided:  -- Secondary hypercoagulable state in a patient with atrial fibrillation  -- Other - I will add my own diagnosis  -- Disagree - Not applicable / Not valid  -- Disagree - Clinically unable to determine / Unknown  -- Refer to Clinical Documentation Reviewer    PROVIDER RESPONSE TEXT:    This patient has secondary hypercoagulable state in a patient with atrial fibrillation.     Query created by: Stephani Chung on 3/14/2023 4:56 PM      Electronically signed by:  Antionette Townsend MD 3/14/2023 7:31 PM

## 2023-03-14 NOTE — DISCHARGE INSTRUCTIONS
Atrial Fibrillation: Care Instructions  Your Care Instructions     Atrial fibrillation is an irregular and often fast heartbeat. Treating this condition is important for several reasons. It can cause blood clots, which can travel from your heart to your brain and cause a stroke. If you have a fast heartbeat, you may feel lightheaded, dizzy, and weak. An irregular heartbeat can also increase your risk for heart failure. Atrial fibrillation is often the result of another heart condition, such as high blood pressure or coronary artery disease. Making changes to improve your heart condition will help you stay healthy and active. Follow-up care is a key part of your treatment and safety. Be sure to make and go to all appointments, and call your doctor if you are having problems. It's also a good idea to know your test results and keep a list of the medicines you take. How can you care for yourself at home? Medicines    Take your medicines exactly as prescribed. Call your doctor if you think you are having a problem with your medicine. You will get more details on the specific medicines your doctor prescribes. If your doctor has given you a blood thinner to prevent a stroke, be sure you get instructions about how to take your medicine safely. Blood thinners can cause serious bleeding problems. Do not take any vitamins, over-the-counter drugs, or herbal products without talking to your doctor first.   Lifestyle changes    Do not smoke. Smoking can increase your chance of a stroke and heart attack. If you need help quitting, talk to your doctor about stop-smoking programs and medicines. These can increase your chances of quitting for good. Eat a heart-healthy diet. Stay at a healthy weight. Lose weight if you need to. Limit alcohol to 2 drinks a day for men and 1 drink a day for women. Too much alcohol can cause health problems. Avoid colds and flu. Get a pneumococcal vaccine shot.  If you have had one before, ask your doctor whether you need another dose. Get a flu shot every year. If you must be around people with colds or flu, wash your hands often. Activity    If your doctor recommends it, get more exercise. Walking is a good choice. Bit by bit, increase the amount you walk every day. Try for at least 30 minutes on most days of the week. You also may want to swim, bike, or do other activities. Your doctor may suggest that you join a cardiac rehabilitation program so that you can have help increasing your physical activity safely. Start light exercise if your doctor says it is okay. Even a small amount will help you get stronger, have more energy, and manage stress. Walking is an easy way to get exercise. Start out by walking a little more than you did in the hospital. Gradually increase the amount you walk. When you exercise, watch for signs that your heart is working too hard. You are pushing too hard if you cannot talk while you are exercising. If you become short of breath or dizzy or have chest pain, sit down and rest immediately. Check your pulse regularly. Place two fingers on the artery at the palm side of your wrist, in line with your thumb. If your heartbeat seems uneven or fast, talk to your doctor. When should you call for help? Call 911 anytime you think you may need emergency care. For example, call if:    You have symptoms of a heart attack. These may include:  Chest pain or pressure, or a strange feeling in the chest.  Sweating. Shortness of breath. Nausea or vomiting. Pain, pressure, or a strange feeling in the back, neck, jaw, or upper belly or in one or both shoulders or arms. Lightheadedness or sudden weakness. A fast or irregular heartbeat. After you call 911, the  may tell you to chew 1 adult-strength or 2 to 4 low-dose aspirin. Wait for an ambulance. Do not try to drive yourself. You have symptoms of a stroke.  These may include:  Sudden numbness, tingling, weakness, or loss of movement in your face, arm, or leg, especially on only one side of your body. Sudden vision changes. Sudden trouble speaking. Sudden confusion or trouble understanding simple statements. Sudden problems with walking or balance. A sudden, severe headache that is different from past headaches. You passed out (lost consciousness). Call your doctor now or seek immediate medical care if:    You have new or increased shortness of breath. You feel dizzy or lightheaded, or you feel like you may faint. Your heart rate becomes irregular. You can feel your heart flutter in your chest or skip heartbeats. Tell your doctor if these symptoms are new or worse. Watch closely for changes in your health, and be sure to contact your doctor if you have any problems. Where can you learn more? Go to http://www.gray.com/  Enter U020 in the search box to learn more about \"Atrial Fibrillation: Care Instructions. \"  Current as of: January 10, 2022               Content Version: 13.4  © 2006-2022 [employer]. Care instructions adapted under license by [employer] (which disclaims liability or warranty for this information). If you have questions about a medical condition or this instruction, always ask your healthcare professional. [address] any warranty or liability for your use of this information. Apixaban (By mouth)   Apixaban (a-PIX-a-ban)  Treats and prevents blood clots. This medicine is a blood thinner. Brand Name(s): Eliquis   There may be other brand names for this medicine. When This Medicine Should Not Be Used: This medicine is not right for everyone. Do not use it if you had an allergic reaction to apixaban or you have active bleeding. How to Use This Medicine:   Tablet  Your doctor will tell you how much medicine to use. Do not use more than directed.   If you are not able to swallow the tablets whole, they may be crushed and mixed in water, 5% dextrose in water (D5W), apple juice, or applesauce. The crushed tablets may be mixed with 60 mL of water or D5W dose and given through a nasogastric tube (NGT). This medicine should come with a Medication Guide. Ask your pharmacist for a copy if you do not have one. Missed dose: Take a dose as soon as you remember. If it is almost time for your next dose, wait until then and take a regular dose. Do not take extra medicine to make up for a missed dose. Store the medicine in a closed container at room temperature, away from heat, moisture, and direct light. Drugs and Foods to Avoid:   Ask your doctor or pharmacist before using any other medicine, including over-the-counter medicines, vitamins, and herbal products. Some medicines can affect how apixaban works. Tell your doctor if you are using any of the following:   Carbamazepine, clarithromycin, itraconazole, ketoconazole, phenytoin, rifampin, ritonavir, Giovanni's wort  Blood thinner (including clopidogrel, heparin, prasugrel, warfarin)  Medicine to treat depression  NSAID pain or arthritis medicine (including aspirin, celecoxib, diclofenac, ibuprofen, naproxen)  Warnings While Using This Medicine:   Tell your doctor if you are pregnant or breastfeeding, or if you have kidney disease, liver disease, bleeding problems, or an artificial heart valve. Do not stop using this medicine suddenly without asking your doctor. You might have a higher risk of stroke for a short time after you stop using this medicine. This medicine increases your risk for bleeding that can become serious if not controlled. You may also bruise easily, and it may take longer than usual for bleeding to stop. This medicine may increase your risk for blood clots in your spine or back if you undergo an epidural or spinal puncture. This could lead to paralysis.  Tell your doctor if you ever had spine problems or back surgery. Tell any doctor or dentist who treats you that you are using this medicine. With your doctor's supervision, you may need to stop using this medicine several days before you have surgery or medical tests. Your doctor will do lab tests at regular visits to check on the effects of this medicine. Keep all appointments. Keep all medicine out of the reach of children. Never share your medicine with anyone. Possible Side Effects While Using This Medicine:   Call your doctor right away if you notice any of these side effects: Allergic reaction: Itching or hives, swelling in your face or hands, swelling or tingling in your mouth or throat, chest tightness, trouble breathing  Change in how much or how often you urinate, red or pink urine  Chest pain, trouble breathing  Coughing up blood, vomiting blood or material that looks like coffee grounds  Numbness, tingling, or muscle weakness in your legs or feet  Red or black, tarry stools  Unusual bleeding, bruising, or weakness  If you notice other side effects that you think are caused by this medicine, tell your doctor. Call your doctor for medical advice about side effects. You may report side effects to FDA at 3-718-EEG-2273  © 2017 [facility] Information is for End User's use only and may not be sold, redistributed or otherwise used for commercial purposes. The above information is an  only. It is not intended as medical advice for individual conditions or treatments. Talk to your doctor, nurse or pharmacist before following any medical regimen to see if it is safe and effective for you.                     Discharge Summary         PATIENT ID: Gay Martha  MRN: [id number]   YOB: 1935    DATE OF ADMISSION: 3/13/2023  3:27 AM    DATE OF DISCHARGE: 14 March 2023   PRIMARY CARE PROVIDER: Kyaw Sheikh NP      ATTENDING PHYSICIAN: Fabby Pena  DISCHARGING PROVIDER: Seema Benitez MD       CONSULTATIONS: IP CONSULT TO CARDIOLOGY     PROCEDURES/SURGERIES: * No surgery found *     ADMISSION SUMMARY AND HOSPITAL COURSE:   Sandi Khanna is a 82 y.o. female with PMH significant for HTN, TIA on aggrenox, T 2 DM, HLD, GERD who was admitted with chest pain. ED finds her in a fib RVR . Diltiazem added. HR now 82, chest pain is gone. She lives by herself, and occasionally still drives. She states that she does not drive long distances, perhaps just to the grocery store which is 3 miles away. Her daughter lives close by, and helps her with all other tasks that require driving. Patient states that after midnight   she woke up with pain in the central chest that was described as sharp. She was feeling mildly lightheaded and weak. She denies shortness of breath, diaphoresis, palpitations. Patient states that she was nauseous, and had 4 episodes of consecutive vomiting. She has had 2 falls in the past month both ground level, mechanical. Tripped in dark movie theater, tripped at home striking arm on cupboard requiring several sutures      CAD risk factors: HTN, HLD previous smoker, DM           Patient was admitted UTI with A-fib with RVR     DISCHARGE DIAGNOSES / PLAN:       Atrial fibrillation with rapid ventricular response. New onset. Patient received Cardizem 20 mg IV in the emergency department  Started Cardizem drip at 5 mg/h, titratable  Have weaned off drip and now rate controlled on PO meds  Cardiology eval - to have MPI in as outpatient  Home holter monitor  Resume home medication of atenolol 25 mg p.o. daily  CHADS score is 5, anticoagulation with Eliquis. Patient has a history of hypertension, diabetes, TIA and age 80 or over. ECHO read pending, F/U with cardiology as an outpatient  Cleared by cardiology to DC with F/U for OP stress and monitor        Sepsis due to urinary tract infection. Patient meets sepsis criteria with heart rate > 100, leukocytosis.     Gentle IV fluids with normal saline at 100 cc/h given   IV ceftriaxone for acute UTI. Bactrim on DC  Grew E Coli     History of transient ischemic attack. Patient states that she never had a stroke, but did have TIA. She states that she does not take a baby aspirin on top of that. Diabetes mellitus II with hyperglycemia. Continue home medication of glipizide 2.5 mg p.o. daily   Adjunctive sliding scale insulin. Elevated TSH. Not clear if this is subclinical hypothyroidism. Will check free T4 levels. Patient is already on levothyroxine 88 mcg/h which will be continued. Essential hypertension. We will hold off on Norvasc at this time as patient is on Cardizem drip and atenolol, and pressures have been low at times. BMI: Body mass index is 23.92 kg/m². . This patient: Has a BMI within normal limits. PENDING TEST RESULTS:   At the time of discharge the following test results are still pending: ECHO - Follow up with cardiology as an outpatient      ADDITIONAL CARE RECOMMENDATIONS:   Follow up with cardiology as an outpatient. 30 day Cardiac Event Monitor will be mailed to you. Call Cardiology if you have any questions. DIET: Regular Diet     ACTIVITY: Activity as tolerated     EQUIPMENT needed: N/A     NOTIFY YOUR PHYSICIAN FOR ANY OF THE FOLLOWING:   Fever over 101 degrees for 24 hours. Chest pain, shortness of breath, fever, chills, nausea, vomiting, diarrhea, change in mentation, falling, weakness, bleeding. Severe pain or pain not relieved by medications, as well as any other signs or symptoms that you may have questions about.      FOLLOW UP APPOINTMENTS:    Follow-up Information         Follow up With Specialties Details Why Contact Info     Joshua King, [address] [facility] Nurse Practitioner, Cardiovascular Disease Physician Follow up on 4/3/2023 11:20 am [address]  [phone]        Dian Caputo NP Nurse Practitioner Follow up in 2 day(s)   [address] [facility] [id number] [phone]                   DISCHARGE MEDICATIONS:  Current Discharge Medication List              START taking these medications     Details   apixaban (ELIQUIS) 5 mg tablet Take 1 Tablet by mouth two (2) times a day for 30 days. Qty: 60 Tablet, Refills: 0  Start date: 3/14/2023, End date: 4/13/2023       trimethoprim-sulfamethoxazole (Bactrim DS) 160-800 mg per tablet Take 1 Tablet by mouth two (2) times a day for 5 days. Qty: 10 Tablet, Refills: 0  Start date: 3/14/2023, End date: 3/19/2023                  CONTINUE these medications which have NOT CHANGED     Details   lisinopriL (PRINIVIL, ZESTRIL) 20 mg tablet Take 20 mg by mouth daily. lidocaine (LIDODERM) 5 % 1 Patch by TransDERmal route every twenty-four (24) hours. Apply patch to the affected area for 12 hours a day and remove for 12 hours a day. calcium carbonate (CALTREX) 600 mg calcium (1,500 mg) tablet Take 600 mg by mouth two (2) times a day. amLODIPine (NORVASC) 10 mg tablet Take 10 mg by mouth daily. atenoloL (TENORMIN) 25 mg tablet Take 25 mg by mouth two (2) times a day. levothyroxine (SYNTHROID) 88 mcg tablet Take 88 mcg by mouth Daily (before breakfast). aspirin delayed-release 81 mg tablet Take 1 Tab by mouth two (2) times a day. Qty: 60 Tab, Refills: 0       cholecalciferol (VITAMIN D3) 25 mcg (1,000 unit) cap Take 1,000 Units by mouth daily (after breakfast). glipiZIDE SR (GLUCOTROL XL) 2.5 mg CR tablet Take 2.5 mg by mouth daily (after breakfast). ascorbic acid, vitamin C, (VITAMIN C) 500 mg tablet Take 500 mg by mouth daily (after breakfast). docosahexanoic acid/epa (FISH OIL PO) Take 1,500 mg by mouth ACB/HS. potassium chloride SA (MICRO-K) 10 mEq capsule Take 10 mEq by mouth daily. rosuvastatin (CRESTOR) 10 mg tablet Take 10 mg by mouth nightly.                    STOP taking these medications         sertraline (ZOLOFT) 25 mg tablet Comments:   Reason for Stopping:            cinnamon bark 500 mg cap Comments:   Reason for Stopping:            aspirin-dipyridamole (AGGRENOX)  mg per SR capsule Comments:   Reason for Stopping:                    DISPOSITION:  x  Home With:    OT   PT   HH   RN        Long term SNF/Inpatient Rehab     Independent/assisted living     Hospice     Other:      PATIENT CONDITION AT DISCHARGE:      Functional status     Poor      Deconditioned    x Independent       Cognition    x Lucid      Forgetful      Dementia       Catheters/lines (plus indication)     Hill      PICC      PEG    x None       Code status    x Full code      DNR       PHYSICAL EXAMINATION AT DISCHARGE:     General:          Alert, cooperative, no distress, appears stated age. HEENT:           Atraumatic, anicteric sclerae, pink conjunctivae                          No oral ulcers, mucosa moist, throat clear, dentition fair  Neck:               Supple, symmetrical  Lungs:             Clear to auscultation bilaterally. No Wheezing or Rhonchi. No rales. Chest wall:      No tenderness  No Accessory muscle use. Heart:              Regular  rhythm,  No  murmur   No edema  Abdomen:        Soft, non-tender. Not distended. Bowel sounds normal  Extremities:     No cyanosis. No clubbing,                            Skin turgor normal, Capillary refill normal  Skin:                Not pale. Not Jaundiced  No rashes   Psych:             Not anxious or agitated.   Neurologic:      Alert, moves all extremities, answers questions appropriately and responds to commands         CHRONIC MEDICAL DIAGNOSES:  Problem List as of 3/14/2023 Date Reviewed: 3/4/2022              Codes Class Noted - Resolved     Atrial fibrillation with rapid ventricular response ([facility] [address] ICD-10-CM: I48.91  ICD-9-CM: 427.31   3/13/2023 - Present           Primary osteoarthritis of right hip ICD-10-CM: M16.11  ICD-9-CM: 715.15   5/29/2019 - Present Greater than 35 minutes were spent with the patient on counseling and coordination of care     Signed:   Tristan Hammond MD  3/14/2023    b

## 2023-03-15 LAB
ATRIAL RATE: 55 BPM
CALCULATED P AXIS, ECG09: 46 DEGREES
CALCULATED R AXIS, ECG10: -13 DEGREES
CALCULATED T AXIS, ECG11: 36 DEGREES
DIAGNOSIS, 93000: NORMAL
P-R INTERVAL, ECG05: 166 MS
Q-T INTERVAL, ECG07: 446 MS
QRS DURATION, ECG06: 78 MS
QTC CALCULATION (BEZET), ECG08: 426 MS
VENTRICULAR RATE, ECG03: 55 BPM

## 2023-03-24 ENCOUNTER — OFFICE VISIT (OUTPATIENT)
Dept: NEUROLOGY | Age: 88
End: 2023-03-24

## 2023-03-24 VITALS
WEIGHT: 143 LBS | DIASTOLIC BLOOD PRESSURE: 75 MMHG | BODY MASS INDEX: 23.82 KG/M2 | RESPIRATION RATE: 18 BRPM | HEART RATE: 57 BPM | OXYGEN SATURATION: 94 % | SYSTOLIC BLOOD PRESSURE: 170 MMHG | HEIGHT: 65 IN

## 2023-03-24 DIAGNOSIS — R41.3 MEMORY IMPAIRMENT: Primary | ICD-10-CM

## 2023-03-24 NOTE — PROGRESS NOTES
Neurology Consult      Subjective: Candy Johnson is a 82 y.o. female who comes in today with family. Her DMV form had to be filled out to keep updated on her driving restrictions as suggested by Lambert Vila. Had previous neuropsychological testing for memory impairment that defaulted to discovery of depression and anxiety issues and potential concerns as it goes to inattention. As such their assessment recommended driving restrictions within 25 mile radius of her home. The previous DMV form and today's form will recognize the same suggested restrictions of driving. Patient has done well and there has been no difficulties and has been very convenient for her and family with her simple drives here and there. Does not cite any new medical history, although my understanding is from family, that she has been seen by cardiology for atrial fib if I have the story line correct. In any event she was alert cooperative and oriented x4 lucid articulate responsive and appropriate during this exam process. Will suggest a revisit on the occasion of getting her updated DMV form a year from now. Current Outpatient Medications   Medication Sig Dispense Refill    apixaban (ELIQUIS) 5 mg tablet Take 1 Tablet by mouth two (2) times a day for 30 days. 60 Tablet 0    lisinopriL (PRINIVIL, ZESTRIL) 20 mg tablet Take 20 mg by mouth daily. lidocaine (LIDODERM) 5 % 1 Patch by TransDERmal route every twenty-four (24) hours. Apply patch to the affected area for 12 hours a day and remove for 12 hours a day. calcium carbonate (CALTREX) 600 mg calcium (1,500 mg) tablet Take 600 mg by mouth two (2) times a day. amLODIPine (NORVASC) 10 mg tablet Take 10 mg by mouth daily. atenoloL (TENORMIN) 25 mg tablet Take 25 mg by mouth two (2) times a day. levothyroxine (SYNTHROID) 88 mcg tablet Take 88 mcg by mouth Daily (before breakfast).       aspirin delayed-release 81 mg tablet Take 1 Tab by mouth two (2) times a day. 60 Tab 0    rosuvastatin (CRESTOR) 10 mg tablet Take 10 mg by mouth nightly. cholecalciferol (VITAMIN D3) 25 mcg (1,000 unit) cap Take 1,000 Units by mouth daily (after breakfast). glipiZIDE SR (GLUCOTROL XL) 2.5 mg CR tablet Take 2.5 mg by mouth daily (after breakfast). ascorbic acid, vitamin C, (VITAMIN C) 500 mg tablet Take 500 mg by mouth daily (after breakfast). docosahexanoic acid/epa (FISH OIL PO) Take 1,500 mg by mouth ACB/HS. potassium chloride SA (MICRO-K) 10 mEq capsule Take 10 mEq by mouth daily. Allergies   Allergen Reactions    Percocet [Oxycodone-Acetaminophen] Unknown (comments)     Can't remember reaction due to length of time of occurrence.      Past Medical History:   Diagnosis Date    Arthritis     Depression     Diabetes (HCC)     GERD (gastroesophageal reflux disease)     High cholesterol     Hypertension     Snoring     Transient ischemic attack (TIA)       Past Surgical History:   Procedure Laterality Date    HX APPENDECTOMY      HX CATARACT REMOVAL Bilateral     HX CHOLECYSTECTOMY      HX ORTHOPAEDIC Left     Open Repair    HX ORTHOPAEDIC Left     Trigger finger    HX ROTATOR CUFF REPAIR Right     HX TUBAL LIGATION        Social History     Socioeconomic History    Marital status:      Spouse name: Not on file    Number of children: Not on file    Years of education: Not on file    Highest education level: Not on file   Occupational History    Not on file   Tobacco Use    Smoking status: Former     Packs/day: 2.00     Years: 30.00     Pack years: 60.00     Types: Cigarettes     Quit date: 1986     Years since quittin.5    Smokeless tobacco: Never   Vaping Use    Vaping Use: Never used   Substance and Sexual Activity    Alcohol use: Yes     Comment: social 1 x year    Drug use: No    Sexual activity: Not on file   Other Topics Concern    Not on file   Social History Narrative    Not on file     Social Determinants of Health     Financial Resource Strain: Not on file   Food Insecurity: Not on file   Transportation Needs: Not on file   Physical Activity: Not on file   Stress: Not on file   Social Connections: Not on file   Intimate Partner Violence: Not on file   Housing Stability: Not on file      Family History   Problem Relation Age of Onset    Heart Disease Mother     Alzheimer's Disease Father     Ovarian Cancer Sister     Alzheimer's Disease Paternal Grandfather       Visit Vitals  BP (!) 170/75   Pulse (!) 57   Resp 18   Ht 5' 5\" (1.651 m)   Wt 64.9 kg (143 lb)   SpO2 94%   BMI 23.80 kg/m²        Review of Systems:   A comprehensive review of systems was negative except for that written in the HPI. Neuro Exam:     Appearance: The patient is well developed, well nourished, provides a coherent history and is in no acute distress. Mental Status: Oriented to time, place and person. Mood and affect appropriate. Cranial Nerves:   Intact visual fields. Fundi are benign. HARSHA, EOM's full, no nystagmus, no ptosis. Facial sensation is normal. Corneal reflexes are intact. Facial movement is symmetric. Hearing is normal bilaterally. Palate is midline with normal sternocleidomastoid and trapezius muscles are normal. Tongue is midline. Motor:  5/5 strength in upper and lower proximal and distal muscles. Normal bulk and tone. No fasciculations. Reflexes:   Deep tendon reflexes 0-2+/4 and symmetrical.   Sensory:   Normal to touch, pinprick and vibration. Gait:  Normal gait. Tremor:   No tremor noted. Cerebellar:  No cerebellar signs present. Neurovascular:  Normal heart sounds and regular rhythm, peripheral pulses intact, and no carotid bruits. Assessment:   Memory impairment. DMV form was filled out per request and honoring the capital driving assessment that suggested she stay within a 25 mile radius of driving to the house.   This in turn defaulted to her neuropsych assessment that suggested depression and anxiety and the potential of inattention in the mix of things. However, she has continued to drive within the restricted range and has done well and the DMV form was filled out with recommendations that follows the same restricted driving area. I presume that she may need another update in 1 year to fulfill DMV criteria for her restricted driving as noted. Plan:   Can be seen back in 1 year on a call from the patient with the DMV parts A and F to update.   Signed by :  Jade Smart MD

## 2023-03-24 NOTE — LETTER
3/24/2023    Patient: Philipp Pizano   YOB: 1935   Date of Visit: 3/24/2023     Jodine Schirmer, NP  [address]  [address] [facility] [address]  Via Fax: [phone]    Dear Jodine Schirmer, NP,      Thank you for referring Ms. John Lindsay to [facility] for evaluation. My notes for this consultation are attached. If you have questions, please do not hesitate to call me. I look forward to following your patient along with you.       Sincerely,    Randa Hamilton MD

## 2023-03-24 NOTE — PROGRESS NOTES
Chief Complaint   Patient presents with    Follow-up     Patient is here following up to have DMV forms filled out .       Visit Vitals  BP (!) 170/75   Pulse (!) 57   Resp 18   Ht 5' 5\" (1.651 m)   Wt 143 lb (64.9 kg)   SpO2 94%   BMI 23.80 kg/m²

## 2023-03-24 NOTE — PATIENT INSTRUCTIONS
Patient history reviewed patient examined. Updated DMV form filled out and will suggest revisit on the occasion where it needs to be updated by [facility] order. Best of luck and has been a pleasure working with you.

## 2023-03-25 ENCOUNTER — APPOINTMENT (OUTPATIENT)
Dept: GENERAL RADIOLOGY | Age: 88
DRG: 329 | End: 2023-03-25
Attending: EMERGENCY MEDICINE
Payer: MEDICARE

## 2023-03-25 ENCOUNTER — HOSPITAL ENCOUNTER (INPATIENT)
Age: 88
LOS: 5 days | Discharge: HOME | DRG: 329 | End: 2023-03-30
Attending: EMERGENCY MEDICINE | Admitting: SURGERY
Payer: MEDICARE

## 2023-03-25 ENCOUNTER — APPOINTMENT (OUTPATIENT)
Dept: CT IMAGING | Age: 88
DRG: 329 | End: 2023-03-25
Attending: EMERGENCY MEDICINE
Payer: MEDICARE

## 2023-03-25 DIAGNOSIS — Z71.89 DNR (DO NOT RESUSCITATE) DISCUSSION: ICD-10-CM

## 2023-03-25 DIAGNOSIS — R53.81 PHYSICAL DEBILITY: ICD-10-CM

## 2023-03-25 DIAGNOSIS — A41.9 SEPSIS, DUE TO UNSPECIFIED ORGANISM, UNSPECIFIED WHETHER ACUTE ORGAN DYSFUNCTION PRESENT (HCC): ICD-10-CM

## 2023-03-25 DIAGNOSIS — R10.9 ACUTE ABDOMINAL PAIN: ICD-10-CM

## 2023-03-25 DIAGNOSIS — K63.1 SMALL BOWEL PERFORATION (HCC): Primary | ICD-10-CM

## 2023-03-25 LAB
ALBUMIN SERPL-MCNC: 3 G/DL (ref 3.5–5)
ALBUMIN/GLOB SERPL: 0.8 (ref 1.1–2.2)
ALP SERPL-CCNC: 34 U/L (ref 45–117)
ALT SERPL-CCNC: 22 U/L (ref 12–78)
ANION GAP SERPL CALC-SCNC: 4 MMOL/L (ref 5–15)
APPEARANCE UR: CLEAR
APPEARANCE UR: CLEAR
AST SERPL-CCNC: 18 U/L (ref 15–37)
BACTERIA URNS QL MICRO: NEGATIVE /HPF
BACTERIA URNS QL MICRO: NEGATIVE /HPF
BASOPHILS # BLD: 0 K/UL (ref 0–0.1)
BASOPHILS NFR BLD: 0 % (ref 0–1)
BILIRUB SERPL-MCNC: 1.5 MG/DL (ref 0.2–1)
BILIRUB UR QL: NEGATIVE
BILIRUB UR QL: NEGATIVE
BUN SERPL-MCNC: 21 MG/DL (ref 6–20)
BUN/CREAT SERPL: 19 (ref 12–20)
CALCIUM SERPL-MCNC: 9.3 MG/DL (ref 8.5–10.1)
CHLORIDE SERPL-SCNC: 92 MMOL/L (ref 97–108)
CO2 SERPL-SCNC: 29 MMOL/L (ref 21–32)
COLOR UR: ABNORMAL
COLOR UR: ABNORMAL
COMMENT, HOLDF: NORMAL
CREAT SERPL-MCNC: 1.09 MG/DL (ref 0.55–1.02)
DIFFERENTIAL METHOD BLD: ABNORMAL
EOSINOPHIL # BLD: 0.2 K/UL (ref 0–0.4)
EOSINOPHIL NFR BLD: 1 % (ref 0–7)
EPITH CASTS URNS QL MICRO: ABNORMAL /LPF
EPITH CASTS URNS QL MICRO: ABNORMAL /LPF
ERYTHROCYTE [DISTWIDTH] IN BLOOD BY AUTOMATED COUNT: 11.8 % (ref 11.5–14.5)
GLOBULIN SER CALC-MCNC: 3.6 G/DL (ref 2–4)
GLUCOSE BLD STRIP.AUTO-MCNC: 181 MG/DL (ref 65–117)
GLUCOSE SERPL-MCNC: 192 MG/DL (ref 65–100)
GLUCOSE UR STRIP.AUTO-MCNC: NEGATIVE MG/DL
GLUCOSE UR STRIP.AUTO-MCNC: NEGATIVE MG/DL
HCT VFR BLD AUTO: 40.1 % (ref 35–47)
HGB BLD-MCNC: 13.9 G/DL (ref 11.5–16)
HGB UR QL STRIP: NEGATIVE
HGB UR QL STRIP: NEGATIVE
IMM GRANULOCYTES # BLD AUTO: 0 K/UL (ref 0–0.04)
IMM GRANULOCYTES NFR BLD AUTO: 0 % (ref 0–0.5)
KETONES UR QL STRIP.AUTO: 15 MG/DL
KETONES UR QL STRIP.AUTO: NEGATIVE MG/DL
LACTATE BLD-SCNC: 1.35 MMOL/L (ref 0.4–2)
LEUKOCYTE ESTERASE UR QL STRIP.AUTO: NEGATIVE
LEUKOCYTE ESTERASE UR QL STRIP.AUTO: NEGATIVE
LYMPHOCYTES # BLD: 2.1 K/UL (ref 0.8–3.5)
LYMPHOCYTES NFR BLD: 10 % (ref 12–49)
MAGNESIUM SERPL-MCNC: 1.6 MG/DL (ref 1.6–2.4)
MCH RBC QN AUTO: 34 PG (ref 26–34)
MCHC RBC AUTO-ENTMCNC: 34.7 G/DL (ref 30–36.5)
MCV RBC AUTO: 98 FL (ref 80–99)
MONOCYTES # BLD: 2.3 K/UL (ref 0–1)
MONOCYTES NFR BLD: 11 % (ref 5–13)
NEUTS SEG # BLD: 16.6 K/UL (ref 1.8–8)
NEUTS SEG NFR BLD: 78 % (ref 32–75)
NITRITE UR QL STRIP.AUTO: NEGATIVE
NITRITE UR QL STRIP.AUTO: NEGATIVE
NRBC # BLD: 0 K/UL (ref 0–0.01)
NRBC BLD-RTO: 0 PER 100 WBC
PH UR STRIP: 5.5 (ref 5–8)
PH UR STRIP: 6 (ref 5–8)
PLATELET # BLD AUTO: 172 K/UL (ref 150–400)
PLATELET COMMENTS,PCOM: ABNORMAL
PMV BLD AUTO: 10.4 FL (ref 8.9–12.9)
POTASSIUM SERPL-SCNC: 3.8 MMOL/L (ref 3.5–5.1)
PROT SERPL-MCNC: 6.6 G/DL (ref 6.4–8.2)
PROT UR STRIP-MCNC: 100 MG/DL
PROT UR STRIP-MCNC: 30 MG/DL
RBC # BLD AUTO: 4.09 M/UL (ref 3.8–5.2)
RBC #/AREA URNS HPF: ABNORMAL /HPF (ref 0–5)
RBC #/AREA URNS HPF: ABNORMAL /HPF (ref 0–5)
RBC MORPH BLD: ABNORMAL
SAMPLES BEING HELD,HOLD: NORMAL
SERVICE CMNT-IMP: ABNORMAL
SODIUM SERPL-SCNC: 125 MMOL/L (ref 136–145)
SP GR UR REFRACTOMETRY: 1.02 (ref 1–1.03)
SP GR UR REFRACTOMETRY: 1.02 (ref 1–1.03)
TROPONIN I SERPL HS-MCNC: 7 NG/L (ref 0–51)
UA: UC IF INDICATED,UAUC: ABNORMAL
UR CULT HOLD, URHOLD: NORMAL
UROBILINOGEN UR QL STRIP.AUTO: 0.2 EU/DL (ref 0.2–1)
UROBILINOGEN UR QL STRIP.AUTO: 0.2 EU/DL (ref 0.2–1)
WBC # BLD AUTO: 21.2 K/UL (ref 3.6–11)
WBC URNS QL MICRO: ABNORMAL /HPF (ref 0–4)
WBC URNS QL MICRO: ABNORMAL /HPF (ref 0–4)

## 2023-03-25 PROCEDURE — 74177 CT ABD & PELVIS W/CONTRAST: CPT

## 2023-03-25 PROCEDURE — 81001 URINALYSIS AUTO W/SCOPE: CPT

## 2023-03-25 PROCEDURE — 71045 X-RAY EXAM CHEST 1 VIEW: CPT

## 2023-03-25 PROCEDURE — 74011250637 HC RX REV CODE- 250/637: Performed by: EMERGENCY MEDICINE

## 2023-03-25 PROCEDURE — 74011000636 HC RX REV CODE- 636: Performed by: STUDENT IN AN ORGANIZED HEALTH CARE EDUCATION/TRAINING PROGRAM

## 2023-03-25 PROCEDURE — 99285 EMERGENCY DEPT VISIT HI MDM: CPT

## 2023-03-25 PROCEDURE — 65270000029 HC RM PRIVATE

## 2023-03-25 PROCEDURE — 93005 ELECTROCARDIOGRAM TRACING: CPT

## 2023-03-25 PROCEDURE — 87040 BLOOD CULTURE FOR BACTERIA: CPT

## 2023-03-25 PROCEDURE — 36415 COLL VENOUS BLD VENIPUNCTURE: CPT

## 2023-03-25 PROCEDURE — 85025 COMPLETE CBC W/AUTO DIFF WBC: CPT

## 2023-03-25 PROCEDURE — 96365 THER/PROPH/DIAG IV INF INIT: CPT

## 2023-03-25 PROCEDURE — 74011250636 HC RX REV CODE- 250/636: Performed by: SURGERY

## 2023-03-25 PROCEDURE — 74011000258 HC RX REV CODE- 258: Performed by: EMERGENCY MEDICINE

## 2023-03-25 PROCEDURE — 84484 ASSAY OF TROPONIN QUANT: CPT

## 2023-03-25 PROCEDURE — 83605 ASSAY OF LACTIC ACID: CPT

## 2023-03-25 PROCEDURE — 83735 ASSAY OF MAGNESIUM: CPT

## 2023-03-25 PROCEDURE — 80053 COMPREHEN METABOLIC PANEL: CPT

## 2023-03-25 PROCEDURE — 74011250636 HC RX REV CODE- 250/636: Performed by: EMERGENCY MEDICINE

## 2023-03-25 PROCEDURE — 96361 HYDRATE IV INFUSION ADD-ON: CPT

## 2023-03-25 PROCEDURE — 82962 GLUCOSE BLOOD TEST: CPT

## 2023-03-25 RX ORDER — SODIUM CHLORIDE 0.9 % (FLUSH) 0.9 %
5-10 SYRINGE (ML) INJECTION AS NEEDED
Status: DISCONTINUED | OUTPATIENT
Start: 2023-03-25 | End: 2023-03-30 | Stop reason: HOSPADM

## 2023-03-25 RX ORDER — HEPARIN SODIUM 5000 [USP'U]/ML
5000 INJECTION, SOLUTION INTRAVENOUS; SUBCUTANEOUS EVERY 8 HOURS
Status: DISCONTINUED
Start: 2023-03-25 | End: 2023-03-27

## 2023-03-25 RX ORDER — HYDROMORPHONE HYDROCHLORIDE 1 MG/ML
0.2 INJECTION, SOLUTION INTRAMUSCULAR; INTRAVENOUS; SUBCUTANEOUS
Status: DISCONTINUED | OUTPATIENT
Start: 2023-03-25 | End: 2023-03-30 | Stop reason: HOSPADM

## 2023-03-25 RX ORDER — SODIUM CHLORIDE 9 MG/ML
100 INJECTION, SOLUTION INTRAVENOUS CONTINUOUS
Status: DISCONTINUED | OUTPATIENT
Start: 2023-03-25 | End: 2023-03-27

## 2023-03-25 RX ORDER — ACETAMINOPHEN 650 MG/1
650 SUPPOSITORY RECTAL
Status: COMPLETED | OUTPATIENT
Start: 2023-03-25 | End: 2023-03-25

## 2023-03-25 RX ORDER — ONDANSETRON 2 MG/ML
4 INJECTION INTRAMUSCULAR; INTRAVENOUS
Status: DISCONTINUED | OUTPATIENT
Start: 2023-03-25 | End: 2023-03-30 | Stop reason: HOSPADM

## 2023-03-25 RX ADMIN — SODIUM CHLORIDE 100 ML/HR: 9 INJECTION, SOLUTION INTRAVENOUS at 22:48

## 2023-03-25 RX ADMIN — SODIUM CHLORIDE 1000 ML: 9 INJECTION, SOLUTION INTRAVENOUS at 18:38

## 2023-03-25 RX ADMIN — HEPARIN SODIUM 5000 UNITS: 5000 INJECTION INTRAVENOUS; SUBCUTANEOUS at 22:49

## 2023-03-25 RX ADMIN — PIPERACILLIN AND TAZOBACTAM 4.5 G: 4; .5 INJECTION, POWDER, FOR SOLUTION INTRAVENOUS at 19:13

## 2023-03-25 RX ADMIN — ACETAMINOPHEN 650 MG: 650 SUPPOSITORY RECTAL at 19:12

## 2023-03-25 RX ADMIN — IOPAMIDOL 100 ML: 755 INJECTION, SOLUTION INTRAVENOUS at 17:26

## 2023-03-25 RX ADMIN — SODIUM CHLORIDE 1000 ML: 9 INJECTION, SOLUTION INTRAVENOUS at 16:57

## 2023-03-25 NOTE — ED NOTES
Bedside shift change report completed with Maggie Jennings RN to include sbar, plan of care, admit status, dual skin assessment completed

## 2023-03-25 NOTE — ED TRIAGE NOTES
Pt arriving to ED via EMS c/o lower abd pain. Recently discharged from [facility] for UTI and discharged on antibiotics. During that admission, pt was diagnosed with new onset afib and started on Eliquis. Recurrance of symptoms 24 hours ago per EMS.  EMS reports pt's daugther reporting that she has increased confusion as well

## 2023-03-25 NOTE — ED PROVIDER NOTES
82-year-old female presents with generalized fatigue and abdominal pain. Patient recently admitted for new onset A-fib and urinary tract infection. Patient completed a course of antibiotics. Patient's daughter told EMS that she started having recurrence of symptoms 24 hours ago. She has had some confusion.        Past Medical History:   Diagnosis Date    Arthritis     Depression     Diabetes (HCC)     GERD (gastroesophageal reflux disease)     High cholesterol     Hypertension     Snoring     Transient ischemic attack (TIA)        Past Surgical History:   Procedure Laterality Date    HX APPENDECTOMY      HX CATARACT REMOVAL Bilateral     HX CHOLECYSTECTOMY      HX ORTHOPAEDIC Left     Open Repair    HX ORTHOPAEDIC Left     Trigger finger    HX ROTATOR CUFF REPAIR Right     HX TUBAL LIGATION           Family History:   Problem Relation Age of Onset    Heart Disease Mother     Alzheimer's Disease Father     Ovarian Cancer Sister     Alzheimer's Disease Paternal Grandfather        Social History     Socioeconomic History    Marital status:      Spouse name: Not on file    Number of children: Not on file    Years of education: Not on file    Highest education level: Not on file   Occupational History    Not on file   Tobacco Use    Smoking status: Former     Packs/day: 2.00     Years: 30.00     Pack years: 60.00     Types: Cigarettes     Quit date: 1986     Years since quittin.5    Smokeless tobacco: Never   Vaping Use    Vaping Use: Never used   Substance and Sexual Activity    Alcohol use: Yes     Comment: social 1 x year    Drug use: No    Sexual activity: Not on file   Other Topics Concern    Not on file   Social History Narrative    Not on file     Social Determinants of Health     Financial Resource Strain: Not on file   Food Insecurity: Not on file   Transportation Needs: Not on file   Physical Activity: Not on file   Stress: Not on file   Social Connections: Not on file   Intimate Partner Violence: Not on file   Housing Stability: Not on file         ALLERGIES: Percocet [oxycodone-acetaminophen]    Review of Systems   Constitutional:  Positive for fatigue. There were no vitals filed for this visit. Physical Exam  Vitals and nursing note reviewed. Constitutional:       General: She is not in acute distress. Appearance: Normal appearance. She is not ill-appearing, toxic-appearing or diaphoretic. HENT:      Head: Normocephalic and atraumatic. Mouth/Throat:      Mouth: Mucous membranes are moist.   Eyes:      Extraocular Movements: Extraocular movements intact. Cardiovascular:      Rate and Rhythm: Normal rate. Pulses: Normal pulses. Pulmonary:      Effort: Pulmonary effort is normal. No respiratory distress. Abdominal:      General: There is no distension. Tenderness: There is abdominal tenderness in the left lower quadrant. Musculoskeletal:         General: Normal range of motion. Cervical back: Normal range of motion. Skin:     General: Skin is dry. Neurological:      Mental Status: She is alert and oriented to person, place, and time. Psychiatric:         Mood and Affect: Mood normal.        Medical Decision Making    Patient presents with concern for urinary tract infection. Labs show significantly elevated white blood cell count of 21,000. UA shows no evidence of infection. CT demonstrates small bowel perforation. Patient started on IV antibiotics and IV fluids. General surgery consulted. Perfect Serve Consult for Admission  6:23 PM    ED Room Number: ER08/08  Patient Name and age:   Shayy Dalton 82 y.o.  female  Working Diagnosis: Small bowel perforation ([address]  (primary encounter diagnosis)    COVID-19 Suspicion:  no  Sepsis present:  no  Reassessment needed: no  Code Status:  Full Code  Readmission: no  Isolation Requirements:  no  Recommended Level of Care:  telemetry  Department: [id number] ED - [phone]    Other: General surgery on consult    Total critical care time spent exclusive of procedures:  37 minutes. Amount and/or Complexity of Data Reviewed  Labs: ordered. Radiology: ordered. ECG/medicine tests: ordered. Risk  OTC drugs. Prescription drug management. Decision regarding hospitalization. ED Course as of 03/25/23 1823   Sat Mar 25, 2023   1610 EKG shows sinus rhythm rate of 63, normal intervals, left axis deviation, no ischemic change.   This EKG was interpreted by Lula Camacho MD, ED Physician [RD]      ED Course User Index  [RD] Leo Guevara MD       Procedures

## 2023-03-26 ENCOUNTER — ANESTHESIA EVENT (OUTPATIENT)
Dept: SURGERY | Age: 88
End: 2023-03-26
Payer: MEDICARE

## 2023-03-26 ENCOUNTER — ANESTHESIA (OUTPATIENT)
Dept: SURGERY | Age: 88
End: 2023-03-26
Payer: MEDICARE

## 2023-03-26 LAB
ANION GAP SERPL CALC-SCNC: 5 MMOL/L (ref 5–15)
ATRIAL RATE: 63 BPM
ATRIAL RATE: 69 BPM
BASOPHILS # BLD: 0 K/UL (ref 0–0.1)
BASOPHILS NFR BLD: 0 % (ref 0–1)
BUN SERPL-MCNC: 13 MG/DL (ref 6–20)
BUN/CREAT SERPL: 16 (ref 12–20)
CALCIUM SERPL-MCNC: 8.7 MG/DL (ref 8.5–10.1)
CALCULATED P AXIS, ECG09: 24 DEGREES
CALCULATED P AXIS, ECG09: 63 DEGREES
CALCULATED R AXIS, ECG10: -33 DEGREES
CALCULATED R AXIS, ECG10: 86 DEGREES
CALCULATED T AXIS, ECG11: 48 DEGREES
CALCULATED T AXIS, ECG11: 8 DEGREES
CHLORIDE SERPL-SCNC: 107 MMOL/L (ref 97–108)
CO2 SERPL-SCNC: 23 MMOL/L (ref 21–32)
CREAT SERPL-MCNC: 0.82 MG/DL (ref 0.55–1.02)
DIAGNOSIS, 93000: NORMAL
DIAGNOSIS, 93000: NORMAL
DIFFERENTIAL METHOD BLD: ABNORMAL
EOSINOPHIL # BLD: 0 K/UL (ref 0–0.4)
EOSINOPHIL NFR BLD: 0 % (ref 0–7)
ERYTHROCYTE [DISTWIDTH] IN BLOOD BY AUTOMATED COUNT: 11.9 % (ref 11.5–14.5)
GLUCOSE BLD STRIP.AUTO-MCNC: 135 MG/DL (ref 65–117)
GLUCOSE BLD STRIP.AUTO-MCNC: 150 MG/DL (ref 65–117)
GLUCOSE BLD STRIP.AUTO-MCNC: 157 MG/DL (ref 65–117)
GLUCOSE SERPL-MCNC: 145 MG/DL (ref 65–100)
HCT VFR BLD AUTO: 37.5 % (ref 35–47)
HGB BLD-MCNC: 13 G/DL (ref 11.5–16)
IMM GRANULOCYTES # BLD AUTO: 0.2 K/UL (ref 0–0.04)
IMM GRANULOCYTES NFR BLD AUTO: 1 % (ref 0–0.5)
LYMPHOCYTES # BLD: 1.5 K/UL (ref 0.8–3.5)
LYMPHOCYTES NFR BLD: 8 % (ref 12–49)
MCH RBC QN AUTO: 34.9 PG (ref 26–34)
MCHC RBC AUTO-ENTMCNC: 34.7 G/DL (ref 30–36.5)
MCV RBC AUTO: 100.5 FL (ref 80–99)
MONOCYTES # BLD: 2.1 K/UL (ref 0–1)
MONOCYTES NFR BLD: 11 % (ref 5–13)
NEUTS SEG # BLD: 15.4 K/UL (ref 1.8–8)
NEUTS SEG NFR BLD: 80 % (ref 32–75)
NRBC # BLD: 0 K/UL (ref 0–0.01)
NRBC BLD-RTO: 0 PER 100 WBC
P-R INTERVAL, ECG05: 134 MS
P-R INTERVAL, ECG05: 142 MS
PLATELET # BLD AUTO: 151 K/UL (ref 150–400)
PMV BLD AUTO: 10.7 FL (ref 8.9–12.9)
POTASSIUM SERPL-SCNC: 3.7 MMOL/L (ref 3.5–5.1)
Q-T INTERVAL, ECG07: 366 MS
Q-T INTERVAL, ECG07: 368 MS
QRS DURATION, ECG06: 74 MS
QRS DURATION, ECG06: 76 MS
QTC CALCULATION (BEZET), ECG08: 376 MS
QTC CALCULATION (BEZET), ECG08: 392 MS
RBC # BLD AUTO: 3.73 M/UL (ref 3.8–5.2)
RBC MORPH BLD: ABNORMAL
SERVICE CMNT-IMP: ABNORMAL
SODIUM SERPL-SCNC: 135 MMOL/L (ref 136–145)
VENTRICULAR RATE, ECG03: 63 BPM
VENTRICULAR RATE, ECG03: 69 BPM
WBC # BLD AUTO: 19.2 K/UL (ref 3.6–11)

## 2023-03-26 PROCEDURE — 77030038269 HC DRN EXT URIN PURWCK BARD -A

## 2023-03-26 PROCEDURE — 74011250637 HC RX REV CODE- 250/637: Performed by: INTERNAL MEDICINE

## 2023-03-26 PROCEDURE — 74011250636 HC RX REV CODE- 250/636: Performed by: ANESTHESIOLOGY

## 2023-03-26 PROCEDURE — 76010000162 HC OR TIME 1.5 TO 2 HR INTENSV-TIER 1: Performed by: SURGERY

## 2023-03-26 PROCEDURE — 74011000258 HC RX REV CODE- 258: Performed by: SURGERY

## 2023-03-26 PROCEDURE — 77030002996 HC SUT SLK J&J -A: Performed by: SURGERY

## 2023-03-26 PROCEDURE — 77030008771 HC TU NG SALEM SUMP -A: Performed by: ANESTHESIOLOGY

## 2023-03-26 PROCEDURE — 77030005513 HC CATH URETH FOL11 MDII -B: Performed by: SURGERY

## 2023-03-26 PROCEDURE — 76060000034 HC ANESTHESIA 1.5 TO 2 HR: Performed by: SURGERY

## 2023-03-26 PROCEDURE — 74011000250 HC RX REV CODE- 250: Performed by: SURGERY

## 2023-03-26 PROCEDURE — 74011250636 HC RX REV CODE- 250/636: Performed by: SURGERY

## 2023-03-26 PROCEDURE — 76210000017 HC OR PH I REC 1.5 TO 2 HR: Performed by: SURGERY

## 2023-03-26 PROCEDURE — 65270000046 HC RM TELEMETRY

## 2023-03-26 PROCEDURE — 77030019940 HC BLNKT HYPOTHRM STRY -B: Performed by: SURGERY

## 2023-03-26 PROCEDURE — 74011000250 HC RX REV CODE- 250: Performed by: INTERNAL MEDICINE

## 2023-03-26 PROCEDURE — 77030008684 HC TU ET CUF COVD -B: Performed by: ANESTHESIOLOGY

## 2023-03-26 PROCEDURE — 77030008462 HC STPLR SKN PROX J&J -A: Performed by: SURGERY

## 2023-03-26 PROCEDURE — 77030026438 HC STYL ET INTUB CARD -A: Performed by: ANESTHESIOLOGY

## 2023-03-26 PROCEDURE — 88307 TISSUE EXAM BY PATHOLOGIST: CPT

## 2023-03-26 PROCEDURE — 77030002966 HC SUT PDS J&J -A: Performed by: SURGERY

## 2023-03-26 PROCEDURE — C9290 INJ, BUPIVACAINE LIPOSOME: HCPCS | Performed by: SURGERY

## 2023-03-26 PROCEDURE — 74011000250 HC RX REV CODE- 250: Performed by: NURSE ANESTHETIST, CERTIFIED REGISTERED

## 2023-03-26 PROCEDURE — 77030009985 HC RELD STPLR XR J&J -B: Performed by: SURGERY

## 2023-03-26 PROCEDURE — 77030011808 HC STPLR ENDOSCOPIC J&J -D: Performed by: SURGERY

## 2023-03-26 PROCEDURE — 74011250637 HC RX REV CODE- 250/637: Performed by: SURGERY

## 2023-03-26 PROCEDURE — 36415 COLL VENOUS BLD VENIPUNCTURE: CPT

## 2023-03-26 PROCEDURE — 80048 BASIC METABOLIC PNL TOTAL CA: CPT

## 2023-03-26 PROCEDURE — 93005 ELECTROCARDIOGRAM TRACING: CPT

## 2023-03-26 PROCEDURE — 74011250636 HC RX REV CODE- 250/636: Performed by: NURSE ANESTHETIST, CERTIFIED REGISTERED

## 2023-03-26 PROCEDURE — 2709999900 HC NON-CHARGEABLE SUPPLY: Performed by: SURGERY

## 2023-03-26 PROCEDURE — 74011000250 HC RX REV CODE- 250: Performed by: EMERGENCY MEDICINE

## 2023-03-26 PROCEDURE — 0DT80ZZ RESECTION OF SMALL INTESTINE, OPEN APPROACH: ICD-10-PCS | Performed by: SURGERY

## 2023-03-26 PROCEDURE — 82962 GLUCOSE BLOOD TEST: CPT

## 2023-03-26 PROCEDURE — 85025 COMPLETE CBC W/AUTO DIFF WBC: CPT

## 2023-03-26 PROCEDURE — 77030013079 HC BLNKT BAIR HGGR 3M -A: Performed by: ANESTHESIOLOGY

## 2023-03-26 RX ORDER — SODIUM CHLORIDE 0.9 % (FLUSH) 0.9 %
5-40 SYRINGE (ML) INJECTION AS NEEDED
Status: DISCONTINUED | OUTPATIENT
Start: 2023-03-26 | End: 2023-03-30 | Stop reason: HOSPADM

## 2023-03-26 RX ORDER — CALCIUM CARBONATE 500(1250)
600 TABLET ORAL 2 TIMES DAILY
Status: DISCONTINUED | OUTPATIENT
Start: 2023-03-26 | End: 2023-03-30 | Stop reason: HOSPADM

## 2023-03-26 RX ORDER — LEVOTHYROXINE SODIUM 88 UG/1
88 TABLET ORAL
Status: DISCONTINUED | OUTPATIENT
Start: 2023-03-26 | End: 2023-03-28

## 2023-03-26 RX ORDER — LIDOCAINE HYDROCHLORIDE 20 MG/ML
INJECTION, SOLUTION EPIDURAL; INFILTRATION; INTRACAUDAL; PERINEURAL AS NEEDED
Status: DISCONTINUED | OUTPATIENT
Start: 2023-03-26 | End: 2023-03-26 | Stop reason: HOSPADM

## 2023-03-26 RX ORDER — ONDANSETRON 2 MG/ML
INJECTION INTRAMUSCULAR; INTRAVENOUS AS NEEDED
Status: DISCONTINUED | OUTPATIENT
Start: 2023-03-26 | End: 2023-03-26 | Stop reason: HOSPADM

## 2023-03-26 RX ORDER — IBUPROFEN 200 MG
4 TABLET ORAL AS NEEDED
Status: DISCONTINUED | OUTPATIENT
Start: 2023-03-26 | End: 2023-03-30 | Stop reason: HOSPADM

## 2023-03-26 RX ORDER — INSULIN LISPRO 100 [IU]/ML
INJECTION, SOLUTION INTRAVENOUS; SUBCUTANEOUS EVERY 6 HOURS
Status: DISCONTINUED | OUTPATIENT
Start: 2023-03-26 | End: 2023-03-30 | Stop reason: HOSPADM

## 2023-03-26 RX ORDER — SODIUM CHLORIDE, SODIUM LACTATE, POTASSIUM CHLORIDE, CALCIUM CHLORIDE 600; 310; 30; 20 MG/100ML; MG/100ML; MG/100ML; MG/100ML
75 INJECTION, SOLUTION INTRAVENOUS CONTINUOUS
Status: DISPENSED | OUTPATIENT
Start: 2023-03-26 | End: 2023-03-27

## 2023-03-26 RX ORDER — ROCURONIUM BROMIDE 10 MG/ML
INJECTION, SOLUTION INTRAVENOUS AS NEEDED
Status: DISCONTINUED | OUTPATIENT
Start: 2023-03-26 | End: 2023-03-26 | Stop reason: HOSPADM

## 2023-03-26 RX ORDER — DEXTROSE MONOHYDRATE 100 MG/ML
0-250 INJECTION, SOLUTION INTRAVENOUS AS NEEDED
Status: DISCONTINUED | OUTPATIENT
Start: 2023-03-26 | End: 2023-03-30 | Stop reason: HOSPADM

## 2023-03-26 RX ORDER — MELATONIN
1000 DAILY
Status: DISCONTINUED | OUTPATIENT
Start: 2023-03-26 | End: 2023-03-30 | Stop reason: HOSPADM

## 2023-03-26 RX ORDER — ROSUVASTATIN CALCIUM 10 MG/1
10 TABLET, COATED ORAL
Status: DISCONTINUED | OUTPATIENT
Start: 2023-03-26 | End: 2023-03-30 | Stop reason: HOSPADM

## 2023-03-26 RX ORDER — DEXAMETHASONE SODIUM PHOSPHATE 4 MG/ML
INJECTION, SOLUTION INTRA-ARTICULAR; INTRALESIONAL; INTRAMUSCULAR; INTRAVENOUS; SOFT TISSUE AS NEEDED
Status: DISCONTINUED | OUTPATIENT
Start: 2023-03-26 | End: 2023-03-26 | Stop reason: HOSPADM

## 2023-03-26 RX ORDER — SUCCINYLCHOLINE CHLORIDE 20 MG/ML
INJECTION INTRAMUSCULAR; INTRAVENOUS AS NEEDED
Status: DISCONTINUED | OUTPATIENT
Start: 2023-03-26 | End: 2023-03-26 | Stop reason: HOSPADM

## 2023-03-26 RX ORDER — EPHEDRINE SULFATE/0.9% NACL/PF 50 MG/5 ML
SYRINGE (ML) INTRAVENOUS AS NEEDED
Status: DISCONTINUED | OUTPATIENT
Start: 2023-03-26 | End: 2023-03-26 | Stop reason: HOSPADM

## 2023-03-26 RX ORDER — QUETIAPINE FUMARATE 25 MG/1
25 TABLET, FILM COATED ORAL ONCE
Status: DISCONTINUED | OUTPATIENT
Start: 2023-03-26 | End: 2023-03-26

## 2023-03-26 RX ORDER — ASPIRIN 81 MG/1
81 TABLET ORAL 2 TIMES DAILY
Status: DISCONTINUED | OUTPATIENT
Start: 2023-03-26 | End: 2023-03-30 | Stop reason: HOSPADM

## 2023-03-26 RX ORDER — GLUCOSAM/CHONDRO/HERB 149/HYAL 750-100 MG
1 TABLET ORAL
Status: DISCONTINUED | OUTPATIENT
Start: 2023-03-26 | End: 2023-03-28

## 2023-03-26 RX ORDER — AMLODIPINE BESYLATE 5 MG/1
10 TABLET ORAL DAILY
Status: DISCONTINUED | OUTPATIENT
Start: 2023-03-26 | End: 2023-03-30 | Stop reason: HOSPADM

## 2023-03-26 RX ORDER — HYDROMORPHONE HYDROCHLORIDE 2 MG/ML
INJECTION, SOLUTION INTRAMUSCULAR; INTRAVENOUS; SUBCUTANEOUS AS NEEDED
Status: DISCONTINUED | OUTPATIENT
Start: 2023-03-26 | End: 2023-03-26 | Stop reason: HOSPADM

## 2023-03-26 RX ORDER — PROPOFOL 10 MG/ML
INJECTION, EMULSION INTRAVENOUS AS NEEDED
Status: DISCONTINUED | OUTPATIENT
Start: 2023-03-26 | End: 2023-03-26 | Stop reason: HOSPADM

## 2023-03-26 RX ORDER — ONDANSETRON 2 MG/ML
4 INJECTION INTRAMUSCULAR; INTRAVENOUS
Status: DISCONTINUED | OUTPATIENT
Start: 2023-03-26 | End: 2023-03-30 | Stop reason: HOSPADM

## 2023-03-26 RX ORDER — ENOXAPARIN SODIUM 100 MG/ML
40 INJECTION SUBCUTANEOUS DAILY
Status: DISCONTINUED | OUTPATIENT
Start: 2023-03-27 | End: 2023-03-28

## 2023-03-26 RX ORDER — LIDOCAINE 4 G/100G
1 PATCH TOPICAL EVERY 24 HOURS
Status: DISCONTINUED | OUTPATIENT
Start: 2023-03-26 | End: 2023-03-30 | Stop reason: HOSPADM

## 2023-03-26 RX ORDER — OXYCODONE HYDROCHLORIDE 5 MG/1
5 TABLET ORAL
Status: DISCONTINUED | OUTPATIENT
Start: 2023-03-26 | End: 2023-03-30 | Stop reason: HOSPADM

## 2023-03-26 RX ORDER — SODIUM CHLORIDE, SODIUM LACTATE, POTASSIUM CHLORIDE, CALCIUM CHLORIDE 600; 310; 30; 20 MG/100ML; MG/100ML; MG/100ML; MG/100ML
INJECTION, SOLUTION INTRAVENOUS
Status: DISCONTINUED | OUTPATIENT
Start: 2023-03-26 | End: 2023-03-26 | Stop reason: HOSPADM

## 2023-03-26 RX ORDER — ATENOLOL 50 MG/1
25 TABLET ORAL DAILY
Status: DISCONTINUED | OUTPATIENT
Start: 2023-03-26 | End: 2023-03-30 | Stop reason: HOSPADM

## 2023-03-26 RX ORDER — IBUPROFEN 200 MG
400 TABLET ORAL
Status: DISCONTINUED | OUTPATIENT
Start: 2023-03-26 | End: 2023-03-30 | Stop reason: HOSPADM

## 2023-03-26 RX ORDER — ACETAMINOPHEN 500 MG
1000 TABLET ORAL EVERY 6 HOURS
Status: DISCONTINUED | OUTPATIENT
Start: 2023-03-26 | End: 2023-03-30 | Stop reason: HOSPADM

## 2023-03-26 RX ORDER — SODIUM CHLORIDE 0.9 % (FLUSH) 0.9 %
5-40 SYRINGE (ML) INJECTION EVERY 8 HOURS
Status: DISCONTINUED | OUTPATIENT
Start: 2023-03-26 | End: 2023-03-30 | Stop reason: HOSPADM

## 2023-03-26 RX ORDER — ASCORBIC ACID 500 MG
500 TABLET ORAL
Status: DISCONTINUED | OUTPATIENT
Start: 2023-03-26 | End: 2023-03-30 | Stop reason: HOSPADM

## 2023-03-26 RX ORDER — LISINOPRIL 20 MG/1
20 TABLET ORAL DAILY
Status: DISCONTINUED | OUTPATIENT
Start: 2023-03-26 | End: 2023-03-30 | Stop reason: HOSPADM

## 2023-03-26 RX ORDER — FENTANYL CITRATE 50 UG/ML
25 INJECTION, SOLUTION INTRAMUSCULAR; INTRAVENOUS
Status: DISCONTINUED | OUTPATIENT
Start: 2023-03-26 | End: 2023-03-26 | Stop reason: HOSPADM

## 2023-03-26 RX ADMIN — PIPERACILLIN AND TAZOBACTAM 3.38 G: 3; .375 INJECTION, POWDER, LYOPHILIZED, FOR SOLUTION INTRAVENOUS at 09:17

## 2023-03-26 RX ADMIN — FENTANYL CITRATE 25 MCG: 0.05 INJECTION, SOLUTION INTRAMUSCULAR; INTRAVENOUS at 13:21

## 2023-03-26 RX ADMIN — LEVOTHYROXINE SODIUM 88 MCG: 88 TABLET ORAL at 06:56

## 2023-03-26 RX ADMIN — OMEGA-3 FATTY ACIDS CAP 1000 MG 1 CAPSULE: 1000 CAP at 22:30

## 2023-03-26 RX ADMIN — SUCCINYLCHOLINE CHLORIDE 100 MG: 20 INJECTION, SOLUTION INTRAMUSCULAR; INTRAVENOUS at 11:10

## 2023-03-26 RX ADMIN — Medication 15 MG: at 11:53

## 2023-03-26 RX ADMIN — SUGAMMADEX 100 MG: 100 INJECTION, SOLUTION INTRAVENOUS at 12:37

## 2023-03-26 RX ADMIN — ONDANSETRON HYDROCHLORIDE 4 MG: 2 SOLUTION INTRAMUSCULAR; INTRAVENOUS at 12:23

## 2023-03-26 RX ADMIN — HYDROMORPHONE HYDROCHLORIDE 0.5 MG: 2 INJECTION, SOLUTION INTRAMUSCULAR; INTRAVENOUS; SUBCUTANEOUS at 11:20

## 2023-03-26 RX ADMIN — LIDOCAINE HYDROCHLORIDE 40 MG: 20 INJECTION, SOLUTION EPIDURAL; INFILTRATION; INTRACAUDAL; PERINEURAL at 11:09

## 2023-03-26 RX ADMIN — ROCURONIUM BROMIDE 10 MG: 10 INJECTION INTRAVENOUS at 11:35

## 2023-03-26 RX ADMIN — SODIUM CHLORIDE, PRESERVATIVE FREE 10 ML: 5 INJECTION INTRAVENOUS at 15:33

## 2023-03-26 RX ADMIN — SODIUM CHLORIDE, POTASSIUM CHLORIDE, SODIUM LACTATE AND CALCIUM CHLORIDE: 600; 310; 30; 20 INJECTION, SOLUTION INTRAVENOUS at 10:55

## 2023-03-26 RX ADMIN — ACETAMINOPHEN 1000 MG: 500 TABLET ORAL at 22:31

## 2023-03-26 RX ADMIN — PIPERACILLIN AND TAZOBACTAM 3.38 G: 3; .375 INJECTION, POWDER, LYOPHILIZED, FOR SOLUTION INTRAVENOUS at 17:42

## 2023-03-26 RX ADMIN — OMEGA-3 FATTY ACIDS CAP 1000 MG 1 CAPSULE: 1000 CAP at 06:56

## 2023-03-26 RX ADMIN — Medication 5 MG: at 12:15

## 2023-03-26 RX ADMIN — SODIUM CHLORIDE, PRESERVATIVE FREE 5 ML: 5 INJECTION INTRAVENOUS at 22:33

## 2023-03-26 RX ADMIN — ROCURONIUM BROMIDE 10 MG: 10 INJECTION INTRAVENOUS at 11:11

## 2023-03-26 RX ADMIN — SODIUM CHLORIDE, POTASSIUM CHLORIDE, SODIUM LACTATE AND CALCIUM CHLORIDE 75 ML/HR: 600; 310; 30; 20 INJECTION, SOLUTION INTRAVENOUS at 15:30

## 2023-03-26 RX ADMIN — FENTANYL CITRATE 25 MCG: 0.05 INJECTION, SOLUTION INTRAMUSCULAR; INTRAVENOUS at 13:50

## 2023-03-26 RX ADMIN — DEXAMETHASONE SODIUM PHOSPHATE 4 MG: 4 INJECTION, SOLUTION INTRAMUSCULAR; INTRAVENOUS at 11:27

## 2023-03-26 RX ADMIN — ROSUVASTATIN 10 MG: 10 TABLET, FILM COATED ORAL at 22:31

## 2023-03-26 RX ADMIN — HYDROMORPHONE HYDROCHLORIDE 0.5 MG: 2 INJECTION, SOLUTION INTRAMUSCULAR; INTRAVENOUS; SUBCUTANEOUS at 10:55

## 2023-03-26 RX ADMIN — ROCURONIUM BROMIDE 20 MG: 10 INJECTION INTRAVENOUS at 11:20

## 2023-03-26 RX ADMIN — SODIUM CHLORIDE, PRESERVATIVE FREE 5 ML: 5 INJECTION INTRAVENOUS at 06:57

## 2023-03-26 RX ADMIN — PIPERACILLIN AND TAZOBACTAM 3.38 G: 3; .375 INJECTION, POWDER, LYOPHILIZED, FOR SOLUTION INTRAVENOUS at 00:44

## 2023-03-26 RX ADMIN — PROPOFOL 70 MG: 10 INJECTION, EMULSION INTRAVENOUS at 11:09

## 2023-03-26 RX ADMIN — HEPARIN SODIUM 5000 UNITS: 5000 INJECTION INTRAVENOUS; SUBCUTANEOUS at 22:33

## 2023-03-26 NOTE — PROGRESS NOTES
(+) bleeding on NICK incision. MD informed. Dr. Sukumar Rosa ordered to reinforce dressing. Bedside and Verbal shift change report given to [employer] (oncoming nurse) by Ta Cobian (offgoing nurse). Report included the following information SBAR, Kardex, Intake/Output, and MAR.

## 2023-03-26 NOTE — PROGRESS NOTES
Mike Graves [facility] [address]  [address] [facility], [address] [place] [address]  [phone] [address] Adult  Hospitalist Group                                                                                          Hospitalist Progress Note  Asiya Ying MD        Date of Service:  3/26/2023  NAME:  Bernie Cole  :  1935  MRN:  [id number]      Admission Summary:   68-year-old female admitted for small bowel perforation. Hospitalist consulted for medical management    Interval history / Subjective:   Plan for OR this morning     Assessment & Plan:     Small bowel perforation  -Continue antibiotics  -Patient to OR today    Inferior left hepatic lobe enhancing lesion on CT scan  -She will need outpatient GI follow-up and possibly MRI    Paroxysmal A-fib  -Continue to monitor on telemetry  -Continue atenolol  -Eliquis held in anticipation for surgery but can resume postop if okay with surgery    Hypertension  -Continue atenolol, lisinopril, Norvasc    Type 2 diabetes  -Glipizide held since patient is n.p.o.  -SSI per protocol    Hypothyroidism  -Continue levothyroxine    Outisde Records, prior notes, labs, radiology, and medications reviewed     Code status: Full code  DVT prophylaxis: Eliquis on hold       Hospital Problems  Date Reviewed: 3/4/2022            Codes Class Noted POA    Small bowel perforation ([facility] [address] ICD-10-CM: K63.1  ICD-9-CM: 569.83  3/25/2023 Unknown             Review of Systems:   A comprehensive review of systems was negative except for that written in the HPI. Vital Signs:    Last 24hrs VS reviewed since prior progress note.  Most recent are:  Visit Vitals  BP (!) 152/57 (BP 1 Location: Right upper arm, BP Patient Position: At rest)   Pulse 72   Temp 98.2 °F (36.8 °C)   Resp 20   Ht 5' 5\" (1.651 m)   Wt 64.9 kg (143 lb 1.3 oz)   SpO2 94%   BMI 23.81 kg/m²         Intake/Output Summary (Last 24 hours) at 3/26/2023 1137  Last data filed at 3/26/2023 1055  Gross per 24 hour   Intake 4098.33 ml   Output 1950 ml   Net 2148.33 ml        Physical Examination:       General: In no acute distress. Well developed, well nourished. Head: Normocephalic, atraumatic. Eyes: Anicteric sclera. PERRL. Extraocular muscles intact. ENT: External ears and nose appear normal.  Oral mucosa moist.  Neck: Supple. No jugular venous distention. Heart: Regular rate and rhythm. No murmurs appreciated. Chest: Symmetrical excursion. Clear to auscultation bilaterally. Abdomen: Soft, mildly tender to palpation in the left lower quadrant; I do not find the abdomen to be significantly distended. Bowel sounds are present throughout. Extremities: No gross deformities. No edema, no cyanosis. Feet are warm to touch. Neurological: No lateralizing deficits. Alert, oriented X3. Skin: No jaundice. No rashes. Data Review:    Review and/or order of clinical lab test      Labs:     Recent Labs     03/26/23  0327 03/25/23  1557   WBC 19.2* 21.2*   HGB 13.0 13.9   HCT 37.5 40.1    172     Recent Labs     03/26/23  0327 03/25/23  1557   * 125*   K 3.7 3.8    92*   CO2 23 29   BUN 13 21*   CREA 0.82 1.09*   * 192*   CA 8.7 9.3   MG  --  1.6     Recent Labs     03/25/23  1557   ALT 22   AP 34*   TBILI 1.5*   TP 6.6   ALB 3.0*   GLOB 3.6     No results for input(s): INR, PTP, APTT, INREXT in the last 72 hours. No results for input(s): FE, TIBC, PSAT, FERR in the last 72 hours. Lab Results   Component Value Date/Time    Folate 13.0 08/16/2021 09:14 AM      No results for input(s): PH, PCO2, PO2 in the last 72 hours. No results for input(s): CPK, CKNDX, TROIQ in the last 72 hours.     No lab exists for component: CPKMB  No results found for: CHOL, CHOLX, CHLST, CHOLV, HDL, HDLP, LDL, LDLC, DLDLP, TGLX, TRIGL, TRIGP, CHHD, CHHDX  Lab Results   Component Value Date/Time    Glucose (POC) 150 (H) 03/26/2023 05:38 AM    Glucose (POC) 181 (H) 03/25/2023 06:51 PM Glucose (POC) 178 (H) 03/14/2023 11:54 AM    Glucose (POC) 111 03/13/2023 05:01 PM    Glucose (POC) 150 (H) 03/13/2023 11:38 AM     Lab Results   Component Value Date/Time    Color YELLOW/STRAW 03/25/2023 11:23 PM    Appearance CLEAR 03/25/2023 11:23 PM    Specific gravity 1.025 03/25/2023 11:23 PM    pH (UA) 6.0 03/25/2023 11:23 PM    Protein 30 (A) 03/25/2023 11:23 PM    Glucose Negative 03/25/2023 11:23 PM    Ketone Negative 03/25/2023 11:23 PM    Bilirubin Negative 03/25/2023 11:23 PM    Urobilinogen 0.2 03/25/2023 11:23 PM    Nitrites Negative 03/25/2023 11:23 PM    Leukocyte Esterase Negative 03/25/2023 11:23 PM    Epithelial cells FEW 03/25/2023 11:23 PM    Bacteria Negative 03/25/2023 11:23 PM    WBC 0-4 03/25/2023 11:23 PM    RBC 0-5 03/25/2023 11:23 PM         Medications Reviewed:     Current Facility-Administered Medications   Medication Dose Route Frequency    amLODIPine (NORVASC) tablet 10 mg  10 mg Oral DAILY    ascorbic acid (vitamin C) (VITAMIN C) tablet 500 mg  500 mg Oral DAILY AFTER BREAKFAST    aspirin delayed-release tablet 81 mg  81 mg Oral BID    atenoloL (TENORMIN) tablet 25 mg  25 mg Oral DAILY    calcium carbonate (OS-CLARA) tablet 500 mg [elemental]  500 mg Oral BID    cholecalciferol (VITAMIN D3) (1000 Units /25 mcg) tablet 1,000 Units  1,000 Units Oral DAILY    levothyroxine (SYNTHROID) tablet 88 mcg  88 mcg Oral ACB    lidocaine 4 % patch 1 Patch  1 Patch TransDERmal Q24H    lisinopriL (PRINIVIL, ZESTRIL) tablet 20 mg  20 mg Oral DAILY    rosuvastatin (CRESTOR) tablet 10 mg  10 mg Oral QHS    omega 3-DHA-EPA-fish oil 1,000 mg (120 mg-180 mg) capsule 1 Capsule  1 Capsule Oral ACB/HS    insulin lispro (HUMALOG) injection   SubCUTAneous Q6H    glucose chewable tablet 16 g  4 Tablet Oral PRN    glucagon (GLUCAGEN) injection 1 mg  1 mg IntraMUSCular PRN    dextrose 10% infusion 0-250 mL  0-250 mL IntraVENous PRN    sodium chloride (NS) flush 5-10 mL  5-10 mL IntraVENous PRN    0.9% sodium chloride infusion  100 mL/hr IntraVENous CONTINUOUS    piperacillin-tazobactam (ZOSYN) 3.375 g in 0.9% sodium chloride (MBP/ADV) 100 mL MBP  3.375 g IntraVENous Q8H    heparin (porcine) injection 5,000 Units  5,000 Units SubCUTAneous Q8H    HYDROmorphone (DILAUDID) syringe 0.2 mg  0.2 mg IntraVENous Q3H PRN    ondansetron (ZOFRAN) injection 4 mg  4 mg IntraVENous Q4H PRN     Facility-Administered Medications Ordered in Other Encounters   Medication Dose Route Frequency    rocuronium injection   IntraVENous PRN    HYDROmorphone (PF) (DILAUDID) injection   IntraVENous PRN    lidocaine (PF) (XYLOCAINE) 20 mg/mL (2 %) injection   IntraVENous PRN    propofoL (DIPRIVAN) 10 mg/mL injection   IntraVENous PRN    succinylcholine (ANECTINE) injection   IntraVENous PRN    lactated Ringers infusion   IntraVENous CONTINUOUS     ______________________________________________________________________  EXPECTED LENGTH OF STAY: - - -  ACTUAL LENGTH OF STAY:          1                 Kenneth Jones MD

## 2023-03-26 NOTE — BRIEF OP NOTE
Brief Postoperative Note    Patient: Bill Katz  YOB: 1935  MRN: [id number]    Date of Procedure: 3/26/2023     Pre-Op Diagnosis: Small bowel perforation ([address] [K63.1]    Post-Op Diagnosis: Same as preoperative diagnosis. Jejunal diverticulitis. Procedure(s):  LAPAROTOMY EXPLORATORY  DRAINAGE ABSCESS   SMALL BOWEL RESECTION    Surgeon(s):  Ashok Gonzalez MD    Surgical Assistant: Surg Asst-1: Diana Saab LPN    Anesthesia: General     Estimated Blood Loss (mL): Minimal    Complications: None    Specimens:   ID Type Source Tests Collected by Time Destination   1 : Small Bowel  Preservative Abdomen  Ashok Gonzalez MD 3/26/2023 1217 Pathology        Implants: * No implants in log *    Drains:   Vic-Glover Drain 03/26/23 Right; Lower Abdomen (Active)       External Urinary Catheter 03/25/23 (Active)   Site Assessment Clean, dry, & intact 03/26/23 0800   Repositioned Yes 03/26/23 0529   Perineal Care No 03/26/23 0529   Wick Changed No 03/26/23 0529   Suction Canister/Tubing Changed No 03/26/23 0529   Urine Output (mL) 125 ml 03/26/23 0529       Findings: Multiple jejunal diverticules, large perforation walled off behind transverse colon     Electronically Signed by Kelsey Raman MD on 3/26/2023 at 12:31 PM

## 2023-03-26 NOTE — PROGRESS NOTES
Problem: Falls - Risk of  Goal: *Absence of Falls  Description: Document Katheryn Raymundo Fall Risk and appropriate interventions in the flowsheet.   Outcome: Progressing Towards Goal  Note: Fall Risk Interventions:                                Problem: Aspiration - Risk of  Goal: *Absence of aspiration  Outcome: Progressing Towards Goal

## 2023-03-26 NOTE — CONSULTS
Hospitalist Consult  NAME: Bill Katz  MRN: [id number]  YOB: 1935  AGE: 82 y.o.  female  SOCIAL SECURITY NUMBER: [id number]  Primary Care Provider: Saima Millan NP  CODE STATUS: Full code    Reason for consultation: Medical management of atrial fibrillation, essential hypertension, diabetes mellitus, hypothyroidism  Consult requested by surgeon, Dr. Hoang Valverde    ASSESSMENT/PLAN:    Small bowel contained perforation versus abscess. Patient has been started on IV Zosyn, and there are tentative plans for operative management tomorrow by primary team.  Continue to monitor WBC, currently 21,200. Patient is n.p.o., IV fluids with normal saline at 100 cc/h. Inferior left hepatic lobe enhancing lesion, 14 mm, per CT scan. Outpatient follow-up with gastroenterologist.  MRI may be indicated? Paroxysmal atrial fibrillation. Orders have been placed for EKG, telemetry. Continue to monitor on telemetry. Based on exam, patient appears to be in normal sinus rhythm at this time. Hold Eliquis in anticipation of surgery. Continue atenolol 25 mg at the reduced dose of once daily rather than twice daily, given that patient's heart rate is in the high 50s-low 60s. Essential hypertension. Atenolol as above. Continue lisinopril 20 mg p.o. daily, Norvasc 10 mg p.o. daily. Diabetes mellitus. We will hold glipizide at this time as patient is n.p.o.  Sliding scale insulin as needed. Hypothyroidism. Continue levothyroxine 88 mcg p.o. daily. From medicine point of view, there are no barriers to surgery tomorrow per current evaluation. History of Presenting Illness: Bill Katz is a 82 y.o. female who presents with small bowel perforation, and is tentatively scheduled to have surgery tomorrow morning at 10 AM.  She states that she started having lower abdominal pain 3-4 days ago.   Patient states that she feels a constant bloating discomfort in the lower abdomen, left greater than right. She states that she does not really experience pain unless somebody presses on the abdomen, and again, the pain is more prominent on the left lower quadrant than the right. Patient denies nausea, vomiting, fever, chills. She states that she has continued to eat as she normally does, and that the pain did not change with food. Patient denies diarrhea. Patient denies any black stools, red blood in the stools. She is passing gas. Patient states that her daughter felt that her abdomen is slightly bigger in girth than baseline. Patient states that her abdominal pain was 4-5/10 in intensity at its worst.    Patient is well-known to me from previous hospital admission of 3/13/2023. She was admitted with newly diagnosed atrial fibrillation. Patient is on Eliquis, and states that her last Eliquis dose was this morning. Review of Systems:  A comprehensive review of systems was negative except for that written in the History of Present Illness. Past Medical History:   Diagnosis Date    Arthritis     Atrial fibrillation ([address]     detected 3/13/2023    Depression     Diabetes (HCC)     GERD (gastroesophageal reflux disease)     High cholesterol     Hypertension     Snoring     Transient ischemic attack (TIA) 2003        PAST SURGICAL HISTORY:   Past Surgical History:   Procedure Laterality Date    HX APPENDECTOMY      HX CATARACT REMOVAL Bilateral     HX CHOLECYSTECTOMY      HX ORTHOPAEDIC Left     Open Repair    HX ORTHOPAEDIC Left     Trigger finger    HX ROTATOR CUFF REPAIR Right     HX TUBAL LIGATION         Prior to Admission medications    Medication Sig Start Date End Date Taking? Authorizing Provider   apixaban (ELIQUIS) 5 mg tablet Take 1 Tablet by mouth two (2) times a day for 30 days. 3/14/23 4/13/23 Yes Jocelyn Bal MD   lisinopriL (PRINIVIL, ZESTRIL) 20 mg tablet Take 20 mg by mouth daily.    Yes Provider, Historical   lidocaine (LIDODERM) 5 % 1 Patch by TransDERmal route every twenty-four (24) hours. Apply patch to the affected area for 12 hours a day and remove for 12 hours a day. Yes Provider, Historical   calcium carbonate (CALTREX) 600 mg calcium (1,500 mg) tablet Take 600 mg by mouth two (2) times a day. Yes Provider, Historical   amLODIPine (NORVASC) 10 mg tablet Take 10 mg by mouth daily. 4/3/21  Yes Provider, Historical   atenoloL (TENORMIN) 25 mg tablet Take 25 mg by mouth two (2) times a day. 21  Yes Provider, Historical   levothyroxine (SYNTHROID) 88 mcg tablet Take 88 mcg by mouth Daily (before breakfast). 21  Yes Provider, Historical   aspirin delayed-release 81 mg tablet Take 1 Tab by mouth two (2) times a day. 19  Yes Aurelio Cutler MD   rosuvastatin (CRESTOR) 10 mg tablet Take 10 mg by mouth nightly. Yes Provider, Historical   cholecalciferol (VITAMIN D3) 25 mcg (1,000 unit) cap Take 1,000 Units by mouth daily (after breakfast). Yes Provider, Historical   glipiZIDE SR (GLUCOTROL XL) 2.5 mg CR tablet Take 2.5 mg by mouth daily (after breakfast). Yes Provider, Historical   ascorbic acid, vitamin C, (VITAMIN C) 500 mg tablet Take 500 mg by mouth daily (after breakfast). Yes Provider, Historical   docosahexanoic acid/epa (FISH OIL PO) Take 1,500 mg by mouth ACB/HS. Yes Provider, Historical   potassium chloride SA (MICRO-K) 10 mEq capsule Take 20 mEq by mouth daily. Yes Provider, Historical       Allergies   Allergen Reactions    Percocet [Oxycodone-Acetaminophen] Unknown (comments)     Can't remember reaction due to length of time of occurrence.         Family History   Problem Relation Age of Onset    Heart Disease Mother     Alzheimer's Disease Father     Ovarian Cancer Sister     Alzheimer's Disease Paternal Grandfather         Social History     Tobacco Use    Smoking status: Former     Packs/day: 2.00     Years: 30.00     Pack years: 60.00     Types: Cigarettes     Quit date: 1986     Years since quittin.5 Smokeless tobacco: Never   Vaping Use    Vaping Use: Never used   Substance Use Topics    Alcohol use: Yes     Comment: social 1 x year    Drug use: No       Physical exam  Patient Vitals for the past 24 hrs:   BP Temp Pulse Resp SpO2   03/25/23 2104 136/61 99.1 °F (37.3 °C) (!) 59 18 94 %   03/25/23 2030 (!) 122/50 99.2 °F (37.3 °C) 65 18 95 %   03/25/23 2015 (!) 122/50 -- 63 18 95 %   03/25/23 1924 -- -- -- -- 97 %   03/25/23 1915 113/73 -- 73 16 97 %   03/25/23 1900 (!) 165/59 99.3 °F (37.4 °C) 68 23 97 %   03/25/23 1845 (!) 149/82 -- 72 23 96 %   03/25/23 1830 (!) 157/68 -- 67 18 94 %   03/25/23 1817 -- (!) 100.8 °F (38.2 °C) -- -- --   03/25/23 1800 (!) 163/61 -- 70 21 94 %   03/25/23 1759 (!) 171/58 -- 69 15 99 %   03/25/23 1552 139/75 -- 63 22 95 %   03/25/23 1549 -- -- 65 -- --   03/25/23 1519 (!) 144/71 99 °F (37.2 °C) 60 18 97 %   On room air  Estimated body mass index is 23.81 kg/m² as calculated from the following:    Height as of this encounter: 5' 5\" (1.651 m). Weight as of this encounter: 64.9 kg (143 lb 1.3 oz). General: In no acute distress. Well developed, well nourished. Head: Normocephalic, atraumatic. Eyes: Anicteric sclera. PERRL. Extraocular muscles intact. ENT: External ears and nose appear normal.  Oral mucosa moist.  Neck: Supple. No jugular venous distention. Heart: Regular rate and rhythm. No murmurs appreciated. Chest: Symmetrical excursion. Clear to auscultation bilaterally. Abdomen: Soft, mildly tender to palpation in the left lower quadrant; I do not find the abdomen to be significantly distended. Bowel sounds are present throughout. Extremities: No gross deformities. No edema, no cyanosis. Feet are warm to touch. Neurological: No lateralizing deficits. Alert, oriented X3. Skin: No jaundice. No rashes.           Recent Results (from the past 24 hour(s))   CBC WITH AUTOMATED DIFF    Collection Time: 03/25/23  3:57 PM   Result Value Ref Range    WBC 21.2 (H) 3.6 - 11.0 K/uL    RBC 4.09 3.80 - 5.20 M/uL    HGB 13.9 11.5 - 16.0 g/dL    HCT 40.1 35.0 - 47.0 %    MCV 98.0 80.0 - 99.0 FL    MCH 34.0 26.0 - 34.0 PG    MCHC 34.7 30.0 - 36.5 g/dL    RDW 11.8 11.5 - 14.5 %    PLATELET 505 686 - 923 K/uL    MPV 10.4 8.9 - 12.9 FL    NRBC 0.0 0  WBC    ABSOLUTE NRBC 0.00 0.00 - 0.01 K/uL    NEUTROPHILS 78 (H) 32 - 75 %    LYMPHOCYTES 10 (L) 12 - 49 %    MONOCYTES 11 5 - 13 %    EOSINOPHILS 1 0 - 7 %    BASOPHILS 0 0 - 1 %    IMMATURE GRANULOCYTES 0 0.0 - 0.5 %    ABS. NEUTROPHILS 16.6 (H) 1.8 - 8.0 K/UL    ABS. LYMPHOCYTES 2.1 0.8 - 3.5 K/UL    ABS. MONOCYTES 2.3 (H) 0.0 - 1.0 K/UL    ABS. EOSINOPHILS 0.2 0.0 - 0.4 K/UL    ABS. BASOPHILS 0.0 0.0 - 0.1 K/UL    ABS. IMM. GRANS. 0.0 0.00 - 0.04 K/UL    DF MANUAL      PLATELET COMMENTS Large Platelets      RBC COMMENTS NORMOCYTIC, NORMOCHROMIC     METABOLIC PANEL, COMPREHENSIVE    Collection Time: 03/25/23  3:57 PM   Result Value Ref Range    Sodium 125 (L) 136 - 145 mmol/L    Potassium 3.8 3.5 - 5.1 mmol/L    Chloride 92 (L) 97 - 108 mmol/L    CO2 29 21 - 32 mmol/L    Anion gap 4 (L) 5 - 15 mmol/L    Glucose 192 (H) 65 - 100 mg/dL    BUN 21 (H) 6 - 20 MG/DL    Creatinine 1.09 (H) 0.55 - 1.02 MG/DL    BUN/Creatinine ratio 19 12 - 20      eGFR 50 (L) >60 ml/min/1.73m2    Calcium 9.3 8.5 - 10.1 MG/DL    Bilirubin, total 1.5 (H) 0.2 - 1.0 MG/DL    ALT (SGPT) 22 12 - 78 U/L    AST (SGOT) 18 15 - 37 U/L    Alk.  phosphatase 34 (L) 45 - 117 U/L    Protein, total 6.6 6.4 - 8.2 g/dL    Albumin 3.0 (L) 3.5 - 5.0 g/dL    Globulin 3.6 2.0 - 4.0 g/dL    A-G Ratio 0.8 (L) 1.1 - 2.2     MAGNESIUM    Collection Time: 03/25/23  3:57 PM   Result Value Ref Range    Magnesium 1.6 1.6 - 2.4 mg/dL   SAMPLES BEING HELD    Collection Time: 03/25/23  3:57 PM   Result Value Ref Range    SAMPLES BEING HELD  1GRN, 1BLU, 1RED, 1SST     COMMENT        Add-on orders for these samples will be processed based on acceptable specimen integrity and analyte stability, which may vary by analyte. TROPONIN-HIGH SENSITIVITY    Collection Time: 03/25/23  3:57 PM   Result Value Ref Range    Troponin-High Sensitivity 7 0 - 51 ng/L   URINALYSIS W/MICROSCOPIC    Collection Time: 03/25/23  4:55 PM   Result Value Ref Range    Color YELLOW/STRAW      Appearance CLEAR CLEAR      Specific gravity 1.017 1.003 - 1.030      pH (UA) 5.5 5.0 - 8.0      Protein 100 (A) NEG mg/dL    Glucose Negative NEG mg/dL    Ketone 15 (A) NEG mg/dL    Bilirubin Negative NEG      Blood Negative NEG      Urobilinogen 0.2 0.2 - 1.0 EU/dL    Nitrites Negative NEG      Leukocyte Esterase Negative NEG      WBC 0-4 0 - 4 /hpf    RBC 0-5 0 - 5 /hpf    Epithelial cells FEW FEW /lpf    Bacteria Negative NEG /hpf   URINE CULTURE HOLD SAMPLE    Collection Time: 03/25/23  4:55 PM    Specimen: Urine   Result Value Ref Range    Urine culture hold        Urine on hold in Microbiology dept for 2 days. If unpreserved urine is submitted, it cannot be used for addtional testing after 24 hours, recollection will be required. POC LACTIC ACID    Collection Time: 03/25/23  6:45 PM   Result Value Ref Range    Lactic Acid (POC) 1.35 0.40 - 2.00 mmol/L   GLUCOSE, POC    Collection Time: 03/25/23  6:51 PM   Result Value Ref Range    Glucose (POC) 181 (H) 65 - 117 mg/dL    Performed by Liliana Juarez        XR CHEST PORT  Narrative: EXAM:  XR CHEST PORT    INDICATION: Fever    COMPARISON: March 13, 2023    TECHNIQUE: portable chest AP view    FINDINGS: The cardiac silhouette is within normal limits. The pulmonary  vasculature is within normal limits. The lungs and pleural spaces are clear. The visualized bones and upper abdomen  are age-appropriate. Impression: No acute process on portable chest.  CT ABD PELV W CONT  Narrative: INDICATION: Abdominal pain    COMPARISON: None    TECHNIQUE:   Thin axial images were obtained through the abdomen and pelvis following  intravenous iodinated contrast administration.  Coronal and sagittal  reconstructions were generated. Oral contrast was not administered. CT dose  reduction was achieved through use of a standardized protocol tailored for this  examination and automatic exposure control for dose modulation. FINDINGS:     LIVER: Subcentimeter low density right hepatic lobe lesion. 14 mm enhancing  lesion versus vascular abnormality of the inferior left hepatic lobe (image 31). Mild biliary ductal prominence   GALLBLADDER: Surgically absent  SPLEEN: Unremarkable  PANCREAS: No mass or ductal dilatation. ADRENALS: Moderate thickening of the right adrenal gland with questionable  underlying nodule  KIDNEYS/URETERS: Nonspecific low-density lesion. No evidence for abnormal  enhancing mass. No hydronephrosis   PERITONEUM: No ascites. No abdominal lymphadenopathy  COLON: Bowel wall thickening in the distal transverse colon which may be  reactive  APPENDIX: Not visualized  SMALL BOWEL: No small bowel segmental inflammatory change with moderate  follow-up thickening and associated contained perforation versus abscess  measuring 4.4 x 3.5 cm (image 48). STOMACH: Unremarkable. PELVIS: Small amount of pelvic free fluid. No pelvic lymphadenopathy. BONES: Right hip arthroplasty. Stable thoracic compression deformities  VISUALIZED THORAX: Bibasilar atelectasis versus scarring  ADDITIONAL COMMENTS: N/A  Impression: 1. Mid small bowel inflammatory change with moderate circumferential wall  thickening and associated contained perforation versus abscess measuring  approximately 4 cm. This may be related to inflammatory bowel disease or small  bowel diverticulitis. 2. 14 mm enhancing lesion versus vascular abnormality of the inferior left  hepatic lobe. Recommend further evaluation with abdominal ultrasound. 3. Right adrenal thickening versus underlying nodule. 4. Small amount of pelvic free fluid. 5. Please refer to above findings for complete details.       03/13/23    ECHO ADULT COMPLETE 03/14/2023 3/14/2023    Interpretation Summary    Left Ventricle: Normal left ventricular systolic function with a visually estimated EF of 55 - 60%. Left ventricle size is normal. Normal wall thickness. Normal wall motion. Grade I diastolic dysfunction with normal LAP.     Signed by: Matthias Renteria MD on 3/14/2023  5:19 PM            Signature:  Melanie Romero DO

## 2023-03-26 NOTE — PERIOP NOTES
TRANSFER - OUT REPORT:    Verbal report given to Jenny(name) on Lorrie Arias  being transferred to [facility](unit) for routine post - op       Report consisted of patients Situation, Background, Assessment and   Recommendations(SBAR). Information from the following report(s) SBAR, OR Summary, and Cardiac Rhythm sinus tach  was reviewed with the receiving nurse. Lines:   Peripheral IV 03/25/23 Left Antecubital (Active)   Site Assessment Clean, dry, & intact 03/26/23 1252   Phlebitis Assessment 0 03/26/23 1252   Infiltration Assessment 0 03/26/23 1252   Dressing Status Clean, dry, & intact 03/26/23 1252   Dressing Type Tape;Transparent 03/26/23 1252   Hub Color/Line Status Pink;Patent 03/26/23 1252   Action Taken Open ports on tubing capped 03/26/23 1252   Alcohol Cap Used Yes 03/26/23 1252       Peripheral IV 03/25/23 Right Antecubital (Active)   Site Assessment Clean, dry, & intact 03/26/23 1252   Phlebitis Assessment 0 03/26/23 1252   Infiltration Assessment 0 03/26/23 1252   Dressing Status Clean, dry, & intact 03/26/23 1252   Dressing Type Tape;Transparent 03/26/23 1252   Hub Color/Line Status Pink;Patent 03/26/23 1252   Action Taken Open ports on tubing capped 03/26/23 1252   Alcohol Cap Used Yes 03/26/23 1252        Opportunity for questions and clarification was provided.       Patient transported with:   Registered Nurse  Cleared by anesthesia to go to inpatient floor

## 2023-03-26 NOTE — ED NOTES
TRANSFER - OUT REPORT:    Verbal report given to CHRIS Grigsby(name) on Janell Part  being transferred to [facility](unit) for routine progression of care       Report consisted of patients Situation, Background, Assessment and   Recommendations(SBAR). Information from the following report(s) SBAR, ED Summary, Procedure Summary, Intake/Output, MAR, Recent Results, Med Rec Status and Cardiac Rhythm nsr was reviewed with the receiving nurse. Lines:   Peripheral IV 03/25/23 Left Antecubital (Active)       Peripheral IV 03/25/23 Right Antecubital (Active)        Opportunity for questions and clarification was provided.       Patient transported with:   Registered Nurse

## 2023-03-26 NOTE — PROGRESS NOTES
Discussed patient with daughter by telephone. Will need to be optimized by hospitalist overnight and tomorrow. Tentative plan for OR tomorrow ~1000. May consider waiting a bit longer if hospitalist/anesthesiologist feels we should wait.      Cordell Tenorio MD

## 2023-03-26 NOTE — PROGRESS NOTES
Bedside and Verbal shift change report given to Micky Trujillo (oncoming nurse) by Estelle Cortez RN (offgoing nurse). Report included the following information SBAR, Kardex, Intake/Output, MAR, Recent Results, and Med Rec Status.

## 2023-03-26 NOTE — ANESTHESIA POSTPROCEDURE EVALUATION
Procedure(s):  LAPAROTOMY EXPLORATORY, SMALL BOWEL RESECTION, DRAINAGE OF ABSCESS. general    Anesthesia Post Evaluation      Multimodal analgesia: multimodal analgesia used between 6 hours prior to anesthesia start to PACU discharge  Patient location during evaluation: bedside  Patient participation: complete - patient participated  Level of consciousness: awake and alert and responsive to verbal stimuli  Pain score: 1  Pain management: satisfactory to patient  Airway patency: patent  Anesthetic complications: no  Cardiovascular status: acceptable and hemodynamically stable  Respiratory status: acceptable and spontaneous ventilation  Hydration status: acceptable  Post anesthesia nausea and vomiting:  none  Final Post Anesthesia Temperature Assessment:  Normothermia (36.0-37.5 degrees C)      INITIAL Post-op Vital signs:   Vitals Value Taken Time   /66 03/26/23 1421   Temp 37.1 °C (98.8 °F) 03/26/23 1252   Pulse 103 03/26/23 1423   Resp 35 03/26/23 1423   SpO2 93 % 03/26/23 1423   Vitals shown include unvalidated device data.

## 2023-03-27 LAB
ANION GAP SERPL CALC-SCNC: 6 MMOL/L (ref 5–15)
BASOPHILS # BLD: 0 K/UL (ref 0–0.1)
BASOPHILS NFR BLD: 0 % (ref 0–1)
BUN SERPL-MCNC: 14 MG/DL (ref 6–20)
BUN/CREAT SERPL: 19 (ref 12–20)
CALCIUM SERPL-MCNC: 7.7 MG/DL (ref 8.5–10.1)
CHLORIDE SERPL-SCNC: 111 MMOL/L (ref 97–108)
CO2 SERPL-SCNC: 20 MMOL/L (ref 21–32)
CREAT SERPL-MCNC: 0.74 MG/DL (ref 0.55–1.02)
DIFFERENTIAL METHOD BLD: ABNORMAL
EOSINOPHIL # BLD: 0 K/UL (ref 0–0.4)
EOSINOPHIL NFR BLD: 0 % (ref 0–7)
ERYTHROCYTE [DISTWIDTH] IN BLOOD BY AUTOMATED COUNT: 12.1 % (ref 11.5–14.5)
GLUCOSE BLD STRIP.AUTO-MCNC: 184 MG/DL (ref 65–117)
GLUCOSE BLD STRIP.AUTO-MCNC: 194 MG/DL (ref 65–117)
GLUCOSE BLD STRIP.AUTO-MCNC: 217 MG/DL (ref 65–117)
GLUCOSE BLD STRIP.AUTO-MCNC: 235 MG/DL (ref 65–117)
GLUCOSE BLD STRIP.AUTO-MCNC: 250 MG/DL (ref 65–117)
GLUCOSE BLD STRIP.AUTO-MCNC: 253 MG/DL (ref 65–117)
GLUCOSE SERPL-MCNC: 246 MG/DL (ref 65–100)
HCT VFR BLD AUTO: 36 % (ref 35–47)
HGB BLD-MCNC: 12.5 G/DL (ref 11.5–16)
IMM GRANULOCYTES # BLD AUTO: 0 K/UL (ref 0–0.04)
IMM GRANULOCYTES NFR BLD AUTO: 0 % (ref 0–0.5)
LYMPHOCYTES # BLD: 0.5 K/UL (ref 0.8–3.5)
LYMPHOCYTES NFR BLD: 6 % (ref 12–49)
MAGNESIUM SERPL-MCNC: 1.5 MG/DL (ref 1.6–2.4)
MCH RBC QN AUTO: 34.2 PG (ref 26–34)
MCHC RBC AUTO-ENTMCNC: 34.7 G/DL (ref 30–36.5)
MCV RBC AUTO: 98.6 FL (ref 80–99)
MONOCYTES # BLD: 1.4 K/UL (ref 0–1)
MONOCYTES NFR BLD: 14 % (ref 5–13)
NEUTS SEG # BLD: 7.4 K/UL (ref 1.8–8)
NEUTS SEG NFR BLD: 80 % (ref 32–75)
NRBC # BLD: 0 K/UL (ref 0–0.01)
NRBC BLD-RTO: 0 PER 100 WBC
PHOSPHATE SERPL-MCNC: 1.5 MG/DL (ref 2.6–4.7)
PLATELET # BLD AUTO: 160 K/UL (ref 150–400)
PMV BLD AUTO: 11 FL (ref 8.9–12.9)
POTASSIUM SERPL-SCNC: 3.7 MMOL/L (ref 3.5–5.1)
RBC # BLD AUTO: 3.65 M/UL (ref 3.8–5.2)
SERVICE CMNT-IMP: ABNORMAL
SODIUM SERPL-SCNC: 137 MMOL/L (ref 136–145)
WBC # BLD AUTO: 9.4 K/UL (ref 3.6–11)

## 2023-03-27 PROCEDURE — 74011000258 HC RX REV CODE- 258: Performed by: SURGERY

## 2023-03-27 PROCEDURE — 74011250636 HC RX REV CODE- 250/636: Performed by: INTERNAL MEDICINE

## 2023-03-27 PROCEDURE — 85025 COMPLETE CBC W/AUTO DIFF WBC: CPT

## 2023-03-27 PROCEDURE — 82962 GLUCOSE BLOOD TEST: CPT

## 2023-03-27 PROCEDURE — 74011000250 HC RX REV CODE- 250: Performed by: INTERNAL MEDICINE

## 2023-03-27 PROCEDURE — 74011250637 HC RX REV CODE- 250/637: Performed by: SURGERY

## 2023-03-27 PROCEDURE — 80048 BASIC METABOLIC PNL TOTAL CA: CPT

## 2023-03-27 PROCEDURE — 74011250637 HC RX REV CODE- 250/637: Performed by: INTERNAL MEDICINE

## 2023-03-27 PROCEDURE — 36415 COLL VENOUS BLD VENIPUNCTURE: CPT

## 2023-03-27 PROCEDURE — 74011636637 HC RX REV CODE- 636/637: Performed by: INTERNAL MEDICINE

## 2023-03-27 PROCEDURE — 74011000250 HC RX REV CODE- 250: Performed by: SURGERY

## 2023-03-27 PROCEDURE — 83735 ASSAY OF MAGNESIUM: CPT

## 2023-03-27 PROCEDURE — 65270000046 HC RM TELEMETRY

## 2023-03-27 PROCEDURE — 84100 ASSAY OF PHOSPHORUS: CPT

## 2023-03-27 PROCEDURE — 74011250636 HC RX REV CODE- 250/636: Performed by: SURGERY

## 2023-03-27 PROCEDURE — 99223 1ST HOSP IP/OBS HIGH 75: CPT | Performed by: NURSE PRACTITIONER

## 2023-03-27 RX ORDER — MAGNESIUM SULFATE HEPTAHYDRATE 40 MG/ML
2 INJECTION, SOLUTION INTRAVENOUS ONCE
Status: COMPLETED | OUTPATIENT
Start: 2023-03-27 | End: 2023-03-27

## 2023-03-27 RX ADMIN — INSULIN LISPRO 3 UNITS: 100 INJECTION, SOLUTION INTRAVENOUS; SUBCUTANEOUS at 17:11

## 2023-03-27 RX ADMIN — ASPIRIN 81 MG: 81 TABLET, COATED ORAL at 17:11

## 2023-03-27 RX ADMIN — CALCIUM 500 MG: 500 TABLET ORAL at 17:11

## 2023-03-27 RX ADMIN — OMEGA-3 FATTY ACIDS CAP 1000 MG 1 CAPSULE: 1000 CAP at 22:59

## 2023-03-27 RX ADMIN — INSULIN LISPRO 5 UNITS: 100 INJECTION, SOLUTION INTRAVENOUS; SUBCUTANEOUS at 11:53

## 2023-03-27 RX ADMIN — ACETAMINOPHEN 1000 MG: 500 TABLET ORAL at 22:58

## 2023-03-27 RX ADMIN — AMLODIPINE BESYLATE 10 MG: 5 TABLET ORAL at 08:31

## 2023-03-27 RX ADMIN — SODIUM CHLORIDE, PRESERVATIVE FREE 10 ML: 5 INJECTION INTRAVENOUS at 13:21

## 2023-03-27 RX ADMIN — OMEGA-3 FATTY ACIDS CAP 1000 MG 1 CAPSULE: 1000 CAP at 06:35

## 2023-03-27 RX ADMIN — PIPERACILLIN AND TAZOBACTAM 3.38 G: 3; .375 INJECTION, POWDER, LYOPHILIZED, FOR SOLUTION INTRAVENOUS at 08:30

## 2023-03-27 RX ADMIN — ASPIRIN 81 MG: 81 TABLET, COATED ORAL at 08:31

## 2023-03-27 RX ADMIN — ENOXAPARIN SODIUM 40 MG: 100 INJECTION SUBCUTANEOUS at 08:30

## 2023-03-27 RX ADMIN — LEVOTHYROXINE SODIUM 88 MCG: 88 TABLET ORAL at 06:35

## 2023-03-27 RX ADMIN — INSULIN LISPRO 2 UNITS: 100 INJECTION, SOLUTION INTRAVENOUS; SUBCUTANEOUS at 06:38

## 2023-03-27 RX ADMIN — PIPERACILLIN AND TAZOBACTAM 3.38 G: 3; .375 INJECTION, POWDER, LYOPHILIZED, FOR SOLUTION INTRAVENOUS at 16:46

## 2023-03-27 RX ADMIN — SODIUM CHLORIDE, POTASSIUM CHLORIDE, SODIUM LACTATE AND CALCIUM CHLORIDE 75 ML/HR: 600; 310; 30; 20 INJECTION, SOLUTION INTRAVENOUS at 03:53

## 2023-03-27 RX ADMIN — POTASSIUM PHOSPHATE, MONOBASIC POTASSIUM PHOSPHATE, DIBASIC: 224; 236 INJECTION, SOLUTION, CONCENTRATE INTRAVENOUS at 15:19

## 2023-03-27 RX ADMIN — PIPERACILLIN AND TAZOBACTAM 3.38 G: 3; .375 INJECTION, POWDER, LYOPHILIZED, FOR SOLUTION INTRAVENOUS at 00:36

## 2023-03-27 RX ADMIN — OXYCODONE HYDROCHLORIDE 5 MG: 5 TABLET ORAL at 22:58

## 2023-03-27 RX ADMIN — Medication 1000 UNITS: at 08:31

## 2023-03-27 RX ADMIN — INSULIN LISPRO 3 UNITS: 100 INJECTION, SOLUTION INTRAVENOUS; SUBCUTANEOUS at 00:41

## 2023-03-27 RX ADMIN — ROSUVASTATIN 10 MG: 10 TABLET, FILM COATED ORAL at 22:59

## 2023-03-27 RX ADMIN — OXYCODONE HYDROCHLORIDE AND ACETAMINOPHEN 500 MG: 500 TABLET ORAL at 08:30

## 2023-03-27 RX ADMIN — CALCIUM 500 MG: 500 TABLET ORAL at 08:32

## 2023-03-27 RX ADMIN — ACETAMINOPHEN 1000 MG: 500 TABLET ORAL at 09:40

## 2023-03-27 RX ADMIN — SODIUM CHLORIDE, PRESERVATIVE FREE 10 ML: 5 INJECTION INTRAVENOUS at 22:59

## 2023-03-27 RX ADMIN — NALOXEGOL OXALATE 25 MG: 12.5 TABLET, FILM COATED ORAL at 08:31

## 2023-03-27 RX ADMIN — MAGNESIUM SULFATE HEPTAHYDRATE 2 G: 40 INJECTION, SOLUTION INTRAVENOUS at 14:54

## 2023-03-27 RX ADMIN — ACETAMINOPHEN 1000 MG: 500 TABLET ORAL at 04:50

## 2023-03-27 RX ADMIN — ACETAMINOPHEN 1000 MG: 500 TABLET ORAL at 15:36

## 2023-03-27 RX ADMIN — HEPARIN SODIUM 5000 UNITS: 5000 INJECTION INTRAVENOUS; SUBCUTANEOUS at 06:36

## 2023-03-27 RX ADMIN — SODIUM CHLORIDE, PRESERVATIVE FREE 5 ML: 5 INJECTION INTRAVENOUS at 05:57

## 2023-03-27 RX ADMIN — OXYCODONE HYDROCHLORIDE 5 MG: 5 TABLET ORAL at 14:53

## 2023-03-27 RX ADMIN — LISINOPRIL 20 MG: 20 TABLET ORAL at 08:31

## 2023-03-27 RX ADMIN — ATENOLOL 25 MG: 50 TABLET ORAL at 08:31

## 2023-03-27 NOTE — CONSULTS
Palliative Medicine Consult  Robbie: [phone] ([phone])    Patient Name: Merrilyn Meigs  YOB: 1935    Date of Initial Consult: March 27, 2023  Reason for Consult: Sepsis  Requesting Provider: Dr. Leda Alexander   Primary Care Physician: Selvin Oates, JOSELIN     SUMMARY:   Merrilyn Meigs is a 82 y.o. female admitted on 3/25/2023 from home with a diagnosis of  SBO with abscess s/p exploratory lap with drainage of abscess and small bowel resection, jejunal diverticulitis. Pt doing well post op. Pt presented with abdominal pain x 3-4 days initially. PMH: DM2, HTN, afib, hypothyroid, hepatic lesion,TIA, pafib,    Current medical issues leading to Palliative Medicine involvement include: sepsis diagnosis and risk of compromise. Social: pt lives at home alone and drives. She has 3 daughters that are involved in her life and very supportive. Full Code  +AMD   PALLIATIVE DIAGNOSES:   Acute abdominal Pain  SBO  Physical debility   DNR discussion     PLAN:   Pt seen with daughters Verner Flank and Sudheer Remedies at the bedside along with Kriss MAE  Pt and family updated by the attending physician. We introduced our services and offered support. Family had a lot of questions about the plan after discharge due tot he patient's physical decline. We were able to answer all of the questions. Plans for physical therapy today. Discussed code status and pt and family affirmed DNR. Discussed completing the [facility] tomorrow allowing the pt more time to optimize mentation. pt and family confirmed AMD.  No other palliative needs at this time. Will follow up.   Initial consult note routed to primary continuity provider and/or primary health care team members  Communicated plan of care with: Palliative [facility] Team     GOALS OF CARE / TREATMENT PREFERENCES:     GOALS OF CARE:  Patient/Health Care Proxy Stated Goals: Cure    TREATMENT PREFERENCES:   Code Status: DNR    Patient and family's personal goals include: continue current care. DNr       Advance Care Planning:  [x] The [facility] Interdisciplinary Team has updated the ACP Navigator with Health Care Decision Maker and Patient Capacity      Primary Decision Maker: Khalida Dasilva - Daughter - [phone]    Primary Decision Maker: Valdo Charles - Daughter - [phone]  Advance Care Planning 3/27/2023   Patient's Healthcare Decision Maker is: -   Confirm Advance Directive None   Patient Would Like to Complete Advance Directive Yes       Medical Interventions: Limited additional interventions       Other:    As far as possible, the palliative care team has discussed with patient / health care proxy about goals of care / treatment preferences for patient.      HISTORY:     History obtained from: chart, team, family, pt    CHIEF COMPLAINT: Pt admitted with aforementioned history and issues    HPI/SUBJECTIVE:    The patient is:   [x] Verbal and participatory   [] Non-participatory due to: medical condition   No complaints     Clinical Pain Assessment (nonverbal scale for severity on nonverbal patients):   Clinical Pain Assessment  Severity: 0     Activity (Movement): Lying quietly, normal position    Duration: for how long has pt been experiencing pain (e.g., 2 days, 1 month, years)  Frequency: how often pain is an issue (e.g., several times per day, once every few days, constant)     FUNCTIONAL ASSESSMENT:     Palliative Performance Scale (PPS):  PPS: 50       PSYCHOSOCIAL/SPIRITUAL SCREENING:     Palliative IDT has assessed this patient for cultural preferences / practices and a referral made as appropriate to needs (Cultural Services, Patient Advocacy, Ethics, etc.)    Any spiritual / Congregational concerns:  [] Yes /  [x] No  [] Unable to obtain this information  If \"Yes\" to discuss with pastoral care during IDT     Does caregiver feel burdened by caring for their loved one:   [] Yes /  [x] No /  [] No Caregiver Present/Available [] No Caregiver [] Pt Lives at [place] [id number]  If \"Yes\" to discuss with social work during [employer]    Anticipatory grief assessment:   [x] Normal  / [] Maladaptive   [] Unable to obtain this information  [] n/a  If \"Maladaptive\" to discuss with social work during IDT    ESAS Anxiety: Anxiety: 0    ESAS Depression: Depression: 0        REVIEW OF SYSTEMS:     Positive and pertinent negative findings in ROS are noted above in HPI. The following systems were [x] reviewed / [] unable to be reviewed as noted in HPI  Other findings are noted below. Systems: constitutional, ears/nose/mouth/throat, respiratory, gastrointestinal, genitourinary, musculoskeletal, integumentary, neurologic, psychiatric, endocrine. Positive findings noted below. Modified ESAS Completed by: provider   Fatigue: 5 Drowsiness: 0   Depression: 0 Pain: 0   Anxiety: 0       Dyspnea: 0           Stool Occurrence(s): 0        PHYSICAL EXAM:     From RN flowsheet:  Wt Readings from Last 3 Encounters:   03/25/23 143 lb 1.3 oz (64.9 kg)   03/24/23 143 lb (64.9 kg)   03/14/23 143 lb 11.8 oz (65.2 kg)     Blood pressure (!) 123/57, pulse 67, temperature 97.9 °F (36.6 °C), resp. rate 18, height 5' 5\" (1.651 m), weight 143 lb 1.3 oz (64.9 kg), SpO2 92 %.     Pain Scale 1: Numeric (0 - 10)  Pain Intensity 1: 0                 Last bowel movement, if known:     Constitutional: nad  Eyes: pupils equal, anicteric  ENMT: no nasal discharge, moist mucous membranes  Cardiovascular:   Respiratory: breathing not labored, symmetric  Gastrointestinal: band in place, drain with bright red blood  Musculoskeletal: no deformity, no tenderness to palpation  Skin: warm, dry  Neurologic: following commands, moving all extremities  Psychiatric: full affect, no hallucinations  Other:       HISTORY:     Active Problems:    Small bowel perforation (HCC) (3/25/2023)    Past Medical History:   Diagnosis Date    Arthritis     Atrial fibrillation ([facility] [address]     detected 3/13/2023    Depression     Diabetes (HCC)     GERD (gastroesophageal reflux disease)     High cholesterol     Hypertension     Snoring     Transient ischemic attack (TIA)       Past Surgical History:   Procedure Laterality Date    HX APPENDECTOMY      HX CATARACT REMOVAL Bilateral     HX CHOLECYSTECTOMY      HX HIP REPLACEMENT Right     HX ORTHOPAEDIC Left     Open Repair    HX ORTHOPAEDIC Left     Trigger finger    HX ROTATOR CUFF REPAIR Right     HX TUBAL LIGATION        Family History   Problem Relation Age of Onset    Heart Disease Mother     Alzheimer's Disease Father     Ovarian Cancer Sister     Alzheimer's Disease Paternal Grandfather       History reviewed, no pertinent family history. Social History     Tobacco Use    Smoking status: Former     Packs/day: 2.00     Years: 30.00     Pack years: 60.00     Types: Cigarettes     Quit date: 1986     Years since quittin.5    Smokeless tobacco: Never   Substance Use Topics    Alcohol use: Yes     Comment: social 1 x year     Allergies   Allergen Reactions    Percocet [Oxycodone-Acetaminophen] Unknown (comments)     Can't remember reaction due to length of time of occurrence.       Current Facility-Administered Medications   Medication Dose Route Frequency    amLODIPine (NORVASC) tablet 10 mg  10 mg Oral DAILY    ascorbic acid (vitamin C) (VITAMIN C) tablet 500 mg  500 mg Oral DAILY AFTER BREAKFAST    aspirin delayed-release tablet 81 mg  81 mg Oral BID    atenoloL (TENORMIN) tablet 25 mg  25 mg Oral DAILY    calcium carbonate (OS-CLARA) tablet 500 mg [elemental]  500 mg Oral BID    cholecalciferol (VITAMIN D3) (1000 Units /25 mcg) tablet 1,000 Units  1,000 Units Oral DAILY    levothyroxine (SYNTHROID) tablet 88 mcg  88 mcg Oral ACB    lidocaine 4 % patch 1 Patch  1 Patch TransDERmal Q24H    lisinopriL (PRINIVIL, ZESTRIL) tablet 20 mg  20 mg Oral DAILY    rosuvastatin (CRESTOR) tablet 10 mg  10 mg Oral QHS    omega 3-DHA-EPA-fish oil 1,000 mg (120 mg-180 mg) capsule 1 Capsule  1 Capsule Oral ACB/HS    insulin lispro (HUMALOG) injection SubCUTAneous Q6H    glucose chewable tablet 16 g  4 Tablet Oral PRN    glucagon (GLUCAGEN) injection 1 mg  1 mg IntraMUSCular PRN    dextrose 10% infusion 0-250 mL  0-250 mL IntraVENous PRN    lactated Ringers infusion  75 mL/hr IntraVENous CONTINUOUS    sodium chloride (NS) flush 5-40 mL  5-40 mL IntraVENous Q8H    sodium chloride (NS) flush 5-40 mL  5-40 mL IntraVENous PRN    acetaminophen (TYLENOL) tablet 1,000 mg  1,000 mg Oral Q6H    oxyCODONE IR (ROXICODONE) tablet 5 mg  5 mg Oral Q4H PRN    ondansetron (ZOFRAN) injection 4 mg  4 mg IntraVENous Q6H PRN    enoxaparin (LOVENOX) injection 40 mg  40 mg SubCUTAneous DAILY    naloxegoL (MOVANTIK) tablet 25 mg  25 mg Oral DAILY    ibuprofen (MOTRIN) tablet 400 mg  400 mg Oral Q12H PRN    sodium chloride (NS) flush 5-10 mL  5-10 mL IntraVENous PRN    0.9% sodium chloride infusion  100 mL/hr IntraVENous CONTINUOUS    piperacillin-tazobactam (ZOSYN) 3.375 g in 0.9% sodium chloride (MBP/ADV) 100 mL MBP  3.375 g IntraVENous Q8H    heparin (porcine) injection 5,000 Units  5,000 Units SubCUTAneous Q8H    HYDROmorphone (DILAUDID) syringe 0.2 mg  0.2 mg IntraVENous Q3H PRN    ondansetron (ZOFRAN) injection 4 mg  4 mg IntraVENous Q4H PRN          LAB AND IMAGING FINDINGS:     Lab Results   Component Value Date/Time    WBC 9.4 03/27/2023 03:34 AM    HGB 12.5 03/27/2023 03:34 AM    PLATELET 895 37/07/5452 03:34 AM     Lab Results   Component Value Date/Time    Sodium 137 03/27/2023 03:34 AM    Potassium 3.7 03/27/2023 03:34 AM    Chloride 111 (H) 03/27/2023 03:34 AM    CO2 20 (L) 03/27/2023 03:34 AM    BUN 14 03/27/2023 03:34 AM    Creatinine 0.74 03/27/2023 03:34 AM    Calcium 7.7 (L) 03/27/2023 03:34 AM    Magnesium 1.5 (L) 03/27/2023 03:34 AM    Phosphorus 1.5 (L) 03/27/2023 03:34 AM      Lab Results   Component Value Date/Time    Alk.  phosphatase 34 (L) 03/25/2023 03:57 PM    Protein, total 6.6 03/25/2023 03:57 PM    Albumin 3.0 (L) 03/25/2023 03:57 PM    Globulin 3.6 03/25/2023 03:57 PM     No results found for: INR, PTMR, PTP, PT1, PT2, APTT, INREXT, INREXT   No results found for: IRON, FE, TIBC, IBCT, PSAT, FERR   No results found for: PH, PCO2, PO2  No components found for: GLPOC   No results found for: CPK, CKMB             Total time: 70 minutes

## 2023-03-27 NOTE — PROGRESS NOTES
Palliative Medicine      Code Status: DNR    Advance Care Planning:  Advance Care Planning 3/27/2023   Patient's Healthcare Decision Maker is: Named in scanned AMD   Confirm Advance Directive On file   Patient Would Like to Complete Advance Directive Already in place     Patient / Family Encounter Documentation    Participants (names): Pt, dtrs Phong Mercado and Huy Nam, Palliative Medicine (NP [address])    Narrative:  Pt was awake in bed, alert and in good spirits. Dtrs Phong Mercado and Huy Nam were both present; Phong Jess lives locally, Huyanh Nam is visiting from [facility]. Pt has another dtr, Dionicio Burris, as well as a son. Pt is , lives alone, still drives though is restricted to a 25 mile radius from her home per recent Neurology note. Pt was evaluated by Neuropsych in 2020 and 2022, was found to have anxiety, depression, and PTSD but diagnosis of dementia/MCI was not supported on either evaluation. Pt has AMD on file dated 10/23/2019 which appoints dtrs Brannon Mattson and/or Danette Bernard as Medical POAs, either of whom may act independently. Dtr Phong Mercado shared that pt mentioned having a DNR in the past but dtr is unaware of any paperwork. DNR order will be entered today to reflect pt's wishes. Dtr requested that Palliative team follow up tomorrow to complete DDNR, as pt is still recovering from yesterday's surgery. Psychosocial Issues Identified/ Resilience Factors:  Pt has good family support in place; dtrs are proactive in terms of planning next steps, want to be sure SNF would be covered by pt's insurance if needed, want to be sure pt is able to care for herself before returning home. No spiritual concerns identified. Caregiver [place]: Pt lives alone, family has been available to support pt as needed. Does the caregiver feel confident administering medication? Pt was able to self-administer meds. Does the caregiver need any help connecting with community resources?  Possible SNF vs home health, depending on pt's functional status in the coming days  Does the caregiver feel confident assisting with activities of daily living? Pt was managing own ADLs prior to hospitalization. Goals of Care / Plan:  Recover from acute issues, family inquired whether pt will need home health vs SNF, are looking forward to pt working with PT/OT. Pt has AMD in place; Palliative team will assist with completion of DDNR. Discussed with . Thank you for including Palliative Medicine in the care of Ms. Rachel Og.      Edelmira Krishnan LCSW, [facility]-  [phone])

## 2023-03-27 NOTE — PROGRESS NOTES
0945: Hill cath taken out per MD orders. PRV trial beginning now. Patient connected to [employer]. 1325: Patient voided 50mL. Bladder scan shows 217mL of urine. RN assisted patient to the bathroom. Patient states she thinks she may be able to urinate more if sitting on toilet. RN changed chuk pad under patient and noticed a small amount of liquid. Patient stated she doesn't know if it is water or urine. 1340: Patient urinated 200mL.

## 2023-03-27 NOTE — PROGRESS NOTES
Problem: Falls - Risk of  Goal: *Absence of Falls  Description: Document Keith Granadoald Fall Risk and appropriate interventions in the flowsheet. Outcome: Progressing Towards Goal  Note: Fall Risk Interventions:                                Problem: Pressure Injury - Risk of  Goal: *Prevention of pressure injury  Description: Document Luis Scale and appropriate interventions in the flowsheet.   Outcome: Progressing Towards Goal  Note: Pressure Injury Interventions:       Moisture Interventions: Limit adult briefs, Minimize layers    Activity Interventions: Assess need for specialty bed, Increase time out of bed    Mobility Interventions: Assess need for specialty bed, Chair cushion, Float heels    Nutrition Interventions: Document food/fluid/supplement intake

## 2023-03-27 NOTE — ADVANCED PRACTICE NURSE
Notified by nursing of pt being aggitated, aggressive and trying to get out of the bed. Family aware and nursing asking for agent to help calm pt. Review of meds hx - will trial dose of seroquel for aggitation and hopefully sleep hygiene    2200 per nursing pt refusing to take meds, will change to zyprexa now.

## 2023-03-27 NOTE — PROGRESS NOTES
Bedside shift change report given to [address] [place] [address] (oncoming nurse) by Oumou Willingham (offgoing nurse). Report included the following information SBAR, Intake/Output, and Recent Results.

## 2023-03-27 NOTE — PROGRESS NOTES
Spiritual Care Assessment/Progress Note  [address]      NAME: Max Moncada      MRN: [id number]  AGE: 82 y.o. SEX: female  Shinto Affiliation: Yazidism   Language: English     3/27/2023     Total Time (in minutes): 15     Spiritual Assessment begun in [facility]  MED SURG 2 through conversation with:         [x]Patient        [x] Family    [] Friend(s)        Reason for Consult: Palliative Care, Initial/Spiritual Assessment     Spiritual beliefs: (Please include comment if needed)     [x] Identifies with a yuliana tradition:    Cheondoism      [x] Supported by a yuliana community:            [] Claims no spiritual orientation:           [] Seeking spiritual identity:                [] Adheres to an individual form of spirituality:           [] Not able to assess:                           Identified resources for coping:      [x] Prayer                               [] Music                  [] Guided Imagery     [x] Family/friends                 [] Pet visits     [] Devotional reading                         [] Unknown     [] Other:                                               Interventions offered during this visit: (See comments for more details)    Patient Interventions: Affirmation of yuliana, Iconic (affirming the presence of God/Higher Power), Initial/Spiritual assessment, patient floor, Prayer (actual), Prayer (assurance of)     Family/Friend(s):  Affirmation of yuliana, Prayer (assurance of), Iconic (affirming the presence of God/Higher Power), Prayer (actual)     Plan of Care:     [] Support spiritual and/or cultural needs    [] Support AMD and/or advance care planning process      [] Support grieving process   [] Coordinate Rites and/or Rituals    [] Coordination with community clergy   [] No spiritual needs identified at this time   [] Detailed Plan of Care below (See Comments)  [] Make referral to Music Therapy  [] Make referral to Pet Therapy     [] Make referral to Addiction services  [] Make referral to [facility]  [] Make referral to Spiritual Care Partner  [] No future visits requested        [x] Contact Spiritual Care for further referrals     Comments: Initial spiritual assessment in 5 Med Surg. Reviewed chart prior to visit. Miss Judge Reich was in a chair in a very good mood brushing her teeth. He daughter was present. Provided support, she requested prayers for continued healing. She has a [facility] in [facility] and attends a [employer]. Provided spiritual presence and prayer. Contact Spiritual Care for any further referrals.   Elizabeth Forbes., MS., [facility]  Page a  [phone])

## 2023-03-27 NOTE — ACP (ADVANCE CARE PLANNING)
Primary Decision MakerDel Carol Barroso Daughter - [phone]    Primary Decision Maker: Christa Ruiz - [phone]  Advance Care Planning 3/27/2023   Patient's Healthcare Decision Maker is: Named in scanned ACP document   Confirm Advance Directive Yes, on file      Pt has AMD on file dated 10/23/2019 which appoints dtsiobhan Michelle and/or Addie Frye as Medical POAs, either of whom may act independently.

## 2023-03-27 NOTE — PROGRESS NOTES
2119  Patient getting extremely agitated, poor safety awareness wants to get out of bed, patient's legs dangling out of the bed and refuses to be repositioned safely in bed. Called daughter Natalia Casas to inform her what's happening and asked permission if we can given something to calm patient down. Informed NP Jose Fuller. 2201  Seroquel ordered but patient refused to take anything orally. Called the daughter again and she said her sister will come in 35 min to stay with their mother for the night. Informed daughter about patients refusal with oral meds. Informed NP to change Seroquel to IV variant. Daughter Alyse Pimentel arrived and patient is much calmer and agreed to take all due oral meds. Patient said she is sorry about how she behaved. Per nursing judgement patient does not need the Zyprexia anymore. Patient follows commands and sleeps soundly in bed with uqual unlabored breaths. Bedside and Verbal shift change report given to [id number] [place] [address] (oncoming nurse) by Kathryn Strickland RN (offgoing nurse). Report included the following information SBAR, Kardex, Intake/Output, MAR, Recent Results, and Med Rec Status.

## 2023-03-27 NOTE — OP NOTES
[facility]  OPERATIVE REPORT    Name:  Jenn De Jesus  MR#:  [id number]  :  1935  ACCOUNT #:  [de-identified]  DATE OF SERVICE:  2023    PRIMARY CARE PROVIDER:  Janel Ag NP    This is a surgery for small bowel perforation with abscess. PREOPERATIVE DIAGNOSIS:  Small bowel perforation. POSTOPERATIVE DIAGNOSIS:  Small bowel perforation with multiple jejunal diverticulosis. PROCEDURES PERFORMED:  1. Exploratory laparotomy. 2.  Drainage of abscess. 3.  Small bowel resection. SURGEON:  Kenyatta Ascencoi MD    ASSISTANT:  Curry Barbosa    ANESTHESIA:  General block. COMPLICATIONS:  None. SPECIMENS REMOVED:  Small bowel x2. IMPLANTS:  ***. ESTIMATED BLOOD LOSS:  ***. FINDINGS:  Multiple jejunal diverticulosis that were clustered at portion of the mid jejunum. There was a large perforation that was well-docked behind transverse colon. INDICATION:  The patient is an 53-year-old female who presented with altered mental status and atypical abdominal pain. Workup looking for source of the altered mental status was unremarkable. CT scan of the abdomen and pelvis was performed revealing a large collection adjacent to a segment of small bowel consistent with perforation. She and her family were consented for exploration. PROCEDURE:  Consent was obtained. She was taken to the operating room, placed in supine position. SCDs were turned on and working. Hill catheter was placed. After successful induction of general endotracheal anesthesia, the abdomen was prepped in usual sterile fashion. Time-out was performed per protocol. The patient was on therapeutic antibiotics. The abdomen was entered with a skin knife down to the fascia and the patient was eviscerated. The ascending jejunum was  from the transverse colon with a thick line containing the perforation. This was evacuated and cultures were taken.   Small bowel resection was performed in usual fashion with three separate fires of a 60 mm blue-load stapler. A second small bowel resection was indicated and from what I got was an extensive amount of diverticulosis that I do not want to occlude the anastomotic staple line. The intervening mesentery *** three segments of small bowel was taken with a vessel sealing device. The bowel was brought together in the side to side ultimately to function as an end-to-end anastomosis with isoperistaltic orientation. Stay sutures were placed. Enterotomies were made and a final 60 mm blue staple load was passed on and the enterotomy was fired including the common channel. The common channel was closed in two layers with the first layer being full thickness seromuscular bites followed by imbricating Lembert style stitches. Suture was a 3-0 V-Loc unidirectional stitch. The mesenteric defect was closed with a second 3-0 V-Loc unidirectional stitch. Abdomen was irrigated with saline. NICK drain was placed adjacent to the well-docked perforation for continued evacuation. Closing table was brought in and gloves and gowns were exchanged. Fascia was closed with a running 2-0 PDS suture. Skin was closed with 4-0 Monocryl stitch. A 15-Botswanan CWV drain was placed adjacent to the perforation and the suture was in place with a 2-0 nylon stitch. Darleen Coker was placed. She was extubated and transferred to the PACU in stable condition.       Jesús Nye MD      [id number]  D:  03/26/2023 12:49  T:  03/26/2023 23:40  JOB #:  [id number]  CC:  Anna Sheehan NP

## 2023-03-27 NOTE — PROGRESS NOTES
3/27/2023  9:54 AM  Case management note    Reason for Readmission:     SBO    3/13/23 - 3/14/23/ atrial fib  Patient came to hospital for abdominal pain. Patient had surgery for SBO with abscess. Patient lives alone and drives. Great family support. She has history of DM, HTN, afib, thyroid, hepatic lesion and TIA. [facility] [place]         RUR Score/Risk Level:     14%    PCP: First and Last name:  Mattie Dye   Name of Practice:    Are you a current patient: Yes/No: yes   Approximate date of last visit: 1 week   Can you participate in a virtual visit with your PCP:     Is a Care Conference indicated:       Did you attend your follow up appointment (s): If not, why not:  yes         Resources/supports as identified by patient/family:   daughter       Top Challenges facing patient (as identified by patient/family and CM): Finances/Medication cost?     Medicare/ [employer]  Transportation      self and family  Support system or lack thereof?     daughters  Living arrangements? Self-care/ADLs/Cognition? Current Advanced Directive/Advance Care Plan:  on file, but would like to update           Plan for utilizing home health:   patient is independent and drives             Transition of Care Plan:    Based on readmission, the patient's previous Plan of Care   has been evaluated and/or modified. The current Transition of Care Plan is:           Home with family assistance  PCP follow up  GI follow up  Surgical follow up  CM to follow for discharge needs    Care Management Interventions  PCP Verified by CM:  Yes Mattie Dye NP)  Support Systems: Child(kristofer)  Confirm Follow Up Transport: Family  The Plan for Transition of Care is Related to the Following Treatment Goals : SBO  Discharge Location  Patient Expects to be Discharged to[de-identified] Home with family assistance    Readmission Assessment  Number of days since last admission?: 8-30 days  Previous disposition: Home with Family  Who is being interviewed?: Patient, Caregiver  What was the patient's/caregiver's perception as to why they think they needed to return back to the hospital?:  (came to abdominal pain requiring surgery)  Did you visit your Primary Care Physician after you left the hospital, before you returned this time?: Yes  Did you see a specialist, such as Cardiac, Pulmonary, Orthopedic Physician, etc. after you left the hospital?: No  Who advised the patient to return to the hospital?: [facility], [address]

## 2023-03-27 NOTE — PROGRESS NOTES
Mike Graves [facility]  [address] [facility], [address] [place] [address]  [phone] [address] Adult  Hospitalist Group                                                                                          Hospitalist Progress Note  Fanta Nichols DO        Date of Service:  3/27/2023  NAME:  Radha Brennan  :  1935  MRN:  [id number]      Admission Summary:   82-year-old female admitted for small bowel perforation. Hospitalist consulted for medical management    Interval history / Subjective:      Denies abd pain; but unable to obtain full reliable ROS given dementia. Daughters at bedside     Assessment & Plan:     Small bowel perforation  -s/p exploratory lap, drainage of abscess, small bowel resection 3/26   -Continue IV Zosyn  -tylenol, oxy and IV dilaudid prn   -surgery following     Inferior left hepatic lobe enhancing lesion on CT scan  -will need outpatient GI follow-up and likely MRI    Paroxysmal A-fib  -Continue to monitor on telemetry  -Continue atenolol  -Eliquis held in anticipation for surgery; discussed with surgery and plan to resume eliquis 3/28 evening    Hypertension  -Continue atenolol, lisinopril, Norvasc    Type 2 diabetes  -Glipizide held   -SSI per protocol    Hypothyroidism  -Continue levothyroxine    Outisde Records, prior notes, labs, radiology, and medications reviewed     Code status: Full code  DVT prophylaxis: Eliquis on hold, lovenox and SCDs for now        Hospital Problems  Date Reviewed: 3/4/2022            Codes Class Noted POA    Small bowel perforation ([facility] [address] ICD-10-CM: K63.1  ICD-9-CM: 569.83  3/25/2023 Unknown           Review of Systems:   A comprehensive review of systems was negative except for that written in the HPI. Vital Signs:    Last 24hrs VS reviewed since prior progress note.  Most recent are:  Visit Vitals  BP (!) 123/57 (BP 1 Location: Left upper arm, BP Patient Position: At rest)   Pulse 67   Temp 97.9 °F (36.6 °C) Resp 18   Ht 5' 5\" (1.651 m)   Wt 64.9 kg (143 lb 1.3 oz)   SpO2 92%   BMI 23.81 kg/m²         Intake/Output Summary (Last 24 hours) at 3/27/2023 1445  Last data filed at 3/27/2023 1405  Gross per 24 hour   Intake 1993.75 ml   Output 2625 ml   Net -631.25 ml          Physical Examination:       General: In no acute distress. elderly  Head: Normocephalic, atraumatic. Eyes: Anicteric sclera. PERRL. Extraocular muscles intact. ENT: External ears and nose appear normal.  Oral mucosa moist.  Neck: Supple. No jugular venous distention. Heart: Regular rate and rhythm. No murmurs appreciated. Chest: Symmetrical excursion. Clear to auscultation bilaterally. Abdomen: binder c/d/I, NICK drain in place   Extremities: No gross deformities. No edema, no cyanosis. Feet are warm to touch. Neurological: cn grossly intact, poor insight   Skin: No jaundice. No rashes. Data Review:    Review and/or order of clinical lab test      Labs:     Recent Labs     03/27/23 0334 03/26/23 0327   WBC 9.4 19.2*   HGB 12.5 13.0   HCT 36.0 37.5    151       Recent Labs     03/27/23  0334 03/26/23 0327 03/25/23  1557    135* 125*   K 3.7 3.7 3.8   * 107 92*   CO2 20* 23 29   BUN 14 13 21*   CREA 0.74 0.82 1.09*   * 145* 192*   CA 7.7* 8.7 9.3   MG 1.5*  --  1.6   PHOS 1.5*  --   --        Recent Labs     03/25/23  1557   ALT 22   AP 34*   TBILI 1.5*   TP 6.6   ALB 3.0*   GLOB 3.6       No results for input(s): INR, PTP, APTT, INREXT, INREXT in the last 72 hours. No results for input(s): FE, TIBC, PSAT, FERR in the last 72 hours. Lab Results   Component Value Date/Time    Folate 13.0 08/16/2021 09:14 AM        No results for input(s): PH, PCO2, PO2 in the last 72 hours. No results for input(s): CPK, CKNDX, TROIQ in the last 72 hours.     No lab exists for component: CPKMB  No results found for: CHOL, CHOLX, CHLST, CHOLV, HDL, HDLP, LDL, LDLC, DLDLP, TGLX, TRIGL, TRIGP, CHHD, CHHDX  Lab Results Component Value Date/Time    Glucose (POC) 250 (H) 03/27/2023 11:27 AM    Glucose (POC) 217 (H) 03/27/2023 08:09 AM    Glucose (POC) 194 (H) 03/27/2023 05:57 AM    Glucose (POC) 253 (H) 03/27/2023 12:35 AM    Glucose (POC) 157 (H) 03/26/2023 05:43 PM     Lab Results   Component Value Date/Time    Color YELLOW/STRAW 03/25/2023 11:23 PM    Appearance CLEAR 03/25/2023 11:23 PM    Specific gravity 1.025 03/25/2023 11:23 PM    pH (UA) 6.0 03/25/2023 11:23 PM    Protein 30 (A) 03/25/2023 11:23 PM    Glucose Negative 03/25/2023 11:23 PM    Ketone Negative 03/25/2023 11:23 PM    Bilirubin Negative 03/25/2023 11:23 PM    Urobilinogen 0.2 03/25/2023 11:23 PM    Nitrites Negative 03/25/2023 11:23 PM    Leukocyte Esterase Negative 03/25/2023 11:23 PM    Epithelial cells FEW 03/25/2023 11:23 PM    Bacteria Negative 03/25/2023 11:23 PM    WBC 0-4 03/25/2023 11:23 PM    RBC 0-5 03/25/2023 11:23 PM         Medications Reviewed:     Current Facility-Administered Medications   Medication Dose Route Frequency    magnesium sulfate 2 g/50 ml IVPB (premix or compounded)  2 g IntraVENous ONCE    potassium phosphate 30 mmol in 0.9% sodium chloride 250 mL infusion   IntraVENous ONCE    amLODIPine (NORVASC) tablet 10 mg  10 mg Oral DAILY    ascorbic acid (vitamin C) (VITAMIN C) tablet 500 mg  500 mg Oral DAILY AFTER BREAKFAST    aspirin delayed-release tablet 81 mg  81 mg Oral BID    atenoloL (TENORMIN) tablet 25 mg  25 mg Oral DAILY    calcium carbonate (OS-CLARA) tablet 500 mg [elemental]  500 mg Oral BID    cholecalciferol (VITAMIN D3) (1000 Units /25 mcg) tablet 1,000 Units  1,000 Units Oral DAILY    levothyroxine (SYNTHROID) tablet 88 mcg  88 mcg Oral ACB    lidocaine 4 % patch 1 Patch  1 Patch TransDERmal Q24H    lisinopriL (PRINIVIL, ZESTRIL) tablet 20 mg  20 mg Oral DAILY    rosuvastatin (CRESTOR) tablet 10 mg  10 mg Oral QHS    omega 3-DHA-EPA-fish oil 1,000 mg (120 mg-180 mg) capsule 1 Capsule  1 Capsule Oral ACB/HS    insulin lispro (HUMALOG) injection   SubCUTAneous Q6H    glucose chewable tablet 16 g  4 Tablet Oral PRN    glucagon (GLUCAGEN) injection 1 mg  1 mg IntraMUSCular PRN    dextrose 10% infusion 0-250 mL  0-250 mL IntraVENous PRN    lactated Ringers infusion  75 mL/hr IntraVENous CONTINUOUS    sodium chloride (NS) flush 5-40 mL  5-40 mL IntraVENous Q8H    sodium chloride (NS) flush 5-40 mL  5-40 mL IntraVENous PRN    acetaminophen (TYLENOL) tablet 1,000 mg  1,000 mg Oral Q6H    oxyCODONE IR (ROXICODONE) tablet 5 mg  5 mg Oral Q4H PRN    ondansetron (ZOFRAN) injection 4 mg  4 mg IntraVENous Q6H PRN    enoxaparin (LOVENOX) injection 40 mg  40 mg SubCUTAneous DAILY    naloxegoL (MOVANTIK) tablet 25 mg  25 mg Oral DAILY    ibuprofen (MOTRIN) tablet 400 mg  400 mg Oral Q12H PRN    sodium chloride (NS) flush 5-10 mL  5-10 mL IntraVENous PRN    piperacillin-tazobactam (ZOSYN) 3.375 g in 0.9% sodium chloride (MBP/ADV) 100 mL MBP  3.375 g IntraVENous Q8H    HYDROmorphone (DILAUDID) syringe 0.2 mg  0.2 mg IntraVENous Q3H PRN    ondansetron (ZOFRAN) injection 4 mg  4 mg IntraVENous Q4H PRN     ______________________________________________________________________  EXPECTED LENGTH OF STAY: - - -  ACTUAL LENGTH OF STAY:          [address] DO [address]

## 2023-03-27 NOTE — PROGRESS NOTES
Doing well. Denies pain. Not out of bed yet. Visit Vitals  BP (!) 144/71 (BP 1 Location: Left upper arm, BP Patient Position: At rest)   Pulse 72   Temp 97.5 °F (36.4 °C)   Resp 16   Ht 5' 5\" (1.651 m)   Wt 64.9 kg (143 lb 1.3 oz)   SpO2 94%   BMI 23.81 kg/m²     Abdomen soft, nondistended. NICK drain clear. POD1 SBR  Diet as tolerated. PT. Hospitalist help with management issue.      Quince Bloch, MD

## 2023-03-28 LAB
ANION GAP SERPL CALC-SCNC: 4 MMOL/L (ref 5–15)
BASOPHILS # BLD: 0 K/UL (ref 0–0.1)
BASOPHILS NFR BLD: 0 % (ref 0–1)
BUN SERPL-MCNC: 15 MG/DL (ref 6–20)
BUN/CREAT SERPL: 19 (ref 12–20)
CALCIUM SERPL-MCNC: 8.3 MG/DL (ref 8.5–10.1)
CHLORIDE SERPL-SCNC: 106 MMOL/L (ref 97–108)
CO2 SERPL-SCNC: 24 MMOL/L (ref 21–32)
CREAT SERPL-MCNC: 0.81 MG/DL (ref 0.55–1.02)
DIFFERENTIAL METHOD BLD: ABNORMAL
EOSINOPHIL # BLD: 0 K/UL (ref 0–0.4)
EOSINOPHIL NFR BLD: 0 % (ref 0–7)
ERYTHROCYTE [DISTWIDTH] IN BLOOD BY AUTOMATED COUNT: 12.5 % (ref 11.5–14.5)
GLUCOSE BLD STRIP.AUTO-MCNC: 137 MG/DL (ref 65–117)
GLUCOSE BLD STRIP.AUTO-MCNC: 158 MG/DL (ref 65–117)
GLUCOSE BLD STRIP.AUTO-MCNC: 162 MG/DL (ref 65–117)
GLUCOSE BLD STRIP.AUTO-MCNC: 182 MG/DL (ref 65–117)
GLUCOSE SERPL-MCNC: 210 MG/DL (ref 65–100)
HCT VFR BLD AUTO: 37.9 % (ref 35–47)
HGB BLD-MCNC: 13.1 G/DL (ref 11.5–16)
IMM GRANULOCYTES # BLD AUTO: 0.1 K/UL (ref 0–0.04)
IMM GRANULOCYTES NFR BLD AUTO: 1 % (ref 0–0.5)
LYMPHOCYTES # BLD: 1.3 K/UL (ref 0.8–3.5)
LYMPHOCYTES NFR BLD: 10 % (ref 12–49)
MAGNESIUM SERPL-MCNC: 1.9 MG/DL (ref 1.6–2.4)
MCH RBC QN AUTO: 34.6 PG (ref 26–34)
MCHC RBC AUTO-ENTMCNC: 34.6 G/DL (ref 30–36.5)
MCV RBC AUTO: 100 FL (ref 80–99)
MONOCYTES # BLD: 1.2 K/UL (ref 0–1)
MONOCYTES NFR BLD: 9 % (ref 5–13)
NEUTS SEG # BLD: 10.8 K/UL (ref 1.8–8)
NEUTS SEG NFR BLD: 80 % (ref 32–75)
NRBC # BLD: 0 K/UL (ref 0–0.01)
NRBC BLD-RTO: 0 PER 100 WBC
PHOSPHATE SERPL-MCNC: 1.2 MG/DL (ref 2.6–4.7)
PLATELET # BLD AUTO: 211 K/UL (ref 150–400)
PMV BLD AUTO: 10.6 FL (ref 8.9–12.9)
POTASSIUM SERPL-SCNC: 3.8 MMOL/L (ref 3.5–5.1)
RBC # BLD AUTO: 3.79 M/UL (ref 3.8–5.2)
SERVICE CMNT-IMP: ABNORMAL
SODIUM SERPL-SCNC: 134 MMOL/L (ref 136–145)
WBC # BLD AUTO: 13.5 K/UL (ref 3.6–11)

## 2023-03-28 PROCEDURE — 80048 BASIC METABOLIC PNL TOTAL CA: CPT

## 2023-03-28 PROCEDURE — 74011250636 HC RX REV CODE- 250/636: Performed by: SURGERY

## 2023-03-28 PROCEDURE — 74011000250 HC RX REV CODE- 250: Performed by: INTERNAL MEDICINE

## 2023-03-28 PROCEDURE — 97162 PT EVAL MOD COMPLEX 30 MIN: CPT

## 2023-03-28 PROCEDURE — 99232 SBSQ HOSP IP/OBS MODERATE 35: CPT | Performed by: NURSE PRACTITIONER

## 2023-03-28 PROCEDURE — 82962 GLUCOSE BLOOD TEST: CPT

## 2023-03-28 PROCEDURE — 97116 GAIT TRAINING THERAPY: CPT

## 2023-03-28 PROCEDURE — 36415 COLL VENOUS BLD VENIPUNCTURE: CPT

## 2023-03-28 PROCEDURE — 65270000046 HC RM TELEMETRY

## 2023-03-28 PROCEDURE — 83735 ASSAY OF MAGNESIUM: CPT

## 2023-03-28 PROCEDURE — 74011000250 HC RX REV CODE- 250: Performed by: SURGERY

## 2023-03-28 PROCEDURE — 74011250637 HC RX REV CODE- 250/637: Performed by: INTERNAL MEDICINE

## 2023-03-28 PROCEDURE — 74011000258 HC RX REV CODE- 258: Performed by: SURGERY

## 2023-03-28 PROCEDURE — 84100 ASSAY OF PHOSPHORUS: CPT

## 2023-03-28 PROCEDURE — 85025 COMPLETE CBC W/AUTO DIFF WBC: CPT

## 2023-03-28 PROCEDURE — 74011250636 HC RX REV CODE- 250/636: Performed by: INTERNAL MEDICINE

## 2023-03-28 PROCEDURE — 74011250637 HC RX REV CODE- 250/637: Performed by: SURGERY

## 2023-03-28 RX ORDER — GLUCOSAM/CHONDRO/HERB 149/HYAL 750-100 MG
1 TABLET ORAL
Status: DISCONTINUED | OUTPATIENT
Start: 2023-03-28 | End: 2023-03-30 | Stop reason: HOSPADM

## 2023-03-28 RX ORDER — LEVOTHYROXINE SODIUM 88 UG/1
88 TABLET ORAL
Status: DISCONTINUED | OUTPATIENT
Start: 2023-03-29 | End: 2023-03-30 | Stop reason: HOSPADM

## 2023-03-28 RX ORDER — FAMOTIDINE 20 MG/1
20 TABLET, FILM COATED ORAL DAILY
Status: DISCONTINUED | OUTPATIENT
Start: 2023-03-28 | End: 2023-03-30 | Stop reason: HOSPADM

## 2023-03-28 RX ADMIN — PIPERACILLIN AND TAZOBACTAM 3.38 G: 3; .375 INJECTION, POWDER, LYOPHILIZED, FOR SOLUTION INTRAVENOUS at 01:00

## 2023-03-28 RX ADMIN — ACETAMINOPHEN 1000 MG: 500 TABLET ORAL at 17:16

## 2023-03-28 RX ADMIN — PIPERACILLIN AND TAZOBACTAM 3.38 G: 3; .375 INJECTION, POWDER, LYOPHILIZED, FOR SOLUTION INTRAVENOUS at 08:37

## 2023-03-28 RX ADMIN — POTASSIUM PHOSPHATE, MONOBASIC POTASSIUM PHOSPHATE, DIBASIC: 224; 236 INJECTION, SOLUTION, CONCENTRATE INTRAVENOUS at 13:59

## 2023-03-28 RX ADMIN — CALCIUM 500 MG: 500 TABLET ORAL at 17:16

## 2023-03-28 RX ADMIN — ROSUVASTATIN 10 MG: 10 TABLET, FILM COATED ORAL at 21:17

## 2023-03-28 RX ADMIN — LEVOTHYROXINE SODIUM 88 MCG: 88 TABLET ORAL at 05:46

## 2023-03-28 RX ADMIN — SODIUM CHLORIDE, PRESERVATIVE FREE 5 ML: 5 INJECTION INTRAVENOUS at 21:18

## 2023-03-28 RX ADMIN — AMLODIPINE BESYLATE 10 MG: 5 TABLET ORAL at 08:37

## 2023-03-28 RX ADMIN — LISINOPRIL 20 MG: 20 TABLET ORAL at 08:37

## 2023-03-28 RX ADMIN — ACETAMINOPHEN 1000 MG: 500 TABLET ORAL at 05:46

## 2023-03-28 RX ADMIN — SODIUM CHLORIDE, PRESERVATIVE FREE 10 ML: 5 INJECTION INTRAVENOUS at 13:36

## 2023-03-28 RX ADMIN — CALCIUM 500 MG: 500 TABLET ORAL at 08:37

## 2023-03-28 RX ADMIN — SODIUM CHLORIDE, PRESERVATIVE FREE 10 ML: 5 INJECTION INTRAVENOUS at 05:52

## 2023-03-28 RX ADMIN — APIXABAN 5 MG: 5 TABLET, FILM COATED ORAL at 17:16

## 2023-03-28 RX ADMIN — OMEGA-3 FATTY ACIDS CAP 1000 MG 1 CAPSULE: 1000 CAP at 21:16

## 2023-03-28 RX ADMIN — ACETAMINOPHEN 1000 MG: 500 TABLET ORAL at 22:52

## 2023-03-28 RX ADMIN — Medication 1000 UNITS: at 08:37

## 2023-03-28 RX ADMIN — FAMOTIDINE 20 MG: 20 TABLET, FILM COATED ORAL at 14:36

## 2023-03-28 RX ADMIN — NALOXEGOL OXALATE 25 MG: 12.5 TABLET, FILM COATED ORAL at 08:38

## 2023-03-28 RX ADMIN — OMEGA-3 FATTY ACIDS CAP 1000 MG 1 CAPSULE: 1000 CAP at 05:46

## 2023-03-28 RX ADMIN — OXYCODONE HYDROCHLORIDE AND ACETAMINOPHEN 500 MG: 500 TABLET ORAL at 08:37

## 2023-03-28 RX ADMIN — ATENOLOL 25 MG: 50 TABLET ORAL at 08:37

## 2023-03-28 RX ADMIN — PIPERACILLIN AND TAZOBACTAM 3.38 G: 3; .375 INJECTION, POWDER, LYOPHILIZED, FOR SOLUTION INTRAVENOUS at 17:16

## 2023-03-28 RX ADMIN — ENOXAPARIN SODIUM 40 MG: 100 INJECTION SUBCUTANEOUS at 08:37

## 2023-03-28 RX ADMIN — ASPIRIN 81 MG: 81 TABLET, COATED ORAL at 17:16

## 2023-03-28 RX ADMIN — ASPIRIN 81 MG: 81 TABLET, COATED ORAL at 08:37

## 2023-03-28 RX ADMIN — ACETAMINOPHEN 1000 MG: 500 TABLET ORAL at 09:47

## 2023-03-28 NOTE — PROGRESS NOTES
Problem: Falls - Risk of  Goal: *Absence of Falls  Description: Document Frazier Scheuermann Fall Risk and appropriate interventions in the flowsheet. Outcome: Progressing Towards Goal  Note: Fall Risk Interventions:                                Problem: Pressure Injury - Risk of  Goal: *Prevention of pressure injury  Description: Document Luis Scale and appropriate interventions in the flowsheet.   Outcome: Progressing Towards Goal  Note: Pressure Injury Interventions:  Sensory Interventions: Assess changes in LOC, Float heels, Minimize linen layers    Moisture Interventions: Absorbent underpads, Limit adult briefs, Minimize layers    Activity Interventions: Assess need for specialty bed, Increase time out of bed    Mobility Interventions: Assess need for specialty bed, Chair cushion, Float heels    Nutrition Interventions: Document food/fluid/supplement intake

## 2023-03-28 NOTE — PROGRESS NOTES
Palliative Medicine Consult  Robbie: [phone] ([phone])    Patient Name: Edson Thompson  YOB: 1935    Date of Initial Consult: March 27, 2023  Reason for Consult: Sepsis  Requesting Provider: Dr. Masood Herron   Primary Care Physician: David Bailey NP     SUMMARY:   Edson Thompson is a 82 y.o. female admitted on 3/25/2023 from home with a diagnosis of  SBO with abscess s/p exploratory lap with drainage of abscess and small bowel resection, jejunal diverticulitis. Pt doing well post op. Pt presented with abdominal pain x 3-4 days initially. PMH: DM2, HTN, afib, hypothyroid, hepatic lesion,TIA, pafib,    Current medical issues leading to Palliative Medicine involvement include: sepsis diagnosis and risk of compromise. Social: pt lives at home alone and drives. She has 3 daughters that are involved in her life and very supportive. Full Code  +AMD   PALLIATIVE DIAGNOSES:   Acute abdominal Pain  SBO  Physical debility   DNR discussion     PLAN:   Pt seen with daughters Sunday Jazz and Lucas Leon at the bedside   The pt is clear today and daughters updated her on our discussion yesterday, the pt stated that she would like to be full code and requesting the order be changed. Reassured family that the chart now reflects full code  DNR removed from the EMR as requested  Will sign off. Plans for rehab    Initial consult note routed to primary continuity provider and/or primary health care team members  Communicated plan of care with: [facility] Team     GOALS OF CARE / TREATMENT PREFERENCES:     GOALS OF CARE:  Patient/Health Care Proxy Stated Goals: Cure    TREATMENT PREFERENCES:   Code Status: Full Code    Patient and family's personal goals include: continue current care.  DNr       Advance Care Planning:  [x] The [facility] Interdisciplinary Team has updated the ACP Navigator with [place] and Patient Capacity      Primary Decision Maker: Erica Joseph - Joseph - [phone]    Primary Decision Maker: Conan Koyanagi - Daughter - [phone]  Advance Care Planning 3/27/2023   Patient's Healthcare Decision Maker is: -   Confirm Advance Directive None   Patient Would Like to Complete Advance Directive Yes       Medical Interventions: Limited additional interventions       Other:    As far as possible, the palliative care team has discussed with patient / health care proxy about goals of care / treatment preferences for patient.      HISTORY:     History obtained from: chart, team, family, pt    CHIEF COMPLAINT: Pt admitted with aforementioned history and issues    HPI/SUBJECTIVE:    The patient is:   [x] Verbal and participatory   [] Non-participatory due to: medical condition   No complaints     Clinical Pain Assessment (nonverbal scale for severity on nonverbal patients):   Clinical Pain Assessment  Severity: 0     Activity (Movement): Lying quietly, normal position    Duration: for how long has pt been experiencing pain (e.g., 2 days, 1 month, years)  Frequency: how often pain is an issue (e.g., several times per day, once every few days, constant)     FUNCTIONAL ASSESSMENT:     Palliative Performance Scale (PPS):  PPS: 50       PSYCHOSOCIAL/SPIRITUAL SCREENING:     Palliative IDT has assessed this patient for cultural preferences / practices and a referral made as appropriate to needs (Cultural Services, Patient Advocacy, Ethics, etc.)    Any spiritual / Restorationist concerns:  [] Yes /  [x] No  [] Unable to obtain this information  If \"Yes\" to discuss with pastoral care during IDT     Does caregiver feel burdened by caring for their loved one:   [] Yes /  [x] No /  [] No Caregiver Present/Available [] No Caregiver [] Pt Lives at [place] [id number]  If \"Yes\" to discuss with social work during IDT    Anticipatory grief assessment:   [x] Normal  / [] Maladaptive   [] Unable to obtain this information  [] n/a  If \"Maladaptive\" to discuss with social work during IDT    ESAS Anxiety: Anxiety: 0    ESAS Depression: Depression: 0        REVIEW OF SYSTEMS:     Positive and pertinent negative findings in ROS are noted above in HPI. The following systems were [x] reviewed / [] unable to be reviewed as noted in HPI  Other findings are noted below. Systems: constitutional, ears/nose/mouth/throat, respiratory, gastrointestinal, genitourinary, musculoskeletal, integumentary, neurologic, psychiatric, endocrine. Positive findings noted below. Modified ESAS Completed by: provider   Fatigue: 5 Drowsiness: 0   Depression: 0 Pain: 0   Anxiety: 0       Dyspnea: 0           Stool Occurrence(s): 0        PHYSICAL EXAM:     From RN flowsheet:  Wt Readings from Last 3 Encounters:   03/28/23 143 lb 15.4 oz (65.3 kg)   03/24/23 143 lb (64.9 kg)   03/14/23 143 lb 11.8 oz (65.2 kg)     Blood pressure (!) 159/88, pulse 67, temperature 97.4 °F (36.3 °C), resp. rate 18, height 5' 5\" (1.651 m), weight 143 lb 15.4 oz (65.3 kg), SpO2 93 %.     Pain Scale 1: Numeric (0 - 10)  Pain Intensity 1: 0  Pain Onset 1: acute  Pain Location 1: Abdomen  Pain Orientation 1: Mid  Pain Description 1: Aching  Pain Intervention(s) 1: Medication (see MAR)  Last bowel movement, if known:     Constitutional: nad  Eyes: pupils equal, anicteric  ENMT: no nasal discharge, moist mucous membranes  Cardiovascular:   Respiratory: breathing not labored, symmetric  Gastrointestinal: band in place, drain with bright red blood  Musculoskeletal: no deformity, no tenderness to palpation  Skin: warm, dry  Neurologic: following commands, moving all extremities  Psychiatric: full affect, no hallucinations  Other:       HISTORY:     Active Problems:    Small bowel perforation (HCC) (3/25/2023)    Past Medical History:   Diagnosis Date    Arthritis     Atrial fibrillation ([facility] [place] [address]     detected 3/13/2023    Depression     Diabetes (HCC)     GERD (gastroesophageal reflux disease)     High cholesterol     Hypertension     Snoring     Transient ischemic attack (TIA) 2003 Past Surgical History:   Procedure Laterality Date    HX APPENDECTOMY      HX CATARACT REMOVAL Bilateral     HX CHOLECYSTECTOMY      HX HIP REPLACEMENT Right     HX ORTHOPAEDIC Left     Open Repair    HX ORTHOPAEDIC Left     Trigger finger    HX ROTATOR CUFF REPAIR Right     HX TUBAL LIGATION        Family History   Problem Relation Age of Onset    Heart Disease Mother     Alzheimer's Disease Father     Ovarian Cancer Sister     Alzheimer's Disease Paternal Grandfather       History reviewed, no pertinent family history. Social History     Tobacco Use    Smoking status: Former     Packs/day: 2.00     Years: 30.00     Pack years: 60.00     Types: Cigarettes     Quit date: 1986     Years since quittin.5    Smokeless tobacco: Never   Substance Use Topics    Alcohol use: Yes     Comment: social 1 x year     Allergies   Allergen Reactions    Percocet [Oxycodone-Acetaminophen] Unknown (comments)     Can't remember reaction due to length of time of occurrence.       Current Facility-Administered Medications   Medication Dose Route Frequency    [START ON 3/29/2023] levothyroxine (SYNTHROID) tablet 88 mcg  88 mcg Oral ACB    omega 3-DHA-EPA-fish oil 1,000 mg (120 mg-180 mg) capsule 1 Capsule  1 Capsule Oral ACB/HS    potassium phosphate 30 mmol in 0.9% sodium chloride 250 mL infusion   IntraVENous ONCE    amLODIPine (NORVASC) tablet 10 mg  10 mg Oral DAILY    ascorbic acid (vitamin C) (VITAMIN C) tablet 500 mg  500 mg Oral DAILY AFTER BREAKFAST    aspirin delayed-release tablet 81 mg  81 mg Oral BID    atenoloL (TENORMIN) tablet 25 mg  25 mg Oral DAILY    calcium carbonate (OS-CLARA) tablet 500 mg [elemental]  500 mg Oral BID    cholecalciferol (VITAMIN D3) (1000 Units /25 mcg) tablet 1,000 Units  1,000 Units Oral DAILY    lidocaine 4 % patch 1 Patch  1 Patch TransDERmal Q24H    lisinopriL (PRINIVIL, ZESTRIL) tablet 20 mg  20 mg Oral DAILY    rosuvastatin (CRESTOR) tablet 10 mg  10 mg Oral QHS    insulin lispro (HUMALOG) injection   SubCUTAneous Q6H    glucose chewable tablet 16 g  4 Tablet Oral PRN    glucagon (GLUCAGEN) injection 1 mg  1 mg IntraMUSCular PRN    dextrose 10% infusion 0-250 mL  0-250 mL IntraVENous PRN    sodium chloride (NS) flush 5-40 mL  5-40 mL IntraVENous Q8H    sodium chloride (NS) flush 5-40 mL  5-40 mL IntraVENous PRN    acetaminophen (TYLENOL) tablet 1,000 mg  1,000 mg Oral Q6H    oxyCODONE IR (ROXICODONE) tablet 5 mg  5 mg Oral Q4H PRN    ondansetron (ZOFRAN) injection 4 mg  4 mg IntraVENous Q6H PRN    enoxaparin (LOVENOX) injection 40 mg  40 mg SubCUTAneous DAILY    naloxegoL (MOVANTIK) tablet 25 mg  25 mg Oral DAILY    ibuprofen (MOTRIN) tablet 400 mg  400 mg Oral Q12H PRN    sodium chloride (NS) flush 5-10 mL  5-10 mL IntraVENous PRN    piperacillin-tazobactam (ZOSYN) 3.375 g in 0.9% sodium chloride (MBP/ADV) 100 mL MBP  3.375 g IntraVENous Q8H    HYDROmorphone (DILAUDID) syringe 0.2 mg  0.2 mg IntraVENous Q3H PRN    ondansetron (ZOFRAN) injection 4 mg  4 mg IntraVENous Q4H PRN          LAB AND IMAGING FINDINGS:     Lab Results   Component Value Date/Time    WBC 13.5 (H) 03/28/2023 02:48 AM    HGB 13.1 03/28/2023 02:48 AM    PLATELET 820 97/[birth year]/1972 02:48 AM     Lab Results   Component Value Date/Time    Sodium 134 (L) 03/28/2023 02:48 AM    Potassium 3.8 03/28/2023 02:48 AM    Chloride 106 03/28/2023 02:48 AM    CO2 24 03/28/2023 02:48 AM    BUN 15 03/28/2023 02:48 AM    Creatinine 0.81 03/28/2023 02:48 AM    Calcium 8.3 (L) 03/28/2023 02:48 AM    Magnesium 1.9 03/28/2023 02:48 AM    Phosphorus 1.2 (L) 03/28/2023 02:48 AM      Lab Results   Component Value Date/Time    Alk.  phosphatase 34 (L) 03/25/2023 03:57 PM    Protein, total 6.6 03/25/2023 03:57 PM    Albumin 3.0 (L) 03/25/2023 03:57 PM    Globulin 3.6 03/25/2023 03:57 PM     No results found for: INR, PTMR, PTP, PT1, PT2, APTT, INREXT, INREXT   No results found for: IRON, FE, TIBC, IBCT, PSAT, FERR   No results found for: PH, PCO2, PO2  No components found for: GLPOC   No results found for: CPK, CKMB             Total time: 25 minutes

## 2023-03-28 NOTE — CONSULTS
Comprehensive Nutrition Assessment    Type and Reason for Visit: Initial, Consult    Nutrition Recommendations/Plan:   Continue easy to chew 4 Carb Choice, Low Fiber diet order  Provide Glucerna BID to aid in meeting kcal/protein needs (440 kcal, 52 g carbs, 20 g protein)      Malnutrition Assessment:  Malnutrition Status:  No malnutrition (03/28/23 1501)    Context:  Acute illness     Findings of the 6 clinical characteristics of malnutrition:   Energy Intake:  Mild decrease in energy intake (specify) (x 3 days)  Weight Loss:  No significant weight loss     Body Fat Loss:  No significant body fat loss,     Muscle Mass Loss:  Mild muscle mass loss, Clavicles (pectoralis & deltoids), Scapula (trapezius)  Fluid Accumulation:  No significant fluid accumulation,     Strength:  Not performed     Nutrition Assessment:     Pt is an 82year old female admitted with Small bowel perforation ([facility] [address] [K63.1]. She  has a past medical history of Arthritis, Atrial fibrillation ([facility] [address] Depression, Diabetes ([facility] [address] GERD (gastroesophageal reflux disease), High cholesterol, Hypertension, Snoring, and Transient ischemic attack (TIA) (2003). RD consulted - comment \"lack of water intake\". Patient is POD2 small bowel resection. + net fluid balanced over last three days. NKFA. No known hx of chewing/swallowing problems. No noted nausea/vomiting/diarrhea. Met with patient and daughter at bedside. Daughter requesting information re: increasing water intake at home. RD provided multiple options for hydration, and patient's daughter voiced understanding. Patient reports appetite is 'non-existent at this time. Not a big fan of hospital food but consumed 2 jellos at lunch today. Reports #; RD obtained bedscale weight of 143.2#. No clinically significant weight changes noted.  Patient voiced acceptance of ONS to trial.     Wt Readings from Last 10 Encounters:   03/28/23 65.3 kg (143 lb 15.4 oz)   03/24/23 64.9 kg (143 lb)   03/14/23 65.2 kg (143 lb 11.8 oz)   03/04/22 63.5 kg (140 lb)   08/13/21 64 kg (141 lb)   02/28/20 64.2 kg (141 lb 9.6 oz)   05/29/19 65.3 kg (143 lb 15.4 oz)   05/15/19 66.4 kg (146 lb 6.2 oz)   01/29/19 65.3 kg (144 lb)   11/05/18 66.5 kg (146 lb 9.6 oz)       Documented Meal intake:  Patient Vitals for the past 168 hrs:   % Diet Eaten   03/28/23 1448 51 - 75%   03/28/23 0857 51 - 75%   03/27/23 1834 51 - 75%   03/27/23 1500 51 - 75%   03/27/23 1217 76 - 100%   03/27/23 0901 76 - 100%   03/26/23 0800 0%   03/26/23 0529 0%   03/25/23 2336 0%       Documentation of supplement intake:  Patient Vitals for the past 168 hrs:   Supplement intake %   03/26/23 0529 0%   03/25/23 2336 0%         Nutrition Related Findings:      Wound Type: Multiple, Surgical incision (abd)  Last Bowel Movement Date: 03/25/23  Stool Appearance: Hard  Abdominal Assessment: Other (comment) (midline incision and wade drain)  Appetite: Fair  Bowel Sounds: Active   Edema:No data recorded    Nutr.  Labs:    Lab Results   Component Value Date/Time    GFR est AA 88 08/16/2021 09:14 AM    GFR est non-AA 76 08/16/2021 09:14 AM    Creatinine 0.81 03/28/2023 02:48 AM    BUN 15 03/28/2023 02:48 AM    Sodium 134 (L) 03/28/2023 02:48 AM    Potassium 3.8 03/28/2023 02:48 AM    Chloride 106 03/28/2023 02:48 AM    CO2 24 03/28/2023 02:48 AM       Lab Results   Component Value Date/Time    Glucose 210 (H) 03/28/2023 02:48 AM    Glucose (POC) 158 (H) 03/28/2023 11:36 AM       Lab Results   Component Value Date/Time    Hemoglobin A1c 7.6 (H) 03/13/2023 03:42 AM       Magnesium   Date Value Ref Range Status   03/28/2023 1.9 1.6 - 2.4 mg/dL Final   03/27/2023 1.5 (L) 1.6 - 2.4 mg/dL Final   03/25/2023 1.6 1.6 - 2.4 mg/dL Final   03/14/2023 1.4 (L) 1.6 - 2.4 mg/dL Final       Lab Results   Component Value Date/Time    Calcium 8.3 (L) 03/28/2023 02:48 AM    Phosphorus 1.2 (L) 03/28/2023 02:48 AM       No results found for: CHOL, CHOLPOCT, CHOLX, CHLST, CHOLV, TOTCHOLEXT, HDL, [address], HDLEXT, HDLP, LDL, LDLCPOC, LDLCEXT, LDLC, DLDLP, VLDLC, VLDL, TGLX, TRIGL, TRIGLYCEXT, TRIGP, TGLPOCT, CHHD, CHHDX      Nutr. Meds:  Norvasc, Vit C, os-ruma, vit D3,Pepcid, glucagon PRN, humalog, lisinopril, movantik, zofran PRN, Kphos, Crestor      Current Nutrition Intake & Therapies:  Average Meal Intake: 51-75% (Easy to Chew)  Average Supplement Intake: None ordered  ADULT DIET Easy to Chew; 4 carb choices (60 gm/meal); Low Fiber  ADULT ORAL NUTRITION SUPPLEMENT Dinner, Lunch; Diabetic Supplement    Anthropometric Measures:  Height: 5' 5\" (165.1 cm)  Ideal Body Weight (IBW): 125 lbs (57 kg)     Current Body Wt:  65.3 kg (143 lb 15.4 oz), 115.2 % IBW.  Bed scale  Current BMI (kg/m2): 24  Usual Body Weight: 63.5 kg (140 lb)  % Weight Change (Calculated): 2.8  Weight Adjustment: No adjustment                 BMI Category: Normal weight (BMI 22.0-24.9) age over 72    Estimated Daily Nutrient Needs:  Energy Requirements Based On: Formula  Weight Used for Energy Requirements: Current  Energy (kcal/day): 1540 (MSJ x 1.3 x 1.1)  Weight Used for Protein Requirements: Current  Protein (g/day): 65-78 (1.0-1.2 g/kg)  Method Used for Fluid Requirements: 1 ml/kcal  Fluid (ml/day): 1540    Nutrition Diagnosis:   Increased nutrient needs related to acute injury/trauma as evidenced by wounds (POD small bowel resection)    Nutrition Interventions:   Food and/or Nutrient Delivery: Continue current diet, Start oral nutrition supplement  Nutrition Education/Counseling: No recommendations at this time  Coordination of Nutrition Care: Continue to monitor while inpatient, Interdisciplinary rounds  Plan of Care discussed with: IDR team    Goals:     Goals: Meet at least 75% of estimated needs, by next RD assessment       Nutrition Monitoring and Evaluation:   Behavioral-Environmental Outcomes: None identified  Food/Nutrient Intake Outcomes: Food and nutrient intake, Supplement intake  Physical Signs/Symptoms Outcomes: Biochemical data, Weight, GI status    Discharge Planning:     Too soon to determine    Cora Poole MS, RD  Contact: Ext: [phone], or via [employer]

## 2023-03-28 NOTE — PROGRESS NOTES
1000: patient and family are requesting her to be changed back to a full code. Stated patient didn't full understand what DNR meant yesterday when they talked with palliative and .  RN reached out to palliative regarding this request.

## 2023-03-28 NOTE — PROGRESS NOTES
Problem: Diabetes Self-Management  Goal: *Disease process and treatment process  Description: Define diabetes and identify own type of diabetes; list 3 options for treating diabetes. Outcome: Progressing Towards Goal  Goal: *Incorporating nutritional management into lifestyle  Description: Describe effect of type, amount and timing of food on blood glucose; list 3 methods for planning meals. Outcome: Progressing Towards Goal  Goal: *Incorporating physical activity into lifestyle  Description: State effect of exercise on blood glucose levels. Outcome: Progressing Towards Goal  Goal: *Developing strategies to promote health/change behavior  Description: Define the ABC's of diabetes; identify appropriate screenings, schedule and personal plan for screenings. Outcome: Progressing Towards Goal  Goal: *Using medications safely  Description: State effect of diabetes medications on diabetes; name diabetes medication taking, action and side effects. Outcome: Progressing Towards Goal  Goal: *Monitoring blood glucose, interpreting and using results  Description: Identify recommended blood glucose targets  and personal targets. Outcome: Progressing Towards Goal  Goal: *Prevention, detection, treatment of acute complications  Description: List symptoms of hyper- and hypoglycemia; describe how to treat low blood sugar and actions for lowering  high blood glucose level. Outcome: Progressing Towards Goal  Goal: *Prevention, detection and treatment of chronic complications  Description: Define the natural course of diabetes and describe the relationship of blood glucose levels to long term complications of diabetes.   Outcome: Progressing Towards Goal  Goal: *Developing strategies to address psychosocial issues  Description: Describe feelings about living with diabetes; identify support needed and support network  Outcome: Progressing Towards Goal  Goal: *Insulin pump training  Outcome: Progressing Towards Goal  Goal: *Sick day guidelines  Outcome: Progressing Towards Goal  Goal: *Patient Specific Goal (EDIT GOAL, INSERT TEXT)  Outcome: Progressing Towards Goal     Problem: Patient Education: Go to Patient Education Activity  Goal: Patient/Family Education  Outcome: Progressing Towards Goal     Problem: Aspiration - Risk of  Goal: *Absence of aspiration  Outcome: Progressing Towards Goal     Problem: Falls - Risk of  Goal: *Absence of Falls  Description: Document Keisha Fall Risk and appropriate interventions in the flowsheet.   Outcome: Progressing Towards Goal  Note: Fall Risk Interventions:

## 2023-03-28 NOTE — CONSULTS
Nutrition Note    Completed please see full assessment note    Electronically signed by Zain Bal RD MS on 7/40/1064 at 3:04 PM    Contact: Ext: [phone], or via [email or web address]

## 2023-03-28 NOTE — PROGRESS NOTES
Problem: Mobility Impaired (Adult and Pediatric)  Goal: *Acute Goals and Plan of Care (Insert Text)  Description: FUNCTIONAL STATUS PRIOR TO ADMISSION: Patient was modified independent using a rollator for functional mobility. HOME SUPPORT PRIOR TO ADMISSION: The patient lived alone with dtr to provide assistance. Physical Therapy Goals  Initiated 3/28/2023  1. Patient will move from supine to sit and sit to supine  in bed with modified independence within 7 day(s). 2.  Patient will transfer from bed to chair and chair to bed with supervision/set-up using the least restrictive device within 7 day(s). 3.  Patient will perform sit to stand with supervision/set-up within 7 day(s). 4.  Patient will ambulate with supervision/set-up for 250 feet with the least restrictive device within 7 day(s). 5.  Patient will ascend/descend 2 stairs with  handrail(s) with minimal assistance/contact guard assist within 7 day(s). Outcome: Progressing Towards Goal   PHYSICAL THERAPY EVALUATION  Patient: Huey Murphy (82 y.o. female)  Date: 3/28/2023  Primary Diagnosis: Small bowel perforation (HCC) [K63.1]  Procedure(s) (LRB):  LAPAROTOMY EXPLORATORY, SMALL BOWEL RESECTION, DRAINAGE OF ABSCESS (N/A) 2 Days Post-Op   Precautions:   Fall    ASSESSMENT  Based on the objective data described below, the patient presents with admission due to SB perforation needing lap exp with bowel resection. Pt received sitting up in recliner with dtr present. NICK drain and abdominal binder in place. Pt is A&Ox4 and excited to walk. Sit to stand with CGA with RW. Donned brief with pt in standing. Gait of 150' tolerated well with CGA. Noted slight SOB with rapid recovery upon sitting. HHPT recommended. Current Level of Function Impacting Discharge (mobility/balance): CGA/good with RW    Functional Outcome Measure: The patient scored /12 on the [facility] outcome measure which is indicative of HHPT.       Other factors to consider for discharge: per above     Patient will benefit from skilled therapy intervention to address the above noted impairments. PLAN :  Recommendations and Planned Interventions: bed mobility training, transfer training, gait training, therapeutic exercises, neuromuscular re-education, edema management/control, patient and family training/education, and therapeutic activities      Frequency/Duration: Patient will be followed by physical therapy:  5 times a week to address goals. Recommendation for discharge: (in order for the patient to meet his/her long term goals)  Physical therapy at least 2 days/week in the home     This discharge recommendation:  Has been made in collaboration with the attending provider and/or case management    IF patient discharges home will need the following DME: has rollator and SPC         SUBJECTIVE:   Patient stated It feels good to walk.     OBJECTIVE DATA SUMMARY:   HISTORY:    Past Medical History:   Diagnosis Date    Arthritis     Atrial fibrillation ([facility] [address]     detected 3/13/2023    Depression     Diabetes (HCC)     GERD (gastroesophageal reflux disease)     High cholesterol     Hypertension     Snoring     Transient ischemic attack (TIA) 2003     Past Surgical History:   Procedure Laterality Date    HX APPENDECTOMY      HX CATARACT REMOVAL Bilateral     HX CHOLECYSTECTOMY      HX HIP REPLACEMENT Right     HX ORTHOPAEDIC Left     Open Repair    HX ORTHOPAEDIC Left     Trigger finger    HX ROTATOR CUFF REPAIR Right     HX TUBAL LIGATION         Personal factors and/or comorbidities impacting plan of care: per above and below    Home Situation  Home Environment: Private residence  # Steps to Enter: 2  Rails to Enter: Yes  Hand Rails : Bilateral  One/Two Story Residence: One story  Living Alone: Yes  Support Systems: Child(kristofer)  Patient Expects to be Discharged to[de-identified] Home with family assistance  Current DME Used/Available at Home: marlen Gómez, Walker, rollator, Walker, rolling, Shower chair, Raised toilet seat, Commode, bedside  Tub or Shower Type: Shower    EXAMINATION/PRESENTATION/DECISION MAKING:   Critical Behavior:  Neurologic State: Alert  Orientation Level: Oriented X4  Cognition: Follows commands     Hearing: Auditory  Auditory Impairment: Hard of hearing, bilateral  Skin:  IV; NICK drain  Edema: abdominal  Range Of Motion:  AROM: Within functional limits                       Strength:    Strength: Within functional limits                    Tone & Sensation:   Tone: Normal              Sensation: Intact               Coordination:  Coordination: Within functional limits  Vision:      Functional Mobility:  Bed Mobility:  Rolling:  (received sitting up in recliner)        Scooting: Stand-by assistance  Transfers:  Sit to Stand: Contact guard assistance  Stand to Sit: Contact guard assistance                       Balance:   Sitting: Intact  Standing: Impaired; With support  Ambulation/Gait Training:  Distance (ft): 150 Feet (ft)  Assistive Device: Walker, rolling;Gait belt  Ambulation - Level of Assistance: Contact guard assistance        Gait Abnormalities: Decreased step clearance        Base of Support: Narrowed                         Therapeutic Exercises:   Per protocol; ankle pumping and spiromety    Functional Measure:  Cordell Pelaez -PAC®      Basic Mobility Inpatient Short Form (6-Clicks) Version 2  How much HELP from another person do you currently need. .. (If the patient hasn't done an activity recently, how much help from another person do you think they would need if they tried?) Total A Lot A Little None   1. Turning from your back to your side while in a flat bed without using bedrails? []  1 []  2 [x]  3  []  4   2. Moving from lying on your back to sitting on the side of a flat bed without using bedrails? []  1 []  2 [x]  3  []  4   3. Moving to and from a bed to a chair (including a wheelchair)? []  1 []  2 [x]  3  []  4   4.  Standing up from a chair using your arms (e.g. wheelchair or bedside chair)? []  1 []  2 [x]  3  []  4   5. Walking in hospital room? []  1 []  2 [x]  3  []  4   6. Climbing 3-5 steps with a railing? []  1 [x]  2 []  3  []  4     Raw Score: 17/24                            Cutoff score ?171,2,3 had higher odds of discharging home with home health or need of SNF/IPR. [address], Magi Sullivan Stas[place], Julissa Servin Carlita Noble. Validity of the AM-PAC 6-Clicks Inpatient Daily Activity and Basic Mobility Short Forms. Physical Therapy Mar 2014, 94 3) 379-391; DOI: 10.2522/ptj.39363498  2. Courtney Gonzalez. Association of AM-PAC \"6-Clicks\" Basic Mobility and Daily Activity Scores With Discharge Destination. Phys Ther. 2021 Apr 4;101(4):fkex747. doi: 10.1093/ptj/cboq742. PMID: 73191487. V Bill Sosa, Ubaldo D, Yasmin Elaine, Julio Cesar K, Diana S. Activity Measure for Post-Acute Care \"6-Clicks\" Basic Mobility Scores Predict Discharge Destination After Acute Care Hospitalization in Select Patient Groups: A Retrospective, Observational Study. Arch Rehabil Res Clin Transl. 2022 Jul 16;4(3):615080. doi: 10.1016/j.arrct. 5245.507762. PMID: 29825194; PMCID: NPF8603506. 4. Sandy Kraft, Radha P. AM-PAC Short Forms Manual 4.0. Revised 2/2020.         Physical Therapy Evaluation Charge Determination   History Examination Presentation Decision-Making   MEDIUM  Complexity : 1-2 comorbidities / personal factors will impact the outcome/ POC  MEDIUM Complexity : 3 Standardized tests and measures addressing body structure, function, activity limitation and / or participation in recreation  MEDIUM Complexity : Evolving with changing characteristics  Other outcome measures [facility]  MEDIUM      Based on the above components, the patient evaluation is determined to be of the following complexity level: MEDIUM    Pain Rating:  none    Activity Tolerance:   Good    After treatment patient left in no apparent distress:   Sitting in chair, Call bell within reach, Bed / chair alarm activated, and Caregiver / family present    COMMUNICATION/EDUCATION:   The patients plan of care was discussed with: Registered nurse and Case management. Fall prevention education was provided and the patient/caregiver indicated understanding., Patient/family have participated as able in goal setting and plan of care. , and Patient/family agree to work toward stated goals and plan of care.     Thank you for this referral.  Latia Durand, PT   Time Calculation: 26 mins

## 2023-03-28 NOTE — PROGRESS NOTES
Bedside shift change report given to [employer] (oncoming nurse) by Chandrakant Olmos RN (offgoing nurse). Report included the following information SBAR, MAR, and Recent Results.

## 2023-03-28 NOTE — PROGRESS NOTES
3/28/2023   CARE MANAGEMENT NOTE:  CM reviewed EMR and handoff received from previous  (Vj Daniels). READMISSION:  Pt was admitted from 3/13/23 - 3/14/23 with Afib. Pt was admitted with small bowel perforation. Reportedly, pt lives alone. Dtr, Phong Mercado [phone]) is the primary family contact. RUR 13%    Transition Plan of Care:  General Surgery following for medical management - pt is s/p ex lap, drainage of abscess, and small bowel resection on 3/26  PT eval pending; await recs  Outpatient follow up   Family will transport pt    CM will continue to follow pt until discharged.   Muriel

## 2023-03-28 NOTE — PROGRESS NOTES
[facility] 79  5335 [facility], [address] [place] [address]  [phone] 700 [address] Adult  Hospitalist Group                                                                                          Hospitalist Progress Note  Marisol Diggs DO        Date of Service:  3/28/2023  NAME:  Gil Skinner  :  1935  MRN:  [id number]      Admission Summary:   44-year-old female admitted for small bowel perforation. Hospitalist consulted for medical management    Interval history / Subjective:      Denies abd pain; but unable to obtain full reliable ROS given dementia. Daughters at bedside; shared concerns for worsening confusion and discussed possible sundowning     Assessment & Plan:     Small bowel perforation  -s/p exploratory lap, drainage of abscess, small bowel resection 3/26   -Continue IV Zosyn  -tylenol, oxy and IV dilaudid prn   -surgery following     Inferior left hepatic lobe enhancing lesion on CT scan  -will need outpatient GI follow-up and likely MRI    Paroxysmal A-fib  -Continue to monitor on telemetry  -Continue atenolol  -Eliquis held in anticipation for surgery; discussed with surgery resume eliquis this evening    Hypertension  -Continue atenolol, lisinopril, Norvasc    Type 2 diabetes  -Glipizide held   -SSI per protocol    Hypothyroidism  -Continue levothyroxine    Dementia   -concern for sundowning   -encouraged windows up  -minimize narcotics     Outisde Records, prior notes, labs, radiology, and medications reviewed     Code status: Full code  DVT prophylaxis: Eliquis , SCDs       Hospital Problems  Date Reviewed: 3/4/2022            Codes Class Noted POA    Small bowel perforation ([facility] [address] ICD-10-CM: K63.1  ICD-9-CM: 569.83  3/25/2023 Unknown         Review of Systems:   A comprehensive review of systems was negative except for that written in the HPI. Vital Signs:    Last 24hrs VS reviewed since prior progress note.  Most recent are:  Visit Vitals  BP (!) 159/88 (BP 1 Location: Left upper arm, BP Patient Position: At rest)   Pulse 67   Temp 97.4 °F (36.3 °C)   Resp 18   Ht 5' 5\" (1.651 m)   Wt 65.3 kg (143 lb 15.4 oz)   SpO2 93%   BMI 23.96 kg/m²         Intake/Output Summary (Last 24 hours) at 3/28/2023 1403  Last data filed at 3/28/2023 0945  Gross per 24 hour   Intake 1866.25 ml   Output 1470 ml   Net 396.25 ml          Physical Examination:       General: In no acute distress. elderly  Head: Normocephalic, atraumatic. Eyes: Anicteric sclera. PERRL. Extraocular muscles intact. ENT: External ears and nose appear normal.  Oral mucosa moist.  Neck: Supple. No jugular venous distention. Heart: Regular rate and rhythm. No murmurs appreciated. Chest: Symmetrical excursion. Clear to auscultation bilaterally. Abdomen: binder c/d/I, NCIK drain in place   Extremities: No gross deformities. No edema, no cyanosis. Feet are warm to touch. Neurological: cn grossly intact, poor insight   Skin: No jaundice. No rashes. Data Review:    Review and/or order of clinical lab test      Labs:     Recent Labs     03/28/23  0248 03/27/23  0334   WBC 13.5* 9.4   HGB 13.1 12.5   HCT 37.9 36.0    160       Recent Labs     03/28/23  0248 03/27/23  0334 03/26/23  0327 03/25/23  1557   * 137 135* 125*   K 3.8 3.7 3.7 3.8    111* 107 92*   CO2 24 20* 23 29   BUN 15 14 13 21*   CREA 0.81 0.74 0.82 1.09*   * 246* 145* 192*   CA 8.3* 7.7* 8.7 9.3   MG 1.9 1.5*  --  1.6   PHOS 1.2* 1.5*  --   --        Recent Labs     03/25/23  1557   ALT 22   AP 34*   TBILI 1.5*   TP 6.6   ALB 3.0*   GLOB 3.6       No results for input(s): INR, PTP, APTT, INREXT, INREXT in the last 72 hours. No results for input(s): FE, TIBC, PSAT, FERR in the last 72 hours. Lab Results   Component Value Date/Time    Folate 13.0 08/16/2021 09:14 AM        No results for input(s): PH, PCO2, PO2 in the last 72 hours.   No results for input(s): CPK, CKNDX, TROIQ in the last 72 hours.     No lab exists for component: CPKMB  No results found for: CHOL, CHOLX, CHLST, CHOLV, HDL, HDLP, LDL, LDLC, DLDLP, TGLX, TRIGL, TRIGP, CHHD, CHHDX  Lab Results   Component Value Date/Time    Glucose (POC) 158 (H) 03/28/2023 11:36 AM    Glucose (POC) 162 (H) 03/28/2023 05:50 AM    Glucose (POC) 184 (H) 03/27/2023 11:03 PM    Glucose (POC) 235 (H) 03/27/2023 04:37 PM    Glucose (POC) 250 (H) 03/27/2023 11:27 AM     Lab Results   Component Value Date/Time    Color YELLOW/STRAW 03/25/2023 11:23 PM    Appearance CLEAR 03/25/2023 11:23 PM    Specific gravity 1.025 03/25/2023 11:23 PM    pH (UA) 6.0 03/25/2023 11:23 PM    Protein 30 (A) 03/25/2023 11:23 PM    Glucose Negative 03/25/2023 11:23 PM    Ketone Negative 03/25/2023 11:23 PM    Bilirubin Negative 03/25/2023 11:23 PM    Urobilinogen 0.2 03/25/2023 11:23 PM    Nitrites Negative 03/25/2023 11:23 PM    Leukocyte Esterase Negative 03/25/2023 11:23 PM    Epithelial cells FEW 03/25/2023 11:23 PM    Bacteria Negative 03/25/2023 11:23 PM    WBC 0-4 03/25/2023 11:23 PM    RBC 0-5 03/25/2023 11:23 PM         Medications Reviewed:     Current Facility-Administered Medications   Medication Dose Route Frequency    [START ON 3/29/2023] levothyroxine (SYNTHROID) tablet 88 mcg  88 mcg Oral ACB    omega 3-DHA-EPA-fish oil 1,000 mg (120 mg-180 mg) capsule 1 Capsule  1 Capsule Oral ACB/HS    potassium phosphate 30 mmol in 0.9% sodium chloride 250 mL infusion   IntraVENous ONCE    amLODIPine (NORVASC) tablet 10 mg  10 mg Oral DAILY    ascorbic acid (vitamin C) (VITAMIN C) tablet 500 mg  500 mg Oral DAILY AFTER BREAKFAST    aspirin delayed-release tablet 81 mg  81 mg Oral BID    atenoloL (TENORMIN) tablet 25 mg  25 mg Oral DAILY    calcium carbonate (OS-CLARA) tablet 500 mg [elemental]  500 mg Oral BID    cholecalciferol (VITAMIN D3) (1000 Units /25 mcg) tablet 1,000 Units  1,000 Units Oral DAILY    lidocaine 4 % patch 1 Patch  1 Patch TransDERmal Q24H    lisinopriL (PRINIVIL, ZESTRIL) tablet 20 mg  20 mg Oral DAILY    rosuvastatin (CRESTOR) tablet 10 mg  10 mg Oral QHS    insulin lispro (HUMALOG) injection   SubCUTAneous Q6H    glucose chewable tablet 16 g  4 Tablet Oral PRN    glucagon (GLUCAGEN) injection 1 mg  1 mg IntraMUSCular PRN    dextrose 10% infusion 0-250 mL  0-250 mL IntraVENous PRN    sodium chloride (NS) flush 5-40 mL  5-40 mL IntraVENous Q8H    sodium chloride (NS) flush 5-40 mL  5-40 mL IntraVENous PRN    acetaminophen (TYLENOL) tablet 1,000 mg  1,000 mg Oral Q6H    oxyCODONE IR (ROXICODONE) tablet 5 mg  5 mg Oral Q4H PRN    ondansetron (ZOFRAN) injection 4 mg  4 mg IntraVENous Q6H PRN    enoxaparin (LOVENOX) injection 40 mg  40 mg SubCUTAneous DAILY    naloxegoL (MOVANTIK) tablet 25 mg  25 mg Oral DAILY    ibuprofen (MOTRIN) tablet 400 mg  400 mg Oral Q12H PRN    sodium chloride (NS) flush 5-10 mL  5-10 mL IntraVENous PRN    piperacillin-tazobactam (ZOSYN) 3.375 g in 0.9% sodium chloride (MBP/ADV) 100 mL MBP  3.375 g IntraVENous Q8H    HYDROmorphone (DILAUDID) syringe 0.2 mg  0.2 mg IntraVENous Q3H PRN    ondansetron (ZOFRAN) injection 4 mg  4 mg IntraVENous Q4H PRN     ______________________________________________________________________  EXPECTED LENGTH OF STAY: 9d 19h  ACTUAL LENGTH OF STAY:          Hao Chaudhry DO

## 2023-03-29 LAB
ANION GAP SERPL CALC-SCNC: 7 MMOL/L (ref 5–15)
APPEARANCE UR: CLEAR
BACTERIA URNS QL MICRO: NEGATIVE /HPF
BASOPHILS # BLD: 0.1 K/UL (ref 0–0.1)
BASOPHILS NFR BLD: 1 % (ref 0–1)
BILIRUB UR QL: NEGATIVE
BUN SERPL-MCNC: 14 MG/DL (ref 6–20)
BUN/CREAT SERPL: 23 (ref 12–20)
CALCIUM SERPL-MCNC: 8.7 MG/DL (ref 8.5–10.1)
CHLORIDE SERPL-SCNC: 107 MMOL/L (ref 97–108)
CO2 SERPL-SCNC: 23 MMOL/L (ref 21–32)
COLOR UR: ABNORMAL
CREAT SERPL-MCNC: 0.6 MG/DL (ref 0.55–1.02)
DIFFERENTIAL METHOD BLD: ABNORMAL
EOSINOPHIL # BLD: 0.2 K/UL (ref 0–0.4)
EOSINOPHIL NFR BLD: 2 % (ref 0–7)
EPITH CASTS URNS QL MICRO: ABNORMAL /LPF
ERYTHROCYTE [DISTWIDTH] IN BLOOD BY AUTOMATED COUNT: 12.5 % (ref 11.5–14.5)
GLUCOSE BLD STRIP.AUTO-MCNC: 151 MG/DL (ref 65–117)
GLUCOSE BLD STRIP.AUTO-MCNC: 172 MG/DL (ref 65–117)
GLUCOSE BLD STRIP.AUTO-MCNC: 215 MG/DL (ref 65–117)
GLUCOSE SERPL-MCNC: 203 MG/DL (ref 65–100)
GLUCOSE UR STRIP.AUTO-MCNC: NEGATIVE MG/DL
HCT VFR BLD AUTO: 40.2 % (ref 35–47)
HGB BLD-MCNC: 13.9 G/DL (ref 11.5–16)
HGB UR QL STRIP: NEGATIVE
HYALINE CASTS URNS QL MICRO: ABNORMAL /LPF (ref 0–2)
IMM GRANULOCYTES # BLD AUTO: 0.2 K/UL (ref 0–0.04)
IMM GRANULOCYTES NFR BLD AUTO: 1 % (ref 0–0.5)
KETONES UR QL STRIP.AUTO: NEGATIVE MG/DL
LEUKOCYTE ESTERASE UR QL STRIP.AUTO: NEGATIVE
LYMPHOCYTES # BLD: 2.2 K/UL (ref 0.8–3.5)
LYMPHOCYTES NFR BLD: 15 % (ref 12–49)
MAGNESIUM SERPL-MCNC: 1.8 MG/DL (ref 1.6–2.4)
MCH RBC QN AUTO: 34.2 PG (ref 26–34)
MCHC RBC AUTO-ENTMCNC: 34.6 G/DL (ref 30–36.5)
MCV RBC AUTO: 98.8 FL (ref 80–99)
MONOCYTES # BLD: 1.3 K/UL (ref 0–1)
MONOCYTES NFR BLD: 9 % (ref 5–13)
NEUTS SEG # BLD: 10.5 K/UL (ref 1.8–8)
NEUTS SEG NFR BLD: 72 % (ref 32–75)
NITRITE UR QL STRIP.AUTO: NEGATIVE
NRBC # BLD: 0 K/UL (ref 0–0.01)
NRBC BLD-RTO: 0 PER 100 WBC
PH UR STRIP: 7 (ref 5–8)
PHOSPHATE SERPL-MCNC: 1.6 MG/DL (ref 2.6–4.7)
PLATELET # BLD AUTO: 264 K/UL (ref 150–400)
PMV BLD AUTO: 10.8 FL (ref 8.9–12.9)
POTASSIUM SERPL-SCNC: 4.2 MMOL/L (ref 3.5–5.1)
PROT UR STRIP-MCNC: 30 MG/DL
RBC # BLD AUTO: 4.07 M/UL (ref 3.8–5.2)
RBC #/AREA URNS HPF: ABNORMAL /HPF (ref 0–5)
SERVICE CMNT-IMP: ABNORMAL
SODIUM SERPL-SCNC: 137 MMOL/L (ref 136–145)
SP GR UR REFRACTOMETRY: 1.01 (ref 1–1.03)
UA: UC IF INDICATED,UAUC: ABNORMAL
UROBILINOGEN UR QL STRIP.AUTO: 0.2 EU/DL (ref 0.2–1)
WBC # BLD AUTO: 14.4 K/UL (ref 3.6–11)
WBC URNS QL MICRO: ABNORMAL /HPF (ref 0–4)

## 2023-03-29 PROCEDURE — 80048 BASIC METABOLIC PNL TOTAL CA: CPT

## 2023-03-29 PROCEDURE — 74011250636 HC RX REV CODE- 250/636: Performed by: SURGERY

## 2023-03-29 PROCEDURE — 36415 COLL VENOUS BLD VENIPUNCTURE: CPT

## 2023-03-29 PROCEDURE — 97116 GAIT TRAINING THERAPY: CPT

## 2023-03-29 PROCEDURE — 74011250637 HC RX REV CODE- 250/637: Performed by: SURGERY

## 2023-03-29 PROCEDURE — 74011000250 HC RX REV CODE- 250: Performed by: INTERNAL MEDICINE

## 2023-03-29 PROCEDURE — 81001 URINALYSIS AUTO W/SCOPE: CPT

## 2023-03-29 PROCEDURE — 65270000046 HC RM TELEMETRY

## 2023-03-29 PROCEDURE — 74011000250 HC RX REV CODE- 250: Performed by: SURGERY

## 2023-03-29 PROCEDURE — 74011250637 HC RX REV CODE- 250/637: Performed by: INTERNAL MEDICINE

## 2023-03-29 PROCEDURE — 82962 GLUCOSE BLOOD TEST: CPT

## 2023-03-29 PROCEDURE — 74011636637 HC RX REV CODE- 636/637: Performed by: SURGERY

## 2023-03-29 PROCEDURE — 85025 COMPLETE CBC W/AUTO DIFF WBC: CPT

## 2023-03-29 PROCEDURE — 74011000258 HC RX REV CODE- 258: Performed by: SURGERY

## 2023-03-29 PROCEDURE — 74011250636 HC RX REV CODE- 250/636: Performed by: INTERNAL MEDICINE

## 2023-03-29 PROCEDURE — 83735 ASSAY OF MAGNESIUM: CPT

## 2023-03-29 PROCEDURE — 84100 ASSAY OF PHOSPHORUS: CPT

## 2023-03-29 PROCEDURE — 97530 THERAPEUTIC ACTIVITIES: CPT

## 2023-03-29 RX ORDER — OXYCODONE HYDROCHLORIDE 5 MG/1
5 TABLET ORAL
Qty: 6 TABLET | Refills: 0 | Status: SHIPPED | OUTPATIENT
Start: 2023-03-29 | End: 2023-04-01

## 2023-03-29 RX ORDER — ONDANSETRON 4 MG/1
4 TABLET, ORALLY DISINTEGRATING ORAL
Qty: 12 TABLET | Refills: 1 | Status: SHIPPED | OUTPATIENT
Start: 2023-03-29 | End: 2023-04-03

## 2023-03-29 RX ORDER — HYDRALAZINE HYDROCHLORIDE 20 MG/ML
10 INJECTION INTRAMUSCULAR; INTRAVENOUS
Status: DISCONTINUED | OUTPATIENT
Start: 2023-03-29 | End: 2023-03-30 | Stop reason: HOSPADM

## 2023-03-29 RX ADMIN — OMEGA-3 FATTY ACIDS CAP 1000 MG 1 CAPSULE: 1000 CAP at 21:21

## 2023-03-29 RX ADMIN — OXYCODONE HYDROCHLORIDE AND ACETAMINOPHEN 500 MG: 500 TABLET ORAL at 08:53

## 2023-03-29 RX ADMIN — ROSUVASTATIN 10 MG: 10 TABLET, FILM COATED ORAL at 21:21

## 2023-03-29 RX ADMIN — SODIUM CHLORIDE, PRESERVATIVE FREE 5 ML: 5 INJECTION INTRAVENOUS at 21:21

## 2023-03-29 RX ADMIN — APIXABAN 5 MG: 5 TABLET, FILM COATED ORAL at 08:52

## 2023-03-29 RX ADMIN — SODIUM CHLORIDE, PRESERVATIVE FREE 10 ML: 5 INJECTION INTRAVENOUS at 13:15

## 2023-03-29 RX ADMIN — ASPIRIN 81 MG: 81 TABLET, COATED ORAL at 17:17

## 2023-03-29 RX ADMIN — INSULIN LISPRO 2 UNITS: 100 INJECTION, SOLUTION INTRAVENOUS; SUBCUTANEOUS at 17:58

## 2023-03-29 RX ADMIN — CALCIUM 500 MG: 500 TABLET ORAL at 17:17

## 2023-03-29 RX ADMIN — INSULIN LISPRO 3 UNITS: 100 INJECTION, SOLUTION INTRAVENOUS; SUBCUTANEOUS at 06:22

## 2023-03-29 RX ADMIN — PIPERACILLIN AND TAZOBACTAM 3.38 G: 3; .375 INJECTION, POWDER, LYOPHILIZED, FOR SOLUTION INTRAVENOUS at 08:52

## 2023-03-29 RX ADMIN — POTASSIUM PHOSPHATE, MONOBASIC POTASSIUM PHOSPHATE, DIBASIC: 224; 236 INJECTION, SOLUTION, CONCENTRATE INTRAVENOUS at 13:50

## 2023-03-29 RX ADMIN — ACETAMINOPHEN 1000 MG: 500 TABLET ORAL at 22:56

## 2023-03-29 RX ADMIN — AMLODIPINE BESYLATE 10 MG: 5 TABLET ORAL at 08:53

## 2023-03-29 RX ADMIN — LISINOPRIL 20 MG: 20 TABLET ORAL at 08:53

## 2023-03-29 RX ADMIN — PIPERACILLIN AND TAZOBACTAM 3.38 G: 3; .375 INJECTION, POWDER, LYOPHILIZED, FOR SOLUTION INTRAVENOUS at 00:45

## 2023-03-29 RX ADMIN — PIPERACILLIN AND TAZOBACTAM 3.38 G: 3; .375 INJECTION, POWDER, LYOPHILIZED, FOR SOLUTION INTRAVENOUS at 17:17

## 2023-03-29 RX ADMIN — ACETAMINOPHEN 1000 MG: 500 TABLET ORAL at 17:17

## 2023-03-29 RX ADMIN — OMEGA-3 FATTY ACIDS CAP 1000 MG 1 CAPSULE: 1000 CAP at 04:47

## 2023-03-29 RX ADMIN — FAMOTIDINE 20 MG: 20 TABLET, FILM COATED ORAL at 08:52

## 2023-03-29 RX ADMIN — ACETAMINOPHEN 1000 MG: 500 TABLET ORAL at 04:47

## 2023-03-29 RX ADMIN — Medication 1000 UNITS: at 08:52

## 2023-03-29 RX ADMIN — NALOXEGOL OXALATE 25 MG: 12.5 TABLET, FILM COATED ORAL at 09:50

## 2023-03-29 RX ADMIN — ASPIRIN 81 MG: 81 TABLET, COATED ORAL at 08:52

## 2023-03-29 RX ADMIN — SODIUM CHLORIDE, PRESERVATIVE FREE 5 ML: 5 INJECTION INTRAVENOUS at 05:15

## 2023-03-29 RX ADMIN — ATENOLOL 25 MG: 50 TABLET ORAL at 08:52

## 2023-03-29 RX ADMIN — APIXABAN 5 MG: 5 TABLET, FILM COATED ORAL at 21:21

## 2023-03-29 RX ADMIN — ACETAMINOPHEN 1000 MG: 500 TABLET ORAL at 09:49

## 2023-03-29 RX ADMIN — CALCIUM 500 MG: 500 TABLET ORAL at 08:52

## 2023-03-29 RX ADMIN — LEVOTHYROXINE SODIUM 88 MCG: 88 TABLET ORAL at 04:47

## 2023-03-29 NOTE — PROGRESS NOTES
Bedside and Verbal shift change report given to [address] [place] [address] (oncoming nurse) by Katie House RN (offgoing nurse). Report included the following information SBAR, Kardex, Intake/Output, MAR, Recent Results, and Med Rec Status.

## 2023-03-29 NOTE — DISCHARGE INSTRUCTIONS
Open Bowel Resection: What to Expect at [address] [place] [address] Drive are likely to have pain that comes and goes for the next few days after bowel surgery. You may have bowel cramps, and your cut (incision) may hurt. You may also feel like you have the flu. You may have a low fever and feel tired and nauseated. This is common. You should feel better after a week and will probably be back to normal in 2 to 3 weeks. This care sheet gives you a general idea about how long it will take for you to recover. But each person recovers at a different pace. Follow the steps below to get better as quickly as possible. How can you care for yourself at home? Activity    Rest when you feel tired. Getting enough sleep will help you recover. Try to walk each day. Start by walking a little more than you did the day before. Bit by bit, increase the amount you walk. Walking boosts blood flow and helps prevent pneumonia and constipation. Avoid strenuous activities, such as biking, jogging, weight lifting, or aerobic exercise, until your doctor says it is okay. Ask your doctor when you can drive again. You will probably need to take 3 to 4 weeks off from work. It depends on the type of work you do and how you feel. You may need to take off 4 to 6 weeks if you lift heavy objects in your job. You may shower 24 to 48 hours after surgery, if your doctor says it is okay. Pat the cut (incision) dry. Do not take a bath for the first 2 weeks, or until your doctor tells you it is okay. Ask your doctor when it is okay for you to have sex. Diet    You may not have much appetite after the surgery. But try to eat a healthy diet. Your doctor will tell you about any foods you should not eat. Eat a low-fiber diet for several weeks after surgery. Eat many small meals throughout the day. Add high-fiber foods a little at a time. Eat yogurt. It puts good bacteria into your colon and helps prevent diarrhea.      Try to avoid nuts, seeds, and corn for a while. They may be hard to digest.     You may need to take vitamins that contain sodium and potassium. Ask your doctor. Drink plenty of fluids to avoid becoming dehydrated. Medicines    Your doctor will tell you if and when you can restart your medicines. He or she will also give you instructions about taking any new medicines. If you stopped taking aspirin or some other blood thinner, your doctor will tell you when to start taking it again. Take pain medicines exactly as directed. If the doctor gave you a prescription medicine for pain, take it as prescribed. If you are not taking a prescription pain medicine, ask your doctor if you can take an over-the-counter medicine. Do not take two or more pain medicines at the same time unless the doctor told you to. Many pain medicines have acetaminophen, which is Tylenol. Too much acetaminophen (Tylenol) can be harmful. If you think your pain medicine is making you sick to your stomach: Take your medicine after meals (unless your doctor tells you not to). Ask your doctor for a different pain medicine. If your doctor prescribed antibiotics, take them as directed. Do not stop taking them just because you feel better. You need to take the full course of antibiotics. You may need to take some medicines in a different form. You will be told whether to crush pills or take a liquid form of the medicine. If your doctor gives you a stool softener, take it as directed. Incision care    If you have strips of tape on the incision, leave the tape on for a week or until it falls off. Wash the area daily with warm, soapy water, and pat it dry. Follow-up care is a key part of your treatment and safety. Be sure to make and go to all appointments, and call your doctor if you are having problems. It's also a good idea to know your test results and keep a list of the medicines you take.   When should you call for help?   Call 911 anytime you think you may need emergency care. For example, call if:    You passed out (lost consciousness). You are short of breath. Call your doctor now or seek immediate medical care if:    You are sick to your stomach and cannot drink fluids or keep them down. You have signs of a blood clot in your leg (called a deep vein thrombosis), such as:  Pain in your calf, back of the knee, thigh, or groin. Redness and swelling in your leg or groin. You have signs of infection, such as: Increased pain, swelling, warmth, or redness. Red streaks leading from the incision. Pus draining from the incision. A fever. You have pain that does not get better after you take pain medicine. You have loose stitches, or your incision comes open. Bright red blood has soaked through the bandage. You cannot pass stools or gas. Watch closely for any changes in your health, and be sure to contact your doctor if you have any problems. Where can you learn more? Go to http://www.gray.com/  Enter Z281 in the search box to learn more about \"Open Bowel Resection: What to Expect at Home. \"  Current as of: June 6, 2022               Content Version: 13.4  © 2006-2022 [employer]. Care instructions adapted under license by [employer] (which disclaims liability or warranty for this information). If you have questions about a medical condition or this instruction, always ask your healthcare professional. [place] [address] any warranty or liability for your use of this information.

## 2023-03-29 NOTE — PROGRESS NOTES
Problem: Mobility Impaired (Adult and Pediatric)  Goal: *Acute Goals and Plan of Care (Insert Text)  Description: FUNCTIONAL STATUS PRIOR TO ADMISSION: Patient was modified independent using a rollator for functional mobility. HOME SUPPORT PRIOR TO ADMISSION: The patient lived alone with dtr to provide assistance. Physical Therapy Goals  Initiated 3/28/2023  1. Patient will move from supine to sit and sit to supine  in bed with modified independence within 7 day(s). 2.  Patient will transfer from bed to chair and chair to bed with supervision/set-up using the least restrictive device within 7 day(s). 3.  Patient will perform sit to stand with supervision/set-up within 7 day(s). 4.  Patient will ambulate with supervision/set-up for 250 feet with the least restrictive device within 7 day(s). 5.  Patient will ascend/descend 2 stairs with  handrail(s) with minimal assistance/contact guard assist within 7 day(s). Note:   PHYSICAL THERAPY TREATMENT  Patient: Gil Skinner (82 y.o. female)  Date: 3/29/2023  Diagnosis: Small bowel perforation (HCC) [K63.1] <principal problem not specified>  Procedure(s) (LRB):  LAPAROTOMY EXPLORATORY, SMALL BOWEL RESECTION, DRAINAGE OF ABSCESS (N/A) 3 Days Post-Op  Precautions: Fall  Chart, physical therapy assessment, plan of care and goals were reviewed. ASSESSMENT  Patient continues with skilled PT services. Pt sit to stand with CGA. Pt ambulated 70ft x 2 with no device CGA. Pt is slightly unsteady at times. Pt has been ambulating with family using RW today and  observed by PT. Pt is safer with RW. Reinforced that pt should stay with RW after returning home for increased stability. Pt tolerated treatment well. Continue goals. PLAN :  Patient continues to benefit from skilled intervention to address the above impairments. Continue treatment per established plan of care. to address goals.     Recommendation for discharge: (in order for the patient to meet his/her long term goals)  Physical therapy at least 2 days/week in the home AND ensure assist and/or supervision for safety     This discharge recommendation:  Has been made in collaboration with the attending provider and/or case management    IF patient discharges home will need the following DME: rolling walker           OBJECTIVE DATA SUMMARY:   Critical Behavior:  Neurologic State: Alert  Orientation Level: Oriented X4  Cognition: Follows commands     Functional Mobility Training:  Bed Mobility:                    Transfers:  Sit to Stand: Contact guard assistance  Stand to Sit: Contact guard assistance                             Balance:  Sitting: Intact  Standing: With support  Ambulation/Gait Training:  Distance (ft): 140 Feet (ft)  Assistive Device: Gait belt  Ambulation - Level of Assistance: Contact guard assistance        Gait Abnormalities: Decreased step clearance        Base of Support: Narrowed                        Activity Tolerance:   Good    After treatment patient left in no apparent distress:   Sitting in chair    COMMUNICATION/COLLABORATION:   The patients plan of care was discussed with: Physical therapist.     Mack Rodgers PTA   Time Calculation: 23 mins

## 2023-03-29 NOTE — PROGRESS NOTES
3/29/2023   CARE MANAGEMENT NOTE:  CM reviewed EMR. READMISSION:  Pt was admitted from 3/13/23 - 3/14/23 with Afib. Pt was admitted with small bowel perforation. Reportedly, pt lives alone. DtrCarlos Alberto ([phone]) is the primary family contact. RUR 12%     Transition Plan of Care:  General Surgery following for medical management - pt is s/p ex lap, drainage of abscess, and small bowel resection on 3/26  PT eval pending; await recs  Outpatient follow up   Family will transport pt     CM will continue to follow pt until discharged.   Muriel

## 2023-03-29 NOTE — PROGRESS NOTES
[facility] 79  [address] [facility], [place], [id number] [place] [address]  [phone] [address] Adult  Hospitalist Group                                                                                          Hospitalist Progress Note  Mari Stevens DO        Date of Service:  3/29/2023  NAME:  Janell Aggarwal  :  1935  MRN:  [id number]      Admission Summary:   82-year-old female admitted for small bowel perforation. Hospitalist consulted for medical management    Interval history / Subjective:      Denies abd pain; but unable to obtain full reliable ROS given dementia. Daughters at bedside; stated only confused at bedtime and less agitated when they are around     Assessment & Plan:     Small bowel perforation  -s/p exploratory lap, drainage of abscess, small bowel resection 3/26   -Continue IV Zosyn  -tylenol, oxy and IV dilaudid prn - minimize as able   -surgery following     Inferior left hepatic lobe enhancing lesion on CT scan  -will need outpatient GI follow-up and likely MRI    Paroxysmal A-fib  -Continue to monitor on telemetry  -Continue atenolol  -Eliquis resumed     Hypertension  -Continue atenolol, lisinopril, Norvasc    Type 2 diabetes  -Glipizide held   -SSI per protocol    Hypothyroidism  -Continue levothyroxine    Dementia   -concern for    -encouraged windows up  -minimize narcotics     Outisde Records, prior notes, labs, radiology, and medications reviewed     Code status: Full code  DVT prophylaxis: Eliquis , SCDs       Hospital Problems  Date Reviewed: 3/4/2022            Codes Class Noted POA    Small bowel perforation ([facility] [address] ICD-10-CM: K63.1  ICD-9-CM: 569.83  3/25/2023 Unknown         Review of Systems:   A comprehensive review of systems was negative except for that written in the HPI. Vital Signs:    Last 24hrs VS reviewed since prior progress note.  Most recent are:  Visit Vitals  BP (!) 168/81 (BP 1 Location: Left upper arm, BP Patient Position: Lying)   Pulse 62   Temp 98.1 °F (36.7 °C)   Resp 18   Ht 5' 5\" (1.651 m)   Wt 65.3 kg (143 lb 15.4 oz)   SpO2 93%   BMI 23.96 kg/m²         Intake/Output Summary (Last 24 hours) at 3/29/2023 1324  Last data filed at 3/29/2023 2413  Gross per 24 hour   Intake 1301.08 ml   Output 810 ml   Net 491.08 ml          Physical Examination:       General: In no acute distress. elderly  Head: Normocephalic, atraumatic. Eyes: Anicteric sclera. PERRL. Extraocular muscles intact. ENT: External ears and nose appear normal.  Oral mucosa moist.  Neck: Supple. No jugular venous distention. Heart: Regular rate and rhythm. No murmurs appreciated. Chest: Symmetrical excursion. Clear to auscultation bilaterally. Abdomen: binder c/d/I, NICK drain in place   Extremities: No gross deformities. No edema, no cyanosis. Feet are warm to touch. Neurological: cn grossly intact, poor insight   Skin: No jaundice. No rashes. Data Review:    Review and/or order of clinical lab test      Labs:     Recent Labs     03/29/23  0112 03/28/23  0248   WBC 14.4* 13.5*   HGB 13.9 13.1   HCT 40.2 37.9    211       Recent Labs     03/29/23  0112 03/28/23  0248 03/27/23  0334    134* 137   K 4.2 3.8 3.7    106 111*   CO2 23 24 20*   BUN 14 15 14   CREA 0.60 0.81 0.74   * 210* 246*   CA 8.7 8.3* 7.7*   MG 1.8 1.9 1.5*   PHOS 1.6* 1.2* 1.5*       No results for input(s): ALT, AP, TBIL, TBILI, TP, ALB, GLOB, GGT, AML, LPSE in the last 72 hours. No lab exists for component: SGOT, GPT, AMYP, HLPSE    No results for input(s): INR, PTP, APTT, INREXT, INREXT in the last 72 hours. No results for input(s): FE, TIBC, PSAT, FERR in the last 72 hours. Lab Results   Component Value Date/Time    Folate 13.0 08/16/2021 09:14 AM        No results for input(s): PH, PCO2, PO2 in the last 72 hours. No results for input(s): CPK, CKNDX, TROIQ in the last 72 hours.     No lab exists for component: CPKMB  No results found for: CHOL, CHOLX, CHLST, CHOLV, HDL, HDLP, LDL, LDLC, DLDLP, TGLX, TRIGL, TRIGP, CHHD, CHHDX  Lab Results   Component Value Date/Time    Glucose (POC) 151 (H) 03/29/2023 11:36 AM    Glucose (POC) 215 (H) 03/29/2023 06:16 AM    Glucose (POC) 182 (H) 03/28/2023 11:40 PM    Glucose (POC) 137 (H) 03/28/2023 04:50 PM    Glucose (POC) 158 (H) 03/28/2023 11:36 AM     Lab Results   Component Value Date/Time    Color YELLOW/STRAW 03/25/2023 11:23 PM    Appearance CLEAR 03/25/2023 11:23 PM    Specific gravity 1.025 03/25/2023 11:23 PM    pH (UA) 6.0 03/25/2023 11:23 PM    Protein 30 (A) 03/25/2023 11:23 PM    Glucose Negative 03/25/2023 11:23 PM    Ketone Negative 03/25/2023 11:23 PM    Bilirubin Negative 03/25/2023 11:23 PM    Urobilinogen 0.2 03/25/2023 11:23 PM    Nitrites Negative 03/25/2023 11:23 PM    Leukocyte Esterase Negative 03/25/2023 11:23 PM    Epithelial cells FEW 03/25/2023 11:23 PM    Bacteria Negative 03/25/2023 11:23 PM    WBC 0-4 03/25/2023 11:23 PM    RBC 0-5 03/25/2023 11:23 PM         Medications Reviewed:     Current Facility-Administered Medications   Medication Dose Route Frequency    hydrALAZINE (APRESOLINE) 20 mg/mL injection 10 mg  10 mg IntraVENous Q6H PRN    potassium phosphate 20 mmol in 0.9% sodium chloride 250 mL infusion   IntraVENous ONCE    levothyroxine (SYNTHROID) tablet 88 mcg  88 mcg Oral ACB    omega 3-DHA-EPA-fish oil 1,000 mg (120 mg-180 mg) capsule 1 Capsule  1 Capsule Oral ACB/HS    apixaban (ELIQUIS) tablet 5 mg  5 mg Oral BID    famotidine (PEPCID) tablet 20 mg  20 mg Oral DAILY    amLODIPine (NORVASC) tablet 10 mg  10 mg Oral DAILY    ascorbic acid (vitamin C) (VITAMIN C) tablet 500 mg  500 mg Oral DAILY AFTER BREAKFAST    aspirin delayed-release tablet 81 mg  81 mg Oral BID    atenoloL (TENORMIN) tablet 25 mg  25 mg Oral DAILY    calcium carbonate (OS-CLARA) tablet 500 mg [elemental]  500 mg Oral BID    cholecalciferol (VITAMIN D3) (1000 Units /25 mcg) tablet 1,000 Units  1,000 Units Oral DAILY    lidocaine 4 % patch 1 Patch  1 Patch TransDERmal Q24H    lisinopriL (PRINIVIL, ZESTRIL) tablet 20 mg  20 mg Oral DAILY    rosuvastatin (CRESTOR) tablet 10 mg  10 mg Oral QHS    insulin lispro (HUMALOG) injection   SubCUTAneous Q6H    glucose chewable tablet 16 g  4 Tablet Oral PRN    glucagon (GLUCAGEN) injection 1 mg  1 mg IntraMUSCular PRN    dextrose 10% infusion 0-250 mL  0-250 mL IntraVENous PRN    sodium chloride (NS) flush 5-40 mL  5-40 mL IntraVENous Q8H    sodium chloride (NS) flush 5-40 mL  5-40 mL IntraVENous PRN    acetaminophen (TYLENOL) tablet 1,000 mg  1,000 mg Oral Q6H    oxyCODONE IR (ROXICODONE) tablet 5 mg  5 mg Oral Q4H PRN    ondansetron (ZOFRAN) injection 4 mg  4 mg IntraVENous Q6H PRN    naloxegoL (MOVANTIK) tablet 25 mg  25 mg Oral DAILY    ibuprofen (MOTRIN) tablet 400 mg  400 mg Oral Q12H PRN    sodium chloride (NS) flush 5-10 mL  5-10 mL IntraVENous PRN    piperacillin-tazobactam (ZOSYN) 3.375 g in 0.9% sodium chloride (MBP/ADV) 100 mL MBP  3.375 g IntraVENous Q8H    HYDROmorphone (DILAUDID) syringe 0.2 mg  0.2 mg IntraVENous Q3H PRN    ondansetron (ZOFRAN) injection 4 mg  4 mg IntraVENous Q4H PRN     ______________________________________________________________________  EXPECTED LENGTH OF STAY: 9d 19h  ACTUAL LENGTH OF STAY:          38 Lizet Nix DO

## 2023-03-29 NOTE — PROGRESS NOTES
Doing well. Denies pain. Walked today, passing gas. Visit Vitals  BP (!) 146/80   Pulse 63   Temp 97.8 °F (36.6 °C)   Resp 18   Ht 5' 5\" (1.651 m)   Wt 65.3 kg (143 lb 15.4 oz)   SpO2 90%   BMI 23.96 kg/m²     Abdomen soft, nondistended. NICK drain clear. POD2 SBR  Diet as tolerated. Appreciate hospitalist input. PT.  for placement.      Vincent Yuen MD

## 2023-03-29 NOTE — PROGRESS NOTES
Bedside shift change report given to [employer] (oncoming nurse) by Korina Kamara RN (offgoing nurse). Report included the following information SBAR, MAR, and Recent Results.

## 2023-03-29 NOTE — PROGRESS NOTES
Doing well. No new issues. Visit Vitals  BP (!) 146/70 (BP 1 Location: Right upper arm, BP Patient Position: Sitting)   Pulse 63   Temp 97.5 °F (36.4 °C)   Resp 16   Ht 5' 5\" (1.651 m)   Wt 65.3 kg (143 lb 15.4 oz)   SpO2 95%   BMI 23.96 kg/m²     Abdomen soft, nondistended. NICK drain clear. POD3 SBR  Home health only, per PT reccs. Diet as tolerated. UTI treated. Hopeful home tomorrow.      Daria Pereira MD

## 2023-03-30 VITALS
SYSTOLIC BLOOD PRESSURE: 130 MMHG | BODY MASS INDEX: 23.99 KG/M2 | HEART RATE: 67 BPM | HEIGHT: 65 IN | WEIGHT: 143.96 LBS | DIASTOLIC BLOOD PRESSURE: 77 MMHG | OXYGEN SATURATION: 94 % | RESPIRATION RATE: 16 BRPM | TEMPERATURE: 98.1 F

## 2023-03-30 LAB
ANION GAP SERPL CALC-SCNC: 4 MMOL/L (ref 5–15)
BASOPHILS # BLD: 0 K/UL (ref 0–0.1)
BASOPHILS NFR BLD: 0 % (ref 0–1)
BUN SERPL-MCNC: 16 MG/DL (ref 6–20)
BUN/CREAT SERPL: 22 (ref 12–20)
CALCIUM SERPL-MCNC: 9 MG/DL (ref 8.5–10.1)
CHLORIDE SERPL-SCNC: 105 MMOL/L (ref 97–108)
CO2 SERPL-SCNC: 25 MMOL/L (ref 21–32)
CREAT SERPL-MCNC: 0.72 MG/DL (ref 0.55–1.02)
DIFFERENTIAL METHOD BLD: ABNORMAL
EOSINOPHIL # BLD: 0.5 K/UL (ref 0–0.4)
EOSINOPHIL NFR BLD: 3 % (ref 0–7)
ERYTHROCYTE [DISTWIDTH] IN BLOOD BY AUTOMATED COUNT: 13.1 % (ref 11.5–14.5)
GLUCOSE BLD STRIP.AUTO-MCNC: 189 MG/DL (ref 65–117)
GLUCOSE BLD STRIP.AUTO-MCNC: 200 MG/DL (ref 65–117)
GLUCOSE BLD STRIP.AUTO-MCNC: 222 MG/DL (ref 65–117)
GLUCOSE SERPL-MCNC: 216 MG/DL (ref 65–100)
HCT VFR BLD AUTO: 37.3 % (ref 35–47)
HGB BLD-MCNC: 12.8 G/DL (ref 11.5–16)
IMM GRANULOCYTES # BLD AUTO: 0 K/UL
IMM GRANULOCYTES NFR BLD AUTO: 0 %
LYMPHOCYTES # BLD: 2.3 K/UL (ref 0.8–3.5)
LYMPHOCYTES NFR BLD: 15 % (ref 12–49)
MAGNESIUM SERPL-MCNC: 1.6 MG/DL (ref 1.6–2.4)
MCH RBC QN AUTO: 34.4 PG (ref 26–34)
MCHC RBC AUTO-ENTMCNC: 34.3 G/DL (ref 30–36.5)
MCV RBC AUTO: 100.3 FL (ref 80–99)
MONOCYTES # BLD: 1.1 K/UL (ref 0–1)
MONOCYTES NFR BLD: 7 % (ref 5–13)
MYELOCYTES NFR BLD MANUAL: 2 %
NEUTS SEG # BLD: 11.3 K/UL (ref 1.8–8)
NEUTS SEG NFR BLD: 73 % (ref 32–75)
NRBC # BLD: 0.02 K/UL (ref 0–0.01)
NRBC BLD-RTO: 0.1 PER 100 WBC
PHOSPHATE SERPL-MCNC: 2.6 MG/DL (ref 2.6–4.7)
PLATELET # BLD AUTO: 265 K/UL (ref 150–400)
PMV BLD AUTO: 10 FL (ref 8.9–12.9)
POTASSIUM SERPL-SCNC: 4.5 MMOL/L (ref 3.5–5.1)
RBC # BLD AUTO: 3.72 M/UL (ref 3.8–5.2)
RBC MORPH BLD: ABNORMAL
SERVICE CMNT-IMP: ABNORMAL
SODIUM SERPL-SCNC: 134 MMOL/L (ref 136–145)
WBC # BLD AUTO: 15.5 K/UL (ref 3.6–11)

## 2023-03-30 PROCEDURE — 74011250637 HC RX REV CODE- 250/637: Performed by: INTERNAL MEDICINE

## 2023-03-30 PROCEDURE — 74011250637 HC RX REV CODE- 250/637: Performed by: SURGERY

## 2023-03-30 PROCEDURE — 74011000250 HC RX REV CODE- 250: Performed by: SURGERY

## 2023-03-30 PROCEDURE — 80048 BASIC METABOLIC PNL TOTAL CA: CPT

## 2023-03-30 PROCEDURE — 74011250636 HC RX REV CODE- 250/636: Performed by: SURGERY

## 2023-03-30 PROCEDURE — 84100 ASSAY OF PHOSPHORUS: CPT

## 2023-03-30 PROCEDURE — 74011000258 HC RX REV CODE- 258: Performed by: SURGERY

## 2023-03-30 PROCEDURE — 82962 GLUCOSE BLOOD TEST: CPT

## 2023-03-30 PROCEDURE — 85025 COMPLETE CBC W/AUTO DIFF WBC: CPT

## 2023-03-30 PROCEDURE — 74011636637 HC RX REV CODE- 636/637: Performed by: SURGERY

## 2023-03-30 PROCEDURE — 83735 ASSAY OF MAGNESIUM: CPT

## 2023-03-30 PROCEDURE — 36415 COLL VENOUS BLD VENIPUNCTURE: CPT

## 2023-03-30 PROCEDURE — 74011250637 HC RX REV CODE- 250/637

## 2023-03-30 RX ORDER — CALCIUM CARBONATE 200(500)MG
200 TABLET,CHEWABLE ORAL ONCE
Status: COMPLETED | OUTPATIENT
Start: 2023-03-30 | End: 2023-03-30

## 2023-03-30 RX ORDER — AMOXICILLIN AND CLAVULANATE POTASSIUM 875; 125 MG/1; MG/1
1 TABLET, FILM COATED ORAL 2 TIMES DAILY
Qty: 10 TABLET | Refills: 0 | Status: SHIPPED | OUTPATIENT
Start: 2023-03-30

## 2023-03-30 RX ADMIN — Medication 1000 UNITS: at 09:41

## 2023-03-30 RX ADMIN — ACETAMINOPHEN 1000 MG: 500 TABLET ORAL at 05:24

## 2023-03-30 RX ADMIN — INSULIN LISPRO 2 UNITS: 100 INJECTION, SOLUTION INTRAVENOUS; SUBCUTANEOUS at 00:35

## 2023-03-30 RX ADMIN — LISINOPRIL 20 MG: 20 TABLET ORAL at 09:43

## 2023-03-30 RX ADMIN — FAMOTIDINE 20 MG: 20 TABLET, FILM COATED ORAL at 09:37

## 2023-03-30 RX ADMIN — LEVOTHYROXINE SODIUM 88 MCG: 88 TABLET ORAL at 05:24

## 2023-03-30 RX ADMIN — INSULIN LISPRO 3 UNITS: 100 INJECTION, SOLUTION INTRAVENOUS; SUBCUTANEOUS at 12:22

## 2023-03-30 RX ADMIN — NALOXEGOL OXALATE 25 MG: 12.5 TABLET, FILM COATED ORAL at 09:41

## 2023-03-30 RX ADMIN — ACETAMINOPHEN 1000 MG: 500 TABLET ORAL at 12:25

## 2023-03-30 RX ADMIN — ANTACID TABLETS 200 MG: 500 TABLET, CHEWABLE ORAL at 00:29

## 2023-03-30 RX ADMIN — ATENOLOL 25 MG: 50 TABLET ORAL at 09:34

## 2023-03-30 RX ADMIN — PIPERACILLIN AND TAZOBACTAM 3.38 G: 3; .375 INJECTION, POWDER, LYOPHILIZED, FOR SOLUTION INTRAVENOUS at 00:29

## 2023-03-30 RX ADMIN — OMEGA-3 FATTY ACIDS CAP 1000 MG 1 CAPSULE: 1000 CAP at 05:24

## 2023-03-30 RX ADMIN — APIXABAN 5 MG: 5 TABLET, FILM COATED ORAL at 09:36

## 2023-03-30 RX ADMIN — ASPIRIN 81 MG: 81 TABLET, COATED ORAL at 09:33

## 2023-03-30 RX ADMIN — SODIUM CHLORIDE, PRESERVATIVE FREE 5 ML: 5 INJECTION INTRAVENOUS at 05:25

## 2023-03-30 RX ADMIN — OXYCODONE HYDROCHLORIDE AND ACETAMINOPHEN 500 MG: 500 TABLET ORAL at 09:43

## 2023-03-30 RX ADMIN — PIPERACILLIN AND TAZOBACTAM 3.38 G: 3; .375 INJECTION, POWDER, LYOPHILIZED, FOR SOLUTION INTRAVENOUS at 09:45

## 2023-03-30 RX ADMIN — INSULIN LISPRO 2 UNITS: 100 INJECTION, SOLUTION INTRAVENOUS; SUBCUTANEOUS at 06:01

## 2023-03-30 RX ADMIN — AMLODIPINE BESYLATE 10 MG: 5 TABLET ORAL at 09:40

## 2023-03-30 RX ADMIN — CALCIUM 500 MG: 500 TABLET ORAL at 09:39

## 2023-03-30 NOTE — PROGRESS NOTES
Problem: Falls - Risk of  Goal: *Absence of Falls  Description: Document Yogesh Garzon Fall Risk and appropriate interventions in the flowsheet. Outcome: Progressing Towards Goal  Note: Fall Risk Interventions:                                Problem: Pressure Injury - Risk of  Goal: *Prevention of pressure injury  Description: Document Luis Scale and appropriate interventions in the flowsheet.   Outcome: Progressing Towards Goal  Note: Pressure Injury Interventions:  Sensory Interventions: Assess changes in LOC, Chair cushion, Float heels, Minimize linen layers    Moisture Interventions: Absorbent underpads, Limit adult briefs, Minimize layers    Activity Interventions: Assess need for specialty bed, Increase time out of bed    Mobility Interventions: Assess need for specialty bed, Float heels    Nutrition Interventions: Document food/fluid/supplement intake

## 2023-03-30 NOTE — PROGRESS NOTES
Bedside and Verbal shift change report given to [address] [place], [place] [address]way (oncoming nurse) by Venkat Kelsey RN (offgoing nurse). Report included the following information SBAR, Kardex, Intake/Output, MAR, Recent Results, and Med Rec Status.

## 2023-03-30 NOTE — DISCHARGE SUMMARY
Discharge Summary    Patient: Tamara Gabriel               Sex: female          DOA: 3/25/2023  3:09 PM       YOB: 1935      Age:  82 y.o.        LOS:  LOS: 5 days                Discharge Date:      Admission Diagnoses: Small bowel perforation ([address] [K63.1]    Discharge Diagnoses:  Same    Procedure:  Procedure(s):  LAPAROTOMY EXPLORATORY, SMALL BOWEL RESECTION, DRAINAGE OF ABSCESS    Discharge Condition: Good    Hospital Course: Unremarkable operative procedure. Discharge to home in stable condition. Consults: None    Significant Diagnostic Studies: See full electronic record. Discharge Medications:     Current Discharge Medication List        START taking these medications    Details   amoxicillin-clavulanate (AUGMENTIN) 875-125 mg per tablet Take 1 Tablet by mouth two (2) times a day. Indications: an infection of the skin and the tissue below the skin  Qty: 10 Tablet, Refills: 0      oxyCODONE IR (ROXICODONE) 5 mg immediate release tablet Take 1 Tablet by mouth every four (4) hours as needed for Pain for up to 3 days. Max Daily Amount: 30 mg.  Qty: 6 Tablet, Refills: 0    Associated Diagnoses: Small bowel perforation (HCC)      ondansetron (ZOFRAN ODT) 4 mg disintegrating tablet Take 1 Tablet by mouth every six (6) hours as needed for Nausea. Indications: prevent nausea and vomiting after surgery  Qty: 12 Tablet, Refills: 1           CONTINUE these medications which have NOT CHANGED    Details   apixaban (ELIQUIS) 5 mg tablet Take 1 Tablet by mouth two (2) times a day for 30 days. Qty: 60 Tablet, Refills: 0      lisinopriL (PRINIVIL, ZESTRIL) 20 mg tablet Take 20 mg by mouth daily. lidocaine (LIDODERM) 5 % 1 Patch by TransDERmal route every twenty-four (24) hours. Apply patch to the affected area for 12 hours a day and remove for 12 hours a day. calcium carbonate (CALTREX) 600 mg calcium (1,500 mg) tablet Take 600 mg by mouth two (2) times a day.       amLODIPine (NORVASC) 10 mg tablet Take 10 mg by mouth daily. atenoloL (TENORMIN) 25 mg tablet Take 25 mg by mouth two (2) times a day. levothyroxine (SYNTHROID) 88 mcg tablet Take 88 mcg by mouth Daily (before breakfast). aspirin delayed-release 81 mg tablet Take 1 Tab by mouth two (2) times a day. Qty: 60 Tab, Refills: 0      rosuvastatin (CRESTOR) 10 mg tablet Take 10 mg by mouth nightly. cholecalciferol (VITAMIN D3) 25 mcg (1,000 unit) cap Take 1,000 Units by mouth daily (after breakfast). glipiZIDE SR (GLUCOTROL XL) 2.5 mg CR tablet Take 2.5 mg by mouth daily (after breakfast). ascorbic acid, vitamin C, (VITAMIN C) 500 mg tablet Take 500 mg by mouth daily (after breakfast). docosahexanoic acid/epa (FISH OIL PO) Take 1,500 mg by mouth ACB/HS. potassium chloride SA (MICRO-K) 10 mEq capsule Take 20 mEq by mouth daily. Activity/Diet/Wound Care: See patient administered discharge instructions.     Follow-up: 2 weeks    Arnol Sharp MD  [facility]  Office:  [phone]  Fax:  [phone]

## 2023-03-30 NOTE — PROGRESS NOTES
[facility]  [address], [facility] [address]  [phone]    Hospitalist Progress Note      NAME: Lorrie Arias   :  1935  MRM:  [id number]    Date/Time of service 3/30/2023  11:54 AM          Assessment and Plan:     Small bowel perforation / LEukocytosis - POA, Admitted to general surgery service. Now s/p exploratory lap, drainage of abscess, small bowel resection on 3/26. Recovering well on Zosyn. Discharge Abx at discretion of Surgeon. Prn tylenol     Paroxysmal A-fib / Hypertension - POA and stable. Discontinue telemetry. Continue atenolol, lisinopril, Norvasc and Eliquis resumed     Diabetes types 2 without complications - Diabetic diet and counseling. SSI per protocol. At discharge resume home glipizide. A1c 7.6. Inferior left hepatic lobe enhancing lesion on CT scan - She will need outpatient GI follow-up and likely MRI     Hypothyroidism - POA, Continue levothyroxine    Hyperlipidemia - Continue rosuvastatin. Dementia / sundowning - POA, not on meds, needs outpatient neuropsych evaluation     Patient is medically stable to discharge. We will sign off. Please call with any questions. Subjective:     Chief Complaint:  feels fine    ROS:  (bold if positive, if negative)    Tolerating PT  Tolerating Diet        Objective:     Last 24hrs VS reviewed since prior progress note.  Most recent are:    Visit Vitals  BP (!) 178/86 (BP 1 Location: Left upper arm, BP Patient Position: Lying)   Pulse 85   Temp 98.5 °F (36.9 °C)   Resp 16   Ht 5' 5\" (1.651 m)   Wt 65.3 kg (143 lb 15.4 oz)   SpO2 92%   BMI 23.96 kg/m²     SpO2 Readings from Last 6 Encounters:   23 92%   23 94%   23 95%   22 100%   21 96%   20 95%    O2 Flow Rate (L/min): 8 l/min     Intake/Output Summary (Last 24 hours) at 3/30/2023 1154  Last data filed at 3/30/2023 0924  Gross per 24 hour   Intake 1139.26 ml   Output 1870 ml   Net -730.74 ml Physical Exam:    Gen:  Well-developed, well-nourished, in no acute distress  HEENT:  Pink conjunctivae, PERRL, hearing intact to voice, moist mucous membranes  Neck:  Supple, without masses, thyroid non-tender  Resp:  No accessory muscle use, clear breath sounds without wheezes rales or rhonchi  Card:  No murmurs, normal S1, S2 without thrills, bruits or peripheral edema  Abd:  Soft, non-tender, non-distended, normoactive bowel sounds are present, no mass  Lymph:  No cervical or inguinal adenopathy  Musc:  No cyanosis or clubbing  Skin:  No rashes or ulcers, skin turgor is good  Neuro:  Cranial nerves are grossly intact, no focal motor weakness, follows commands appropriately  Psych:  Good insight, oriented to person, place and time, alert    Telemetry reviewed:   normal sinus rhythm  __________________________________________________________________  Medications Reviewed: (see below)  Medications:     Current Facility-Administered Medications   Medication Dose Route Frequency    hydrALAZINE (APRESOLINE) 20 mg/mL injection 10 mg  10 mg IntraVENous Q6H PRN    levothyroxine (SYNTHROID) tablet 88 mcg  88 mcg Oral ACB    omega 3-DHA-EPA-fish oil 1,000 mg (120 mg-180 mg) capsule 1 Capsule  1 Capsule Oral ACB/HS    apixaban (ELIQUIS) tablet 5 mg  5 mg Oral BID    famotidine (PEPCID) tablet 20 mg  20 mg Oral DAILY    amLODIPine (NORVASC) tablet 10 mg  10 mg Oral DAILY    ascorbic acid (vitamin C) (VITAMIN C) tablet 500 mg  500 mg Oral DAILY AFTER BREAKFAST    aspirin delayed-release tablet 81 mg  81 mg Oral BID    atenoloL (TENORMIN) tablet 25 mg  25 mg Oral DAILY    calcium carbonate (OS-CLARA) tablet 500 mg [elemental]  500 mg Oral BID    cholecalciferol (VITAMIN D3) (1000 Units /25 mcg) tablet 1,000 Units  1,000 Units Oral DAILY    lidocaine 4 % patch 1 Patch  1 Patch TransDERmal Q24H    lisinopriL (PRINIVIL, ZESTRIL) tablet 20 mg  20 mg Oral DAILY    rosuvastatin (CRESTOR) tablet 10 mg  10 mg Oral QHS    insulin lispro (HUMALOG) injection   SubCUTAneous Q6H    glucose chewable tablet 16 g  4 Tablet Oral PRN    glucagon (GLUCAGEN) injection 1 mg  1 mg IntraMUSCular PRN    dextrose 10% infusion 0-250 mL  0-250 mL IntraVENous PRN    sodium chloride (NS) flush 5-40 mL  5-40 mL IntraVENous Q8H    sodium chloride (NS) flush 5-40 mL  5-40 mL IntraVENous PRN    acetaminophen (TYLENOL) tablet 1,000 mg  1,000 mg Oral Q6H    oxyCODONE IR (ROXICODONE) tablet 5 mg  5 mg Oral Q4H PRN    ondansetron (ZOFRAN) injection 4 mg  4 mg IntraVENous Q6H PRN    naloxegoL (MOVANTIK) tablet 25 mg  25 mg Oral DAILY    ibuprofen (MOTRIN) tablet 400 mg  400 mg Oral Q12H PRN    sodium chloride (NS) flush 5-10 mL  5-10 mL IntraVENous PRN    piperacillin-tazobactam (ZOSYN) 3.375 g in 0.9% sodium chloride (MBP/ADV) 100 mL MBP  3.375 g IntraVENous Q8H    HYDROmorphone (DILAUDID) syringe 0.2 mg  0.2 mg IntraVENous Q3H PRN    ondansetron (ZOFRAN) injection 4 mg  4 mg IntraVENous Q4H PRN        Lab Data Reviewed: (see below)  Lab Review:     Recent Labs     03/30/23 0439 03/29/23 0112 03/28/23  0248   WBC 15.5* 14.4* 13.5*   HGB 12.8 13.9 13.1   HCT 37.3 40.2 37.9    264 211     Recent Labs     03/30/23 0439 03/29/23 0112 03/28/23  0248   * 137 134*   K 4.5 4.2 3.8    107 106   CO2 25 23 24   * 203* 210*   BUN 16 14 15   CREA 0.72 0.60 0.81   CA 9.0 8.7 8.3*   MG 1.6 1.8 1.9   PHOS 2.6 1.6* 1.2*     Lab Results   Component Value Date/Time    Glucose (POC) 222 (H) 03/30/2023 11:31 AM    Glucose (POC) 189 (H) 03/30/2023 05:53 AM    Glucose (POC) 200 (H) 03/30/2023 12:16 AM    Glucose (POC) 172 (H) 03/29/2023 05:44 PM    Glucose (POC) 151 (H) 03/29/2023 11:36 AM     No results for input(s): PH, PCO2, PO2, HCO3, FIO2 in the last 72 hours. No results for input(s): INR, INREXT in the last 72 hours.   All Micro Results       Procedure Component Value Units Date/Time    CULTURE, BLOOD [[id number]] Collected: 03/25/23 1834    Order Status: Completed Specimen: Blood Updated: 03/30/23 0532     Special Requests: NO SPECIAL REQUESTS        Culture result: NO GROWTH 5 DAYS       CULTURE, BLOOD [[id number]] Collected: 03/25/23 1834    Order Status: Completed Specimen: Blood Updated: 03/30/23 0532     Special Requests: NO SPECIAL REQUESTS        Culture result: NO GROWTH 5 DAYS       URINE CULTURE HOLD SAMPLE [[id number]] Collected: 03/25/23 1655    Order Status: Completed Specimen: Urine Updated: 03/25/23 1700     Urine culture hold       Urine on hold in Microbiology dept for 2 days. If unpreserved urine is submitted, it cannot be used for addtional testing after 24 hours, recollection will be required. Other pertinent lab: none    Total time spent with patient: 30 Minutes I personally reviewed chart, notes, data and current medications in the medical record. I have personally examined and treated the patient at bedside during this period. To assist coordination of care and communication with nursing and staff, this note may be preliminary early in the day, but finalized by end of the day.                  Care Plan discussed with: Patient, Family, Care Manager, Nursing Staff, Consultant/Specialist, and >50% of time spent in counseling and coordination of care    Discussed:  Care Plan and D/C Planning    Prophylaxis:  H2B/PPI and Eliquis    Disposition:  Home w/Family           ___________________________________________________    Attending Physician: Thelma Diop MD

## 2023-03-30 NOTE — PROGRESS NOTES
3/30/2023   CARE MANAGEMENT NOTE:  CM reviewed EMR. READMISSION:  Pt was admitted from 3/13/23 - 3/14/23 with Afib. Pt was admitted with small bowel perforation. Reportedly, pt lives alone. Dtr, Sunday Jazz [phone]) is the primary family contact. RUR 12%     Transition Plan of Care:  General Surgery following for medical management - pt is s/p ex lap, drainage of abscess, and small bowel resection on 3/26  Plan is for pt to return home. Dtr will stay with pt and assist as needed  Pt states that she already has two RWs at home  Outpatient follow up   Family will transport pt     CM will continue to follow pt until discharged.   Muriel

## 2023-03-30 NOTE — PROGRESS NOTES
AT 2764 left message for Dr. Rodriguez Dad nurse to make aware that patient is asking if can be discharged today.  Awaiting call back

## 2023-03-30 NOTE — PROGRESS NOTES
NICK drain emptied for 21mL of red liquid with some clots presents. Suture was cut with sterile scissors and drain removed, tip of drain was intact. Drain incision was about 3/4 of an inch;  covered with steri strips and 2x2 taped over top. Family member watched and was given 2x2 pads and tape for home dressing changes. Patient was told not to shower for 24 hours. Tolerated well no pain reported.

## 2023-03-30 NOTE — PROGRESS NOTES
Physical Therapy  3/30/2023    Chart reviewed and cleared by RN. Pt ambulating in hallway with daughter. Observed with steady gait and good safety. Pt reports plan for d/c home today. Requesting RW for home use. Order placed. Encouraged pt to continue ambulating with supervision 2-3x/day.     Thank Oscar Gifford, PT, DPT

## 2023-03-30 NOTE — PROGRESS NOTES
Physician Progress Note      Fidel Drake  [facility] #:                  [id number]  :                       1935  ADMIT DATE:       3/25/2023 3:09 PM  [address] [place] DATE:  RESPONDING  PROVIDER #:        Saran Luciano MD          QUERY TEXT:    Good morning  Patient admitted with SBO with perforation. Noted documentation of sepsis in consult note on . In order to support the diagnosis of sepsis, please include additional clinical indicators in your documentation. Or please document if the diagnosis of sepsis has been ruled out after further study. The medical record reflects the following:  Risk Factors: SBO, abscess, DM2, PAF, Diverticulitis, perforation of intestine  Clinical Indicators: Patient presented with generalized fatigue and lower abdominal pain.  palliative consult for Sepsis  Treatment: daily labs, IV Zosyn, IVF bolus, ex lap with drainage of abscess, palliative consult    Thank you  Carmelina Bhatia RN CDI  [phone]  Options provided:  -- Sepsis present as evidenced by, Please document evidence. -- Sepsis was ruled out after study  -- Other - I will add my own diagnosis  -- Disagree - Not applicable / Not valid  -- Disagree - Clinically unable to determine / Unknown  -- Refer to Clinical Documentation Reviewer    PROVIDER RESPONSE TEXT:    Provider is clinically unable to determine a response to this query. Query created by: Madelyn Al on 3/28/2023 10:38 AM      QUERY TEXT:    Pt admitted with SBO with perforation. Pt noted to have confusion and agitation. If possible, please document in the progress notes and discharge summary if you are evaluating and / or treating any of the following: The medical record reflects the following:  Risk Factors: SBO, abscess, HTN, DM2, PAF  Clinical Indicators: presented with generalized fatigue and lower abdominal pain.  Confusion at home   PN \"Patient getting extremely agitated, poor safety awareness wants to get out of bed\"  Treatment: daily labs, PO Zyprexa, seroquel    Thank you  Marita Spencer RN CDI  [phone]  Options provided:  -- Metabolic encephalopathy  -- Septic encephalopathy  -- Toxic encephalopathy  -- Toxic metabolic encephalopathy  -- Delirium due to, Please specify cause. -- Other - I will add my own diagnosis  -- Disagree - Not applicable / Not valid  -- Disagree - Clinically unable to determine / Unknown  -- Refer to Clinical Documentation Reviewer    PROVIDER RESPONSE TEXT:    Provider is clinically unable to determine a response to this query.     Query created by: Leia Gordon on 3/28/2023 10:55 AM      Electronically signed by:  Liban Diaz MD 3/30/2023 7:58 AM

## 2023-03-30 NOTE — PROGRESS NOTES
At 1630  Reviewed discharge with patient and daughter and they verbalized understanding. IV removed patient went home with daughter at approx.  [address]

## 2023-03-31 LAB
BACTERIA SPEC CULT: NORMAL
BACTERIA SPEC CULT: NORMAL
SERVICE CMNT-IMP: NORMAL
SERVICE CMNT-IMP: NORMAL

## 2023-04-03 ENCOUNTER — OFFICE VISIT (OUTPATIENT)
Dept: CARDIOLOGY CLINIC | Age: 88
End: 2023-04-03
Payer: MEDICARE

## 2023-04-03 DIAGNOSIS — E78.5 HYPERLIPIDEMIA, UNSPECIFIED HYPERLIPIDEMIA TYPE: ICD-10-CM

## 2023-04-03 DIAGNOSIS — I48.0 PAROXYSMAL ATRIAL FIBRILLATION (HCC): Primary | ICD-10-CM

## 2023-04-03 DIAGNOSIS — R07.9 CHEST PAIN, UNSPECIFIED TYPE: ICD-10-CM

## 2023-04-03 DIAGNOSIS — I10 ESSENTIAL HYPERTENSION: ICD-10-CM

## 2023-04-03 DIAGNOSIS — Z86.73 HISTORY OF TIA (TRANSIENT ISCHEMIC ATTACK): ICD-10-CM

## 2023-04-03 PROCEDURE — 99214 OFFICE O/P EST MOD 30 MIN: CPT

## 2023-04-03 PROCEDURE — 93005 ELECTROCARDIOGRAM TRACING: CPT

## 2023-04-03 PROCEDURE — 93010 ELECTROCARDIOGRAM REPORT: CPT

## 2023-04-03 PROCEDURE — 3075F SYST BP GE 130 - 139MM HG: CPT

## 2023-04-03 PROCEDURE — 3079F DIAST BP 80-89 MM HG: CPT

## 2023-04-03 PROCEDURE — 1123F ACP DISCUSS/DSCN MKR DOCD: CPT

## 2023-04-03 PROCEDURE — G0463 HOSPITAL OUTPT CLINIC VISIT: HCPCS

## 2023-04-03 NOTE — PROGRESS NOTES
Chief Complaint   Patient presents with    Hospital Follow Up     Af hospital follow up. Not wearing monitor at this time.       Vitals:    04/03/23 1147   BP: 130/80   BP 1 Location: Left upper arm   BP Patient Position: Sitting   Pulse: (!) 58   Height: 5' 5\" (1.651 m)   Weight: 138 lb (62.6 kg)   SpO2: 96%       Chest pain denied     SOB denied     Palpitations denied     Swelling in hands/feet denied     Dizziness denied     Recent hospital stays denied     Refills denied

## 2023-04-03 NOTE — LETTER
4/3/2023    Patient: Deidra Lloyd   YOB: 1935   Date of Visit: 4/3/2023     Tawanna Reyes NP  [address]  [address] [facility] [address]  Via Fax: [phone]    Dear Tawanna Reyes NP,      Thank you for referring Ms. Dalia Canales to [address] [place] [address] for evaluation. My notes for this consultation are attached. If you have questions, please do not hesitate to call me. I look forward to following your patient along with you.       Sincerely,    BOUCHRA Rm

## 2023-04-03 NOTE — PROGRESS NOTES
Patient: Anil Tucker  : 1935    Primary Cardiologist: Ca Pierce DO  PCP: Bucky Mace NP    Today's Date: 4/3/2023      ASSESSMENT AND PLAN:     Assessment and Plan:  Hospital Follow Up  - admitted from 3/13-3/14/23 for afib RVR    2. Afib   - newly discovered, converted to NSR spontaneously  - stopped PO diltiazem due to low BP   - continue atenolol  - continue eliquis [id number] score 5 (age, gender, HTN, DM) - continue eliquis 5 mg daily, no s/s of bleeding  - refer to EP for watchman given repeated falls     3. Chest pain   - had chest pain during recent admission, denies current symptoms now  - had stress test with JW - will request records, however, will proceed with scheduling nuclear stress test at this time - pt is agreeable      4. HTN  - cont amlodipine 10 mg     5. HLD   - cont rosouvastatin 10 mg daily     6. TIA history  - was previously aggrenox but d/c due to doac use    She will follow-up with Dr. Pepito Rivas in 4 months. ICD-10-CM ICD-9-CM    1. Paroxysmal atrial fibrillation (HCC)  I48.0 427.31 AMB POC EKG ROUTINE W/ 12 LEADS, INTER & REP      apixaban (ELIQUIS) 5 mg tablet      2. Chest pain, unspecified type  R07.9 786.50 NUCLEAR CARDIAC STRESS TEST      3. Essential hypertension  I10 401.9       4. Hyperlipidemia, unspecified hyperlipidemia type  E78.5 272.4       5. History of TIA (transient ischemic attack)  Z86.73 V12.54            HISTORY OF PRESENT ILLNESS:     History of Present Illness: Anil Tucker is a 82 y.o. F who presents for hospital follow-up. Patient was admitted to [facility] from 3/13- with afib RVR. She spontaneously converted to NSR while in hospital. Echo revealed normal EF 55-60%. Patient was given holter monitor to assess afib burden and wore for 2 days because she was admitted again for small bowel perforation. Overall, she feels well.  Denies chest pain, edema, syncope, shortness of breath at rest.   Has no tachycardia, palpitations or sense of arrhythmia. PAST MEDICAL HISTORY:     Past Medical History:   Diagnosis Date    Arthritis     Atrial fibrillation ([facility] [address]     detected 3/13/2023    Depression     Diabetes ([facility]     GERD (gastroesophageal reflux disease)     High cholesterol     Hypertension     Snoring     Transient ischemic attack (TIA) 2003       Past Surgical History:   Procedure Laterality Date    HX APPENDECTOMY      HX CATARACT REMOVAL Bilateral     HX CHOLECYSTECTOMY      HX HIP REPLACEMENT Right     HX ORTHOPAEDIC Left     Open Repair    HX ORTHOPAEDIC Left     Trigger finger    HX ROTATOR CUFF REPAIR Right     HX TUBAL LIGATION         CURRENT MEDICATIONS:    .  Current Outpatient Medications   Medication Sig Dispense Refill    apixaban (ELIQUIS) 5 mg tablet Take 1 Tablet by mouth two (2) times a day. 180 Tablet 3    amoxicillin-clavulanate (AUGMENTIN) 875-125 mg per tablet Take 1 Tablet by mouth two (2) times a day. Indications: an infection of the skin and the tissue below the skin 10 Tablet 0    lisinopriL (PRINIVIL, ZESTRIL) 20 mg tablet Take 20 mg by mouth daily. lidocaine (LIDODERM) 5 % 1 Patch by TransDERmal route every twenty-four (24) hours. Apply patch to the affected area for 12 hours a day and remove for 12 hours a day. calcium carbonate (CALTREX) 600 mg calcium (1,500 mg) tablet Take 600 mg by mouth two (2) times a day. amLODIPine (NORVASC) 10 mg tablet Take 10 mg by mouth daily. atenoloL (TENORMIN) 25 mg tablet Take 25 mg by mouth two (2) times a day. levothyroxine (SYNTHROID) 88 mcg tablet Take 88 mcg by mouth Daily (before breakfast). aspirin delayed-release 81 mg tablet Take 1 Tab by mouth two (2) times a day. 60 Tab 0    rosuvastatin (CRESTOR) 10 mg tablet Take 10 mg by mouth nightly. cholecalciferol (VITAMIN D3) 25 mcg (1,000 unit) cap Take 1,000 Units by mouth daily (after breakfast).       glipiZIDE SR (GLUCOTROL XL) 2.5 mg CR tablet Take 2.5 mg by mouth daily (after breakfast). ascorbic acid, vitamin C, (VITAMIN C) 500 mg tablet Take 500 mg by mouth daily (after breakfast). docosahexanoic acid/epa (FISH OIL PO) Take 1,500 mg by mouth ACB/HS. potassium chloride SA (MICRO-K) 10 mEq capsule Take 20 mEq by mouth daily. Allergies   Allergen Reactions    Percocet [Oxycodone-Acetaminophen] Unknown (comments)     Can't remember reaction due to length of time of occurrence.          SOCIAL HISTORY:     Social History     Tobacco Use    Smoking status: Former     Packs/day: 2.00     Years: 30.00     Pack years: 60.00     Types: Cigarettes     Quit date: 1986     Years since quittin.5    Smokeless tobacco: Never   Vaping Use    Vaping Use: Never used   Substance Use Topics    Alcohol use: Yes     Comment: social 1 x year    Drug use: No       FAMILY HISTORY:     Family History   Problem Relation Age of Onset    Heart Disease Mother     Alzheimer's Disease Father     Ovarian Cancer Sister     Alzheimer's Disease Paternal Grandfather        REVIEW OF SYMPTOMS:     Review of Symptoms:  Negative except as above, all other systems reviewed and are negative for a Comprehensive ROS (10+)    PHYSICAL EXAM:     Physical Exam:  Visit Vitals  /80 (BP 1 Location: Left upper arm, BP Patient Position: Sitting)   Pulse (!) 58   Ht 5' 5\" (1.651 m)   Wt 138 lb (62.6 kg)   SpO2 96%   BMI 22.96 kg/m²       General appearance: alert, cooperative, no distress, appears stated age  Neck: supple, symmetrical, trachea midline, no adenopathy, thyroid: not enlarged, symmetric, no tenderness/mass/nodules, no carotid bruit, and no JVD  Lungs: clear to auscultation bilaterally  Heart: regular rate and rhythm, S1, S2 normal, no murmur, click, rub or gallop  Extremities: extremities normal, atraumatic, no cyanosis or edema    LABS / OTHER STUDIES:     Lab Results   Component Value Date/Time    Sodium 134 (L) 2023 04:39 AM    Potassium 4.5 2023 04:39 AM    Chloride 105 03/30/2023 04:39 AM    CO2 25 03/30/2023 04:39 AM    Anion gap 4 (L) 03/30/2023 04:39 AM    Glucose 216 (H) 03/30/2023 04:39 AM    BUN 16 03/30/2023 04:39 AM    Creatinine 0.72 03/30/2023 04:39 AM    BUN/Creatinine ratio 22 (H) 03/30/2023 04:39 AM    GFR est AA 88 08/16/2021 09:14 AM    GFR est non-AA 76 08/16/2021 09:14 AM    Calcium 9.0 03/30/2023 04:39 AM    Bilirubin, total 1.5 (H) 03/25/2023 03:57 PM    Alk. phosphatase 34 (L) 03/25/2023 03:57 PM    Protein, total 6.6 03/25/2023 03:57 PM    Albumin 3.0 (L) 03/25/2023 03:57 PM    Globulin 3.6 03/25/2023 03:57 PM    A-G Ratio 0.8 (L) 03/25/2023 03:57 PM    ALT (SGPT) 22 03/25/2023 03:57 PM    AST (SGOT) 18 03/25/2023 03:57 PM       No results found for: CHOL, CHOLPOCT, CHOLX, CHLST, CHOLV, TOTCHOLEXT, HDL, HDLPOC, HDLEXT, HDLP, LDL, LDLCPOC, LDLCEXT, LDLC, DLDLP, VLDLC, VLDL, TGLX, TRIGL, TRIGLYCEXT, TRIGP, TGLPOCT, CHHD, CHHDX    CARDIAC DIAGNOSTICS:     Cardiac Evaluation Includes:  I reviewed the test results below. 03/13/23    ECHO ADULT COMPLETE 03/14/2023 3/14/2023    Interpretation Summary    Left Ventricle: Normal left ventricular systolic function with a visually estimated EF of 55 - 60%. Left ventricle size is normal. Normal wall thickness. Normal wall motion. Grade I diastolic dysfunction with normal LAP.     Signed by: Shawn Diaz MD on 3/14/2023  5:19 PM               EKG Results       Procedure 720 Value Units Date/Time    AMB POC EKG ROUTINE W/ 12 LEADS, INTER & REP [[id number]] Resulted: 04/03/23 1149    Order Status: Completed Updated: 04/03/23 1155                 Future Appointments   Date Time Provider Aroldo Valenzuela   4/14/2023  8:00 AM TEODORA BAUTISTA [facility] BS AMB   5/19/2023  2:40 PM Franck Degroot MD [facility] AMB   8/16/2023  9:20 AM DO YUSEF BullardSF BS AMB         [place], [address], [address] [place] [address] [address]    Ph: [phone]

## 2023-04-10 ENCOUNTER — HOSPITAL ENCOUNTER (INPATIENT)
Age: 88
LOS: 3 days | Discharge: HOME | DRG: 393 | End: 2023-04-13
Attending: EMERGENCY MEDICINE | Admitting: INTERNAL MEDICINE
Payer: MEDICARE

## 2023-04-10 ENCOUNTER — APPOINTMENT (OUTPATIENT)
Dept: CT IMAGING | Age: 88
DRG: 393 | End: 2023-04-10
Attending: EMERGENCY MEDICINE
Payer: MEDICARE

## 2023-04-10 DIAGNOSIS — K62.5 RECTAL BLEEDING: Primary | ICD-10-CM

## 2023-04-10 DIAGNOSIS — D72.829 LEUKOCYTOSIS, UNSPECIFIED TYPE: ICD-10-CM

## 2023-04-10 DIAGNOSIS — R73.9 HYPERGLYCEMIA: ICD-10-CM

## 2023-04-10 PROBLEM — K92.1 MELENA: Status: ACTIVE | Noted: 2023-04-10

## 2023-04-10 LAB
ALBUMIN SERPL-MCNC: 3.5 G/DL (ref 3.5–5)
ALBUMIN/GLOB SERPL: 0.9 (ref 1.1–2.2)
ALP SERPL-CCNC: 46 U/L (ref 45–117)
ALT SERPL-CCNC: 25 U/L (ref 12–78)
ANION GAP SERPL CALC-SCNC: 5 MMOL/L (ref 5–15)
APPEARANCE UR: CLEAR
AST SERPL-CCNC: 23 U/L (ref 15–37)
BACTERIA URNS QL MICRO: NEGATIVE /HPF
BASOPHILS # BLD: 0.1 K/UL (ref 0–0.1)
BASOPHILS NFR BLD: 0 % (ref 0–1)
BILIRUB SERPL-MCNC: 0.4 MG/DL (ref 0.2–1)
BILIRUB UR QL: NEGATIVE
BUN SERPL-MCNC: 22 MG/DL (ref 6–20)
BUN/CREAT SERPL: 24 (ref 12–20)
CALCIUM SERPL-MCNC: 10 MG/DL (ref 8.5–10.1)
CHLORIDE SERPL-SCNC: 102 MMOL/L (ref 97–108)
CO2 SERPL-SCNC: 25 MMOL/L (ref 21–32)
COLOR UR: ABNORMAL
CREAT SERPL-MCNC: 0.91 MG/DL (ref 0.55–1.02)
DIFFERENTIAL METHOD BLD: ABNORMAL
EOSINOPHIL # BLD: 0.1 K/UL (ref 0–0.4)
EOSINOPHIL NFR BLD: 0 % (ref 0–7)
EPITH CASTS URNS QL MICRO: ABNORMAL /LPF
ERYTHROCYTE [DISTWIDTH] IN BLOOD BY AUTOMATED COUNT: 12.9 % (ref 11.5–14.5)
GLOBULIN SER CALC-MCNC: 4 G/DL (ref 2–4)
GLUCOSE SERPL-MCNC: 251 MG/DL (ref 65–100)
GLUCOSE UR STRIP.AUTO-MCNC: NEGATIVE MG/DL
HCT VFR BLD AUTO: 34.2 % (ref 35–47)
HGB BLD-MCNC: 11.4 G/DL (ref 11.5–16)
HGB UR QL STRIP: NEGATIVE
HYALINE CASTS URNS QL MICRO: ABNORMAL /LPF (ref 0–2)
IMM GRANULOCYTES # BLD AUTO: 0.1 K/UL (ref 0–0.04)
IMM GRANULOCYTES NFR BLD AUTO: 1 % (ref 0–0.5)
KETONES UR QL STRIP.AUTO: ABNORMAL MG/DL
LEUKOCYTE ESTERASE UR QL STRIP.AUTO: NEGATIVE
LYMPHOCYTES # BLD: 1.2 K/UL (ref 0.8–3.5)
LYMPHOCYTES NFR BLD: 6 % (ref 12–49)
MCH RBC QN AUTO: 34.5 PG (ref 26–34)
MCHC RBC AUTO-ENTMCNC: 33.3 G/DL (ref 30–36.5)
MCV RBC AUTO: 103.6 FL (ref 80–99)
MONOCYTES # BLD: 1 K/UL (ref 0–1)
MONOCYTES NFR BLD: 5 % (ref 5–13)
NEUTS SEG # BLD: 16.4 K/UL (ref 1.8–8)
NEUTS SEG NFR BLD: 88 % (ref 32–75)
NITRITE UR QL STRIP.AUTO: NEGATIVE
NRBC # BLD: 0 K/UL (ref 0–0.01)
NRBC BLD-RTO: 0 PER 100 WBC
PH UR STRIP: 6 (ref 5–8)
PLATELET # BLD AUTO: 525 K/UL (ref 150–400)
PMV BLD AUTO: 9.6 FL (ref 8.9–12.9)
POTASSIUM SERPL-SCNC: 4.2 MMOL/L (ref 3.5–5.1)
PROT SERPL-MCNC: 7.5 G/DL (ref 6.4–8.2)
PROT UR STRIP-MCNC: 30 MG/DL
RBC # BLD AUTO: 3.3 M/UL (ref 3.8–5.2)
RBC #/AREA URNS HPF: ABNORMAL /HPF (ref 0–5)
SODIUM SERPL-SCNC: 132 MMOL/L (ref 136–145)
SP GR UR REFRACTOMETRY: 1 (ref 1–1.03)
UR CULT HOLD, URHOLD: NORMAL
UROBILINOGEN UR QL STRIP.AUTO: 0.2 EU/DL (ref 0.2–1)
WBC # BLD AUTO: 18.8 K/UL (ref 3.6–11)
WBC URNS QL MICRO: ABNORMAL /HPF (ref 0–4)

## 2023-04-10 PROCEDURE — 36415 COLL VENOUS BLD VENIPUNCTURE: CPT

## 2023-04-10 PROCEDURE — 74011250636 HC RX REV CODE- 250/636: Performed by: EMERGENCY MEDICINE

## 2023-04-10 PROCEDURE — 80053 COMPREHEN METABOLIC PANEL: CPT

## 2023-04-10 PROCEDURE — 74011250637 HC RX REV CODE- 250/637: Performed by: INTERNAL MEDICINE

## 2023-04-10 PROCEDURE — 96374 THER/PROPH/DIAG INJ IV PUSH: CPT

## 2023-04-10 PROCEDURE — 99285 EMERGENCY DEPT VISIT HI MDM: CPT

## 2023-04-10 PROCEDURE — 74011250636 HC RX REV CODE- 250/636: Performed by: INTERNAL MEDICINE

## 2023-04-10 PROCEDURE — 74011000250 HC RX REV CODE- 250: Performed by: INTERNAL MEDICINE

## 2023-04-10 PROCEDURE — 81001 URINALYSIS AUTO W/SCOPE: CPT

## 2023-04-10 PROCEDURE — 74177 CT ABD & PELVIS W/CONTRAST: CPT

## 2023-04-10 PROCEDURE — 74011000636 HC RX REV CODE- 636: Performed by: EMERGENCY MEDICINE

## 2023-04-10 PROCEDURE — 96376 TX/PRO/DX INJ SAME DRUG ADON: CPT

## 2023-04-10 PROCEDURE — 65270000029 HC RM PRIVATE

## 2023-04-10 PROCEDURE — 85025 COMPLETE CBC W/AUTO DIFF WBC: CPT

## 2023-04-10 RX ORDER — LISINOPRIL 20 MG/1
20 TABLET ORAL DAILY
Status: DISCONTINUED | OUTPATIENT
Start: 2023-04-11 | End: 2023-04-13 | Stop reason: HOSPADM

## 2023-04-10 RX ORDER — AMLODIPINE BESYLATE 5 MG/1
10 TABLET ORAL DAILY
Status: DISCONTINUED | OUTPATIENT
Start: 2023-04-11 | End: 2023-04-13 | Stop reason: HOSPADM

## 2023-04-10 RX ORDER — ACETAMINOPHEN 325 MG/1
650 TABLET ORAL
Status: DISCONTINUED | OUTPATIENT
Start: 2023-04-10 | End: 2023-04-13 | Stop reason: HOSPADM

## 2023-04-10 RX ORDER — SODIUM CHLORIDE 0.9 % (FLUSH) 0.9 %
5-40 SYRINGE (ML) INJECTION AS NEEDED
Status: DISCONTINUED | OUTPATIENT
Start: 2023-04-10 | End: 2023-04-13 | Stop reason: HOSPADM

## 2023-04-10 RX ORDER — ACETAMINOPHEN 650 MG/1
650 SUPPOSITORY RECTAL
Status: DISCONTINUED | OUTPATIENT
Start: 2023-04-10 | End: 2023-04-13 | Stop reason: HOSPADM

## 2023-04-10 RX ORDER — ONDANSETRON 2 MG/ML
4 INJECTION INTRAMUSCULAR; INTRAVENOUS
Status: DISCONTINUED | OUTPATIENT
Start: 2023-04-10 | End: 2023-04-13 | Stop reason: HOSPADM

## 2023-04-10 RX ORDER — ONDANSETRON 4 MG/1
4 TABLET, ORALLY DISINTEGRATING ORAL
Status: DISCONTINUED | OUTPATIENT
Start: 2023-04-10 | End: 2023-04-13 | Stop reason: HOSPADM

## 2023-04-10 RX ORDER — LEVOTHYROXINE SODIUM 88 UG/1
88 TABLET ORAL
Status: DISCONTINUED | OUTPATIENT
Start: 2023-04-11 | End: 2023-04-13 | Stop reason: HOSPADM

## 2023-04-10 RX ORDER — SODIUM CHLORIDE, SODIUM LACTATE, POTASSIUM CHLORIDE, CALCIUM CHLORIDE 600; 310; 30; 20 MG/100ML; MG/100ML; MG/100ML; MG/100ML
75 INJECTION, SOLUTION INTRAVENOUS CONTINUOUS
Status: DISCONTINUED | OUTPATIENT
Start: 2023-04-10 | End: 2023-04-13 | Stop reason: HOSPADM

## 2023-04-10 RX ORDER — ONDANSETRON 2 MG/ML
4 INJECTION INTRAMUSCULAR; INTRAVENOUS
Status: COMPLETED | OUTPATIENT
Start: 2023-04-10 | End: 2023-04-10

## 2023-04-10 RX ORDER — ASPIRIN 81 MG/1
81 TABLET ORAL 2 TIMES DAILY
Status: DISCONTINUED | OUTPATIENT
Start: 2023-04-11 | End: 2023-04-11

## 2023-04-10 RX ORDER — POLYETHYLENE GLYCOL 3350 17 G/17G
17 POWDER, FOR SOLUTION ORAL DAILY PRN
Status: DISCONTINUED | OUTPATIENT
Start: 2023-04-10 | End: 2023-04-13 | Stop reason: HOSPADM

## 2023-04-10 RX ORDER — ATENOLOL 25 MG/1
25 TABLET ORAL 2 TIMES DAILY
Status: DISCONTINUED | OUTPATIENT
Start: 2023-04-11 | End: 2023-04-13 | Stop reason: HOSPADM

## 2023-04-10 RX ORDER — LIDOCAINE 4 G/100G
1 PATCH TOPICAL EVERY 24 HOURS
Status: DISCONTINUED | OUTPATIENT
Start: 2023-04-11 | End: 2023-04-13 | Stop reason: HOSPADM

## 2023-04-10 RX ORDER — ROSUVASTATIN CALCIUM 10 MG/1
10 TABLET, COATED ORAL
Status: DISCONTINUED | OUTPATIENT
Start: 2023-04-10 | End: 2023-04-13 | Stop reason: HOSPADM

## 2023-04-10 RX ORDER — METRONIDAZOLE 500 MG/100ML
500 INJECTION, SOLUTION INTRAVENOUS EVERY 12 HOURS
Status: DISCONTINUED | OUTPATIENT
Start: 2023-04-10 | End: 2023-04-13 | Stop reason: HOSPADM

## 2023-04-10 RX ORDER — SODIUM CHLORIDE 0.9 % (FLUSH) 0.9 %
5-40 SYRINGE (ML) INJECTION EVERY 8 HOURS
Status: DISCONTINUED | OUTPATIENT
Start: 2023-04-10 | End: 2023-04-13 | Stop reason: HOSPADM

## 2023-04-10 RX ORDER — METRONIDAZOLE 500 MG/100ML
500 INJECTION, SOLUTION INTRAVENOUS EVERY 8 HOURS
Status: DISCONTINUED | OUTPATIENT
Start: 2023-04-10 | End: 2023-04-10

## 2023-04-10 RX ADMIN — IOPAMIDOL 100 ML: 755 INJECTION, SOLUTION INTRAVENOUS at 17:44

## 2023-04-10 RX ADMIN — SODIUM CHLORIDE 1000 ML: 9 INJECTION, SOLUTION INTRAVENOUS at 17:33

## 2023-04-10 RX ADMIN — ONDANSETRON 4 MG: 2 INJECTION INTRAMUSCULAR; INTRAVENOUS at 18:09

## 2023-04-10 RX ADMIN — SODIUM CHLORIDE, PRESERVATIVE FREE 10 ML: 5 INJECTION INTRAVENOUS at 23:25

## 2023-04-10 RX ADMIN — SODIUM CHLORIDE, POTASSIUM CHLORIDE, SODIUM LACTATE AND CALCIUM CHLORIDE 125 ML/HR: 600; 310; 30; 20 INJECTION, SOLUTION INTRAVENOUS at 23:26

## 2023-04-10 RX ADMIN — SODIUM CHLORIDE 2 G: 9 INJECTION INTRAMUSCULAR; INTRAVENOUS; SUBCUTANEOUS at 23:25

## 2023-04-10 RX ADMIN — ONDANSETRON 4 MG: 2 INJECTION INTRAMUSCULAR; INTRAVENOUS at 20:10

## 2023-04-10 RX ADMIN — METRONIDAZOLE 500 MG: 500 INJECTION, SOLUTION INTRAVENOUS at 23:26

## 2023-04-10 RX ADMIN — ROSUVASTATIN CALCIUM 10 MG: 10 TABLET, FILM COATED ORAL at 23:25

## 2023-04-10 NOTE — PROGRESS NOTES
General Surgery    Patient is an 82year-old woman s/p open small bowel resection and drainage of abscess, likely from diverticulitis, on 3/26/2023 who presented to the ER with diarrhea and rectal bleeding. Labs reviewed: Hb 11 from 12 prior to discharge; WBC 18; glc 250  CT scan reviewed: no evidence of acute bleed; no small bowel or colonic wall thickening or absence of wall enhancement to suggest ischemic bowel, mild possible rectal wall thickening; no abscess; mild post-op inflammatory stranding    Recommend Hospitalist admit. Recommend hold Eliquis: pt w/ new-onset atrial fibrillation at last admission. Recommend trend Hb. If continues to bleed, will need CTA abdomen/pelvis. Full consult to follow. Thank you for the opportunity to participate in this patient's care. Sofie Garduno.  Deanne Beal MD, FACS

## 2023-04-10 NOTE — ED TRIAGE NOTES
Pt presents to the ED with c/o abdominal cramping, bloody diarrhea and vomiting that began today. Pt has a small bowel resection two weeks ago and went to her follow-up appointment today with those symptoms, who sent her here for a CT scan. Pt states that she has vomited once today and states that her stool is just mucus and blood.

## 2023-04-10 NOTE — ED PROVIDER NOTES
The history is provided by the patient and a relative. Abdominal Pain   This is a new problem. The current episode started 6 to 12 hours ago. The problem has been resolved. The pain is located in the periumbilical region. The quality of the pain is cramping. Associated symptoms include anorexia, diarrhea, hematochezia, nausea and vomiting. Pertinent negatives include no fever and no back pain. Nothing worsens the pain. The pain is relieved by Nothing. Her past medical history is significant for diverticulitis.   bowel resection     Past Medical History:   Diagnosis Date    Arthritis     Atrial fibrillation ([address]     detected 3/13/2023    Depression     Diabetes (HCC)     GERD (gastroesophageal reflux disease)     High cholesterol     Hypertension     Snoring     Transient ischemic attack (TIA)        Past Surgical History:   Procedure Laterality Date    HX APPENDECTOMY      HX CATARACT REMOVAL Bilateral     HX CHOLECYSTECTOMY      HX HIP REPLACEMENT Right     HX ORTHOPAEDIC Left     Open Repair    HX ORTHOPAEDIC Left     Trigger finger    HX ROTATOR CUFF REPAIR Right     HX TUBAL LIGATION           Family History:   Problem Relation Age of Onset    Heart Disease Mother     Alzheimer's Disease Father     Ovarian Cancer Sister     Alzheimer's Disease Paternal Grandfather        Social History     Socioeconomic History    Marital status:      Spouse name: Not on file    Number of children: Not on file    Years of education: Not on file    Highest education level: Not on file   Occupational History    Not on file   Tobacco Use    Smoking status: Former     Packs/day: 2.00     Years: 30.00     Pack years: 60.00     Types: Cigarettes     Quit date: 1986     Years since quittin.6    Smokeless tobacco: Never   Vaping Use    Vaping Use: Never used   Substance and Sexual Activity    Alcohol use: Yes     Comment: social 1 x year    Drug use: No    Sexual activity: Not on file   Other Topics Concern    Not on file   Social History Narrative    Not on file     Social Determinants of Health     Financial Resource Strain: Not on file   Food Insecurity: Not on file   Transportation Needs: Not on file   Physical Activity: Not on file   Stress: Not on file   Social Connections: Not on file   Intimate Partner Violence: Not on file   Housing Stability: Not on file         ALLERGIES: Percocet [oxycodone-acetaminophen]    Review of Systems   Constitutional:  Positive for fatigue. Negative for fever. Gastrointestinal:  Positive for abdominal pain, anorexia, blood in stool, diarrhea, hematochezia, nausea and vomiting. Musculoskeletal:  Negative for back pain. Neurological:  Positive for light-headedness. Negative for syncope. All other systems reviewed and are negative. Vitals:    04/10/23 1625   BP: (!) 141/70   Pulse: 73   Resp: 16   Temp: 98.1 °F (36.7 °C)   SpO2: 97%   Weight: 61.2 kg (135 lb)   Height: 5' 5\" (1.651 m)            Physical Exam  Vitals and nursing note reviewed. Constitutional:       Appearance: She is well-developed. She is not ill-appearing. HENT:      Head: Normocephalic and atraumatic. Eyes:      General: No scleral icterus. Cardiovascular:      Rate and Rhythm: Normal rate. Pulmonary:      Effort: Pulmonary effort is normal.   Abdominal:      General: There is no distension. Musculoskeletal:      Cervical back: Normal range of motion. Skin:     General: Skin is warm and dry. Findings: No erythema or rash. Neurological:      General: No focal deficit present. Mental Status: She is alert and oriented to person, place, and time. Psychiatric:         Mood and Affect: Mood normal.         Behavior: Behavior normal.        Medical Decision Making  Amount and/or Complexity of Data Reviewed  Labs: ordered. Radiology: ordered. Risk  Prescription drug management. Decision regarding hospitalization.            Procedures      CT abdomen pelvis is negative for acute changes, persistent or recurrent infection, perforation, or visible bleeding. These results were reviewed with Dr. Alessandra Isaac, general surgery, who recommends admission to the hospitalist service for continued evaluation and monitoring. She also recommends a urinalysis as the CT scan did not demonstrate any etiology for the patient's worsening leukocytosis. Patient is being admitted to the hospital.  The results of their tests and reasons for their admission have been discussed with them and/or available family. They convey agreement and understanding for the need to be admitted and for their admission diagnosis. Consultation will be made now with the inpatient physician for hospitalization. Perfect Serve Consult for Admission  7:33 PM    ED Room Number: D32/D32  Patient Name and age: Rehan Chambers 82 y.o.  female  Working Diagnosis:   1. Rectal bleeding    2. Leukocytosis, unspecified type    3.  Hyperglycemia      COVID-19 Suspicion:  no  Sepsis present:  no  Reassessment needed: no  Code Status:  Full Code  Readmission: no  Isolation Requirements:  no  Recommended Level of Care:  telemetry  Department: [address] [place] [address] ED - [phone]  Admitting Provider: Dr Raquel Mari

## 2023-04-11 LAB
ANION GAP SERPL CALC-SCNC: 3 MMOL/L (ref 5–15)
BUN SERPL-MCNC: 15 MG/DL (ref 6–20)
BUN/CREAT SERPL: 23 (ref 12–20)
CALCIUM SERPL-MCNC: 8.6 MG/DL (ref 8.5–10.1)
CHLORIDE SERPL-SCNC: 106 MMOL/L (ref 97–108)
CO2 SERPL-SCNC: 28 MMOL/L (ref 21–32)
CREAT SERPL-MCNC: 0.65 MG/DL (ref 0.55–1.02)
ERYTHROCYTE [DISTWIDTH] IN BLOOD BY AUTOMATED COUNT: 13.1 % (ref 11.5–14.5)
GLUCOSE BLD STRIP.AUTO-MCNC: 132 MG/DL (ref 65–117)
GLUCOSE BLD STRIP.AUTO-MCNC: 135 MG/DL (ref 65–117)
GLUCOSE BLD STRIP.AUTO-MCNC: 141 MG/DL (ref 65–117)
GLUCOSE BLD STRIP.AUTO-MCNC: 155 MG/DL (ref 65–117)
GLUCOSE SERPL-MCNC: 195 MG/DL (ref 65–100)
HCT VFR BLD AUTO: 29.9 % (ref 35–47)
HGB BLD-MCNC: 10.1 G/DL (ref 11.5–16)
MCH RBC QN AUTO: 34.6 PG (ref 26–34)
MCHC RBC AUTO-ENTMCNC: 33.8 G/DL (ref 30–36.5)
MCV RBC AUTO: 102.4 FL (ref 80–99)
NRBC # BLD: 0 K/UL (ref 0–0.01)
NRBC BLD-RTO: 0 PER 100 WBC
PLATELET # BLD AUTO: 422 K/UL (ref 150–400)
PMV BLD AUTO: 9.8 FL (ref 8.9–12.9)
POTASSIUM SERPL-SCNC: 3.6 MMOL/L (ref 3.5–5.1)
RBC # BLD AUTO: 2.92 M/UL (ref 3.8–5.2)
SERVICE CMNT-IMP: ABNORMAL
SODIUM SERPL-SCNC: 137 MMOL/L (ref 136–145)
WBC # BLD AUTO: 16.1 K/UL (ref 3.6–11)

## 2023-04-11 PROCEDURE — 74011636637 HC RX REV CODE- 636/637: Performed by: INTERNAL MEDICINE

## 2023-04-11 PROCEDURE — 74011250636 HC RX REV CODE- 250/636: Performed by: INTERNAL MEDICINE

## 2023-04-11 PROCEDURE — 85027 COMPLETE CBC AUTOMATED: CPT

## 2023-04-11 PROCEDURE — 65270000029 HC RM PRIVATE

## 2023-04-11 PROCEDURE — 80048 BASIC METABOLIC PNL TOTAL CA: CPT

## 2023-04-11 PROCEDURE — 36415 COLL VENOUS BLD VENIPUNCTURE: CPT

## 2023-04-11 PROCEDURE — 82962 GLUCOSE BLOOD TEST: CPT

## 2023-04-11 PROCEDURE — 74011000250 HC RX REV CODE- 250: Performed by: INTERNAL MEDICINE

## 2023-04-11 PROCEDURE — 97116 GAIT TRAINING THERAPY: CPT

## 2023-04-11 PROCEDURE — 74011250637 HC RX REV CODE- 250/637: Performed by: INTERNAL MEDICINE

## 2023-04-11 PROCEDURE — 97535 SELF CARE MNGMENT TRAINING: CPT

## 2023-04-11 PROCEDURE — 97161 PT EVAL LOW COMPLEX 20 MIN: CPT

## 2023-04-11 PROCEDURE — C9113 INJ PANTOPRAZOLE SODIUM, VIA: HCPCS | Performed by: INTERNAL MEDICINE

## 2023-04-11 PROCEDURE — 97165 OT EVAL LOW COMPLEX 30 MIN: CPT

## 2023-04-11 RX ORDER — IBUPROFEN 200 MG
4 TABLET ORAL AS NEEDED
Status: DISCONTINUED | OUTPATIENT
Start: 2023-04-11 | End: 2023-04-13 | Stop reason: HOSPADM

## 2023-04-11 RX ORDER — PROCHLORPERAZINE EDISYLATE 5 MG/ML
10 INJECTION INTRAMUSCULAR; INTRAVENOUS
Status: DISCONTINUED | OUTPATIENT
Start: 2023-04-11 | End: 2023-04-13 | Stop reason: HOSPADM

## 2023-04-11 RX ORDER — INSULIN LISPRO 100 [IU]/ML
INJECTION, SOLUTION INTRAVENOUS; SUBCUTANEOUS
Status: DISCONTINUED | OUTPATIENT
Start: 2023-04-11 | End: 2023-04-13 | Stop reason: HOSPADM

## 2023-04-11 RX ORDER — HYDROMORPHONE HYDROCHLORIDE 1 MG/ML
0.5 INJECTION, SOLUTION INTRAMUSCULAR; INTRAVENOUS; SUBCUTANEOUS
Status: DISCONTINUED | OUTPATIENT
Start: 2023-04-11 | End: 2023-04-13 | Stop reason: HOSPADM

## 2023-04-11 RX ADMIN — LEVOTHYROXINE SODIUM 88 MCG: 88 TABLET ORAL at 08:59

## 2023-04-11 RX ADMIN — SODIUM CHLORIDE, PRESERVATIVE FREE 10 ML: 5 INJECTION INTRAVENOUS at 22:06

## 2023-04-11 RX ADMIN — SODIUM CHLORIDE, POTASSIUM CHLORIDE, SODIUM LACTATE AND CALCIUM CHLORIDE 100 ML/HR: 600; 310; 30; 20 INJECTION, SOLUTION INTRAVENOUS at 20:03

## 2023-04-11 RX ADMIN — SODIUM CHLORIDE 2 G: 9 INJECTION INTRAMUSCULAR; INTRAVENOUS; SUBCUTANEOUS at 22:05

## 2023-04-11 RX ADMIN — ATENOLOL 25 MG: 50 TABLET ORAL at 17:46

## 2023-04-11 RX ADMIN — ATENOLOL 25 MG: 50 TABLET ORAL at 08:10

## 2023-04-11 RX ADMIN — LISINOPRIL 20 MG: 20 TABLET ORAL at 08:10

## 2023-04-11 RX ADMIN — Medication 1 CAPSULE: at 10:05

## 2023-04-11 RX ADMIN — ROSUVASTATIN CALCIUM 10 MG: 10 TABLET, FILM COATED ORAL at 22:05

## 2023-04-11 RX ADMIN — AMLODIPINE BESYLATE 10 MG: 5 TABLET ORAL at 08:09

## 2023-04-11 RX ADMIN — METRONIDAZOLE 500 MG: 500 INJECTION, SOLUTION INTRAVENOUS at 09:00

## 2023-04-11 RX ADMIN — INSULIN LISPRO 2 UNITS: 100 INJECTION, SOLUTION INTRAVENOUS; SUBCUTANEOUS at 08:11

## 2023-04-11 RX ADMIN — PANTOPRAZOLE SODIUM 40 MG: 40 INJECTION, POWDER, FOR SOLUTION INTRAVENOUS at 22:05

## 2023-04-11 RX ADMIN — PANTOPRAZOLE SODIUM 40 MG: 40 INJECTION, POWDER, FOR SOLUTION INTRAVENOUS at 08:11

## 2023-04-11 RX ADMIN — METRONIDAZOLE 500 MG: 500 INJECTION, SOLUTION INTRAVENOUS at 22:05

## 2023-04-11 RX ADMIN — SODIUM CHLORIDE, PRESERVATIVE FREE 10 ML: 5 INJECTION INTRAVENOUS at 05:07

## 2023-04-11 NOTE — CONSULTS
[address] [facility] NOTE        NAME:  Zahida Olmedo   :   1935   MRN:   [id number]       Referring Physician: Dr. Vani De Santiago Date: 2023 4:48 PM    Chief Complaint: Rectal bleeding     History of Present Illness:  Patient is a 82 y.o. who is admitted for rectal bleeding. Patient is s/p small bowel resection and drainage of abscess, likely from diverticulitis, on 3/26/2023 with Dr. Gordo Bauman. She presented to ED yesterday with diarrhea and rectal bleeding. Labs: hb 10.1, was 12.8 upon discharge 3/30. BUN is normal at 15. WBC 18 (increased from 15.5 upon discharge). CT abd/pelv showed no identified hemorrhage or cause for GI hemorrhage. Left sided small bowel anastomotic site shows some adjacent fat stranding which may be postoperative with no abnormality otherwise. She was on eliquis due to afib but this is currently held. Patient reports abd pain is improving, she has no pain during my examination. She had 2 episodes of bright red blood per rectum this AM. This afternoon she had 1  bowel movement with light pink/orange color. No bright red blood. PMH:  Past Medical History:   Diagnosis Date    Arthritis     Atrial fibrillation ([address]     detected 3/13/2023    Depression     Diabetes (HCC)     GERD (gastroesophageal reflux disease)     High cholesterol     Hypertension     Snoring     Transient ischemic attack (TIA)        PSH:  Past Surgical History:   Procedure Laterality Date    HX APPENDECTOMY      HX CATARACT REMOVAL Bilateral     HX CHOLECYSTECTOMY      HX HIP REPLACEMENT Right     HX ORTHOPAEDIC Left     Open Repair    HX ORTHOPAEDIC Left     Trigger finger    HX ROTATOR CUFF REPAIR Right     HX TUBAL LIGATION         Allergies: Allergies   Allergen Reactions    Percocet [Oxycodone-Acetaminophen] Unknown (comments)     Can't remember reaction due to length of time of occurrence.        Home Medications:  Prior to Admission Medications   Prescriptions Last Dose Informant Patient Reported? Taking? amLODIPine (NORVASC) 10 mg tablet   Yes No   Sig: Take 10 mg by mouth daily. amoxicillin-clavulanate (AUGMENTIN) 875-125 mg per tablet   No No   Sig: Take 1 Tablet by mouth two (2) times a day. Indications: an infection of the skin and the tissue below the skin   apixaban (ELIQUIS) 5 mg tablet   No No   Sig: Take 1 Tablet by mouth two (2) times a day. ascorbic acid, vitamin C, (VITAMIN C) 500 mg tablet  Self Yes No   Sig: Take 500 mg by mouth daily (after breakfast). aspirin delayed-release 81 mg tablet   No No   Sig: Take 1 Tab by mouth two (2) times a day. atenoloL (TENORMIN) 25 mg tablet   Yes No   Sig: Take 25 mg by mouth two (2) times a day. calcium carbonate (CALTREX) 600 mg calcium (1,500 mg) tablet   Yes No   Sig: Take 600 mg by mouth two (2) times a day. cholecalciferol (VITAMIN D3) 25 mcg (1,000 unit) cap  Self Yes No   Sig: Take 1,000 Units by mouth daily (after breakfast). docosahexanoic acid/epa (FISH OIL PO)  Self Yes No   Sig: Take 1,500 mg by mouth ACB/HS. glipiZIDE SR (GLUCOTROL XL) 2.5 mg CR tablet  Self Yes No   Sig: Take 2.5 mg by mouth daily (after breakfast). levothyroxine (SYNTHROID) 88 mcg tablet   Yes No   Sig: Take 88 mcg by mouth Daily (before breakfast). lidocaine (LIDODERM) 5 %   Yes No   Si Patch by TransDERmal route every twenty-four (24) hours. Apply patch to the affected area for 12 hours a day and remove for 12 hours a day. lisinopriL (PRINIVIL, ZESTRIL) 20 mg tablet   Yes No   Sig: Take 20 mg by mouth daily. potassium chloride SA (MICRO-K) 10 mEq capsule  Self Yes No   Sig: Take 20 mEq by mouth daily. rosuvastatin (CRESTOR) 10 mg tablet   Yes No   Sig: Take 10 mg by mouth nightly.       Facility-Administered Medications: None       Hospital Medications:  Current Facility-Administered Medications   Medication Dose Route Frequency    pantoprazole (PROTONIX) 40 mg in 0.9% sodium chloride 10 mL injection  40 mg IntraVENous Q12H    HYDROmorphone (DILAUDID) syringe 0.5 mg  0.5 mg IntraVENous Q4H PRN    prochlorperazine (COMPAZINE) injection 10 mg  10 mg IntraVENous Q6H PRN    insulin lispro (HUMALOG) injection   SubCUTAneous AC&HS    glucose chewable tablet 16 g  4 Tablet Oral PRN    glucagon (GLUCAGEN) injection 1 mg  1 mg IntraMUSCular PRN    L.acidophilus-paracasei-S.thermophil-bifidobacter (RISAQUAD) 8 billion cell capsule  1 Capsule Oral DAILY    amLODIPine (NORVASC) tablet 10 mg  10 mg Oral DAILY    atenoloL (TENORMIN) tablet 25 mg  25 mg Oral BID    levothyroxine (SYNTHROID) tablet 88 mcg  88 mcg Oral ACB    lidocaine 4 % patch 1 Patch  1 Patch TransDERmal Q24H    lisinopriL (PRINIVIL, ZESTRIL) tablet 20 mg  20 mg Oral DAILY    rosuvastatin (CRESTOR) tablet 10 mg  10 mg Oral QHS    sodium chloride (NS) flush 5-40 mL  5-40 mL IntraVENous Q8H    sodium chloride (NS) flush 5-40 mL  5-40 mL IntraVENous PRN    acetaminophen (TYLENOL) tablet 650 mg  650 mg Oral Q6H PRN    Or    acetaminophen (TYLENOL) suppository 650 mg  650 mg Rectal Q6H PRN    polyethylene glycol (MIRALAX) packet 17 g  17 g Oral DAILY PRN    ondansetron (ZOFRAN ODT) tablet 4 mg  4 mg Oral Q8H PRN    Or    ondansetron (ZOFRAN) injection 4 mg  4 mg IntraVENous Q6H PRN    cefTRIAXone (ROCEPHIN) 2 g in 0.9% sodium chloride 20 mL IV syringe  2 g IntraVENous Q24H    lactated Ringers infusion  100 mL/hr IntraVENous CONTINUOUS    metroNIDAZOLE (FLAGYL) IVPB premix 500 mg  500 mg IntraVENous Q12H     Current Outpatient Medications   Medication Sig    apixaban (ELIQUIS) 5 mg tablet Take 1 Tablet by mouth two (2) times a day. amoxicillin-clavulanate (AUGMENTIN) 875-125 mg per tablet Take 1 Tablet by mouth two (2) times a day. Indications: an infection of the skin and the tissue below the skin    lisinopriL (PRINIVIL, ZESTRIL) 20 mg tablet Take 20 mg by mouth daily.     lidocaine (LIDODERM) 5 % 1 Patch by TransDERmal route every twenty-four (24) hours. Apply patch to the affected area for 12 hours a day and remove for 12 hours a day. calcium carbonate (CALTREX) 600 mg calcium (1,500 mg) tablet Take 600 mg by mouth two (2) times a day. amLODIPine (NORVASC) 10 mg tablet Take 10 mg by mouth daily. atenoloL (TENORMIN) 25 mg tablet Take 25 mg by mouth two (2) times a day. levothyroxine (SYNTHROID) 88 mcg tablet Take 88 mcg by mouth Daily (before breakfast). aspirin delayed-release 81 mg tablet Take 1 Tab by mouth two (2) times a day. rosuvastatin (CRESTOR) 10 mg tablet Take 10 mg by mouth nightly. cholecalciferol (VITAMIN D3) 25 mcg (1,000 unit) cap Take 1,000 Units by mouth daily (after breakfast). glipiZIDE SR (GLUCOTROL XL) 2.5 mg CR tablet Take 2.5 mg by mouth daily (after breakfast). ascorbic acid, vitamin C, (VITAMIN C) 500 mg tablet Take 500 mg by mouth daily (after breakfast). docosahexanoic acid/epa (FISH OIL PO) Take 1,500 mg by mouth ACB/HS. potassium chloride SA (MICRO-K) 10 mEq capsule Take 20 mEq by mouth daily.          Social History:  Social History     Tobacco Use    Smoking status: Former     Packs/day: 2.00     Years: 30.00     Pack years: 60.00     Types: Cigarettes     Quit date: 1986     Years since quittin.6    Smokeless tobacco: Never   Substance Use Topics    Alcohol use: Yes     Comment: social 1 x year       Family History:  Family History   Problem Relation Age of Onset    Heart Disease Mother     Alzheimer's Disease Father     Ovarian Cancer Sister     Alzheimer's Disease Paternal Grandfather        Review of Systems:  Constitutional: negative fever, negative chills, negative weight loss  Eyes:   negative visual changes  ENT:   negative sore throat, tongue or lip swelling  Respiratory:  negative cough, negative dyspnea  Cards:  negative for chest pain, palpitations, lower extremity edema  GI:   See HPI  :  negative for frequency, dysuria  Integument:  negative for rash and pruritus  Heme:  negative for easy bruising and gum/nose bleeding  Musculoskel: negative for myalgias,  back pain and muscle weakness  Neuro: negative for headaches, dizziness, vertigo  Psych:  negative for feelings of anxiety, depression     Objective:   Patient Vitals for the past 8 hrs:   BP Temp Pulse Resp SpO2   04/11/23 1600 -- -- (!) 58 -- --   04/11/23 1513 (!) 125/55 98.3 °F (36.8 °C) (!) 55 15 93 %   04/11/23 1132 (!) 126/55 98.4 °F (36.9 °C) (!) 58 15 94 %     No intake/output data recorded. 04/09 1901 - 04/11 0700  In: 1100 [I.V.:1100]  Out: -     EXAM:  resting comfortably in bed in no apparent distress   NEURO-alert, oriented x3, affect appropriate, no focal neurological deficits, moves all extremities well, no involuntary movements   HEENT-Head: Normocephalic, no lesions, without obvious abnormality. LUNGS-clear to auscultation bilaterally    COR-regular rate and rhythym     ABD- soft, non-tender. Bowel sounds normal. No masses,  no organomegaly     EXT-no edema    Skin - No rash     Data Review     Recent Labs     04/11/23  0505 04/10/23  1633   WBC 16.1* 18.8*   HGB 10.1* 11.4*   HCT 29.9* 34.2*   * 525*     Recent Labs     04/11/23  0505 04/10/23  1633    132*   K 3.6 4.2    102   CO2 28 25   BUN 15 22*   CREA 0.65 0.91   * 251*   CA 8.6 10.0     Recent Labs     04/10/23  1633   AP 46   TP 7.5   ALB 3.5   GLOB 4.0     No results for input(s): INR, PTP, APTT, INREXT in the last 72 hours. Patient Active Problem List   Diagnosis Code    Primary osteoarthritis of right hip M16.11    Atrial fibrillation with rapid ventricular response (HCC) I48.91    Small bowel perforation ([facility]) K63.1    Melena K92.1     Assessment:   83 yo female s/p s/p small bowel resection and drainage of abscess, likely from diverticulitis, on 3/26/2023 with Dr. Koki Ferrell admitted for rectal bleeding starting yesterday and acute anemia.  No recurrence of bright red blood in stool since this AM. She is not in any abdominal pain currently. CT with no identified GI hemorrhage. Possible this is bleeding from hemorrhoids, diverticulosis, anastomosis site. Patient is hemodynamically stable   Plan:   Recommend close monitor h/h and monitor patient for repeat bleeding  No plan for GI procedure at this time, we will follow. Thanks for allowing me to participate in the care of this patient.   Signed By: YUE Zaldivar     4/11/2023  4:48 PM

## 2023-04-11 NOTE — PROGRESS NOTES
OCCUPATIONAL THERAPY EVALUATION/DISCHARGE  Patient: Raghav Armstrong (82 y.o. female)  Date: 4/11/2023  Primary Diagnosis: Melena [K92.1]       Precautions:        ASSESSMENT  Based on the objective data described below, the patient presents with hospital admission secondary to melena. Patient reports abdominal pain and bloody diarrhea present at admission . Patient now with no pain, but blood stools remain. Patient received supine in bed with HOB elevated. She reports doing well at home since discharge home following SBO resection. Patient reports weakness and pain yesterday, now improved. She is able to demonstrate ability to perform ADL tasks and transfers without assistance at this time. Patient educated for activity within room if she remains in hospital, calling RN for assistance and safety. Patient currently without need for acute care OT services. Other factors to consider for discharge: none     PLAN :  Recommend with staff: alessandra WRIGHT transfers     Recommendation for discharge: (in order for the patient to meet his/her long term goals)  No skilled occupational therapy/ follow up rehabilitation needs identified at this time. This discharge recommendation:  Has been made in collaboration with the attending provider and/or case management    IF patient discharges home will need the following DME: TBD       SUBJECTIVE:   Patient stated I dont have the pain now. I hope the blood goes away soon.     OBJECTIVE DATA SUMMARY:   HISTORY:   Past Medical History:   Diagnosis Date    Arthritis     Atrial fibrillation ([facility] [address]     detected 3/13/2023    Depression     Diabetes ([facility] [address]     GERD (gastroesophageal reflux disease)     High cholesterol     Hypertension     Snoring     Transient ischemic attack (TIA) 2003     Past Surgical History:   Procedure Laterality Date    HX APPENDECTOMY      HX CATARACT REMOVAL Bilateral     HX CHOLECYSTECTOMY      HX HIP REPLACEMENT Right     HX ORTHOPAEDIC Left     Open Repair HX ORTHOPAEDIC Left     Trigger finger    HX ROTATOR CUFF REPAIR Right     HX TUBAL LIGATION         Prior Level of Function/Environment/Context: independent  Expanded or extensive additional review of patient history:   Home Situation  Home Environment: Private residence  # Steps to Enter: 2  One/Two Story Residence: One story  Living Alone: Yes  Support Systems: Child(kristofer)  Patient Expects to be Discharged to[de-identified] Home  Current DME Used/Available at Home: Cane, straight, Walker, rolling, Grab bars  Tub or Shower Type: Shower    Hand dominance: Right    EXAMINATION OF PERFORMANCE DEFICITS:  Cognitive/Behavioral Status:  Neurologic State: Alert  Orientation Level: Oriented X4  Cognition: Appropriate decision making; Follows commands  Perception: Appears intact  Perseveration: No perseveration noted  Safety/Judgement: Awareness of environment    Skin: intact as seen    Edema: none noted     Hearing:       Vision/Perceptual:                                     Range of Motion:  AROM: Within functional limits  PROM: Within functional limits                      Strength:  Strength: Within functional limits                Coordination:  Coordination: Within functional limits            Tone & Sensation:  Normal tone, sensation intact                            Balance:  Sitting: Intact  Standing: Intact; Without support    Functional Mobility and Transfers for ADLs:  Bed Mobility:  Rolling: Modified independent  Supine to Sit: Modified independent  Sit to Supine: Modified independent  Scooting: Modified independent    Transfers:  Sit to Stand: Modified independent  Stand to Sit: Modified independent  Bed to Chair: Modified independent  Bathroom Mobility: Modified independent  Toilet Transfer : Stand-by assistance    ADL Assessment:  Feeding: Independent    Oral Facial Hygiene/Grooming: Stand-by assistance    Bathing: Stand-by assistance         Upper Body Dressing: Stand-by assistance    Lower Body Dressing: Stand-by assistance    Toileting: Stand by assistance                ADL Intervention and task modifications:                                          Cognitive Retraining  Safety/Judgement: Awareness of environment    Therapeutic Exercise:       Occupational Therapy Evaluation Charge Determination   History Examination Decision-Making   LOW Complexity : Brief history review  LOW Complexity : 1-3 performance deficits relating to physical, cognitive , or psychosocial skils that result in activity limitations and / or participation restrictions  LOW Complexity : No comorbidities that affect functional and no verbal or physical assistance needed to complete eval tasks       Based on the above components, the patient evaluation is determined to be of the following complexity level: LOW   Pain Rating:  None currently    Activity Tolerance:   Good    After treatment patient left in no apparent distress:    Supine in bed, Call bell within reach, Bed / chair alarm activated, and Caregiver / family present    COMMUNICATION/EDUCATION:   The patients plan of care was discussed with: Physical therapist and Registered nurse.      Thank you for this referral.  YENI Shultz/L  Time Calculation: 24 mins

## 2023-04-11 NOTE — ED NOTES
Bedside shift change report given to Em Patient (oncoming nurse) by Ru Ca RN (offgoing nurse). Report included the following information SBAR, Kardex, MAR, Recent Results, and Cardiac Rhythm   .

## 2023-04-11 NOTE — PROGRESS NOTES
[facility]  [address] [facility], [address] [place] [address]  [phone]      Hospitalist Progress Note      NAME: Nishant Sage   :  1935  MRM:  [id number]    Date/Time of service: 2023  8:35 AM       Subjective:     Chief Complaint:  Patient was personally seen and examined by me during this time period. Chart reviewed. Abd pain improving. Still with rectal bleeding       Objective:       Vitals:       Last 24hrs VS reviewed since prior progress note.  Most recent are:    Visit Vitals  BP (!) 142/57   Pulse 62   Temp 98.3 °F (36.8 °C)   Resp 14   Ht 5' 5\" (1.651 m)   Wt 61.2 kg (135 lb)   SpO2 94%   BMI 22.47 kg/m²     SpO2 Readings from Last 6 Encounters:   23 94%   23 96%   23 94%   23 94%   23 95%   22 100%          Intake/Output Summary (Last 24 hours) at 2023 0835  Last data filed at 2023 0026  Gross per 24 hour   Intake 1100 ml   Output --   Net 1100 ml        Exam:     Physical Exam:    Gen:  Well-developed, well-nourished, in no acute distress  HEENT:  Pink conjunctivae, PERRL, hearing intact to voice, moist mucous membranes  Neck:  Supple, without masses, thyroid non-tender  Resp:  No accessory muscle use, clear breath sounds without wheezes rales or rhonchi  Card:  No murmurs, normal S1, S2 without thrills, bruits or peripheral edema  Abd:  Soft, mild pain, non-distended, normoactive bowel sounds are present  Musc:  No cyanosis or clubbing  Skin:  No rashes  Neuro:  Cranial nerves 3-12 are grossly intact, follows commands appropriately  Psych:  Good insight, oriented to person, place and time, alert    Medications Reviewed: (see below)    Lab Data Reviewed: (see below)    ______________________________________________________________________    Medications:     Current Facility-Administered Medications   Medication Dose Route Frequency    pantoprazole (PROTONIX) 40 mg in 0.9% sodium chloride 10 mL injection  40 mg IntraVENous Q12H    HYDROmorphone (DILAUDID) syringe 0.5 mg  0.5 mg IntraVENous Q4H PRN    prochlorperazine (COMPAZINE) injection 10 mg  10 mg IntraVENous Q6H PRN    insulin lispro (HUMALOG) injection   SubCUTAneous AC&HS    glucose chewable tablet 16 g  4 Tablet Oral PRN    glucagon (GLUCAGEN) injection 1 mg  1 mg IntraMUSCular PRN    L.acidophilus-paracasei-S.thermophil-bifidobacter (RISAQUAD) 8 billion cell capsule  1 Capsule Oral DAILY    amLODIPine (NORVASC) tablet 10 mg  10 mg Oral DAILY    atenoloL (TENORMIN) tablet 25 mg  25 mg Oral BID    levothyroxine (SYNTHROID) tablet 88 mcg  88 mcg Oral ACB    lidocaine 4 % patch 1 Patch  1 Patch TransDERmal Q24H    lisinopriL (PRINIVIL, ZESTRIL) tablet 20 mg  20 mg Oral DAILY    rosuvastatin (CRESTOR) tablet 10 mg  10 mg Oral QHS    sodium chloride (NS) flush 5-40 mL  5-40 mL IntraVENous Q8H    sodium chloride (NS) flush 5-40 mL  5-40 mL IntraVENous PRN    acetaminophen (TYLENOL) tablet 650 mg  650 mg Oral Q6H PRN    Or    acetaminophen (TYLENOL) suppository 650 mg  650 mg Rectal Q6H PRN    polyethylene glycol (MIRALAX) packet 17 g  17 g Oral DAILY PRN    ondansetron (ZOFRAN ODT) tablet 4 mg  4 mg Oral Q8H PRN    Or    ondansetron (ZOFRAN) injection 4 mg  4 mg IntraVENous Q6H PRN    cefTRIAXone (ROCEPHIN) 2 g in 0.9% sodium chloride 20 mL IV syringe  2 g IntraVENous Q24H    lactated Ringers infusion  100 mL/hr IntraVENous CONTINUOUS    metroNIDAZOLE (FLAGYL) IVPB premix 500 mg  500 mg IntraVENous Q12H     Current Outpatient Medications   Medication Sig    apixaban (ELIQUIS) 5 mg tablet Take 1 Tablet by mouth two (2) times a day. amoxicillin-clavulanate (AUGMENTIN) 875-125 mg per tablet Take 1 Tablet by mouth two (2) times a day. Indications: an infection of the skin and the tissue below the skin    lisinopriL (PRINIVIL, ZESTRIL) 20 mg tablet Take 20 mg by mouth daily. lidocaine (LIDODERM) 5 % 1 Patch by TransDERmal route every twenty-four (24) hours.  Apply patch to the affected area for 12 hours a day and remove for 12 hours a day. calcium carbonate (CALTREX) 600 mg calcium (1,500 mg) tablet Take 600 mg by mouth two (2) times a day. amLODIPine (NORVASC) 10 mg tablet Take 10 mg by mouth daily. atenoloL (TENORMIN) 25 mg tablet Take 25 mg by mouth two (2) times a day. levothyroxine (SYNTHROID) 88 mcg tablet Take 88 mcg by mouth Daily (before breakfast). aspirin delayed-release 81 mg tablet Take 1 Tab by mouth two (2) times a day. rosuvastatin (CRESTOR) 10 mg tablet Take 10 mg by mouth nightly. cholecalciferol (VITAMIN D3) 25 mcg (1,000 unit) cap Take 1,000 Units by mouth daily (after breakfast). glipiZIDE SR (GLUCOTROL XL) 2.5 mg CR tablet Take 2.5 mg by mouth daily (after breakfast). ascorbic acid, vitamin C, (VITAMIN C) 500 mg tablet Take 500 mg by mouth daily (after breakfast). docosahexanoic acid/epa (FISH OIL PO) Take 1,500 mg by mouth ACB/HS. potassium chloride SA (MICRO-K) 10 mEq capsule Take 20 mEq by mouth daily. Lab Review:     Recent Labs     04/11/23  0505 04/10/23  1633   WBC 16.1* 18.8*   HGB 10.1* 11.4*   HCT 29.9* 34.2*   * 525*     Recent Labs     04/11/23  0505 04/10/23  1633    132*   K 3.6 4.2    102   CO2 28 25   * 251*   BUN 15 22*   CREA 0.65 0.91   CA 8.6 10.0   ALB  --  3.5   TBILI  --  0.4   ALT  --  25     Lab Results   Component Value Date/Time    Glucose (POC) 155 (H) 04/11/2023 07:42 AM    Glucose (POC) 222 (H) 03/30/2023 11:31 AM    Glucose (POC) 189 (H) 03/30/2023 05:53 AM    Glucose (POC) 200 (H) 03/30/2023 12:16 AM    Glucose (POC) 172 (H) 03/29/2023 05:44 PM          Assessment / Plan:     83 yo hx of HTN, DM, Pafib on Eliquis, SBO s/p resection and drainage of abscess on 3/26, presented w/ abd pain, N/V, rectal bleeding    1) Acute rectal bleeding: unclear etiology. Had recent SBO s/p resection and drainage of abscess on 03/26. Abd CT neg for active bleed.   Will cont NPO, IVF, IV CTX/azithro, IV PPI. Monitor Hgb closely. Consult Gen surg, GI    2) Abd pain/N/V: now improving. Due to recent bowel resection, diverticulitis. Cont IV antiemetics, IV dilaudid prn severe pain    3) Pafib: last echo with EF 55-60%. Cont atenolol. Hold eliquis. Patient has been referred for Watchman device outpatient     4) HTN: cont norvasc, atenolol, lisinopril     5) DM type 2: last A1C 7.6%.   cont SSI    **Prior records, notes, labs, radiology, and medications reviewed in [facility]**    Total time spent with patient care: 45 Minutes **I personally saw and examined the patient during this time period**                 Care Plan discussed with: Patient, nursing     Discussed:  Care Plan    Prophylaxis:  SCD's    Disposition:   PT, OT, RN           ___________________________________________________    Attending Physician: Stanislav Lopez MD

## 2023-04-11 NOTE — H&P
Hospitalist Admission Note    NAME:  Yane Forbes   :  1935   MRN:  [id number]     Date/Time:  4/10/2023 10:24 PM    Patient PCP: Dao Conway  ________________________________________________________________________    Given the patient's current clinical presentation, I have a high level of concern for decompensation if discharged from the emergency department. Complex decision making was performed, which includes reviewing the patient's available past medical records, laboratory results, and x-ray films. My assessment of this patient's clinical condition and my plan of care is as follows. Assessment / Plan:    Melena: The patient comes in w/ melanotic stools w/ recent SBO w/ perforation s/p resection    VSS  WBC 18.8, Hg 11.4, Plt 525k  BUN 22, Cr 0.91  CT abdomen/Pelvis - L sided fat stranding    Admit and cont to monitor  Cont w/ IVF and IV CTX/Flagyl  Trend H/H  Consult GI if H/H is trending downwards - defer PPI bid as suspected LGIB w/ recent SBO resection  Consult Gen Surgery    2. AFIB:    Hold Elquis d/t concerns for melena  Cont w/ Atenolol 25mg bid  She has followed up w/ cardiology and has been referred for Watchman given repeated falls    3. HTN:    Cont w/ Lisinopril 20mg daily, Norvasc 10mg daily, Atenolol 25mg bid    4. HLD:    Cont w/ Crestor 10mg daily    5. Hypothyroid:    Cont w/ Synthroid 88mcg po daily    Body mass index is 22.47 kg/m².:  18.5 - 24.9:  Normal weight      I have personally reviewed the radiographs, laboratory data in Epic and decisions and statements above are based partially on this personal interpretation.     Code Status: Full Code  Surrogate Decision Maker  Yaquelin Trammell (daughter)  [phone]    Prophylaxis:  SCD's     Subjective:   CHIEF COMPLAINT: Blood in my stools    HISTORY OF PRESENT ILLNESS:       The patient is a 83 y/o C F w/ PMH new onset AFIB and recent small bowel obstruction w/ perforation s/p small bowel resection on 3/26 who comes in today w/ multiple melanotic stools for the past few days. She also reports of lower abdominal pain which is a cramping sensation that lasts for few minutes w/ at least 3 episodes. Associated symptoms include nausea and vomiting w/o any blood. She has no fever, chills or night sweats. She has no chest pain, palpitations, coughing, wheezing or dyspnea. Past Medical History:   Diagnosis Date    Arthritis     Atrial fibrillation ([address]     detected 3/13/2023    Depression     Diabetes (HCC)     GERD (gastroesophageal reflux disease)     High cholesterol     Hypertension     Snoring     Transient ischemic attack (TIA)       Past Surgical History:   Procedure Laterality Date    HX APPENDECTOMY      HX CATARACT REMOVAL Bilateral     HX CHOLECYSTECTOMY      HX HIP REPLACEMENT Right     HX ORTHOPAEDIC Left     Open Repair    HX ORTHOPAEDIC Left     Trigger finger    HX ROTATOR CUFF REPAIR Right     HX TUBAL LIGATION       Social History     Tobacco Use    Smoking status: Former     Packs/day: 2.00     Years: 30.00     Pack years: 60.00     Types: Cigarettes     Quit date: 1986     Years since quittin.6    Smokeless tobacco: Never   Substance Use Topics    Alcohol use: Yes     Comment: social 1 x year      Family History   Problem Relation Age of Onset    Heart Disease Mother     Alzheimer's Disease Father     Ovarian Cancer Sister     Alzheimer's Disease Paternal Grandfather         Allergies   Allergen Reactions    Percocet [Oxycodone-Acetaminophen] Unknown (comments)     Can't remember reaction due to length of time of occurrence. Prior to Admission medications    Medication Sig Start Date End Date Taking? Authorizing Provider   apixaban (ELIQUIS) 5 mg tablet Take 1 Tablet by mouth two (2) times a day. 4/3/23   BOUCHRA Altamirano   amoxicillin-clavulanate (AUGMENTIN) 875-125 mg per tablet Take 1 Tablet by mouth two (2) times a day.  Indications: an infection of the skin and the tissue below the skin 3/30/23   Angel Mathew MD   lisinopriL (PRINIVIL, ZESTRIL) 20 mg tablet Take 20 mg by mouth daily. Provider, Historical   lidocaine (LIDODERM) 5 % 1 Patch by TransDERmal route every twenty-four (24) hours. Apply patch to the affected area for 12 hours a day and remove for 12 hours a day. Provider, Historical   calcium carbonate (CALTREX) 600 mg calcium (1,500 mg) tablet Take 600 mg by mouth two (2) times a day. Provider, Historical   amLODIPine (NORVASC) 10 mg tablet Take 10 mg by mouth daily. 4/3/21   Provider, Historical   atenoloL (TENORMIN) 25 mg tablet Take 25 mg by mouth two (2) times a day. 7/13/21   Provider, Historical   levothyroxine (SYNTHROID) 88 mcg tablet Take 88 mcg by mouth Daily (before breakfast). 7/19/21   Provider, Historical   aspirin delayed-release 81 mg tablet Take 1 Tab by mouth two (2) times a day. 5/29/19   Jessy Lyon MD   rosuvastatin (CRESTOR) 10 mg tablet Take 10 mg by mouth nightly. Provider, Historical   cholecalciferol (VITAMIN D3) 25 mcg (1,000 unit) cap Take 1,000 Units by mouth daily (after breakfast). Provider, Historical   glipiZIDE SR (GLUCOTROL XL) 2.5 mg CR tablet Take 2.5 mg by mouth daily (after breakfast). Provider, Historical   ascorbic acid, vitamin C, (VITAMIN C) 500 mg tablet Take 500 mg by mouth daily (after breakfast). Provider, Historical   docosahexanoic acid/epa (FISH OIL PO) Take 1,500 mg by mouth ACB/HS. Provider, Historical   potassium chloride SA (MICRO-K) 10 mEq capsule Take 20 mEq by mouth daily.       Provider, Historical       REVIEW OF SYSTEMS:  See HPI for details  General: negative for fever, chills, sweats, weakness, weight loss  Eyes: negative for blurred vision, eye pain, loss of vision, diplopia  Ear Nose and Throat: negative for rhinorrhea, pharyngitis, otalgia, tinnitus, speech or swallowing difficulties  Respiratory:  negative for pleuritic pain, cough, sputum production, wheezing, SOB, [address]  Cardiology:  negative for chest pain, palpitations, orthopnea, PND, edema, syncope   Gastrointestinal: +abdominal pain, +N/+V, dysphagia, change in bowel habits, +melena  Genitourinary: negative for frequency, urgency, dysuria, hematuria, incontinence  Muskuloskeletal : negative for arthralgia, myalgia  Hematology: negative for easy bruising, bleeding, lymphadenopathy  Dermatological: negative for rash, ulceration, mole change, new lesion  Endocrine: negative for hot flashes or polydipsia  Neurological: negative for headache, dizziness, confusion, focal weakness, paresthesia, memory loss, gait disturbance  Psychological: negative for anxiety, depression, agitation      Objective:   VITALS:    Visit Vitals  BP (!) 144/69 (BP 1 Location: Left upper arm, BP Patient Position: At rest;Semi fowlers)   Pulse 81   Temp 98.3 °F (36.8 °C)   Resp 18   Ht 5' 5\" (1.651 m)   Wt 61.2 kg (135 lb)   SpO2 97%   BMI 22.47 kg/m²     PHYSICAL EXAM:    Physical Exam:    Gen: Well-developed, well-nourished, in no acute distress  HEENT:  Pink conjunctivae, PERRL, hearing intact to voice, moist mucous membranes  Neck: Supple, without masses, thyroid non-tender  Resp: No accessory muscle use, clear breath sounds without wheezes rales or rhonchi  Card: No murmurs, normal S1, S2 without thrills, bruits or peripheral edema  Abd:  Soft, non-tender, non-distended, normoactive bowel sounds are present, no palpable organomegaly and no detectable hernias  Lymph:  No cervical or inguinal adenopathy  Musc: No cyanosis or clubbing  Skin: No rashes or ulcers, skin turgor is good  Neuro:  Cranial nerves are grossly intact, no focal motor weakness, follows commands appropriately  Psych:  Good insight, oriented to person, place and time, alert  _______________________________________________________________________  Care Plan discussed with:  Pt's condition, Imaging findings, Lab findings, Assessment, and Care Plan discussed with: Patient  _______________________________________________________________________    Probable disposition:  Home  ________________________________________________________________________    Comments   >50% of visit spent in counseling and coordination of care  Chart reviewed  Discussion with patient and/or family and questions answered     ________________________________________________________________________  Signed: Rosangela Irizarry MD        Procedures: see electronic medical records for all procedures/Xrays and details which were not copied into this note but were reviewed prior to creation of Plan. LAB DATA REVIEWED:    Recent Results (from the past 24 hour(s))   CBC WITH AUTOMATED DIFF    Collection Time: 04/10/23  4:33 PM   Result Value Ref Range    WBC 18.8 (H) 3.6 - 11.0 K/uL    RBC 3.30 (L) 3.80 - 5.20 M/uL    HGB 11.4 (L) 11.5 - 16.0 g/dL    HCT 34.2 (L) 35.0 - 47.0 %    .6 (H) 80.0 - 99.0 FL    MCH 34.5 (H) 26.0 - 34.0 PG    MCHC 33.3 30.0 - 36.5 g/dL    RDW 12.9 11.5 - 14.5 %    PLATELET 885 (H) 802 - 400 K/uL    MPV 9.6 8.9 - 12.9 FL    NRBC 0.0 0  WBC    ABSOLUTE NRBC 0.00 0.00 - 0.01 K/uL    NEUTROPHILS 88 (H) 32 - 75 %    LYMPHOCYTES 6 (L) 12 - 49 %    MONOCYTES 5 5 - 13 %    EOSINOPHILS 0 0 - 7 %    BASOPHILS 0 0 - 1 %    IMMATURE GRANULOCYTES 1 (H) 0.0 - 0.5 %    ABS. NEUTROPHILS 16.4 (H) 1.8 - 8.0 K/UL    ABS. LYMPHOCYTES 1.2 0.8 - 3.5 K/UL    ABS. MONOCYTES 1.0 0.0 - 1.0 K/UL    ABS. EOSINOPHILS 0.1 0.0 - 0.4 K/UL    ABS. BASOPHILS 0.1 0.0 - 0.1 K/UL    ABS. IMM.  GRANS. 0.1 (H) 0.00 - 0.04 K/UL    DF AUTOMATED     METABOLIC PANEL, COMPREHENSIVE    Collection Time: 04/10/23  4:33 PM   Result Value Ref Range    Sodium 132 (L) 136 - 145 mmol/L    Potassium 4.2 3.5 - 5.1 mmol/L    Chloride 102 97 - 108 mmol/L    CO2 25 21 - 32 mmol/L    Anion gap 5 5 - 15 mmol/L    Glucose 251 (H) 65 - 100 mg/dL    BUN 22 (H) 6 - 20 MG/DL    Creatinine 0.91 0.55 - 1.02 MG/DL    BUN/Creatinine ratio 24 (H) 12 - 20      eGFR >60 >60 ml/min/1.73m2    Calcium 10.0 8.5 - 10.1 MG/DL    Bilirubin, total 0.4 0.2 - 1.0 MG/DL    ALT (SGPT) 25 12 - 78 U/L    AST (SGOT) 23 15 - 37 U/L    Alk. phosphatase 46 45 - 117 U/L    Protein, total 7.5 6.4 - 8.2 g/dL    Albumin 3.5 3.5 - 5.0 g/dL    Globulin 4.0 2.0 - 4.0 g/dL    A-G Ratio 0.9 (L) 1.1 - 2.2     URINALYSIS W/MICROSCOPIC    Collection Time: 04/10/23  7:58 PM   Result Value Ref Range    Color YELLOW/STRAW      Appearance CLEAR CLEAR      Specific gravity 1.005 1.003 - 1.030      pH (UA) 6.0 5.0 - 8.0      Protein 30 (A) NEG mg/dL    Glucose Negative NEG mg/dL    Ketone TRACE (A) NEG mg/dL    Bilirubin Negative NEG      Blood Negative NEG      Urobilinogen 0.2 0.2 - 1.0 EU/dL    Nitrites Negative NEG      Leukocyte Esterase Negative NEG      WBC 0-4 0 - 4 /hpf    RBC 0-5 0 - 5 /hpf    Epithelial cells FEW FEW /lpf    Bacteria Negative NEG /hpf    Hyaline cast 0-2 0 - 2 /lpf   URINE CULTURE HOLD SAMPLE    Collection Time: 04/10/23  7:58 PM    Specimen: Urine   Result Value Ref Range    Urine culture hold        Urine on hold in Microbiology dept for 2 days. If unpreserved urine is submitted, it cannot be used for addtional testing after 24 hours, recollection will be required.

## 2023-04-11 NOTE — PROGRESS NOTES
Patient received [facility] Rights letter. A signed copy has been placed in chart.   gB Baldwin CMS

## 2023-04-11 NOTE — PROGRESS NOTES
PHYSICAL THERAPY EVALUATION/DISCHARGE  Patient: Brittany Foote (82 y.o. female)  Date: 4/11/2023  Primary Diagnosis: Melena [K92.1]       Precautions: fall         ASSESSMENT  Based on the objective data described below, the patient presents with modified independent with ambulation without assistive device  and modified independent with all functional mobility. Patient feeling much better now, family at bedside and agreed patient back to her base line level. Reviewed all safety precaution and home exercise program with the patient, verbalized understanding, clear to go home per Physical Therapy perspective. Skilled physical therapy is not indicated at this time. Functional Outcome Measure: The patient scored 24/24 on the [facility] outcome measure   Other factors to consider for discharge: none     Further skilled acute physical therapy is not indicated at this time. PLAN :  Recommendation for discharge: (in order for the patient to meet his/her long term goals)  No skilled physical therapy/ follow up rehabilitation needs identified at this time. This discharge recommendation:  Has been made in collaboration with the attending provider and/or case management    IF patient discharges home will need the following DME: patient owns DME required for discharge       SUBJECTIVE:   Patient stated I feel much better now.     OBJECTIVE DATA SUMMARY:   HISTORY:    Past Medical History:   Diagnosis Date    Arthritis     Atrial fibrillation ([facility] [address]     detected 3/13/2023    Depression     Diabetes ([facility] [address]     GERD (gastroesophageal reflux disease)     High cholesterol     Hypertension     Snoring     Transient ischemic attack (TIA) 2003     Past Surgical History:   Procedure Laterality Date    HX APPENDECTOMY      HX CATARACT REMOVAL Bilateral     HX CHOLECYSTECTOMY      HX HIP REPLACEMENT Right     HX ORTHOPAEDIC Left     Open Repair    HX ORTHOPAEDIC Left     Trigger finger    HX ROTATOR CUFF REPAIR Right     HX TUBAL LIGATION Prior level of function: Independent community ambulator without assistive device. Personal factors and/or comorbidities impacting plan of care:          EXAMINATION/PRESENTATION/DECISION MAKING:   Critical Behavior:              Hearing:       Range Of Motion:  AROM: Within functional limits           PROM: Within functional limits           Strength:    Strength: Within functional limits                    Tone & Sensation:                                  Coordination:  Coordination: Within functional limits  Vision:      Functional Mobility:  Bed Mobility:  Rolling: Modified independent  Supine to Sit: Modified independent  Sit to Supine: Modified independent  Scooting: Modified independent  Transfers:  Sit to Stand: Modified independent  Stand to Sit: Modified independent  Stand Pivot Transfers: Modified independent     Bed to Chair: Modified independent              Balance:   Sitting: Intact  Standing: Intact; Without support  Ambulation/Gait Training:  Distance (ft): 15 Feet (ft) (ad amy in ED)     Ambulation - Level of Assistance: Modified independent     Gait Description (WDL): Within defined limits                                     Therapeutic Exercises:   Educate and instructed patient to continue active range of motion exercise on both legs while up on chair or on bed multiple times. Recommend patient to be up on the chair at least 3 times a day every meal times as tolerated. Functional Measure:  Norman Braden -PAC®      Basic Mobility Inpatient Short Form (6-Clicks) Version 2  How much HELP from another person do you currently need. .. (If the patient hasn't done an activity recently, how much help from another person do you think they would need if they tried?) Total A Lot A Little None   1. Turning from your back to your side while in a flat bed without using bedrails? []  1 []  2 []  3  [x]  4   2.   Moving from lying on your back to sitting on the side of a flat bed without using bedrails? []  1 []  2 []  3  [x]  4   3. Moving to and from a bed to a chair (including a wheelchair)? []  1 []  2 []  3  [x]  4   4. Standing up from a chair using your arms (e.g. wheelchair or bedside chair)? []  1 []  2 []  3  [x]  4   5. Walking in hospital room? []  1 []  2 []  3  [x]  4   6. Climbing 3-5 steps with a railing? []  1 []  2 []  3  [x]  4     Raw Score: 24/24                            Cutoff score ?171,2,3 had higher odds of discharging home with home health or need of SNF/IPR. [address], Magi Hansen, Clay Hermosillo. Validity of the AM-PAC 6-Clicks Inpatient Daily Activity and Basic Mobility Short Forms. Physical Therapy Mar 2014, 94 3) 379-391; DOI: 10.2522/ptj.39112856  2. Varsha Young. Association of AM-PAC \"6-Clicks\" Basic Mobility and Daily Activity Scores With Discharge Destination. Phys Ther. 2021 Apr 4;101(4):yysh591. doi: 10.1093/ptj/zkdo791. PMID: 42922913. V Bill Sosa, Ubaldo D, Miguel Walton, Julio Cesar K, Diana S. Activity Measure for Post-Acute Care \"6-Clicks\" Basic Mobility Scores Predict Discharge Destination After Acute Care Hospitalization in Select Patient Groups: A Retrospective, Observational Study. Arch Rehabil Res Clin Transl. 2022 Jul 16;4(3):552652. doi: 10.1016/j.arrct. 5285.037419. PMID: 37557975; PMCID: VCZ0108500. 4. Darby Alegre, Sandy GLASER, Radha BERG. AM-PAC Short Forms Manual 4.0. Revised 2/2020.         Physical Therapy Evaluation Charge Determination   History Examination Presentation Decision-Making   LOW Complexity : Zero comorbidities / personal factors that will impact the outcome / POC LOW Complexity : 1-2 Standardized tests and measures addressing body structure, function, activity limitation and / or participation in recreation  LOW Complexity : Stable, uncomplicated  Other outcome measures [facility]  LOW       Based on the above components, the patient evaluation is determined to be of the following complexity level: LOW     Pain Ratin/10    Activity Tolerance:   Good      After treatment patient left in no apparent distress:   Supine in bed, Call bell within reach, Bed / chair alarm activated, and Caregiver / family present    COMMUNICATION/EDUCATION:   The patients plan of care was discussed with: Occupational therapist and Registered nurse. Fall prevention education was provided and the patient/caregiver indicated understanding. Thank you for this referral.  Miguel Angel Pate PT,WCC.    Time Calculation: 27 mins

## 2023-04-11 NOTE — ED NOTES
Patient with loose stool with bright red blood mixed in. Stable on feet. New brief on. Back in bed, has call bell, lights out.

## 2023-04-12 LAB
ALBUMIN SERPL-MCNC: 2.5 G/DL (ref 3.5–5)
ALBUMIN/GLOB SERPL: 0.8 (ref 1.1–2.2)
ALP SERPL-CCNC: 36 U/L (ref 45–117)
ALT SERPL-CCNC: 15 U/L (ref 12–78)
ANION GAP SERPL CALC-SCNC: 3 MMOL/L (ref 5–15)
AST SERPL-CCNC: 17 U/L (ref 15–37)
BILIRUB SERPL-MCNC: 0.4 MG/DL (ref 0.2–1)
BUN SERPL-MCNC: 8 MG/DL (ref 6–20)
BUN/CREAT SERPL: 11 (ref 12–20)
CALCIUM SERPL-MCNC: 8.2 MG/DL (ref 8.5–10.1)
CHLORIDE SERPL-SCNC: 108 MMOL/L (ref 97–108)
CO2 SERPL-SCNC: 27 MMOL/L (ref 21–32)
CREAT SERPL-MCNC: 0.7 MG/DL (ref 0.55–1.02)
ERYTHROCYTE [DISTWIDTH] IN BLOOD BY AUTOMATED COUNT: 13.2 % (ref 11.5–14.5)
EST. AVERAGE GLUCOSE BLD GHB EST-MCNC: 157 MG/DL
FERRITIN SERPL-MCNC: 68 NG/ML (ref 26–388)
GLOBULIN SER CALC-MCNC: 3.2 G/DL (ref 2–4)
GLUCOSE BLD STRIP.AUTO-MCNC: 145 MG/DL (ref 65–117)
GLUCOSE BLD STRIP.AUTO-MCNC: 151 MG/DL (ref 65–117)
GLUCOSE BLD STRIP.AUTO-MCNC: 156 MG/DL (ref 65–117)
GLUCOSE BLD STRIP.AUTO-MCNC: 178 MG/DL (ref 65–117)
GLUCOSE SERPL-MCNC: 176 MG/DL (ref 65–100)
HBA1C MFR BLD: 7.1 % (ref 4–5.6)
HCT VFR BLD AUTO: 29.2 % (ref 35–47)
HGB BLD-MCNC: 9.7 G/DL (ref 11.5–16)
IRON SATN MFR SERPL: 13 % (ref 20–50)
IRON SERPL-MCNC: 33 UG/DL (ref 35–150)
LACTATE SERPL-SCNC: 1.3 MMOL/L (ref 0.4–2)
MAGNESIUM SERPL-MCNC: 1.5 MG/DL (ref 1.6–2.4)
MCH RBC QN AUTO: 34.3 PG (ref 26–34)
MCHC RBC AUTO-ENTMCNC: 33.2 G/DL (ref 30–36.5)
MCV RBC AUTO: 103.2 FL (ref 80–99)
NRBC # BLD: 0 K/UL (ref 0–0.01)
NRBC BLD-RTO: 0 PER 100 WBC
PHOSPHATE SERPL-MCNC: 1.6 MG/DL (ref 2.6–4.7)
PLATELET # BLD AUTO: 362 K/UL (ref 150–400)
PMV BLD AUTO: 9.8 FL (ref 8.9–12.9)
POTASSIUM SERPL-SCNC: 3.4 MMOL/L (ref 3.5–5.1)
PROCALCITONIN SERPL-MCNC: 0.08 NG/ML
PROT SERPL-MCNC: 5.7 G/DL (ref 6.4–8.2)
RBC # BLD AUTO: 2.83 M/UL (ref 3.8–5.2)
SERVICE CMNT-IMP: ABNORMAL
SODIUM SERPL-SCNC: 138 MMOL/L (ref 136–145)
TIBC SERPL-MCNC: 251 UG/DL (ref 250–450)
WBC # BLD AUTO: 12.2 K/UL (ref 3.6–11)

## 2023-04-12 PROCEDURE — 74011000250 HC RX REV CODE- 250: Performed by: INTERNAL MEDICINE

## 2023-04-12 PROCEDURE — 85027 COMPLETE CBC AUTOMATED: CPT

## 2023-04-12 PROCEDURE — 80053 COMPREHEN METABOLIC PANEL: CPT

## 2023-04-12 PROCEDURE — 82962 GLUCOSE BLOOD TEST: CPT

## 2023-04-12 PROCEDURE — C9113 INJ PANTOPRAZOLE SODIUM, VIA: HCPCS | Performed by: INTERNAL MEDICINE

## 2023-04-12 PROCEDURE — 83605 ASSAY OF LACTIC ACID: CPT

## 2023-04-12 PROCEDURE — 74011250637 HC RX REV CODE- 250/637: Performed by: INTERNAL MEDICINE

## 2023-04-12 PROCEDURE — 83540 ASSAY OF IRON: CPT

## 2023-04-12 PROCEDURE — 74011250636 HC RX REV CODE- 250/636: Performed by: INTERNAL MEDICINE

## 2023-04-12 PROCEDURE — 65270000029 HC RM PRIVATE

## 2023-04-12 PROCEDURE — 82728 ASSAY OF FERRITIN: CPT

## 2023-04-12 PROCEDURE — 74011636637 HC RX REV CODE- 636/637: Performed by: INTERNAL MEDICINE

## 2023-04-12 PROCEDURE — 36415 COLL VENOUS BLD VENIPUNCTURE: CPT

## 2023-04-12 PROCEDURE — 84100 ASSAY OF PHOSPHORUS: CPT

## 2023-04-12 PROCEDURE — 83735 ASSAY OF MAGNESIUM: CPT

## 2023-04-12 PROCEDURE — 84145 PROCALCITONIN (PCT): CPT

## 2023-04-12 PROCEDURE — 2709999900 HC NON-CHARGEABLE SUPPLY

## 2023-04-12 PROCEDURE — 83036 HEMOGLOBIN GLYCOSYLATED A1C: CPT

## 2023-04-12 RX ORDER — MAGNESIUM SULFATE HEPTAHYDRATE 40 MG/ML
2 INJECTION, SOLUTION INTRAVENOUS ONCE
Status: COMPLETED | OUTPATIENT
Start: 2023-04-12 | End: 2023-04-12

## 2023-04-12 RX ADMIN — ATENOLOL 25 MG: 50 TABLET ORAL at 17:05

## 2023-04-12 RX ADMIN — SODIUM CHLORIDE, PRESERVATIVE FREE 10 ML: 5 INJECTION INTRAVENOUS at 05:15

## 2023-04-12 RX ADMIN — INSULIN LISPRO 2 UNITS: 100 INJECTION, SOLUTION INTRAVENOUS; SUBCUTANEOUS at 17:00

## 2023-04-12 RX ADMIN — METRONIDAZOLE 500 MG: 500 INJECTION, SOLUTION INTRAVENOUS at 21:24

## 2023-04-12 RX ADMIN — SODIUM CHLORIDE, POTASSIUM CHLORIDE, SODIUM LACTATE AND CALCIUM CHLORIDE 75 ML/HR: 600; 310; 30; 20 INJECTION, SOLUTION INTRAVENOUS at 21:26

## 2023-04-12 RX ADMIN — PANTOPRAZOLE SODIUM 40 MG: 40 INJECTION, POWDER, FOR SOLUTION INTRAVENOUS at 21:25

## 2023-04-12 RX ADMIN — ROSUVASTATIN CALCIUM 10 MG: 10 TABLET, FILM COATED ORAL at 21:24

## 2023-04-12 RX ADMIN — SODIUM CHLORIDE 2 G: 9 INJECTION INTRAMUSCULAR; INTRAVENOUS; SUBCUTANEOUS at 21:25

## 2023-04-12 RX ADMIN — SODIUM CHLORIDE, PRESERVATIVE FREE 10 ML: 5 INJECTION INTRAVENOUS at 06:02

## 2023-04-12 RX ADMIN — METRONIDAZOLE 500 MG: 500 INJECTION, SOLUTION INTRAVENOUS at 12:37

## 2023-04-12 RX ADMIN — ATENOLOL 25 MG: 50 TABLET ORAL at 09:12

## 2023-04-12 RX ADMIN — Medication 1 CAPSULE: at 09:12

## 2023-04-12 RX ADMIN — AMLODIPINE BESYLATE 10 MG: 5 TABLET ORAL at 09:12

## 2023-04-12 RX ADMIN — MAGNESIUM SULFATE HEPTAHYDRATE 2 G: 40 INJECTION, SOLUTION INTRAVENOUS at 09:03

## 2023-04-12 RX ADMIN — LISINOPRIL 20 MG: 20 TABLET ORAL at 09:12

## 2023-04-12 RX ADMIN — SODIUM CHLORIDE, POTASSIUM CHLORIDE, SODIUM LACTATE AND CALCIUM CHLORIDE 100 ML/HR: 600; 310; 30; 20 INJECTION, SOLUTION INTRAVENOUS at 05:12

## 2023-04-12 RX ADMIN — POTASSIUM PHOSPHATE, MONOBASIC POTASSIUM PHOSPHATE, DIBASIC: 224; 236 INJECTION, SOLUTION, CONCENTRATE INTRAVENOUS at 09:03

## 2023-04-12 RX ADMIN — LEVOTHYROXINE SODIUM 88 MCG: 88 TABLET ORAL at 06:01

## 2023-04-12 RX ADMIN — SODIUM CHLORIDE, PRESERVATIVE FREE 10 ML: 5 INJECTION INTRAVENOUS at 21:43

## 2023-04-12 RX ADMIN — PANTOPRAZOLE SODIUM 40 MG: 40 INJECTION, POWDER, FOR SOLUTION INTRAVENOUS at 09:15

## 2023-04-12 RX ADMIN — SODIUM CHLORIDE, PRESERVATIVE FREE 5 ML: 5 INJECTION INTRAVENOUS at 14:48

## 2023-04-12 NOTE — PROGRESS NOTES
[address]             GI PROGRESS NOTE        NAME: Nishant Sage   :  1935   MRN:  [id number]       Subjective:   Patient reports feeling well today. She had minimal (spot) of bright red blood in underwear this AM but none this afternoon. No bowel movements today. Denies abdominal pain, nausea, vomiting. Tolerating clear liquids well and diet was advanced to full for dinner tonight. Of note, patient does not want to have a colonoscopy at this time. They have decided to indefinitely stop anticoagulant due to bleed and have outpatient consultation for watchman device. Objective:   Patient resting comfortably in bed in no apparent distress. VITALS:   Last 24hrs VS reviewed since prior progress note. Most recent are:  Visit Vitals  /71 (BP 1 Location: Right upper arm, BP Patient Position: Reclining; At rest)   Pulse (!) 52   Temp 98.2 °F (36.8 °C)   Resp 14   Ht 5' 5\" (1.651 m)   Wt 61.2 kg (135 lb)   SpO2 93%   BMI 22.47 kg/m²       Intake/Output Summary (Last 24 hours) at 2023 1603  Last data filed at 2023 0935  Gross per 24 hour   Intake 20 ml   Output 0 ml   Net 20 ml       PHYSICAL EXAM:  General: Alert, in no acute distress    HEENT: Anicteric sclerae. Lungs:            CTA Bilaterally. Heart:  Regular  rhythm,    Abdomen: Soft, Non distended, Non tender.  (+)Bowel sounds, no HSM  Extremities: No c/c/e  Neurologic:  CN 2-12 gi, Alert and oriented X 3. No acute neurological distress   Psych:   Good insight. Not anxious nor agitated.     Lab Data Reviewed:   Recent Labs     23  0308 23  0505   WBC 12.2* 16.1*   HGB 9.7* 10.1*   HCT 29.2* 29.9*    422*     Recent Labs     23  0308 23  0505    137   K 3.4* 3.6    106   CO2 27 28   BUN 8 15   CREA 0.70 0.65   * 195*   PHOS 1.6*  --    CA 8.2* 8.6     Recent Labs     23  0308 04/10/23  1633   AP 36* 46   TP 5.7* 7.5   ALB 2.5* 3.5   GLOB 3.2 4.0 ________________________________________________________________________  Patient Active Problem List   Diagnosis Code    Primary osteoarthritis of right hip M16.11    Atrial fibrillation with rapid ventricular response (HCC) I48.91    Small bowel perforation (HCC) K63.1    Melena K92.1         Assessment and Plan:  83 yo female s/p s/p small bowel resection and drainage of abscess, likely from diverticulitis, on 3/26/2023 with Dr. Katie Woods admitted for rectal bleeding starting 4/10/23 and acute anemia. Bleeding seems to have stopped. Hgb dropped slightly from 10.1 to 9.7 today, she has not had any signs of bleedings since early this morning and anticoagulant has been stopped indefinitely. Discussed with patient possible bleeding from hemorrhoids, diverticulum, anastomosis site, complicated by eliquis use. CT abd was neg for acute bleed 4/10/23. Patient voiced she does not want colonoscopy at this time. Recommend close monitoring of hgb. As bleeding appears to have stopped, no diarrhea or abdominal pain GI signing off. Please call if signs of bleeding return.         Signed By: YUE Dominguez     4/12/2023  4:03 PM

## 2023-04-12 NOTE — PROGRESS NOTES
Problem: Diabetes Self-Management  Goal: *Incorporating physical activity into lifestyle  Description: State effect of exercise on blood glucose levels. Outcome: Progressing Towards Goal  Goal: *Prevention, detection, treatment of acute complications  Description: List symptoms of hyper- and hypoglycemia; describe how to treat low blood sugar and actions for lowering  high blood glucose level. Outcome: Progressing Towards Goal     Problem: Pressure Injury - Risk of  Goal: *Prevention of pressure injury  Description: Document Luis Scale and appropriate interventions in the flowsheet.   Outcome: Progressing Towards Goal  Note: Pressure Injury Interventions:  Sensory Interventions: Minimize linen layers    Moisture Interventions: Minimize layers, Limit adult briefs    Activity Interventions: Assess need for specialty bed, PT/OT evaluation, Increase time out of bed    Mobility Interventions: PT/OT evaluation, Float heels, Assess need for specialty bed    Nutrition Interventions: Document food/fluid/supplement intake, Offer support with meals,snacks and hydration    Friction and Shear Interventions: Apply protective barrier, creams and emollients, Lift sheet, Transferring/repositioning devices

## 2023-04-12 NOTE — PROGRESS NOTES
Spiritual Care Partner Volunteer visited patient at [address] in [facility] 4M POST SURG ORT 1 on 4/12/2023  Documented by:  Gabby Knutson [address], [facility]  Staff [address]  Paging service: [phone] (ADOLFO)

## 2023-04-12 NOTE — PROGRESS NOTES
Bedside shift change report given to [address] [place] [address] and Kwan RN  (oncoming nurse) by Mita Zavaleta  (offgoing nurse). Report included the following information SBAR, Kardex, ED Summary, MAR, Accordion, and Recent Results.

## 2023-04-12 NOTE — PROGRESS NOTES
Vascular Access Team:  Extended dwell Arrow endurance ultrasound guided IV placed in the left cephalic . 6 cm, by Estuardo Rose. Line has brisk blood return. Secured with stat lock, dressed with large tegaderm, alcohol cap utilized. Patient tolerated well. Primary RN Hiral Guerra made aware line is ready for use.       Michael Granado RN

## 2023-04-12 NOTE — PROGRESS NOTES
TRANSFER - OUT REPORT:    Verbal report given to CHRIS Cook(name) on Tl Ades  being transferred to (unit) for routine progression of care       Report consisted of patients Situation, Background, Assessment and   Recommendations(SBAR). Information from the following report(s) SBAR, Kardex, and ED Summary was reviewed with the receiving nurse. Lines:   Peripheral IV Left Antecubital (Active)        Opportunity for questions and clarification was provided.       Patient transported with:   Registered Nurse  Tech

## 2023-04-12 NOTE — PROGRESS NOTES
Problem: Diabetes Self-Management  Goal: *Disease process and treatment process  Description: Define diabetes and identify own type of diabetes; list 3 options for treating diabetes. Outcome: Progressing Towards Goal   Blood sugars monitored    Bedside and Verbal shift change report given to Nathanael Roche 97  (oncoming nurse) by Lizandro Salazar RN  (offgoing nurse).  Report included the following information SBAR, Kardex, and [facility]

## 2023-04-12 NOTE — PROGRESS NOTES
[facility]  [address] [facility], [address] [place] [address]  [phone]      Hospitalist Progress Note      NAME: Noni Brasher   :  1935  MRM:  [id number]    Date/Time of service: 2023  11:17 AM       Subjective:     Chief Complaint:  Patient was personally seen and examined by me during this time period. Chart reviewed. No further bleeding or pain       Objective:       Vitals:       Last 24hrs VS reviewed since prior progress note. Most recent are:    Visit Vitals  /64 (BP 1 Location: Right upper arm, BP Patient Position: Sitting; At rest)   Pulse (!) 54   Temp 97.7 °F (36.5 °C)   Resp 16   Ht 5' 5\" (1.651 m)   Wt 61.2 kg (135 lb)   SpO2 91%   BMI 22.47 kg/m²     SpO2 Readings from Last 6 Encounters:   23 91%   23 96%   23 94%   23 94%   23 95%   22 100%          Intake/Output Summary (Last 24 hours) at 2023 1117  Last data filed at 2023 0935  Gross per 24 hour   Intake 20 ml   Output 0 ml   Net 20 ml          Exam:     Physical Exam:    Gen:  Well-developed, well-nourished, in no acute distress  HEENT:  Pink conjunctivae, PERRL, hearing intact to voice, moist mucous membranes  Neck:  Supple, without masses, thyroid non-tender  Resp:  No accessory muscle use, clear breath sounds without wheezes rales or rhonchi  Card:  No murmurs, normal S1, S2 without thrills, bruits or peripheral edema  Abd:  Soft, NT, non-distended, normoactive bowel sounds are present  Musc:  No cyanosis or clubbing  Skin:  No rashes  Neuro:  Cranial nerves 3-12 are grossly intact, follows commands appropriately  Psych:  Good insight, oriented to person, place and time, alert    Medications Reviewed: (see below)    Lab Data Reviewed: (see below)    ______________________________________________________________________    Medications:     Current Facility-Administered Medications   Medication Dose Route Frequency    potassium phosphate 30 mmol in 0.9% sodium chloride 250 mL infusion   IntraVENous ONCE    pantoprazole (PROTONIX) 40 mg in 0.9% sodium chloride 10 mL injection  40 mg IntraVENous Q12H    HYDROmorphone (DILAUDID) syringe 0.5 mg  0.5 mg IntraVENous Q4H PRN    prochlorperazine (COMPAZINE) injection 10 mg  10 mg IntraVENous Q6H PRN    insulin lispro (HUMALOG) injection   SubCUTAneous AC&HS    glucose chewable tablet 16 g  4 Tablet Oral PRN    glucagon (GLUCAGEN) injection 1 mg  1 mg IntraMUSCular PRN    L.acidophilus-paracasei-S.thermophil-bifidobacter (RISAQUAD) 8 billion cell capsule  1 Capsule Oral DAILY    amLODIPine (NORVASC) tablet 10 mg  10 mg Oral DAILY    atenoloL (TENORMIN) tablet 25 mg  25 mg Oral BID    levothyroxine (SYNTHROID) tablet 88 mcg  88 mcg Oral ACB    lidocaine 4 % patch 1 Patch  1 Patch TransDERmal Q24H    lisinopriL (PRINIVIL, ZESTRIL) tablet 20 mg  20 mg Oral DAILY    rosuvastatin (CRESTOR) tablet 10 mg  10 mg Oral QHS    sodium chloride (NS) flush 5-40 mL  5-40 mL IntraVENous Q8H    sodium chloride (NS) flush 5-40 mL  5-40 mL IntraVENous PRN    acetaminophen (TYLENOL) tablet 650 mg  650 mg Oral Q6H PRN    Or    acetaminophen (TYLENOL) suppository 650 mg  650 mg Rectal Q6H PRN    polyethylene glycol (MIRALAX) packet 17 g  17 g Oral DAILY PRN    ondansetron (ZOFRAN ODT) tablet 4 mg  4 mg Oral Q8H PRN    Or    ondansetron (ZOFRAN) injection 4 mg  4 mg IntraVENous Q6H PRN    cefTRIAXone (ROCEPHIN) 2 g in 0.9% sodium chloride 20 mL IV syringe  2 g IntraVENous Q24H    lactated Ringers infusion  75 mL/hr IntraVENous CONTINUOUS    metroNIDAZOLE (FLAGYL) IVPB premix 500 mg  500 mg IntraVENous Q12H          Lab Review:     Recent Labs     04/12/23  0308 04/11/23  0505 04/10/23  1633   WBC 12.2* 16.1* 18.8*   HGB 9.7* 10.1* 11.4*   HCT 29.2* 29.9* 34.2*    422* 525*       Recent Labs     04/12/23  0308 04/11/23  0505 04/10/23  1633    137 132*   K 3.4* 3.6 4.2    106 102   CO2 27 28 25   * 195* 251*   BUN 8 15 22* CREA 0.70 0.65 0.91   CA 8.2* 8.6 10.0   MG 1.5*  --   --    PHOS 1.6*  --   --    ALB 2.5*  --  3.5   TBILI 0.4  --  0.4   ALT 15  --  25       Lab Results   Component Value Date/Time    Glucose (POC) 145 (H) 04/12/2023 10:58 AM    Glucose (POC) 151 (H) 04/12/2023 07:27 AM    Glucose (POC) 135 (H) 04/11/2023 10:00 PM    Glucose (POC) 141 (H) 04/11/2023 03:16 PM    Glucose (POC) 132 (H) 04/11/2023 11:35 AM          Assessment / Plan:     81 yo hx of HTN, DM, Pafib on Eliquis, SBO s/p resection and drainage of abscess on 3/26, presented w/ abd pain, N/V, rectal bleeding    1) Acute rectal bleeding: now resolved. Unclear etiology, could be due to diverticulosis or hemorrhoids, complicated by Eliquis. Had recent SBO s/p resection and drainage of abscess on 03/26. Abd CT neg for active bleed. Will advanced diet to clears. Cont IVF, IV CTX/azithro, IV PPI. Monitor Hgb closely. Gen surg and GI following    2) Abd pain/N/V: now resolved. Due to recent bowel resection, diverticulitis. Cont IV antiemetics, IV dilaudid prn severe pain    3) Pafib: last echo with EF 55-60%. Cont atenolol. Hold eliquis indefinitely due to GI bleed. Patient has been referred for Watchman device outpatient     4) HTN: cont norvasc, atenolol, lisinopril     5) DM type 2: last A1C 7.6%.   cont SSI    **Prior records, notes, labs, radiology, and medications reviewed in [facility]**    Total time spent with patient care: 35 Minutes **I personally saw and examined the patient during this time period**                 Care Plan discussed with: Patient, nursing     Discussed:  Care Plan    Prophylaxis:  SCD's    Disposition:   PT, OT, RN           ___________________________________________________    Attending Physician: Thi Chapman MD

## 2023-04-12 NOTE — PROGRESS NOTES
Physician Progress Note      Raina Fischer  CSN #:                  [id number]  :                       1935  ADMIT DATE:       4/10/2023 4:34 PM  DISCH DATE:  RESPONDING  PROVIDER #:        Jack Perez DO, MD          QUERY TEXT:    Good Morning    This patient admitted on 04/10/2023 for rectal bleeding. The patient also has A-fib and is on Eliquis. Eliquis has been on hold due to bleeding. If possible, please document in progress notes and discharge summary if you are evaluating and/or treating any of the following: The medical record reflects the following:  Risk Factors:  Age (82) - female, Known A-fib on Eliquis , HTN, HLD  Clinical Indicators: Presented with weakness, rectal bleeding, on Eliquis for known Paroxysmal Atrial fib  Treatment: While workup for the GI bleeding, Eliquis has been on hold, will resume after Gi workup. Thank you  JUSTIN SchmittN,Rn, CPHQ, CCDS, SMART  Options provided:  -- Secondary hypercoagulable state in a patient with atrial fibrillation  -- Other - I will add my own diagnosis  -- Disagree - Not applicable / Not valid  -- Disagree - Clinically unable to determine / Unknown  -- Refer to Clinical Documentation Reviewer    PROVIDER RESPONSE TEXT:    This patient has secondary hypercoagulable state in a patient with atrial fibrillation.     Query created by: Edward Mcgovern on 2023 7:43 AM      Electronically signed by:  Alize Oates MD 2023 7:45 AM

## 2023-04-13 VITALS
RESPIRATION RATE: 18 BRPM | HEIGHT: 65 IN | HEART RATE: 62 BPM | DIASTOLIC BLOOD PRESSURE: 57 MMHG | WEIGHT: 135 LBS | BODY MASS INDEX: 22.49 KG/M2 | OXYGEN SATURATION: 96 % | TEMPERATURE: 97.9 F | SYSTOLIC BLOOD PRESSURE: 149 MMHG

## 2023-04-13 PROBLEM — K62.5 RECTAL BLEED: Status: ACTIVE | Noted: 2023-04-13

## 2023-04-13 LAB
ANION GAP SERPL CALC-SCNC: 3 MMOL/L (ref 5–15)
BUN SERPL-MCNC: 7 MG/DL (ref 6–20)
BUN/CREAT SERPL: 10 (ref 12–20)
CALCIUM SERPL-MCNC: 7.6 MG/DL (ref 8.5–10.1)
CHLORIDE SERPL-SCNC: 108 MMOL/L (ref 97–108)
CO2 SERPL-SCNC: 26 MMOL/L (ref 21–32)
CREAT SERPL-MCNC: 0.68 MG/DL (ref 0.55–1.02)
ERYTHROCYTE [DISTWIDTH] IN BLOOD BY AUTOMATED COUNT: 13 % (ref 11.5–14.5)
GLUCOSE BLD STRIP.AUTO-MCNC: 145 MG/DL (ref 65–117)
GLUCOSE BLD STRIP.AUTO-MCNC: 166 MG/DL (ref 65–117)
GLUCOSE BLD STRIP.AUTO-MCNC: 202 MG/DL (ref 65–117)
GLUCOSE SERPL-MCNC: 172 MG/DL (ref 65–100)
HCT VFR BLD AUTO: 29.1 % (ref 35–47)
HGB BLD-MCNC: 9.5 G/DL (ref 11.5–16)
MAGNESIUM SERPL-MCNC: 1.8 MG/DL (ref 1.6–2.4)
MCH RBC QN AUTO: 34.1 PG (ref 26–34)
MCHC RBC AUTO-ENTMCNC: 32.6 G/DL (ref 30–36.5)
MCV RBC AUTO: 104.3 FL (ref 80–99)
NRBC # BLD: 0 K/UL (ref 0–0.01)
NRBC BLD-RTO: 0 PER 100 WBC
PHOSPHATE SERPL-MCNC: 1.5 MG/DL (ref 2.6–4.7)
PLATELET # BLD AUTO: 326 K/UL (ref 150–400)
PMV BLD AUTO: 9.8 FL (ref 8.9–12.9)
POTASSIUM SERPL-SCNC: 3.3 MMOL/L (ref 3.5–5.1)
RBC # BLD AUTO: 2.79 M/UL (ref 3.8–5.2)
SERVICE CMNT-IMP: ABNORMAL
SODIUM SERPL-SCNC: 137 MMOL/L (ref 136–145)
WBC # BLD AUTO: 11.2 K/UL (ref 3.6–11)

## 2023-04-13 PROCEDURE — 74011636637 HC RX REV CODE- 636/637: Performed by: INTERNAL MEDICINE

## 2023-04-13 PROCEDURE — 84100 ASSAY OF PHOSPHORUS: CPT

## 2023-04-13 PROCEDURE — 74011250637 HC RX REV CODE- 250/637: Performed by: INTERNAL MEDICINE

## 2023-04-13 PROCEDURE — 85027 COMPLETE CBC AUTOMATED: CPT

## 2023-04-13 PROCEDURE — 80048 BASIC METABOLIC PNL TOTAL CA: CPT

## 2023-04-13 PROCEDURE — 74011250636 HC RX REV CODE- 250/636: Performed by: INTERNAL MEDICINE

## 2023-04-13 PROCEDURE — 82962 GLUCOSE BLOOD TEST: CPT

## 2023-04-13 PROCEDURE — 74011000250 HC RX REV CODE- 250: Performed by: INTERNAL MEDICINE

## 2023-04-13 PROCEDURE — 83735 ASSAY OF MAGNESIUM: CPT

## 2023-04-13 PROCEDURE — C9113 INJ PANTOPRAZOLE SODIUM, VIA: HCPCS | Performed by: INTERNAL MEDICINE

## 2023-04-13 PROCEDURE — 36415 COLL VENOUS BLD VENIPUNCTURE: CPT

## 2023-04-13 RX ORDER — SODIUM,POTASSIUM PHOSPHATES 280-250MG
2 POWDER IN PACKET (EA) ORAL 2 TIMES DAILY
Status: COMPLETED | OUTPATIENT
Start: 2023-04-13 | End: 2023-04-13

## 2023-04-13 RX ORDER — PANTOPRAZOLE SODIUM 40 MG/1
40 TABLET, DELAYED RELEASE ORAL DAILY
Qty: 30 TABLET | Refills: 0 | Status: SHIPPED | OUTPATIENT
Start: 2023-04-13

## 2023-04-13 RX ADMIN — LEVOTHYROXINE SODIUM 88 MCG: 88 TABLET ORAL at 09:04

## 2023-04-13 RX ADMIN — INSULIN LISPRO 3 UNITS: 100 INJECTION, SOLUTION INTRAVENOUS; SUBCUTANEOUS at 12:17

## 2023-04-13 RX ADMIN — AMLODIPINE BESYLATE 10 MG: 5 TABLET ORAL at 09:03

## 2023-04-13 RX ADMIN — LISINOPRIL 20 MG: 20 TABLET ORAL at 09:05

## 2023-04-13 RX ADMIN — INSULIN LISPRO 2 UNITS: 100 INJECTION, SOLUTION INTRAVENOUS; SUBCUTANEOUS at 09:02

## 2023-04-13 RX ADMIN — SODIUM CHLORIDE, PRESERVATIVE FREE 10 ML: 5 INJECTION INTRAVENOUS at 05:42

## 2023-04-13 RX ADMIN — PANTOPRAZOLE SODIUM 40 MG: 40 INJECTION, POWDER, FOR SOLUTION INTRAVENOUS at 09:06

## 2023-04-13 RX ADMIN — Medication 2 PACKET: at 18:25

## 2023-04-13 RX ADMIN — Medication 2 PACKET: at 09:03

## 2023-04-13 RX ADMIN — Medication 1 CAPSULE: at 09:03

## 2023-04-13 RX ADMIN — IRON SUCROSE 200 MG: 20 INJECTION, SOLUTION INTRAVENOUS at 09:15

## 2023-04-13 RX ADMIN — ATENOLOL 25 MG: 50 TABLET ORAL at 18:24

## 2023-04-13 RX ADMIN — ATENOLOL 25 MG: 50 TABLET ORAL at 09:04

## 2023-04-13 RX ADMIN — METRONIDAZOLE 500 MG: 500 INJECTION, SOLUTION INTRAVENOUS at 09:06

## 2023-04-13 NOTE — DISCHARGE INSTRUCTIONS
HOSPITALIST DISCHARGE INSTRUCTIONS  NAME: Brittany Foote   :  1935   MRN:  [id number]     Date/Time:  2023 10:35 AM    ADMIT DATE: 4/10/2023     DISCHARGE DATE: 2023     ADMITTING DIAGNOSIS:  Rectal bleeding likely due to diverticulosis and eliquis (blood thinner)    DISCHARGE DIAGNOSIS:  same    MEDICATIONS:  See after visit summary       It is important that you take the medication exactly as they are prescribed. Keep your medication in the bottles provided by the pharmacist and keep a list of the medication names, dosages, and times to be taken in your wallet. Do not take other medications without consulting your doctor     Pain Management: per above medications    What to do at Home    Recommended diet:  Regular Diet    Recommended activity: Activity as tolerated    1) Return to the hospital if you feel worse    2) If you experience any of the following symptoms then please call your primary care physician or return to the emergency room if you cannot get hold of your doctor:  Fever, chills, nausea, vomiting, diarrhea, change in mentation, falling, bleeding, shortness of breath, chest pain, severe headache, severe abdominal pain,     3) Follow up with your doctors as directed    4) Do not take Eliquis until you follow up with your doctors     Follow Up:  [place], [facility]  [address] [facility]  [phone]    Schedule an appointment as soon as possible for a visit in 1 week(s)      Yuniel Blanton MD  [address] [address]  [phone]    Schedule an appointment as soon as possible for a visit in 1 week(s)    . Information obtained by :  I understand that if any problems occur once I am at home I am to contact my physician. I understand and acknowledge receipt of the instructions indicated above. Physician's or R.N.'s Signature                                                                  Date/Time                                                                                                                                              Patient or Representative Signature                                                          Date/Time          Rectal Bleeding: Care Instructions  Your Care Instructions     Rectal bleeding in small amounts is common. You may see red spotting on toilet paper or drops of blood in the toilet. Rectal bleeding has many possible causes, from something as minor as hemorrhoids to something as serious as colon cancer. You may need more tests to find the cause of your bleeding. Follow-up care is a key part of your treatment and safety. Be sure to make and go to all appointments, and call your doctor if you are having problems. It's also a good idea to know your test results and keep a list of the medicines you take. How can you care for yourself at home? Avoid aspirin and other nonsteroidal anti-inflammatory drugs (NSAIDs), such as ibuprofen (Advil, Motrin) and naproxen (Aleve). They can cause you to bleed more. Ask your doctor if you can take acetaminophen (Tylenol). Read and follow all instructions on the label. Use a stool softener that contains bran or psyllium. You can save money by buying bran or psyllium (available in bulk at most health food stores) and sprinkling it on foods or stirring it into fruit juice. You can also use a product such as Metamucil or Citrucel. Take your medicines exactly as directed. Call your doctor if you think you are having a problem with your medicine. When should you call for help? Call 911 anytime you think you may need emergency care. For example, call if:    You passed out (lost consciousness). Call your doctor now or seek immediate medical care if:    You have new or worse pain.      You have new or worse bleeding from the rectum. You are dizzy or light-headed, or you feel like you may faint. Watch closely for changes in your health, and be sure to contact your doctor if:    You cannot pass stools or gas. You do not get better as expected. Where can you learn more? Go to http://www.gray.com/  Enter C958 in the search box to learn more about \"Rectal Bleeding: Care Instructions. \"  Current as of: June 6, 2022               Content Version: 13.6  © 2006-2023 [employer]. Care instructions adapted under license by [employer] (which disclaims liability or warranty for this information). If you have questions about a medical condition or this instruction, always ask your healthcare professional. [address] any warranty or liability for your use of this information.

## 2023-04-13 NOTE — PROGRESS NOTES
Medicare pt has received, reviewed, and signed 2nd IM letter informing them of their right to appeal the discharge. Signed copy has been placed on pt bedside chart.   Supriya Fang CMS

## 2023-04-13 NOTE — PROGRESS NOTES
4/13/2023  11:38 AM  Care Management Assessment      Reason for Re-Admission: Emergency -     Melena [K92.1]      Assessment:   [x]In person with pt   []Via p/c with pt   []With family member in person. Who/Relation:     []With family member via p/c. Who/Relation:   []Chart Review    RUR: 17%  Risk Level: []Low [x]Moderate []High  Value-based purchasing: [] Yes [x] No    Level 1 - Low   3/25-30: Small Bowel Perforation    Advance Directive: Full Code.    [] No AD on file. [x] AD on file. [] Current AD not on file. Copy requested. [] Requests AD, and referral submitted to [facility]. Healthcare Decision Maker:   Primary Decision Maker: Erica Joseph - Daughter - [phone]    Primary Decision Maker: Raúl Duke - Daughter - [phone]        Assessment:    Age: 82 y.o. Sex: [] Male [x]Female     Residency: [x]Private residence []Apartment []Assisted Living []LTC []Other:   Exterior Steps: 2  Interior Steps: 0    Lives With: []With spouse []Other family members []Underage children [x]Alone []Care provider [x]Other: DTR assists as needed. Prior functioning:  [x]Independent with ADLs and iADLS []Dependent with ADLs and iADLs []Partial dependence, Specify:     Cognition: [x]Intact []Some spheres some of the time. []Some spheres all of the time. []All spheres all of the time.      Prior transportation method: [x]Self []Spouse [x]Other family members []Medicaid transport []Other:     Prior DME required:  []None []RW [x]Cane prn []Crutches []Bedside commode []CPAP []Home O2 (Liter/Provider: ) []Nebulizer   []Shower Chair []Wheelchair []Hospital Bed []Jenny []Stair lift []Rollator []Other:    DME available: []None []RW [x]Cane []Crutches []Bedside commode []CPAP []Home O2 (Liter/Provider: ) []Nebulizer   []Shower Chair []Wheelchair []Hospital Bed []Jenny []Stair lift []Rollator []Other:    Rehab history: []None [x]Outpatient PT []Home Health (Provider/Date: ) []SNF (Provider/Date: ) []IPR (Provider/Date: ) []LTC (Provider/Date: ) []Hospice (Provider/Date: )  []Other:     Covid vaccination status:   [] Yes, Type:  [] Booster 1 [] Booster 2  [] No  [x] N/A / Not asked    Insurer:   Insurance Information                  [employer] Phone: [phone]    Subscriber: Euna Guardian Subscriber#: [id number]    Group#: -- Precert#: --        /BSHSI  [employer] LIFE Phone: --    Subscriber: Castro Marcial Subscriber#: [id number]    Group#: -- Precert#: --            [facility]   [facility] [facility] [address]   Phone number: [phone]   Current patient: [x]Yes []No   Approximate date of last visit: within 2 weeks   Access to virtual PCP visits: []Yes []No     Financial concerns/barriers: []Yes, explain: [x]No []Unknown/Not discussed    [facility] [address] [facility]: DTR will transport     Discharge Concerns: []Yes [x]No []Unknown   Describe:    Comments:           Transition of care plan:    []Unable to determine at this time. Awaiting clinical progress, and disposition recommendations. [x] Home with family assistance as needed, and outpatient follow-up. Pt lives outside of [address] [place] [address], and reported she will make her own follow-up appts. [] Home with Outpatient PT and outpatient follow-up   Pt aware of OP appt? []Yes, Provider:   []Not scheduled   Transport provider:     [] Home with Home Health   - Provider:     []SNF/IPR   -[]Preferences given:   []Listing provided and preferences requested   -Status: []Pending []Accepted:    -Auth required: []Yes []No    -Auth initiated date:   -3 midnight stay required: []Yes []No  Date satisfied:     [] LTC:     [] Home with Hospice   -Provider:     [] Dispatch Health information provided. [] Other:     Todd West MA    Care Management Interventions  PCP Verified by CM: Yes  Mode of Transport at Discharge:  Other (see comment)  Jessy Signup: No  Discharge Durable Medical Equipment: No  Physical Therapy Consult: Yes  Occupational Therapy Consult: Yes  Speech Therapy Consult: No  Support Systems: Spouse/Significant Other  Confirm Follow Up Transport: Family  Discharge Location  Patient Expects to be Discharged to[de-identified] Home with family assistance    Readmission Assessment  Number of days since last admission?: 8-30 days  Previous disposition: Home Alone  Who is being interviewed?: Patient  What was the patient's/caregiver's perception as to why they think they needed to return back to the hospital?: Other (Comment) (Recurrent medical problem)  Did you visit your Primary Care Physician after you left the hospital, before you returned this time?: Yes  Did you see a specialist, such as Cardiac, Pulmonary, Orthopedic Physician, etc. after you left the hospital?: Yes  Who advised the patient to return to the hospital?: Physician, Physician's Nurse/Office Staff  Does the patient report anything that got in the way of taking their medications?: No  In our efforts to provide the best possible care to you and others like you, can you think of anything that we could have done to help you after you left the hospital the first time, so that you might not have needed to return so soon?: Other (Comment) (None Reported.  Recurrent medical problem.)

## 2023-04-13 NOTE — DISCHARGE SUMMARY
[facility]  [address]  [phone]    Hospitalist Discharge Summary     Patient ID:  Philipp Pizano  [id number]  82 y.o.  1935    Admit date: 4/10/2023    Discharge date and time: 4/13/2023 10:36 AM    Admission Diagnoses: Melena [K92.1]    Discharge Diagnoses:  Principal Diagnosis Rectal bleed                                            Principal Problem:    Rectal bleed (4/13/2023)    Active Problems:    Melena (4/10/2023)           Hospital Course:     81 yo hx of HTN, DM, Pafib on Eliquis, SBO s/p resection and drainage of abscess on 3/26, presented w/ abd pain, N/V, rectal bleeding     1) Acute rectal bleeding: now resolved. Unclear etiology, could be due to diverticulosis or hemorrhoids, complicated by Eliquis. Had recent SBO s/p resection and drainage of abscess on 03/26. Abd CT neg for active bleed. Patient was told to stop Eliquis. GI and Gen were following, no interventions needed     2) Abd pain/N/V: now resolved. Due to recent bowel resection, diverticulitis     3) Pafib: last echo with EF 55-60%. Cont atenolol. Hold eliquis indefinitely due to GI bleed.   Patient has been referred for Watchman device outpatient      4) HTN: cont norvasc, atenolol, lisinopril      5) DM type 2: last A1C 7.6%    PCP: Grecia MILLER     Consults:  GI, Gen surg    Significant Diagnostic Studies: none    Discharge Exam:  Physical Exam:    Gen:  Well-developed, well-nourished, in no acute distress  HEENT:  Pink conjunctivae, PERRL, hearing intact to voice, moist mucous membranes  Neck:  Supple, without masses, thyroid non-tender  Resp:  No accessory muscle use, clear breath sounds without wheezes rales or rhonchi  Card:  No murmurs, normal S1, S2 without thrills, bruits or peripheral edema  Abd:  Soft, NT, non-distended, normoactive bowel sounds are present  Musc:  No cyanosis or clubbing  Skin:  No rashes  Neuro:  Cranial nerves 3-12 are grossly intact, follows commands appropriately  Psych:  Good insight, oriented to person, place and time, alert    Disposition: home  Discharge Condition: Stable    Patient Instructions:   Current Discharge Medication List        START taking these medications    Details   L.acid,para-B. bifidum-S.therm (RISAQUAD) 8 billion cell cap cap Take 1 Capsule by mouth daily. Qty: 30 Capsule, Refills: 0      pantoprazole (Protonix) 40 mg tablet Take 1 Tablet by mouth daily. Qty: 30 Tablet, Refills: 0           CONTINUE these medications which have NOT CHANGED    Details   lisinopriL (PRINIVIL, ZESTRIL) 20 mg tablet Take 1 Tablet by mouth daily. lidocaine (LIDODERM) 5 % 1 Patch by TransDERmal route every twenty-four (24) hours. Apply patch to the affected area for 12 hours a day and remove for 12 hours a day. calcium carbonate (CALTREX) 600 mg calcium (1,500 mg) tablet Take 1 Tablet by mouth two (2) times a day. amLODIPine (NORVASC) 10 mg tablet Take 1 Tablet by mouth daily. atenoloL (TENORMIN) 25 mg tablet Take 1 Tablet by mouth two (2) times a day. levothyroxine (SYNTHROID) 88 mcg tablet Take 1 Tablet by mouth Daily (before breakfast). rosuvastatin (CRESTOR) 10 mg tablet Take 1 Tablet by mouth nightly. cholecalciferol (VITAMIN D3) 25 mcg (1,000 unit) cap Take 1 Capsule by mouth daily (after breakfast). ascorbic acid, vitamin C, (VITAMIN C) 500 mg tablet Take 1 Tablet by mouth daily (after breakfast). docosahexanoic acid/epa (FISH OIL PO) Take 1,500 mg by mouth ACB/HS. glipiZIDE SR (GLUCOTROL XL) 2.5 mg CR tablet Take 1 Tablet by mouth daily (after breakfast). potassium chloride SA (MICRO-K) 10 mEq capsule Take 20 mEq by mouth daily.              STOP taking these medications       apixaban (ELIQUIS) 5 mg tablet Comments:   Reason for Stopping:         amoxicillin-clavulanate (AUGMENTIN) 875-125 mg per tablet Comments:   Reason for Stopping:         aspirin delayed-release 81 mg tablet Comments:   Reason for Stopping:             Activity: Activity as tolerated  Diet: Regular Diet  Wound Care: None needed    Follow-up with  Follow-up Information       Follow up With Specialties Details Why Contact Info    Arnav MILLER Nurse Practitioner Schedule an appointment as soon as possible for a visit in 1 week(s)  [address]      Charly Jain MD General Surgery Schedule an appointment as soon as possible for a visit in 1 week(s)  [address] [address]  [phone]               Follow-up tests/labs none    Signed:  Shira Sepulveda MD  4/13/2023  10:36 AM  **I personally spent 35 min on discharge**

## 2023-04-14 NOTE — PROGRESS NOTES
At [address] [facility] patient discharged home with daughter.  IV removed  and discharge instructions reviewed and they both verbalized understanding

## 2023-05-03 ENCOUNTER — DOCUMENTATION ONLY (OUTPATIENT)
Dept: CARDIOLOGY CLINIC | Age: 88
End: 2023-05-03

## 2023-05-19 ENCOUNTER — OFFICE VISIT (OUTPATIENT)
Age: 88
End: 2023-05-19
Payer: MEDICARE

## 2023-05-19 VITALS
WEIGHT: 140.8 LBS | OXYGEN SATURATION: 94 % | SYSTOLIC BLOOD PRESSURE: 130 MMHG | BODY MASS INDEX: 23.46 KG/M2 | HEART RATE: 55 BPM | HEIGHT: 65 IN | DIASTOLIC BLOOD PRESSURE: 70 MMHG

## 2023-05-19 DIAGNOSIS — I48.0 PAROXYSMAL ATRIAL FIBRILLATION (HCC): Primary | ICD-10-CM

## 2023-05-19 DIAGNOSIS — Z01.812 PRE-PROCEDURE LAB EXAM: ICD-10-CM

## 2023-05-19 PROCEDURE — G8427 DOCREV CUR MEDS BY ELIG CLIN: HCPCS | Performed by: INTERNAL MEDICINE

## 2023-05-19 PROCEDURE — G8400 PT W/DXA NO RESULTS DOC: HCPCS | Performed by: INTERNAL MEDICINE

## 2023-05-19 PROCEDURE — 1036F TOBACCO NON-USER: CPT | Performed by: INTERNAL MEDICINE

## 2023-05-19 PROCEDURE — G8420 CALC BMI NORM PARAMETERS: HCPCS | Performed by: INTERNAL MEDICINE

## 2023-05-19 PROCEDURE — 99204 OFFICE O/P NEW MOD 45 MIN: CPT | Performed by: INTERNAL MEDICINE

## 2023-05-19 PROCEDURE — 1123F ACP DISCUSS/DSCN MKR DOCD: CPT | Performed by: INTERNAL MEDICINE

## 2023-05-19 PROCEDURE — 1090F PRES/ABSN URINE INCON ASSESS: CPT | Performed by: INTERNAL MEDICINE

## 2023-05-19 RX ORDER — SERTRALINE HYDROCHLORIDE 25 MG/1
25 TABLET, FILM COATED ORAL DAILY
COMMUNITY
Start: 2023-05-02

## 2023-05-22 ENCOUNTER — TELEPHONE (OUTPATIENT)
Age: 88
End: 2023-05-22

## 2023-05-22 NOTE — TELEPHONE ENCOUNTER
Spoke with patient. 2 patient identifiers used. Confirmed with patient she is not taking Eliquis. Let her know to ignore her paperwork from Friday that says to take it and that somehow with the new system it got back on her list. Patient verbalized understanding. Let patient know that I would be updating her medication list and take the Eliquis off.

## 2023-05-23 ENCOUNTER — TELEPHONE (OUTPATIENT)
Age: 88
End: 2023-05-23

## 2023-05-23 NOTE — TELEPHONE ENCOUNTER
Chandrakant Mora, RN  Myesha Wayne MA This patient needs to be scheduled for Watchman procedure. CPT 66156   ICD I48.0     Labs placed for June. She is to not take glipizide morning of procedure. Spoke to patients daughter Kristina Aviles and scheduled patient for Watchman 6/20/23 @ 12:00 pm with 10:00 am arrival. Instructions gone over with patient and daughter and sent a copy to my chart as well as mailed a copy and mailed lab slip. Patient daughter verbalized understanding and had no questions at the time of call. Patient identification verified x2. Patient Instructions    Watchman       PCI     Bilateral Angio w/ runoff    PFO  Pre-procedure instructions  3-4 days prior to procedure  Lab work: Please do by 6/14/23  Which will be in [address] [place] [address] [facility] records, or a printed copy to go to [employer]   The night before the procedure nothing to eat or drink after midnight, you may take approved medications the morning of the procedure with a few sips of water. Stop blood thinners 2 days prior to procedure EXCEPT: Brilinta, Plavix or Aspirin  Medication restrictions: Hold Glipizide the morning of the procedure.     Procedure day  Have a  that will bring you and take you home  Bring ID and insurance card  Wear comfortable clothing  Leave valuables at home, bring: dentures, hearing aids, or glasses  Bring overnight bag   Where to report  [facility]: go through main entrance and to the left is outpatient registration  GABI patients report to first floor outpatient registration       Date of procedure: 6/20/23  Arrival Time: 10:00 am    Post procedure instructions  No driving for 24 hours  No heavy lifting (over 10lbs) or strenuous activity for 48hrs  No baths, swimming, hot tubs, or spas for a week  The band aid over the cath site may be removed the day after procedure and washed gently with soap and water  The site may be bruised or discolored for a couple of weeks, a small knot may also be

## 2023-05-24 ENCOUNTER — HOSPITAL ENCOUNTER (OUTPATIENT)
Facility: HOSPITAL | Age: 88
Discharge: HOME | End: 2023-05-27
Payer: MEDICARE

## 2023-05-24 DIAGNOSIS — I48.0 PAROXYSMAL ATRIAL FIBRILLATION (HCC): ICD-10-CM

## 2023-05-24 PROCEDURE — 71275 CT ANGIOGRAPHY CHEST: CPT

## 2023-05-24 PROCEDURE — 6360000004 HC RX CONTRAST MEDICATION: Performed by: INTERNAL MEDICINE

## 2023-05-24 RX ADMIN — IOPAMIDOL 96 ML: 755 INJECTION, SOLUTION INTRAVENOUS at 15:46

## 2023-06-15 ENCOUNTER — PREP FOR PROCEDURE (OUTPATIENT)
Age: 88
End: 2023-06-15

## 2023-06-15 DIAGNOSIS — I48.91 ATRIAL FIBRILLATION, UNSPECIFIED TYPE (HCC): Primary | ICD-10-CM

## 2023-06-15 RX ORDER — SODIUM CHLORIDE 9 MG/ML
INJECTION, SOLUTION INTRAVENOUS CONTINUOUS
Status: CANCELLED | OUTPATIENT
Start: 2023-06-15 | End: 2023-06-15

## 2023-06-15 RX ORDER — SODIUM CHLORIDE 0.9 % (FLUSH) 0.9 %
5-40 SYRINGE (ML) INJECTION PRN
Status: CANCELLED | OUTPATIENT
Start: 2023-06-15

## 2023-06-20 ENCOUNTER — ANESTHESIA EVENT (OUTPATIENT)
Facility: HOSPITAL | Age: 88
DRG: 274 | End: 2023-06-20
Payer: MEDICARE

## 2023-06-20 ENCOUNTER — HOSPITAL ENCOUNTER (INPATIENT)
Facility: HOSPITAL | Age: 88
LOS: 1 days | Discharge: HOME | DRG: 274 | End: 2023-06-21
Attending: INTERNAL MEDICINE | Admitting: INTERNAL MEDICINE
Payer: MEDICARE

## 2023-06-20 ENCOUNTER — ANESTHESIA (OUTPATIENT)
Facility: HOSPITAL | Age: 88
DRG: 274 | End: 2023-06-20
Payer: MEDICARE

## 2023-06-20 DIAGNOSIS — I48.91 ATRIAL FIBRILLATION, UNSPECIFIED TYPE (HCC): ICD-10-CM

## 2023-06-20 DIAGNOSIS — Z95.818 PRESENCE OF WATCHMAN LEFT ATRIAL APPENDAGE CLOSURE DEVICE: ICD-10-CM

## 2023-06-20 DIAGNOSIS — K62.5 RECTAL BLEED: Primary | ICD-10-CM

## 2023-06-20 LAB
ABO + RH BLD: NORMAL
ACT BLD: 257 SECS (ref 79–138)
ANION GAP SERPL CALC-SCNC: 8 MMOL/L (ref 5–15)
BLOOD GROUP ANTIBODIES SERPL: NORMAL
BUN SERPL-MCNC: 19 MG/DL (ref 6–20)
BUN/CREAT SERPL: 23 (ref 12–20)
CALCIUM SERPL-MCNC: 9.6 MG/DL (ref 8.5–10.1)
CHLORIDE SERPL-SCNC: 103 MMOL/L (ref 97–108)
CO2 SERPL-SCNC: 27 MMOL/L (ref 21–32)
CREAT SERPL-MCNC: 0.83 MG/DL (ref 0.55–1.02)
ECHO BSA: 1.71 M2
ECHO BSA: 1.71 M2
ERYTHROCYTE [DISTWIDTH] IN BLOOD BY AUTOMATED COUNT: 11.8 % (ref 11.5–14.5)
GLUCOSE BLD STRIP.AUTO-MCNC: 190 MG/DL (ref 65–117)
GLUCOSE BLD STRIP.AUTO-MCNC: 242 MG/DL (ref 65–117)
GLUCOSE SERPL-MCNC: 192 MG/DL (ref 65–100)
HCT VFR BLD AUTO: 45.4 % (ref 35–47)
HGB BLD-MCNC: 14.9 G/DL (ref 11.5–16)
INR PPP: 1.1 (ref 0.9–1.1)
MCH RBC QN AUTO: 31.6 PG (ref 26–34)
MCHC RBC AUTO-ENTMCNC: 32.8 G/DL (ref 30–36.5)
MCV RBC AUTO: 96.2 FL (ref 80–99)
NRBC # BLD: 0 K/UL (ref 0–0.01)
NRBC BLD-RTO: 0 PER 100 WBC
PLATELET # BLD AUTO: 202 K/UL (ref 150–400)
PMV BLD AUTO: 10.8 FL (ref 8.9–12.9)
POTASSIUM SERPL-SCNC: 3.9 MMOL/L (ref 3.5–5.1)
PROTHROMBIN TIME: 11 SEC (ref 9–11.1)
RBC # BLD AUTO: 4.72 M/UL (ref 3.8–5.2)
SERVICE CMNT-IMP: ABNORMAL
SERVICE CMNT-IMP: ABNORMAL
SODIUM SERPL-SCNC: 138 MMOL/L (ref 136–145)
SPECIMEN EXP DATE BLD: NORMAL
WBC # BLD AUTO: 9.1 K/UL (ref 3.6–11)

## 2023-06-20 PROCEDURE — 2709999900 HC NON-CHARGEABLE SUPPLY: Performed by: INTERNAL MEDICINE

## 2023-06-20 PROCEDURE — 76937 US GUIDE VASCULAR ACCESS: CPT | Performed by: INTERNAL MEDICINE

## 2023-06-20 PROCEDURE — 3700000001 HC ADD 15 MINUTES (ANESTHESIA): Performed by: INTERNAL MEDICINE

## 2023-06-20 PROCEDURE — 3700000000 HC ANESTHESIA ATTENDED CARE: Performed by: INTERNAL MEDICINE

## 2023-06-20 PROCEDURE — 86900 BLOOD TYPING SEROLOGIC ABO: CPT

## 2023-06-20 PROCEDURE — 93312 ECHO TRANSESOPHAGEAL: CPT | Performed by: INTERNAL MEDICINE

## 2023-06-20 PROCEDURE — 86850 RBC ANTIBODY SCREEN: CPT

## 2023-06-20 PROCEDURE — 6360000004 HC RX CONTRAST MEDICATION: Performed by: INTERNAL MEDICINE

## 2023-06-20 PROCEDURE — C1760 CLOSURE DEV, VASC: HCPCS | Performed by: INTERNAL MEDICINE

## 2023-06-20 PROCEDURE — 93325 DOPPLER ECHO COLOR FLOW MAPG: CPT | Performed by: INTERNAL MEDICINE

## 2023-06-20 PROCEDURE — 82962 GLUCOSE BLOOD TEST: CPT

## 2023-06-20 PROCEDURE — 99223 1ST HOSP IP/OBS HIGH 75: CPT | Performed by: INTERNAL MEDICINE

## 2023-06-20 PROCEDURE — 2580000003 HC RX 258: Performed by: NURSE PRACTITIONER

## 2023-06-20 PROCEDURE — C1894 INTRO/SHEATH, NON-LASER: HCPCS | Performed by: INTERNAL MEDICINE

## 2023-06-20 PROCEDURE — 2060000000 HC ICU INTERMEDIATE R&B

## 2023-06-20 PROCEDURE — 80048 BASIC METABOLIC PNL TOTAL CA: CPT

## 2023-06-20 PROCEDURE — 2500000003 HC RX 250 WO HCPCS: Performed by: INTERNAL MEDICINE

## 2023-06-20 PROCEDURE — 93320 DOPPLER ECHO COMPLETE: CPT | Performed by: INTERNAL MEDICINE

## 2023-06-20 PROCEDURE — 6360000002 HC RX W HCPCS: Performed by: NURSE ANESTHETIST, CERTIFIED REGISTERED

## 2023-06-20 PROCEDURE — 33340 PERQ CLSR TCAT L ATR APNDGE: CPT | Performed by: INTERNAL MEDICINE

## 2023-06-20 PROCEDURE — 93355 ECHO TRANSESOPHAGEAL (TEE): CPT

## 2023-06-20 PROCEDURE — 02L73DK OCCLUSION OF LEFT ATRIAL APPENDAGE WITH INTRALUMINAL DEVICE, PERCUTANEOUS APPROACH: ICD-10-PCS | Performed by: INTERNAL MEDICINE

## 2023-06-20 PROCEDURE — 85347 COAGULATION TIME ACTIVATED: CPT

## 2023-06-20 PROCEDURE — C1893 INTRO/SHEATH, FIXED,NON-PEEL: HCPCS | Performed by: INTERNAL MEDICINE

## 2023-06-20 PROCEDURE — C1889 IMPLANT/INSERT DEVICE, NOC: HCPCS | Performed by: INTERNAL MEDICINE

## 2023-06-20 PROCEDURE — 2500000003 HC RX 250 WO HCPCS: Performed by: NURSE ANESTHETIST, CERTIFIED REGISTERED

## 2023-06-20 PROCEDURE — 85610 PROTHROMBIN TIME: CPT

## 2023-06-20 PROCEDURE — 86901 BLOOD TYPING SEROLOGIC RH(D): CPT

## 2023-06-20 PROCEDURE — 36415 COLL VENOUS BLD VENIPUNCTURE: CPT

## 2023-06-20 PROCEDURE — 85027 COMPLETE CBC AUTOMATED: CPT

## 2023-06-20 PROCEDURE — 2580000003 HC RX 258: Performed by: NURSE ANESTHETIST, CERTIFIED REGISTERED

## 2023-06-20 DEVICE — LEFT ATRIAL APPENDAGE CLOSURE DEVICE WITH DELIVERY SYSTEM
Type: IMPLANTABLE DEVICE | Status: FUNCTIONAL
Brand: WATCHMAN FLX™

## 2023-06-20 RX ORDER — SODIUM CHLORIDE 0.9 % (FLUSH) 0.9 %
5-40 SYRINGE (ML) INJECTION PRN
Status: DISCONTINUED | OUTPATIENT
Start: 2023-06-20 | End: 2023-06-21 | Stop reason: HOSPADM

## 2023-06-20 RX ORDER — PROTAMINE SULFATE 10 MG/ML
INJECTION, SOLUTION INTRAVENOUS PRN
Status: DISCONTINUED | OUTPATIENT
Start: 2023-06-20 | End: 2023-06-20 | Stop reason: SDUPTHER

## 2023-06-20 RX ORDER — FENTANYL CITRATE 50 UG/ML
INJECTION, SOLUTION INTRAMUSCULAR; INTRAVENOUS PRN
Status: DISCONTINUED | OUTPATIENT
Start: 2023-06-20 | End: 2023-06-20 | Stop reason: SDUPTHER

## 2023-06-20 RX ORDER — ROCURONIUM BROMIDE 10 MG/ML
INJECTION, SOLUTION INTRAVENOUS PRN
Status: DISCONTINUED | OUTPATIENT
Start: 2023-06-20 | End: 2023-06-20 | Stop reason: SDUPTHER

## 2023-06-20 RX ORDER — PHENYLEPHRINE HCL IN 0.9% NACL 0.4MG/10ML
SYRINGE (ML) INTRAVENOUS PRN
Status: DISCONTINUED | OUTPATIENT
Start: 2023-06-20 | End: 2023-06-20 | Stop reason: SDUPTHER

## 2023-06-20 RX ORDER — HEPARIN SODIUM 1000 [USP'U]/ML
INJECTION, SOLUTION INTRAVENOUS; SUBCUTANEOUS PRN
Status: DISCONTINUED | OUTPATIENT
Start: 2023-06-20 | End: 2023-06-20 | Stop reason: SDUPTHER

## 2023-06-20 RX ORDER — ACETAMINOPHEN 325 MG/1
650 TABLET ORAL EVERY 4 HOURS PRN
Status: DISCONTINUED | OUTPATIENT
Start: 2023-06-20 | End: 2023-06-21 | Stop reason: HOSPADM

## 2023-06-20 RX ORDER — SODIUM CHLORIDE 0.9 % (FLUSH) 0.9 %
5-40 SYRINGE (ML) INJECTION EVERY 12 HOURS SCHEDULED
Status: DISCONTINUED | OUTPATIENT
Start: 2023-06-20 | End: 2023-06-21 | Stop reason: HOSPADM

## 2023-06-20 RX ORDER — SUCCINYLCHOLINE/SOD CL,ISO/PF 200MG/10ML
SYRINGE (ML) INTRAVENOUS PRN
Status: DISCONTINUED | OUTPATIENT
Start: 2023-06-20 | End: 2023-06-20 | Stop reason: SDUPTHER

## 2023-06-20 RX ORDER — CEFAZOLIN SODIUM 1 G/3ML
INJECTION, POWDER, FOR SOLUTION INTRAMUSCULAR; INTRAVENOUS PRN
Status: DISCONTINUED | OUTPATIENT
Start: 2023-06-20 | End: 2023-06-20 | Stop reason: SDUPTHER

## 2023-06-20 RX ORDER — ONDANSETRON 2 MG/ML
INJECTION INTRAMUSCULAR; INTRAVENOUS PRN
Status: DISCONTINUED | OUTPATIENT
Start: 2023-06-20 | End: 2023-06-20 | Stop reason: SDUPTHER

## 2023-06-20 RX ORDER — GLYCOPYRROLATE 0.2 MG/ML
INJECTION INTRAMUSCULAR; INTRAVENOUS PRN
Status: DISCONTINUED | OUTPATIENT
Start: 2023-06-20 | End: 2023-06-20 | Stop reason: SDUPTHER

## 2023-06-20 RX ORDER — SODIUM CHLORIDE 9 MG/ML
INJECTION, SOLUTION INTRAVENOUS PRN
Status: DISCONTINUED | OUTPATIENT
Start: 2023-06-20 | End: 2023-06-21 | Stop reason: HOSPADM

## 2023-06-20 RX ORDER — SODIUM CHLORIDE 9 MG/ML
INJECTION, SOLUTION INTRAVENOUS CONTINUOUS
Status: DISPENSED | OUTPATIENT
Start: 2023-06-20 | End: 2023-06-20

## 2023-06-20 RX ORDER — NEOSTIGMINE METHYLSULFATE 1 MG/ML
INJECTION, SOLUTION INTRAVENOUS PRN
Status: DISCONTINUED | OUTPATIENT
Start: 2023-06-20 | End: 2023-06-20 | Stop reason: SDUPTHER

## 2023-06-20 RX ADMIN — CEFAZOLIN 2 G: 330 INJECTION, POWDER, FOR SOLUTION INTRAMUSCULAR; INTRAVENOUS at 13:02

## 2023-06-20 RX ADMIN — ROCURONIUM BROMIDE 35 MG: 10 SOLUTION INTRAVENOUS at 12:59

## 2023-06-20 RX ADMIN — Medication 120 MCG: at 13:28

## 2023-06-20 RX ADMIN — Medication 120 MCG: at 13:16

## 2023-06-20 RX ADMIN — ONDANSETRON HYDROCHLORIDE 4 MG: 2 INJECTION, SOLUTION INTRAMUSCULAR; INTRAVENOUS at 13:44

## 2023-06-20 RX ADMIN — HEPARIN SODIUM 3000 UNITS: 1000 INJECTION, SOLUTION INTRAVENOUS; SUBCUTANEOUS at 13:16

## 2023-06-20 RX ADMIN — FENTANYL CITRATE 50 MCG: 50 INJECTION, SOLUTION INTRAMUSCULAR; INTRAVENOUS at 12:50

## 2023-06-20 RX ADMIN — FENTANYL CITRATE 50 MCG: 50 INJECTION, SOLUTION INTRAMUSCULAR; INTRAVENOUS at 12:56

## 2023-06-20 RX ADMIN — PROTAMINE SULFATE 70 MG: 10 INJECTION, SOLUTION INTRAVENOUS at 13:44

## 2023-06-20 RX ADMIN — PROPOFOL 100 MG: 10 INJECTION, EMULSION INTRAVENOUS at 12:50

## 2023-06-20 RX ADMIN — ROCURONIUM BROMIDE 5 MG: 10 SOLUTION INTRAVENOUS at 12:50

## 2023-06-20 RX ADMIN — GLYCOPYRROLATE 0.4 MG: 0.2 INJECTION INTRAMUSCULAR; INTRAVENOUS at 13:49

## 2023-06-20 RX ADMIN — Medication 100 MG: at 12:50

## 2023-06-20 RX ADMIN — PHENYLEPHRINE HYDROCHLORIDE 40 MCG/MIN: 10 INJECTION INTRAVENOUS at 13:42

## 2023-06-20 RX ADMIN — HEPARIN SODIUM 4000 UNITS: 1000 INJECTION, SOLUTION INTRAVENOUS; SUBCUTANEOUS at 13:22

## 2023-06-20 RX ADMIN — Medication 80 MCG: at 12:50

## 2023-06-20 RX ADMIN — Medication 80 MCG: at 13:06

## 2023-06-20 RX ADMIN — SODIUM CHLORIDE: 9 INJECTION, SOLUTION INTRAVENOUS at 10:35

## 2023-06-20 RX ADMIN — SODIUM CHLORIDE: 9 INJECTION, SOLUTION INTRAVENOUS at 13:23

## 2023-06-20 RX ADMIN — PROPOFOL 40 MG: 10 INJECTION, EMULSION INTRAVENOUS at 12:55

## 2023-06-20 RX ADMIN — Medication 3 MG: at 13:49

## 2023-06-20 NOTE — ANESTHESIA PROCEDURE NOTES
Procedure Performed: GABI       Start Time:        End Time:      Preanesthesia Checklist:  Patient identified, IV assessed, risks and benefits discussed, monitors and equipment assessed, procedure being performed at surgeon's request and anesthesia consent obtained. General Procedure Information  Diagnostic Indications for Echo:  assessment of ascending aorta, assessment of surgical repair, hemodynamic monitoring and assessment of valve function  Location performed:  OR  Intubated  Bite block placed  Heart visualized  Probe Insertion:  Easy  Probe Type:  3D, mulitplane, epiaortic and biplane  Modalities:  2D, 3D, color flow mapping, continuous wave Doppler, pulse wave Doppler and M-mode    Echocardiographic and Doppler Measurements    Ventricles    Right Ventricle:  Cavity size normal.  Hypertrophy not present. Thrombus not present. Global function normal.  Ejection Fraction 45%. Left Ventricle:  Cavity size normal.  Hypertrophy not present. Thrombus not present. Global Function normal.  Ejection Fraction 55%. Other Ventricular Findings:       LVEF >55%    Ventricular Regional Function:  1- Basal Anteroseptal:  normal  2- Basal Anterior:  normal  3- Basal Anterolateral:  normal  4- Basal Inferolateral:  normal  5- Basal Inferior:  normal  6- Basal Inferoseptal:  normal  7- Mid Anteroseptal:  normal  8- Mid Anterior:  normal  9- Mid Anterolateral:  normal  10- Mid Inferolateral:  normal  11- Mid Inferior:  normal  12- Mid Inferoseptal:  normal  13- Apical Anterior:  normal  14- Apical Lateral:  normal  15- Apical Inferior:  normal  16- Apical Septal:  normal  17- Gibbon:  normal    Wall Motion Comments:       No WMA    Valves    Aortic Valve: Annulus normal.  Stenosis not present. Regurgitation none. Leaflets normal.  Leaflet motions normal.      Mitral Valve: Annulus normal.  Stenosis not present. Regurgitation none. Leaflets normal.  Leaflet motions normal.      Tricuspid Valve:   Annulus normal.

## 2023-06-20 NOTE — H&P
MD Shikha Fernandes, AMARJIT                         [facility]. [phone]            Cardiology structural heart disease consult/Progress Note      Kai Byers  82 y.o.  1935    Requesting/referring provider: Milla Oliveira     Reason for Consult: Discussion of alternatives to long-term oral anticoagulation in the setting of recurrent paroxysmal atrial fibrillation. Assessment/Plan:    Paroxysmal atrial fibrillation -eliquis stopped   Acute rectal bleeding (thought to be s/t diverticulosis vs. Hemorrhoids)  Acute anemia s/t GI bleeding   Hypertension  DM2-Hgba1c 7.6  6. Hx of small bowel resection, drainage of abscess on 3/26/23      Kai Beyrs is here to discuss alternatives to long-term oral anticoagulation. She had a recent GI bleed with associated with acute anemia. She is not a good candidate for long-term oral anticoagulation and potentially watchman based left renal appendage occlusion would be a reasonable alternative. The patient has an elevated CHADSVASC/CHADS 2 score  and should avoid long term anticoagulation due to prior major GI bleed. The patient feels strongly that anticoagulation and documented stroke risk greatly impacts his/her quality of life. The patient is a candidate for Watchman, a left atrial appendage closure (LAAC) device to reduce risk of thromboembolism. The patient has need for anticoagulation and is considered a high risk for stroke, but has a relative contraindication for long term anticoagulation. The patient is suitable for short term warfarin but deemed unable to take long term oral anticoagulation following the conclusion of shared decision making interaction with the patient. I have discussed at length the risks/benefits and alternatives of this procedure with regards to stroke risk versus bleeding risk.  The patient understands that anticoagulation will be maintained in a variety of forms during the first year and agrees with

## 2023-06-20 NOTE — ANESTHESIA POSTPROCEDURE EVALUATION
Department of Anesthesiology  Postprocedure Note    Patient: Roxi Rosen  MRN: [id number]  YOB: 1935  Date of evaluation: 6/20/2023      Procedure Summary     Date: 06/20/23 Room / Location: [facility] CATH LAB 2 / [facility] CARDIAC CATH LAB    Anesthesia Start: 6426 Anesthesia Stop: 9964    Procedures:       Watchman yvan closure device      Ultrasound guided vascular access Diagnosis:       Atrial fibrillation, unspecified type ([address]      (AFIB)    Providers: Cullen Pradhan MD Responsible Provider: Arpita Mello MD    Anesthesia Type: general ASA Status: 3          Anesthesia Type: No value filed. Sravani Phase I: Sravani Score: 10    Sravani Phase II:        Anesthesia Post Evaluation    Patient location during evaluation: PACU  Patient participation: complete - patient participated  Level of consciousness: responsive to verbal stimuli and sleepy but conscious  Airway patency: patent  Complications: no  Cardiovascular status: blood pressure returned to baseline  Respiratory status: acceptable  Hydration status: stable  Comments: +Post-Anesthesia Evaluation and Assessment    Patient: Roxi Rosen MRN: [id number]  SSN: [id number]   YOB: 1935  Age: 82 y.o. Sex: female          Cardiovascular Function/Vital Signs    BP (!) 128/59   Pulse (!) 48   Temp 97.7 °F (36.5 °C) (Oral)   Resp 17   Ht 1.651 m (5' 5\")   Wt 63.5 kg (140 lb)   SpO2 95%   Breastfeeding No   BMI 23.30 kg/m²     Patient is status post Procedure(s): Watchman yvan closure device  Ultrasound guided vascular access. Nausea/Vomiting: Controlled. Postoperative hydration reviewed and adequate. Pain:      Managed. Neurological Status: At baseline. Mental Status and Level of Consciousness: Arousable. Pulmonary Status:       Adequate oxygenation and airway patent. Complications related to anesthesia: None    Post-anesthesia assessment completed. No concerns.     I have evaluated the patient and the patient is

## 2023-06-20 NOTE — PROGRESS NOTES
TRANSFER - IN REPORT:    Verbal report received from [address] [place] [address],[address], RN and CRNA on Donaldo Jj  being received from procedure area for routine post-op. Report consisted of patients Situation, Background, Assessment and Recommendations(SBAR). Information from the following report(s) SBAR, Procedure Summary, Intake/Output, MAR, Recent Results, Med Rec Status, and Cardiac Rhythm Sinus Tommie Bassem  was reviewed with the receiving clinician. Opportunity for questions and clarification was provided. Assessment completed upon patients arrival to [address] [place] [address]. Cardiac Cath Lab Recovery Arrival Note:    Donaldo Jj arrived to [facility] recovery area. Patient procedure= Watchman insertion, GABI. Patient on cardiac monitor, non-invasive blood pressure, SPO2 monitor. On room air o. IV  of NS on pump at 25 ml/hr. Patient status doing well without problems. Patient is A&Ox 3. Patient reports no complaints. PROCEDURE SITE CHECK:    Procedure site:without any bleeding or hematoma, no pain/discomfort reported at procedure site. No change in patient status. Continue to monitor patient and status.

## 2023-06-20 NOTE — PROGRESS NOTES
Ambulated to the bathroom with standby assistance. Voided without difficulty. Returned to stretcher Groin site clean, dry and intact, no bleeding, no hematoma. 1800-TRANSFER - OUT REPORT:    Verbal report given to PAXTON Briscoe on Reuben Frank  being transferred to [facility] for routine progression of patient care       Report consisted of patient's Situation, Background, Assessment and   Recommendations(SBAR). Information from the following report(s) Nurse Handoff Report, Surgery Report, Intake/Output, MAR, Recent Results, Med Rec Status, Cardiac Rhythm Sinus Sully Spare, and Alarm Parameters was reviewed with the receiving nurse. Lines:   Peripheral IV 06/20/23 Proximal;Right; Anterior Forearm (Active)        Opportunity for questions and clarification was provided.       Patient transported with:  Monitor and Registered Nurse  All patient's belongings including duffle bag, clothing, cell phone, glasses

## 2023-06-20 NOTE — ANESTHESIA PRE PROCEDURE
COVID19        Anesthesia Evaluation  Patient summary reviewed and Nursing notes reviewed no history of anesthetic complications:   Airway: Mallampati: III  TM distance: >3 FB   Neck ROM: full  Mouth opening: > = 3 FB   Dental: normal exam   (+) caps      Pulmonary:Negative Pulmonary ROS and normal exam  breath sounds clear to auscultation                             Cardiovascular:Negative CV ROS  Exercise tolerance: good (>4 METS),   (+) hypertension:, dysrhythmias: atrial fibrillation,         Rhythm: regular  Rate: normal                 ROS comment: Left Ventricle: Normal left ventricular systolic function with a visually estimated EF of 55 - 60%. Left ventricle size is normal. Normal wall thickness. Normal wall motion. Grade I diastolic dysfunction with normal LAP. Neuro/Psych:   Negative Neuro/Psych ROS  (+) psychiatric history:            GI/Hepatic/Renal: Neg GI/Hepatic/Renal ROS  (+) GERD:,           Endo/Other: Negative Endo/Other ROS   (+) Diabetes, . Abdominal: normal exam            Vascular: negative vascular ROS. Other Findings:           Anesthesia Plan      general     ASA 3       Induction: intravenous. GABI  MIPS: Postoperative opioids intended and Prophylactic antiemetics administered. Anesthetic plan and risks discussed with patient. Use of blood products discussed with patient whom consented to blood products.    Plan discussed with CRNA and surgical team.    Attending anesthesiologist reviewed and agrees with Preprocedure content                Pasquale Caicedo MD   6/20/2023

## 2023-06-20 NOTE — PROGRESS NOTES
Cardiac Cath Lab Procedure Area Arrival Note:    Anna Masterson arrived to Cardiac Cath Lab, Procedure Area. Patient identifiers verified with NAME and DATE OF BIRTH. Procedure verified with patient. Consent forms verified. Allergies verified. Patient informed of procedure and plan of care. Questions answered with review. Patient voiced understanding of procedure and plan of care. Patient on cardiac monitor, non-invasive blood pressure, SPO2 monitor. Patient status doing well without problems. Patient is A&Ox 4. Patient reports no CP or SOB. Patient medicated during procedure with orders obtained and verified by Dr. Opal Wood. Refer to patients Cardiac Cath Lab PROCEDURE REPORT for vital signs, assessment, status, and response during procedure, printed at end of case. Printed report on chart or scanned into chart.

## 2023-06-20 NOTE — PROGRESS NOTES
EEP/Cath LAB to Recovery Room Report    Procedure: Ian PERSAUD: Angelika Drake    Verbal Report given to Recovery Nurse on patient being transferred to Recovery Room for routine post-op. Patient stable upon transfer to . Pt had general sedation with Anesthesia team, who managed MAR, vitals, and airway. Vitals, mental status, MAR, procedural summary discussed with recovery RN. Procedural Site:    Right femoral vein 15fr sheath removed, closed with perclose.

## 2023-06-20 NOTE — PROGRESS NOTES
AC plan is asa/plavix x 3 months. Unable to tolerate DOAC d/t recent GI bleeding. Benefits outweigh risks. Plan for TTE limited in AM to eval for pericardial effusion post procedure.     Likely d/c home in AM.

## 2023-06-21 ENCOUNTER — APPOINTMENT (OUTPATIENT)
Facility: HOSPITAL | Age: 88
DRG: 274 | End: 2023-06-21
Attending: INTERNAL MEDICINE
Payer: MEDICARE

## 2023-06-21 VITALS
HEART RATE: 83 BPM | TEMPERATURE: 98 F | SYSTOLIC BLOOD PRESSURE: 137 MMHG | DIASTOLIC BLOOD PRESSURE: 76 MMHG | OXYGEN SATURATION: 93 % | WEIGHT: 138.89 LBS | BODY MASS INDEX: 23.14 KG/M2 | HEIGHT: 65 IN | RESPIRATION RATE: 16 BRPM

## 2023-06-21 LAB
ECHO BSA: 1.7 M2
ECHO LV FRACTIONAL SHORTENING: 29 % (ref 28–44)
ECHO LV INTERNAL DIMENSION DIASTOLE INDEX: 2.84 CM/M2
ECHO LV INTERNAL DIMENSION DIASTOLIC: 4.8 CM (ref 3.9–5.3)
ECHO LV INTERNAL DIMENSION SYSTOLIC INDEX: 2.01 CM/M2
ECHO LV INTERNAL DIMENSION SYSTOLIC: 3.4 CM
ECHO LV IVSD: 1 CM (ref 0.6–0.9)
ECHO LV MASS 2D: 147.8 G (ref 67–162)
ECHO LV MASS INDEX 2D: 87.4 G/M2 (ref 43–95)
ECHO LV POSTERIOR WALL DIASTOLIC: 0.8 CM (ref 0.6–0.9)
ECHO LV RELATIVE WALL THICKNESS RATIO: 0.33

## 2023-06-21 PROCEDURE — 2580000003 HC RX 258: Performed by: NURSE PRACTITIONER

## 2023-06-21 PROCEDURE — 6370000000 HC RX 637 (ALT 250 FOR IP): Performed by: NURSE PRACTITIONER

## 2023-06-21 PROCEDURE — 93308 TTE F-UP OR LMTD: CPT

## 2023-06-21 RX ORDER — LEVOTHYROXINE SODIUM 88 UG/1
88 TABLET ORAL
Status: DISCONTINUED | OUTPATIENT
Start: 2023-06-21 | End: 2023-06-21 | Stop reason: HOSPADM

## 2023-06-21 RX ORDER — DEXTROSE MONOHYDRATE 100 MG/ML
INJECTION, SOLUTION INTRAVENOUS CONTINUOUS PRN
Status: DISCONTINUED | OUTPATIENT
Start: 2023-06-21 | End: 2023-06-21 | Stop reason: HOSPADM

## 2023-06-21 RX ORDER — ASPIRIN 81 MG/1
81 TABLET ORAL DAILY
Qty: 30 TABLET | Refills: 3 | Status: SHIPPED | OUTPATIENT
Start: 2023-06-21

## 2023-06-21 RX ORDER — ASPIRIN 81 MG/1
81 TABLET ORAL DAILY
Status: DISCONTINUED | OUTPATIENT
Start: 2023-06-21 | End: 2023-06-21 | Stop reason: HOSPADM

## 2023-06-21 RX ORDER — CLOPIDOGREL BISULFATE 75 MG/1
75 TABLET ORAL DAILY
Status: DISCONTINUED | OUTPATIENT
Start: 2023-06-21 | End: 2023-06-21 | Stop reason: HOSPADM

## 2023-06-21 RX ORDER — CLOPIDOGREL BISULFATE 75 MG/1
75 TABLET ORAL DAILY
Qty: 30 TABLET | Refills: 3 | Status: SHIPPED | OUTPATIENT
Start: 2023-06-21

## 2023-06-21 RX ORDER — ROSUVASTATIN CALCIUM 10 MG/1
10 TABLET, COATED ORAL NIGHTLY
Status: DISCONTINUED | OUTPATIENT
Start: 2023-06-21 | End: 2023-06-21 | Stop reason: HOSPADM

## 2023-06-21 RX ORDER — AMLODIPINE BESYLATE 5 MG/1
10 TABLET ORAL DAILY
Status: DISCONTINUED | OUTPATIENT
Start: 2023-06-21 | End: 2023-06-21 | Stop reason: HOSPADM

## 2023-06-21 RX ORDER — LISINOPRIL 20 MG/1
20 TABLET ORAL DAILY
Status: DISCONTINUED | OUTPATIENT
Start: 2023-06-21 | End: 2023-06-21 | Stop reason: HOSPADM

## 2023-06-21 RX ORDER — ASPIRIN 81 MG/1
81 TABLET ORAL DAILY
Qty: 30 TABLET | Refills: 3 | Status: SHIPPED | OUTPATIENT
Start: 2023-06-21 | End: 2023-06-21 | Stop reason: HOSPADM

## 2023-06-21 RX ORDER — GLIPIZIDE 5 MG/1
2.5 TABLET ORAL
Status: DISCONTINUED | OUTPATIENT
Start: 2023-06-21 | End: 2023-06-21 | Stop reason: HOSPADM

## 2023-06-21 RX ADMIN — GLIPIZIDE 2.5 MG: 5 TABLET ORAL at 09:43

## 2023-06-21 RX ADMIN — AMLODIPINE BESYLATE 10 MG: 5 TABLET ORAL at 09:44

## 2023-06-21 RX ADMIN — SERTRALINE HYDROCHLORIDE 25 MG: 50 TABLET ORAL at 09:38

## 2023-06-21 RX ADMIN — SODIUM CHLORIDE, PRESERVATIVE FREE 10 ML: 5 INJECTION INTRAVENOUS at 09:49

## 2023-06-21 RX ADMIN — LEVOTHYROXINE SODIUM 88 MCG: 0.09 TABLET ORAL at 09:47

## 2023-06-21 RX ADMIN — LISINOPRIL 20 MG: 20 TABLET ORAL at 09:47

## 2023-06-21 RX ADMIN — CLOPIDOGREL BISULFATE 75 MG: 75 TABLET ORAL at 09:47

## 2023-06-21 RX ADMIN — ASPIRIN 81 MG: 81 TABLET, COATED ORAL at 09:47

## 2023-06-21 NOTE — CARE COORDINATION
Care Management Initial Assessment       RUR: 8% Low   Readmission? No  1st IM letter given? Yes - 06/20  1st  letter given: Yes - 06/21    CM met with pt to introduce self and role and assess POLLO and DC needs. Pt is independent. DC to home with family providing transportation is disposition plan.         06/21/23 0918   Service Assessment   Patient Orientation Alert and Oriented   Cognition Alert   History Provided By Patient   Primary Caregiver Self   Accompanied By/Relationship none   Support Systems Children;Family Members   Patient's Healthcare Decision Maker is: Legal Next of Kin  (Eldest daughter Eli Samples [phone])   PCP Verified by CM Yes   Last Visit to PCP Within last 3 months   Prior Functional Level Independent in ADLs/IADLs   Current Functional Level Independent in ADLs/IADLs   Can patient return to prior living arrangement Yes   Ability to make needs known: Good   Family able to assist with home care needs: Yes   Would you like for me to discuss the discharge plan with any other family members/significant others, and if so, who?  No   Financial Resources Medicare   Community Resources None   Social/Functional History   Lives With Alone   Type of [address] One level   Home Access Level entry   [address] shower   Bathroom Toilet Standard   Bathroom Equipment Shower chair   [address] [facility] [address]   Homemaking Responsibilities Yes   Ambulation Assistance Independent   Transfer Assistance Independent   Active  Yes   Mode of Transportation Car   Occupation Retired   Discharge Planning   Type of [address] Medications No   [address] Discharge   Mode of Transport at Discharge Other (see comment)  (Daughter Dotty and/or Christiane Ponce)     RUSH Barron

## 2023-06-21 NOTE — PROGRESS NOTES
Discharge instructions reviewed with patient and medications reviewed with patient and pickup location           Medication List        START taking these medications      aspirin 81 MG EC tablet  Take 1 tablet by mouth daily     clopidogrel 75 MG tablet  Commonly known as: PLAVIX  Take 1 tablet by mouth daily            CONTINUE taking these medications      amLODIPine 10 MG tablet  Commonly known as: NORVASC     ascorbic acid 500 MG tablet  Commonly known as: VITAMIN C     atenolol 25 MG tablet  Commonly known as: TENORMIN     calcium carbonate 1500 (600 Ca) MG Tabs tablet     FISH OIL PO     glipiZIDE 2.5 MG extended release tablet  Commonly known as: GLUCOTROL XL     levothyroxine 88 MCG tablet  Commonly known as: SYNTHROID     lidocaine 5 %  Commonly known as: LIDODERM     lisinopril 20 MG tablet  Commonly known as: PRINIVIL;ZESTRIL     potassium chloride 10 MEQ extended release capsule  Commonly known as: MICRO-K     rosuvastatin 10 MG tablet  Commonly known as: CRESTOR     sertraline 25 MG tablet  Commonly known as: ZOLOFT     vitamin D 25 MCG (1000 UT) Caps               Where to Get Your Medications        These medications were sent to [facility]  [address] [phone] - F [phone]  [address] [place] [address] [address] [facility] [address]      Phone: [phone]   aspirin 81 MG EC tablet  clopidogrel 75 MG tablet

## 2023-07-06 ENCOUNTER — TELEPHONE (OUTPATIENT)
Age: 88
End: 2023-07-06

## 2023-07-19 ENCOUNTER — OFFICE VISIT (OUTPATIENT)
Age: 88
End: 2023-07-19
Payer: MEDICARE

## 2023-07-19 VITALS
WEIGHT: 141.8 LBS | BODY MASS INDEX: 23.63 KG/M2 | DIASTOLIC BLOOD PRESSURE: 82 MMHG | SYSTOLIC BLOOD PRESSURE: 136 MMHG | HEART RATE: 66 BPM | OXYGEN SATURATION: 96 % | HEIGHT: 65 IN

## 2023-07-19 DIAGNOSIS — I48.91 ATRIAL FIBRILLATION WITH RAPID VENTRICULAR RESPONSE (HCC): Primary | ICD-10-CM

## 2023-07-19 DIAGNOSIS — Z95.818 PRESENCE OF WATCHMAN LEFT ATRIAL APPENDAGE CLOSURE DEVICE: ICD-10-CM

## 2023-07-19 PROCEDURE — 99214 OFFICE O/P EST MOD 30 MIN: CPT | Performed by: INTERNAL MEDICINE

## 2023-07-19 PROCEDURE — 1036F TOBACCO NON-USER: CPT | Performed by: INTERNAL MEDICINE

## 2023-07-19 RX ORDER — PANTOPRAZOLE SODIUM 40 MG/1
40 TABLET, DELAYED RELEASE ORAL DAILY
COMMUNITY
Start: 2023-04-13

## 2023-07-19 ASSESSMENT — PATIENT HEALTH QUESTIONNAIRE - PHQ9
2. FEELING DOWN, DEPRESSED OR HOPELESS: 0
1. LITTLE INTEREST OR PLEASURE IN DOING THINGS: 0
SUM OF ALL RESPONSES TO PHQ QUESTIONS 1-9: 0
SUM OF ALL RESPONSES TO PHQ QUESTIONS 1-9: 0
SUM OF ALL RESPONSES TO PHQ9 QUESTIONS 1 & 2: 0
SUM OF ALL RESPONSES TO PHQ QUESTIONS 1-9: 0
SUM OF ALL RESPONSES TO PHQ QUESTIONS 1-9: 0

## 2023-07-19 NOTE — PROGRESS NOTES
MD Luigi John, NP                         [facility]. [phone]            Cardiology structural heart disease consult/Progress Note      Annalisa Brown  82 y.o.  1935    Requesting/referring provider: Renee David     Reason for Consult:  Post Watchman follow up     Assessment/Plan:    Paroxysmal atrial fibrillation s/p Watchman 6/20/23 - on asa/plavix x 3 months d/t inability to tolerate DOAC d/t bleeding   Acute rectal bleeding (thought to be s/t diverticulosis vs. Hemorrhoids)  Acute anemia s/t GI bleeding - resolved   Hypertension  DM2-Hgba1c 7.6  6. Hx of small bowel resection, drainage of abscess on 3/26/23      Will set up for Post watchman GABI for Aug 18th. Assuming no juhi-device leak, she will be de-escalated to baby aspirin only after 3 months. Investigations ordered    []    High complexity decision making was performed  []    Patient is at high-risk of decompensation with multiple organ involvement  []    Complex/difficult social determinants of health outcomes  Total of ** minutes were spent on reviewing the records, analyzing investigations and documentation in the chart, on the day of visit including time for examination and time spent with the patient      Investigations personally reviewed and interpreted  03/14/23    TRANSTHORACIC ECHOCARDIOGRAM (TTE) COMPLETE (CONTRAST/BUBBLE/3D PRN) 03/14/2023  5:14 PM (Final)    Narrative  This is a summary report. The complete report is available in the patient's medical record. If you cannot access the medical record, please contact the sending organization for a detailed fax or copy. Left Ventricle: Normal left ventricular systolic function with a visually estimated EF of 55 - 60%. Left ventricle size is normal. Normal wall thickness. Normal wall motion. Grade I diastolic dysfunction with normal LAP.     Signed by: Caleb Elise MD on 3/14/2023  5:14 PM    EKG 3/25/23: NSR         HPI: Radha Hansen

## 2023-07-24 ENCOUNTER — TELEPHONE (OUTPATIENT)
Age: 88
End: 2023-07-24

## 2023-07-24 NOTE — TELEPHONE ENCOUNTER
Spoke with Pt of GABI phillips for8/18/23 at 12pm arrive at [facility] in at the first floor Outpt reg. Desk. Pt Needs a   Pt is to be NPO from midnight the night before.      Medications:   Hold Glipizide day of procedure    VO by /nurse Chyna BARROSO

## 2023-08-07 ENCOUNTER — OFFICE VISIT (OUTPATIENT)
Age: 88
End: 2023-08-07
Payer: MEDICARE

## 2023-08-07 VITALS
HEART RATE: 64 BPM | SYSTOLIC BLOOD PRESSURE: 128 MMHG | OXYGEN SATURATION: 95 % | DIASTOLIC BLOOD PRESSURE: 84 MMHG | TEMPERATURE: 97.5 F | RESPIRATION RATE: 20 BRPM

## 2023-08-07 DIAGNOSIS — R25.9 MIXED ACTION AND RESTING TREMOR: ICD-10-CM

## 2023-08-07 DIAGNOSIS — R25.9 MIXED ACTION AND RESTING TREMOR: Primary | ICD-10-CM

## 2023-08-07 PROCEDURE — 1090F PRES/ABSN URINE INCON ASSESS: CPT

## 2023-08-07 PROCEDURE — 99214 OFFICE O/P EST MOD 30 MIN: CPT

## 2023-08-07 PROCEDURE — G8427 DOCREV CUR MEDS BY ELIG CLIN: HCPCS

## 2023-08-07 PROCEDURE — 1123F ACP DISCUSS/DSCN MKR DOCD: CPT

## 2023-08-07 PROCEDURE — 1036F TOBACCO NON-USER: CPT

## 2023-08-07 PROCEDURE — G8420 CALC BMI NORM PARAMETERS: HCPCS

## 2023-08-07 PROCEDURE — G8400 PT W/DXA NO RESULTS DOC: HCPCS

## 2023-08-07 RX ORDER — TOPIRAMATE 25 MG/1
TABLET ORAL
Qty: 90 TABLET | Refills: 0 | Status: SHIPPED | OUTPATIENT
Start: 2023-08-07

## 2023-08-07 RX ORDER — TOPIRAMATE 100 MG/1
100 TABLET, FILM COATED ORAL
Qty: 30 TABLET | Refills: 3 | Status: SHIPPED | OUTPATIENT
Start: 2023-09-07

## 2023-08-07 RX ORDER — TOPIRAMATE 25 MG/1
TABLET ORAL
Qty: 261 TABLET | OUTPATIENT
Start: 2023-08-07

## 2023-08-07 ASSESSMENT — ENCOUNTER SYMPTOMS: BACK PAIN: 1

## 2023-08-16 ENCOUNTER — OFFICE VISIT (OUTPATIENT)
Age: 88
End: 2023-08-16
Payer: MEDICARE

## 2023-08-16 VITALS
SYSTOLIC BLOOD PRESSURE: 100 MMHG | BODY MASS INDEX: 23.99 KG/M2 | OXYGEN SATURATION: 98 % | HEIGHT: 65 IN | HEART RATE: 48 BPM | WEIGHT: 144 LBS | DIASTOLIC BLOOD PRESSURE: 68 MMHG

## 2023-08-16 DIAGNOSIS — Z95.818 PRESENCE OF WATCHMAN LEFT ATRIAL APPENDAGE CLOSURE DEVICE: ICD-10-CM

## 2023-08-16 DIAGNOSIS — I10 ESSENTIAL (PRIMARY) HYPERTENSION: ICD-10-CM

## 2023-08-16 DIAGNOSIS — I48.0 PAROXYSMAL ATRIAL FIBRILLATION (HCC): Primary | ICD-10-CM

## 2023-08-16 DIAGNOSIS — I95.1 ORTHOSTASIS: ICD-10-CM

## 2023-08-16 PROCEDURE — G8428 CUR MEDS NOT DOCUMENT: HCPCS | Performed by: STUDENT IN AN ORGANIZED HEALTH CARE EDUCATION/TRAINING PROGRAM

## 2023-08-16 PROCEDURE — G8400 PT W/DXA NO RESULTS DOC: HCPCS | Performed by: STUDENT IN AN ORGANIZED HEALTH CARE EDUCATION/TRAINING PROGRAM

## 2023-08-16 PROCEDURE — 1090F PRES/ABSN URINE INCON ASSESS: CPT | Performed by: STUDENT IN AN ORGANIZED HEALTH CARE EDUCATION/TRAINING PROGRAM

## 2023-08-16 PROCEDURE — 3074F SYST BP LT 130 MM HG: CPT | Performed by: STUDENT IN AN ORGANIZED HEALTH CARE EDUCATION/TRAINING PROGRAM

## 2023-08-16 PROCEDURE — 99214 OFFICE O/P EST MOD 30 MIN: CPT | Performed by: STUDENT IN AN ORGANIZED HEALTH CARE EDUCATION/TRAINING PROGRAM

## 2023-08-16 PROCEDURE — 1123F ACP DISCUSS/DSCN MKR DOCD: CPT | Performed by: STUDENT IN AN ORGANIZED HEALTH CARE EDUCATION/TRAINING PROGRAM

## 2023-08-16 PROCEDURE — 1036F TOBACCO NON-USER: CPT | Performed by: STUDENT IN AN ORGANIZED HEALTH CARE EDUCATION/TRAINING PROGRAM

## 2023-08-16 PROCEDURE — G8420 CALC BMI NORM PARAMETERS: HCPCS | Performed by: STUDENT IN AN ORGANIZED HEALTH CARE EDUCATION/TRAINING PROGRAM

## 2023-08-16 PROCEDURE — 3078F DIAST BP <80 MM HG: CPT | Performed by: STUDENT IN AN ORGANIZED HEALTH CARE EDUCATION/TRAINING PROGRAM

## 2023-08-16 RX ORDER — AMLODIPINE BESYLATE 5 MG/1
5 TABLET ORAL DAILY
Qty: 90 TABLET | Refills: 3 | Status: SHIPPED | OUTPATIENT
Start: 2023-08-16

## 2023-08-16 ASSESSMENT — PATIENT HEALTH QUESTIONNAIRE - PHQ9
SUM OF ALL RESPONSES TO PHQ QUESTIONS 1-9: 2
1. LITTLE INTEREST OR PLEASURE IN DOING THINGS: 1
SUM OF ALL RESPONSES TO PHQ9 QUESTIONS 1 & 2: 2
SUM OF ALL RESPONSES TO PHQ QUESTIONS 1-9: 2
2. FEELING DOWN, DEPRESSED OR HOPELESS: 1
SUM OF ALL RESPONSES TO PHQ QUESTIONS 1-9: 2
SUM OF ALL RESPONSES TO PHQ QUESTIONS 1-9: 2

## 2023-08-16 NOTE — PROGRESS NOTES
Chief Complaint   Patient presents with    Follow-up     4 mo     Atrial Fibrillation     Vitals:    08/16/23 0924   BP: 100/68   Site: Right Upper Arm   Position: Sitting   Pulse: (!) 48   SpO2: 98%   Weight: 144 lb (65.3 kg)   Height: 5' 4.96\" (1.65 m)         Chest pain denied     SOB denied     Palpitations denied     Swelling in hands/feet denied     Dizziness denied     Recent hospital stays denied     Refills denied

## 2023-08-16 NOTE — PROGRESS NOTES
Vidal Campos, DO  [address], [address] [place] [address]    Office [phone],EXY [phone]           Tosin Jorge is a 82 y.o. female presents the office for follow-up evaluation      Assessment/Recommendations:     Diagnosis Orders   1. Paroxysmal atrial fibrillation (HCC)        2. Essential (primary) hypertension        3. Presence of Watchman left atrial appendage closure device        4. Orthostasis             pAfib.  newly discovered, converted to NSR spontaneously 3/2023   - s/p watchman due high chadsvasc with high fall risk. Scheduled for follow-up GABI later this week. Bradycardia. D/c atenolol. Essential hypertension with ongoing symptoms of orthostasis   -On atenolol 25 mg twice daily, amlodipine 10 mg daily, lisinopril 20 mg daily. Recommend to discontinue atenolol per above. Decrease amlodipine to 5 mg daily and lisinopril 20 mg daily. Continue to monitor home blood pressure. May need to down titrate further. HLD    - cont rosouvastatin 10 mg daily       hx TIA       Primary Care Physician- [facility]    Follow-up 6 weeks with nurse practitioner to follow blood pressure        Subjective:  82 y.o. presents the office for follow-up evaluation. Initially presented with atrial fibrillation rapid ventricle response in March 2023. High [id number] with high fall risk. Underwent watchman placement. Remains on aspirin and Plavix. Scheduled for repeat GABI later this week to assure left atrial appendage occlusion.   Reports feeling lightheaded and dizzy when she stands up from a seated position, often having to stand for several minutes before she gets her bearings        Past Medical History:   Diagnosis Date    Arthritis     Atrial fibrillation ([address])     detected 3/13/2023    Depression     Diabetes (HCC)     GERD (gastroesophageal reflux disease)     High cholesterol     Hypertension     Snoring     Transient ischemic attack

## 2023-08-17 ENCOUNTER — TELEPHONE (OUTPATIENT)
Age: 88
End: 2023-08-17

## 2023-08-17 NOTE — TELEPHONE ENCOUNTER
Patient would like a call from [facility] regarding her medication Topomax she has had Diarrhea for over a week since taking the medication.     Please contact

## 2023-08-17 NOTE — TELEPHONE ENCOUNTER
Spoke To Pt:  Just a reminder not to forget your GABI scheduled for tomorrow. Arriving at [address] will need a    NPO from midnight   check in at the 1st floor outpt. reg. Desk. Medications:  Hold Glipizide in the morning   VO by /nurse Jennifer RIOS

## 2023-08-18 ENCOUNTER — TELEPHONE (OUTPATIENT)
Age: 88
End: 2023-08-18

## 2023-08-18 ENCOUNTER — HOSPITAL ENCOUNTER (OUTPATIENT)
Facility: HOSPITAL | Age: 88
Discharge: HOME | End: 2023-08-18
Attending: INTERNAL MEDICINE | Admitting: INTERNAL MEDICINE
Payer: MEDICARE

## 2023-08-18 VITALS
HEIGHT: 65 IN | BODY MASS INDEX: 23.99 KG/M2 | HEART RATE: 53 BPM | RESPIRATION RATE: 13 BRPM | SYSTOLIC BLOOD PRESSURE: 139 MMHG | OXYGEN SATURATION: 94 % | WEIGHT: 144 LBS | DIASTOLIC BLOOD PRESSURE: 77 MMHG

## 2023-08-18 DIAGNOSIS — I48.91 ATRIAL FIBRILLATION (HCC): ICD-10-CM

## 2023-08-18 LAB — ECHO BSA: 1.73 M2

## 2023-08-18 PROCEDURE — 93325 DOPPLER ECHO COLOR FLOW MAPG: CPT | Performed by: INTERNAL MEDICINE

## 2023-08-18 PROCEDURE — 93312 ECHO TRANSESOPHAGEAL: CPT | Performed by: INTERNAL MEDICINE

## 2023-08-18 PROCEDURE — 6370000000 HC RX 637 (ALT 250 FOR IP): Performed by: INTERNAL MEDICINE

## 2023-08-18 PROCEDURE — 99152 MOD SED SAME PHYS/QHP 5/>YRS: CPT

## 2023-08-18 PROCEDURE — 93312 ECHO TRANSESOPHAGEAL: CPT

## 2023-08-18 PROCEDURE — 6360000002 HC RX W HCPCS: Performed by: INTERNAL MEDICINE

## 2023-08-18 PROCEDURE — 93320 DOPPLER ECHO COMPLETE: CPT | Performed by: INTERNAL MEDICINE

## 2023-08-18 RX ORDER — MIDAZOLAM HYDROCHLORIDE 1 MG/ML
INJECTION INTRAMUSCULAR; INTRAVENOUS
Status: DISCONTINUED
Start: 2023-08-18 | End: 2023-08-18 | Stop reason: HOSPADM

## 2023-08-18 RX ORDER — LIDOCAINE HYDROCHLORIDE 20 MG/ML
JELLY TOPICAL PRN
Status: COMPLETED | OUTPATIENT
Start: 2023-08-18 | End: 2023-08-18

## 2023-08-18 RX ORDER — LIDOCAINE 50 MG/G
OINTMENT TOPICAL PRN
Status: COMPLETED | OUTPATIENT
Start: 2023-08-18 | End: 2023-08-18

## 2023-08-18 RX ORDER — CLOPIDOGREL BISULFATE 75 MG/1
75 TABLET ORAL DAILY
Qty: 30 TABLET | Refills: 1 | Status: SHIPPED
Start: 2023-08-18

## 2023-08-18 RX ORDER — LIDOCAINE 50 MG/G
OINTMENT TOPICAL
Status: DISCONTINUED
Start: 2023-08-18 | End: 2023-08-18 | Stop reason: HOSPADM

## 2023-08-18 RX ORDER — LIDOCAINE HYDROCHLORIDE 20 MG/ML
SOLUTION OROPHARYNGEAL PRN
Status: COMPLETED | OUTPATIENT
Start: 2023-08-18 | End: 2023-08-18

## 2023-08-18 RX ORDER — LIDOCAINE HYDROCHLORIDE 20 MG/ML
SOLUTION OROPHARYNGEAL
Status: DISCONTINUED
Start: 2023-08-18 | End: 2023-08-18 | Stop reason: HOSPADM

## 2023-08-18 RX ORDER — FENTANYL CITRATE 50 UG/ML
INJECTION, SOLUTION INTRAMUSCULAR; INTRAVENOUS
Status: DISCONTINUED
Start: 2023-08-18 | End: 2023-08-18 | Stop reason: HOSPADM

## 2023-08-18 RX ORDER — LIDOCAINE HYDROCHLORIDE 20 MG/ML
JELLY TOPICAL
Status: DISCONTINUED
Start: 2023-08-18 | End: 2023-08-18 | Stop reason: HOSPADM

## 2023-08-18 RX ORDER — MIDAZOLAM HYDROCHLORIDE 1 MG/ML
INJECTION INTRAMUSCULAR; INTRAVENOUS PRN
Status: COMPLETED | OUTPATIENT
Start: 2023-08-18 | End: 2023-08-18

## 2023-08-18 RX ORDER — FENTANYL CITRATE 50 UG/ML
INJECTION, SOLUTION INTRAMUSCULAR; INTRAVENOUS PRN
Status: COMPLETED | OUTPATIENT
Start: 2023-08-18 | End: 2023-08-18

## 2023-08-18 RX ADMIN — LIDOCAINE HYDROCHLORIDE 5 ML: 20 JELLY TOPICAL at 12:28

## 2023-08-18 RX ADMIN — LIDOCAINE HYDROCHLORIDE 15 ML: 20 SOLUTION ORAL; TOPICAL at 11:50

## 2023-08-18 RX ADMIN — FENTANYL CITRATE 50 MCG: 50 INJECTION, SOLUTION INTRAMUSCULAR; INTRAVENOUS at 12:25

## 2023-08-18 RX ADMIN — MIDAZOLAM HYDROCHLORIDE 2 MG: 1 INJECTION, SOLUTION INTRAMUSCULAR; INTRAVENOUS at 12:25

## 2023-08-18 RX ADMIN — Medication 5 ML: at 12:01

## 2023-08-18 ASSESSMENT — PAIN - FUNCTIONAL ASSESSMENT: PAIN_FUNCTIONAL_ASSESSMENT: NONE - DENIES PAIN

## 2023-08-18 NOTE — H&P
MD Swati Lorenzo NP                         [facility]. [phone]            Cardiology structural heart disease consult/Progress Note      Moustapha Underwood  82 y.o.  1935    Requesting/referring provider: Elliot Vazquez     Reason for Consult: Discussion of alternatives to long-term oral anticoagulation in the setting of recurrent paroxysmal atrial fibrillation. Assessment/Plan:    Paroxysmal atrial fibrillation -eliquis stopped   Acute rectal bleeding (thought to be s/t diverticulosis vs. Hemorrhoids)  Acute anemia s/t GI bleeding   Hypertension  DM2-Hgba1c 7.6  6. Hx of small bowel resection, drainage of abscess on 3/26/23      Moustapha Underwood is here to get GABI for post watchman evaluation. I discussed the risks/benefits/alternatives of the procedure with the patient. Risks include (but are not limited to) bleeding, infection,stroke,heart attack, need for emergency surgery/pericardiocentesis, need for dialysis or death. The patient understands and agrees to proceed. Investigations ordered    []    High complexity decision making was performed  []    Patient is at high-risk of decompensation with multiple organ involvement  []    Complex/difficult social determinants of health outcomes  Total of ** minutes were spent on reviewing the records, analyzing investigations and documentation in the chart, on the day of visit including time for examination and time spent with the patient      Investigations personally reviewed and interpreted  03/14/23    TRANSTHORACIC ECHOCARDIOGRAM (TTE) COMPLETE (CONTRAST/BUBBLE/3D PRN) 03/14/2023  5:14 PM (Final)    Narrative  This is a summary report. The complete report is available in the patient's medical record. If you cannot access the medical record, please contact the sending organization for a detailed fax or copy. Left Ventricle: Normal left ventricular systolic function with a visually estimated EF of 55 - 60%.  Left 0.83 06/20/2023 10:37 AM    GLUCOSE 192 06/20/2023 10:37 AM    CALCIUM 9.6 06/20/2023 10:37 AM    LABGLOM >60 06/20/2023 10:37 AM      Lab Results   Component Value Date    WBC 9.1 06/20/2023    HGB 14.9 06/20/2023    HCT 45.4 06/20/2023    MCV 96.2 06/20/2023     06/20/2023           ATTENTION:   This medical record was transcribed using an electronic medical records/speech recognition system. Although proofread, it may and can contain electronic, spelling and other errors. Corrections may be executed at a later time. Please feel free to contact us for any clarifications as needed.     [facility] heart and Vascular [place]  Bruce DOWNING [facility],  [facility]. [phone]

## 2023-08-18 NOTE — DISCHARGE INSTRUCTIONS
GABI demonstrated adequate left atrial appendage closure with watchman with no peridevice leak. Recommend discontinuation of Plavix in another 6 weeks and continuation of aspirin monotherapy after that. Continue follow-up with Dr. Vish Killian. No further follow-up required with Dr. Guevara Fairchild.     Future Appointments   Date Time Provider [address] [place] [address]   10/4/2023  9:40 AM ADRI Freitas NP [facility] AMB   11/9/2023  1:00 PM ADRI Valentino NP [facility] BS AMB

## 2023-08-18 NOTE — PROGRESS NOTES
Patient arrived to Non-Invasive Cardiology Lab for Out Patient GABI Procedure. Staff introduced to patient. Patient identifiers verified with Name and Date of Birth. Procedure verified with patient. Consent forms reviewed and signed by patient or authorized representative and verified. Allergies verified. Patient informed of procedure and plan of care. Questions answered with review. Patient on cardiac monitor, non-invasive blood pressure, SPO2 monitor. Patient is A&Ox3. Patient reports no complaints. Patient belongings and valuables to remain in the room with patient. Patient on stretcher, in low position, with side rails up and brakes locked. Patient instructed to call for assistance as needed. Family in waiting room.

## 2023-08-22 ENCOUNTER — TELEPHONE (OUTPATIENT)
Age: 88
End: 2023-08-22

## 2023-08-22 NOTE — TELEPHONE ENCOUNTER
Called [facility], there is nowhere in chart or connect care that pt has every been on [facility]. S/w [facility] and she does not see where pt even receives meds from their Specialty pharmacy, pt only uses the retail pharmacy for her meds.   Will disregard msg

## 2023-08-28 ENCOUNTER — TELEPHONE (OUTPATIENT)
Age: 88
End: 2023-08-28

## 2023-08-28 NOTE — TELEPHONE ENCOUNTER
Daughter called in regards to pts BP.196/109. States bp has been up for the last week. Please advise.

## 2023-08-28 NOTE — TELEPHONE ENCOUNTER
Patient's daughter is requesting a call to discuss patients medication (Topamax). States it is causing her Diarrhea. Please contact.

## 2023-08-29 ENCOUNTER — TELEPHONE (OUTPATIENT)
Age: 88
End: 2023-08-29

## 2023-08-29 RX ORDER — AMLODIPINE BESYLATE 10 MG/1
10 TABLET ORAL DAILY
Qty: 90 TABLET | Refills: 3 | Status: SHIPPED | OUTPATIENT
Start: 2023-08-29

## 2023-08-29 NOTE — TELEPHONE ENCOUNTER
Please see previous message sent to Neisha KEN.Pt's daughter Linda Fields is calling to find out what plan does the doctor have for her mother's BP being elevated. 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       One Vicki Bell

## 2023-08-29 NOTE — TELEPHONE ENCOUNTER
Received VO from Dr. Yessenia Peacock:      go back up to 10 mg on the amlodipine    Refill per VO of Dr. Jamari Matson. Rita Palafox:    Last appt:  2023    Future Appointments   Date Time Provider [address]   10/4/2023  9:40 AM Russella Rinne, APRN - NP [facility]   2023  1:00 PM ADRI Saravia NP [facility] BS AMB       Requested Prescriptions     Pending Prescriptions Disp Refills    amLODIPine (NORVASC) 10 MG tablet 90 tablet 3     Sig: Take 1 tablet by mouth daily         Spoke with the pts daughter, , identified the pt with name and . Informed her of the increase to 10 mg daily. Her daughter expressed understanding and agreement. Pt has no further questions or concerns at this time.

## 2023-08-31 NOTE — TELEPHONE ENCOUNTER
Patient daughter calling back to discuss medication Topirmate and need to discuss it also causing the patient to have bowel issues.

## 2023-09-01 ENCOUNTER — TELEPHONE (OUTPATIENT)
Age: 88
End: 2023-09-01

## 2023-09-01 NOTE — TELEPHONE ENCOUNTER
PT's daughter called Pt BP is elevated today  BP readings this morning    173/105  163/105  161/101  Pt currently taking Amolodipine from 5-10mg Pt daughter would like call back     PT daughter# [phone]

## 2023-09-01 NOTE — TELEPHONE ENCOUNTER
Spoke with the pts daughter, Teresa Cunningham, identified the pt with name and . I advised her that Dr. Marcelo Persaud doesn't want to make any more changes to her medication at this time. I went over S&S of HTN, and when to either call [facility] or go to the ED. Dotty expressed understanding and agreement. Pt has no further questions or concerns at this time.

## 2023-09-01 NOTE — PROGRESS NOTES
Received VO from Dr. Mary Jo Schultz:    Restart lisinopril at 10mg      I informed Dr. Luc Curtis that the pt is already on lisinopril 20 mg daily. His instructions are no change at this time.

## 2023-09-06 ENCOUNTER — TELEPHONE (OUTPATIENT)
Age: 88
End: 2023-09-06

## 2023-09-06 NOTE — TELEPHONE ENCOUNTER
Patient's daughter is requesting a call back. Is not happy that she has not been able to get a response would like to speak to Isaak. Please contact.

## 2023-09-20 ENCOUNTER — TELEPHONE (OUTPATIENT)
Age: 88
End: 2023-09-20

## 2023-09-20 NOTE — TELEPHONE ENCOUNTER
Patients daughter calling again today requesting a call back.   Please ensure the patient's daughter is called back today by end of day to discuss issues with medication

## 2023-09-20 NOTE — TELEPHONE ENCOUNTER
Returned her call. Daughter is frustrated about not getting a call back. However she is also frustrated with her mother starting a new medication and not notifying her daughter of it. She started the topamax and was on it for 1.5 weeks had sever diarrhea and stopped it on the 16th. Once she stopped the medication it has gone away. She was taking the medication for her tremors. The daughter would like to know what else she can take for the tremors? She hasn't been on anything else for the tremors before. Please advise   The daughter would like to be notified before anything is sent in.

## 2023-09-20 NOTE — TELEPHONE ENCOUNTER
Left message and told patients mother that I would check on meds in the morning and I will call her back

## 2023-09-20 NOTE — TELEPHONE ENCOUNTER
Attempted to call Johnathan Silvestre, the patient's daughter about treatment options for her mother's tremor but there was no answer. Left a message for her to call me back to discuss.

## 2023-09-21 ENCOUNTER — TELEPHONE (OUTPATIENT)
Age: 88
End: 2023-09-21

## 2023-09-21 NOTE — TELEPHONE ENCOUNTER
Patients daughter is returning Daysi's call from yesterday evening 9/20/23 regarding her mother's medication.     Please contact

## 2023-09-21 NOTE — TELEPHONE ENCOUNTER
Patient daughter calling back with another message. She do not want the office to call in any medication for her mother at this time. She has another appt in January 2024 with another provider and will visit her mother current condition with that doctor.

## 2023-09-21 NOTE — TELEPHONE ENCOUNTER
Returned her call. NO answer, left detailed msg. Gave her the name of the medication and instructions on how to take it. She needs to give us a call back to let us know we need to send it in if its ok with her.

## 2023-09-21 NOTE — TELEPHONE ENCOUNTER
Patient's daughter is returning missed call. States she is available to speak with between 8am-2pm and 4pm-5pm.    Please contact.

## 2023-10-03 ENCOUNTER — APPOINTMENT (OUTPATIENT)
Facility: HOSPITAL | Age: 88
End: 2023-10-03
Payer: MEDICARE

## 2023-10-03 ENCOUNTER — HOSPITAL ENCOUNTER (INPATIENT)
Facility: HOSPITAL | Age: 88
LOS: 4 days | Discharge: HOME | End: 2023-10-07
Attending: EMERGENCY MEDICINE | Admitting: INTERNAL MEDICINE
Payer: MEDICARE

## 2023-10-03 DIAGNOSIS — R00.1 SEVERE SINUS BRADYCARDIA: ICD-10-CM

## 2023-10-03 DIAGNOSIS — R07.9 CHEST PAIN AT REST: ICD-10-CM

## 2023-10-03 DIAGNOSIS — I48.91 ATRIAL FIBRILLATION WITH RAPID VENTRICULAR RESPONSE (HCC): Primary | ICD-10-CM

## 2023-10-03 LAB
ALBUMIN SERPL-MCNC: 3.4 G/DL (ref 3.5–5)
ALBUMIN/GLOB SERPL: 1.2 (ref 1.1–2.2)
ALP SERPL-CCNC: 39 U/L (ref 45–117)
ALT SERPL-CCNC: 27 U/L (ref 12–78)
ANION GAP SERPL CALC-SCNC: 5 MMOL/L (ref 5–15)
AST SERPL-CCNC: 18 U/L (ref 15–37)
BASOPHILS # BLD: 0 K/UL (ref 0–0.1)
BASOPHILS NFR BLD: 0 % (ref 0–1)
BILIRUB SERPL-MCNC: 0.5 MG/DL (ref 0.2–1)
BUN SERPL-MCNC: 18 MG/DL (ref 6–20)
BUN/CREAT SERPL: 16 (ref 12–20)
CALCIUM SERPL-MCNC: 8.6 MG/DL (ref 8.5–10.1)
CHLORIDE SERPL-SCNC: 107 MMOL/L (ref 97–108)
CO2 SERPL-SCNC: 25 MMOL/L (ref 21–32)
COMMENT:: NORMAL
CREAT SERPL-MCNC: 1.12 MG/DL (ref 0.55–1.02)
DIFFERENTIAL METHOD BLD: ABNORMAL
EOSINOPHIL # BLD: 0 K/UL (ref 0–0.4)
EOSINOPHIL NFR BLD: 0 % (ref 0–7)
ERYTHROCYTE [DISTWIDTH] IN BLOOD BY AUTOMATED COUNT: 13.1 % (ref 11.5–14.5)
GLOBULIN SER CALC-MCNC: 2.9 G/DL (ref 2–4)
GLUCOSE BLD STRIP.AUTO-MCNC: 228 MG/DL (ref 65–117)
GLUCOSE SERPL-MCNC: 245 MG/DL (ref 65–100)
HCT VFR BLD AUTO: 39.1 % (ref 35–47)
HGB BLD-MCNC: 13.3 G/DL (ref 11.5–16)
IMM GRANULOCYTES # BLD AUTO: 0.1 K/UL (ref 0–0.04)
IMM GRANULOCYTES NFR BLD AUTO: 0 % (ref 0–0.5)
LYMPHOCYTES # BLD: 1.5 K/UL (ref 0.8–3.5)
LYMPHOCYTES NFR BLD: 13 % (ref 12–49)
MAGNESIUM SERPL-MCNC: 1.5 MG/DL (ref 1.6–2.4)
MCH RBC QN AUTO: 32.1 PG (ref 26–34)
MCHC RBC AUTO-ENTMCNC: 34 G/DL (ref 30–36.5)
MCV RBC AUTO: 94.4 FL (ref 80–99)
MONOCYTES # BLD: 1 K/UL (ref 0–1)
MONOCYTES NFR BLD: 9 % (ref 5–13)
NEUTS SEG # BLD: 8.6 K/UL (ref 1.8–8)
NEUTS SEG NFR BLD: 78 % (ref 32–75)
NRBC # BLD: 0 K/UL (ref 0–0.01)
NRBC BLD-RTO: 0 PER 100 WBC
PLATELET # BLD AUTO: 200 K/UL (ref 150–400)
PMV BLD AUTO: 10.7 FL (ref 8.9–12.9)
POTASSIUM SERPL-SCNC: 4 MMOL/L (ref 3.5–5.1)
PROT SERPL-MCNC: 6.3 G/DL (ref 6.4–8.2)
RBC # BLD AUTO: 4.14 M/UL (ref 3.8–5.2)
SERVICE CMNT-IMP: ABNORMAL
SODIUM SERPL-SCNC: 137 MMOL/L (ref 136–145)
SPECIMEN HOLD: NORMAL
TROPONIN I SERPL HS-MCNC: 17 NG/L (ref 0–51)
TROPONIN I SERPL HS-MCNC: 51 NG/L (ref 0–51)
WBC # BLD AUTO: 11.2 K/UL (ref 3.6–11)

## 2023-10-03 PROCEDURE — 85025 COMPLETE CBC W/AUTO DIFF WBC: CPT

## 2023-10-03 PROCEDURE — 2580000003 HC RX 258: Performed by: EMERGENCY MEDICINE

## 2023-10-03 PROCEDURE — 6360000002 HC RX W HCPCS: Performed by: EMERGENCY MEDICINE

## 2023-10-03 PROCEDURE — 36415 COLL VENOUS BLD VENIPUNCTURE: CPT

## 2023-10-03 PROCEDURE — 93005 ELECTROCARDIOGRAM TRACING: CPT | Performed by: EMERGENCY MEDICINE

## 2023-10-03 PROCEDURE — 84484 ASSAY OF TROPONIN QUANT: CPT

## 2023-10-03 PROCEDURE — 96375 TX/PRO/DX INJ NEW DRUG ADDON: CPT

## 2023-10-03 PROCEDURE — 82962 GLUCOSE BLOOD TEST: CPT

## 2023-10-03 PROCEDURE — 1100000000 HC RM PRIVATE

## 2023-10-03 PROCEDURE — 2060000000 HC ICU INTERMEDIATE R&B

## 2023-10-03 PROCEDURE — 83735 ASSAY OF MAGNESIUM: CPT

## 2023-10-03 PROCEDURE — 80053 COMPREHEN METABOLIC PANEL: CPT

## 2023-10-03 PROCEDURE — 2500000003 HC RX 250 WO HCPCS: Performed by: EMERGENCY MEDICINE

## 2023-10-03 PROCEDURE — 2580000003 HC RX 258: Performed by: INTERNAL MEDICINE

## 2023-10-03 PROCEDURE — 99285 EMERGENCY DEPT VISIT HI MDM: CPT

## 2023-10-03 PROCEDURE — 6360000002 HC RX W HCPCS: Performed by: INTERNAL MEDICINE

## 2023-10-03 PROCEDURE — 6370000000 HC RX 637 (ALT 250 FOR IP): Performed by: INTERNAL MEDICINE

## 2023-10-03 PROCEDURE — 71045 X-RAY EXAM CHEST 1 VIEW: CPT

## 2023-10-03 PROCEDURE — 96365 THER/PROPH/DIAG IV INF INIT: CPT

## 2023-10-03 RX ORDER — SODIUM CHLORIDE 9 MG/ML
INJECTION, SOLUTION INTRAVENOUS PRN
Status: DISCONTINUED | OUTPATIENT
Start: 2023-10-03 | End: 2023-10-07 | Stop reason: HOSPADM

## 2023-10-03 RX ORDER — PANTOPRAZOLE SODIUM 40 MG/1
40 TABLET, DELAYED RELEASE ORAL
Status: DISCONTINUED | OUTPATIENT
Start: 2023-10-04 | End: 2023-10-07 | Stop reason: HOSPADM

## 2023-10-03 RX ORDER — ACETAMINOPHEN 650 MG/1
650 SUPPOSITORY RECTAL EVERY 6 HOURS PRN
Status: DISCONTINUED | OUTPATIENT
Start: 2023-10-03 | End: 2023-10-07 | Stop reason: HOSPADM

## 2023-10-03 RX ORDER — POLYETHYLENE GLYCOL 3350 17 G/17G
17 POWDER, FOR SOLUTION ORAL DAILY PRN
Status: DISCONTINUED | OUTPATIENT
Start: 2023-10-03 | End: 2023-10-07 | Stop reason: HOSPADM

## 2023-10-03 RX ORDER — INSULIN LISPRO 100 [IU]/ML
0-4 INJECTION, SOLUTION INTRAVENOUS; SUBCUTANEOUS NIGHTLY
Status: DISCONTINUED | OUTPATIENT
Start: 2023-10-03 | End: 2023-10-07 | Stop reason: HOSPADM

## 2023-10-03 RX ORDER — INSULIN LISPRO 100 [IU]/ML
0-8 INJECTION, SOLUTION INTRAVENOUS; SUBCUTANEOUS
Status: DISCONTINUED | OUTPATIENT
Start: 2023-10-03 | End: 2023-10-07 | Stop reason: HOSPADM

## 2023-10-03 RX ORDER — MAGNESIUM SULFATE 1 G/100ML
1000 INJECTION INTRAVENOUS
Status: COMPLETED | OUTPATIENT
Start: 2023-10-03 | End: 2023-10-03

## 2023-10-03 RX ORDER — 0.9 % SODIUM CHLORIDE 0.9 %
500 INTRAVENOUS SOLUTION INTRAVENOUS ONCE
Status: COMPLETED | OUTPATIENT
Start: 2023-10-03 | End: 2023-10-03

## 2023-10-03 RX ORDER — ASPIRIN 81 MG/1
81 TABLET ORAL DAILY
Status: DISCONTINUED | OUTPATIENT
Start: 2023-10-03 | End: 2023-10-07 | Stop reason: HOSPADM

## 2023-10-03 RX ORDER — PROCHLORPERAZINE EDISYLATE 5 MG/ML
10 INJECTION INTRAMUSCULAR; INTRAVENOUS EVERY 6 HOURS PRN
Status: DISCONTINUED | OUTPATIENT
Start: 2023-10-03 | End: 2023-10-07 | Stop reason: HOSPADM

## 2023-10-03 RX ORDER — SODIUM CHLORIDE 9 MG/ML
INJECTION, SOLUTION INTRAVENOUS CONTINUOUS
Status: DISCONTINUED | OUTPATIENT
Start: 2023-10-03 | End: 2023-10-05

## 2023-10-03 RX ORDER — SENNA AND DOCUSATE SODIUM 50; 8.6 MG/1; MG/1
1 TABLET, FILM COATED ORAL 2 TIMES DAILY
Status: DISCONTINUED | OUTPATIENT
Start: 2023-10-03 | End: 2023-10-07 | Stop reason: HOSPADM

## 2023-10-03 RX ORDER — LISINOPRIL 20 MG/1
20 TABLET ORAL DAILY
Status: DISCONTINUED | OUTPATIENT
Start: 2023-10-04 | End: 2023-10-04

## 2023-10-03 RX ORDER — TOPIRAMATE 25 MG/1
100 TABLET ORAL
Status: DISCONTINUED | OUTPATIENT
Start: 2023-10-03 | End: 2023-10-04

## 2023-10-03 RX ORDER — DILTIAZEM HYDROCHLORIDE 5 MG/ML
10 INJECTION INTRAVENOUS
Status: COMPLETED | OUTPATIENT
Start: 2023-10-03 | End: 2023-10-03

## 2023-10-03 RX ORDER — ACETAMINOPHEN 325 MG/1
650 TABLET ORAL EVERY 6 HOURS PRN
Status: DISCONTINUED | OUTPATIENT
Start: 2023-10-03 | End: 2023-10-07 | Stop reason: HOSPADM

## 2023-10-03 RX ORDER — SODIUM CHLORIDE 0.9 % (FLUSH) 0.9 %
5-40 SYRINGE (ML) INJECTION PRN
Status: DISCONTINUED | OUTPATIENT
Start: 2023-10-03 | End: 2023-10-07 | Stop reason: HOSPADM

## 2023-10-03 RX ORDER — ONDANSETRON 2 MG/ML
4 INJECTION INTRAMUSCULAR; INTRAVENOUS EVERY 6 HOURS PRN
Status: DISCONTINUED | OUTPATIENT
Start: 2023-10-03 | End: 2023-10-07 | Stop reason: HOSPADM

## 2023-10-03 RX ORDER — ENOXAPARIN SODIUM 100 MG/ML
40 INJECTION SUBCUTANEOUS DAILY
Status: DISCONTINUED | OUTPATIENT
Start: 2023-10-03 | End: 2023-10-04

## 2023-10-03 RX ORDER — ROSUVASTATIN CALCIUM 10 MG/1
10 TABLET, COATED ORAL NIGHTLY
Status: DISCONTINUED | OUTPATIENT
Start: 2023-10-03 | End: 2023-10-07 | Stop reason: HOSPADM

## 2023-10-03 RX ORDER — LEVOTHYROXINE SODIUM 88 UG/1
88 TABLET ORAL
Status: DISCONTINUED | OUTPATIENT
Start: 2023-10-04 | End: 2023-10-07 | Stop reason: HOSPADM

## 2023-10-03 RX ORDER — SODIUM CHLORIDE 0.9 % (FLUSH) 0.9 %
5-40 SYRINGE (ML) INJECTION EVERY 12 HOURS SCHEDULED
Status: DISCONTINUED | OUTPATIENT
Start: 2023-10-03 | End: 2023-10-07 | Stop reason: HOSPADM

## 2023-10-03 RX ADMIN — SODIUM CHLORIDE: 9 INJECTION, SOLUTION INTRAVENOUS at 22:08

## 2023-10-03 RX ADMIN — ASPIRIN 81 MG: 81 TABLET, COATED ORAL at 22:08

## 2023-10-03 RX ADMIN — MAGNESIUM SULFATE HEPTAHYDRATE 1000 MG: 1 INJECTION, SOLUTION INTRAVENOUS at 18:25

## 2023-10-03 RX ADMIN — SODIUM CHLORIDE 5 MG/HR: 900 INJECTION, SOLUTION INTRAVENOUS at 19:26

## 2023-10-03 RX ADMIN — ENOXAPARIN SODIUM 40 MG: 100 INJECTION SUBCUTANEOUS at 22:07

## 2023-10-03 RX ADMIN — SODIUM CHLORIDE 500 ML: 9 INJECTION, SOLUTION INTRAVENOUS at 18:25

## 2023-10-03 RX ADMIN — DILTIAZEM HYDROCHLORIDE 10 MG: 5 INJECTION INTRAVENOUS at 17:04

## 2023-10-03 RX ADMIN — SENNOSIDES AND DOCUSATE SODIUM 1 TABLET: 50; 8.6 TABLET ORAL at 22:08

## 2023-10-03 RX ADMIN — TOPIRAMATE 100 MG: 100 TABLET, FILM COATED ORAL at 22:08

## 2023-10-03 RX ADMIN — ROSUVASTATIN CALCIUM 10 MG: 10 TABLET, COATED ORAL at 22:08

## 2023-10-03 ASSESSMENT — LIFESTYLE VARIABLES
HOW MANY STANDARD DRINKS CONTAINING ALCOHOL DO YOU HAVE ON A TYPICAL DAY: PATIENT DOES NOT DRINK
HOW OFTEN DO YOU HAVE A DRINK CONTAINING ALCOHOL: NEVER

## 2023-10-03 ASSESSMENT — ENCOUNTER SYMPTOMS
DIARRHEA: 0
VOMITING: 0
CONSTIPATION: 0
NAUSEA: 0
SHORTNESS OF BREATH: 1
BACK PAIN: 0
ABDOMINAL PAIN: 0
COLOR CHANGE: 0

## 2023-10-03 ASSESSMENT — PAIN SCALES - GENERAL: PAINLEVEL_OUTOF10: 0

## 2023-10-03 ASSESSMENT — PAIN - FUNCTIONAL ASSESSMENT: PAIN_FUNCTIONAL_ASSESSMENT: 0-10

## 2023-10-03 NOTE — H&P
[address] [place] [address]  [phone]        Hospitalist Admission History and Physical      NAME:  Earlene Orozco   :   1935   MRN:  [id number]     PCP:  Cristiano Gaming     Date/Time of service:  10/3/2023  7:42 PM        Subjective:     CHIEF COMPLAINT: chest pain     HISTORY OF PRESENT ILLNESS:     The patient is a 83 yo hx of HTN, DM, Afib s/p watchman, GI bleed, SBO s/p resection, presented w/ chest pain, afib RVR. The patient was otherwise healthy when she developed chest pain tonight. Described as \"dull\", 6/10, non-radiating, not associated with dyspnea or diaphoresis. Denied cough, fevers, chills, nausea, vomiting. In the ED, Trop was neg, but was found to have afib RVR, rate ~140s. Dilt gtt started    Allergies   Allergen Reactions    Oxycodone-Acetaminophen      Other reaction(s): Unknown (comments)  Can't remember reaction due to length of time of occurrence. Prior to Admission medications    Medication Sig Start Date End Date Taking? Authorizing Provider   amLODIPine (NORVASC) 10 MG tablet Take 1 tablet by mouth daily 23   Karissa Jerry DO   clopidogrel (PLAVIX) 75 MG tablet Take 1 tablet by mouth daily 23   Stephen Moses MD   topiramate (TOPAMAX) 25 MG tablet Start Topamax  for prevention of migraines, 25 mg nightly for 1 week then, 50 mg nightly for 1 week then, 75 mg nightly for 1 week then, 100mg nightly thereafter.  You will have 2 prescriptions at the pharmacy-- 1 for the 25 mg tabs and 1 for the 100 mg tabs 23   Zoltan Ndiaye APRN - NP   topiramate (TOPAMAX) 100 MG tablet Take 1 tablet by mouth nightly 23   ADRI Martinez - NP   pantoprazole (PROTONIX) 40 MG tablet Take 1 tablet by mouth daily 23   Historical Provider, MD   aspirin 81 MG EC tablet Take 1 tablet by mouth daily 23   ADRI Bradley - CNP   sertraline (ZOLOFT) 25 MG tablet Take 1 tablet by mouth daily 23 Tobacco Use    Smoking status: Former     Packs/day: 2     Types: Cigarettes     Quit date: 1986     Years since quittin.0    Smokeless tobacco: Never   Substance Use Topics    Alcohol use: Not Currently        Family History   Problem Relation Age of Onset    Ovarian Cancer Sister     Alzheimer's Disease Father     Alzheimer's Disease Paternal Grandfather     Heart Disease Mother         Review of Systems:  (bold if positive, if negative)    Gen:  Eyes:  ENT:  CVS:   chest painPulm:  GI:  :  MS:  Skin:  Psych:  Endo:  Hem:  Renal:  Neuro:          Objective:      VITALS:    Vital signs reviewed; most recent are:    Vitals:    10/03/23 1745   BP: 98/65   Pulse: 99   Resp: 16   Temp:    SpO2: 94%     SpO2 Readings from Last 6 Encounters:   10/03/23 94%   23 94%   23 98%   23 95%   23 96%   23 93%        No intake or output data in the 24 hours ending 10/03/23 194     Exam:     Physical Exam:    Gen:  Well-developed, well-nourished, in no acute distress  HEENT:  Pink conjunctivae, PERRL, hearing intact to voice, moist mucous membranes  Neck:  Supple, without masses, thyroid non-tender  Resp:  No accessory muscle use, clear breath sounds without wheezes rales or rhonchi  Card:  No murmurs, irregular, tachy, normal S1, S2 without thrills, bruits or peripheral edema  Abd:  Soft, non-tender, non-distended, normoactive bowel sounds are present  Lymph:  No cervical adenopathy  Musc:  No cyanosis or clubbing  Skin:  No rashes o  Neuro:  Cranial nerves 3-12 are grossly intact, follows commands appropriately  Psych:  Alert with good insight. Oriented to person, place, and time    Labs:    Recent Labs     10/03/23  1658   WBC 11.2*   HGB 13.3   HCT 39.1        Recent Labs     10/03/23  1658      K 4.0      CO2 25   BUN 18   MG 1.5*   ALT 27     No results found for: \"GLUCPOC\"  No results for input(s): \"PH\", \"PCO2\", \"PO2\", \"HCO3\", \"FIO2\" in the last 72 hours.   No results for input(s): \"INR\" in the last 72 hours. Radiology and EKG reviewed:   CXR reviewed    **Prior records, notes, labs, radiology, and medications reviewed in [facility]**       Assessment/Plan:        81 yo hx of HTN, DM, Afib s/p watchman, GI bleed, SBO s/p resection, presented w/ chest pain, afib RVR    1) Afib RVR: has chronic afib s/p watchman device. Recent GABI in August with EF 55-60%. Off Eliquis due to GI bleed. Will admit to step down. Repeat cardiac enzymes, echo. Start dilt gtt. Cont ASA, statin. Consult Cards    2) Chest pain: non-specific, likely related to afib. Will check echo, cardiac enzymes. Consider chest CTA if worse. Use IV dilaudid prn severe pain    3) HTN: hold norvasc since patient already on dilt. Cont lisinopril    4) DM type 2: check A1C. Hold oral meds.   Start SSI    Risk of deterioration: high      Total time spent with patient care: 70 Minutes (35 min on critical care)  **I personally saw and examined the patient during this time period**                 Care Plan discussed with: Patient, nursing     Discussed:  Care Plan    Prophylaxis:  Lovenox    Probable Disposition:  Home w/Family           ___________________________________________________    Attending Physician: Laura Sam MD

## 2023-10-03 NOTE — ED PROVIDER NOTES
[facility] EMERGENCY DEPT  EMERGENCY DEPARTMENT ENCOUNTER      Pt Name: Chantale Connors  MRN: [id number]  [address] [facility] [place] 1935  Date of evaluation: 10/3/2023  Provider: Bernice Cedeño MD    CHIEF COMPLAINT       Chief Complaint   Patient presents with    Tachycardia         HISTORY OF PRESENT ILLNESS   (Location/Symptom, Timing/Onset, Context/Setting, Quality, Duration, Modifying Factors, Severity)  Note limiting factors. Chantale Connors is a 82 y.o. female who presents to the emergency department      The history is provided by the patient. No  was used. Chest Pain  Pain location:  Substernal area  Pain radiates to:  Does not radiate  Pain severity:  Moderate  Onset quality:  Sudden  Timing:  Rare  Progression:  Resolved  Chronicity:  New  Associated symptoms: shortness of breath    Associated symptoms: no abdominal pain, no back pain, no dizziness, no fever, no headache, no nausea, no numbness, no palpitations, no vomiting and no weakness        Nursing Notes were reviewed. REVIEW OF SYSTEMS    (2-9 systems for level 4, 10 or more for level 5)     Review of Systems   Constitutional:  Negative for activity change, chills and fever. HENT:  Negative for nosebleeds. Eyes:  Negative for visual disturbance. Respiratory:  Positive for shortness of breath. Cardiovascular:  Positive for chest pain. Negative for palpitations. Gastrointestinal:  Negative for abdominal pain, constipation, diarrhea, nausea and vomiting. Genitourinary:  Negative for difficulty urinating, dysuria, hematuria and urgency. Musculoskeletal:  Negative for back pain, neck pain and neck stiffness. Skin:  Negative for color change. Allergic/Immunologic: Negative for immunocompromised state. Neurological:  Negative for dizziness, seizures, syncope, weakness, light-headedness, numbness and headaches. Psychiatric/Behavioral:  Negative for behavioral problems, confusion, hallucinations, self-injury and suicidal ideas.

## 2023-10-03 NOTE — ED TRIAGE NOTES
Pt presents to the ER today via EMS. Pt chief complaint is that of afib rvr. Pt has historyof afib. EMS administered 6mg and 12mg or adenosine with no conversion. They called ER for orderes for Metoprolol. Pt hr still in 140's.

## 2023-10-04 PROBLEM — E78.5 DYSLIPIDEMIA: Status: ACTIVE | Noted: 2023-10-04

## 2023-10-04 PROBLEM — Z79.899 HIGH RISK MEDICATION USE: Status: ACTIVE | Noted: 2023-10-04

## 2023-10-04 PROBLEM — I10 HTN (HYPERTENSION), BENIGN: Status: ACTIVE | Noted: 2023-10-04

## 2023-10-04 PROBLEM — I49.5 TACHY-BRADY SYNDROME (HCC): Status: ACTIVE | Noted: 2023-10-04

## 2023-10-04 LAB
ALBUMIN SERPL-MCNC: 3.4 G/DL (ref 3.5–5)
ALBUMIN/GLOB SERPL: 1.1 (ref 1.1–2.2)
ALP SERPL-CCNC: 43 U/L (ref 45–117)
ALT SERPL-CCNC: 34 U/L (ref 12–78)
ANION GAP SERPL CALC-SCNC: 5 MMOL/L (ref 5–15)
APPEARANCE UR: ABNORMAL
AST SERPL-CCNC: 19 U/L (ref 15–37)
BACTERIA URNS QL MICRO: ABNORMAL /HPF
BASOPHILS # BLD: 0 K/UL (ref 0–0.1)
BASOPHILS NFR BLD: 0 % (ref 0–1)
BILIRUB SERPL-MCNC: 0.6 MG/DL (ref 0.2–1)
BILIRUB UR QL: NEGATIVE
BUN SERPL-MCNC: 15 MG/DL (ref 6–20)
BUN/CREAT SERPL: 19 (ref 12–20)
CALCIUM SERPL-MCNC: 8.6 MG/DL (ref 8.5–10.1)
CHLORIDE SERPL-SCNC: 107 MMOL/L (ref 97–108)
CK SERPL-CCNC: 64 U/L (ref 26–192)
CO2 SERPL-SCNC: 25 MMOL/L (ref 21–32)
COLOR UR: ABNORMAL
CREAT SERPL-MCNC: 0.78 MG/DL (ref 0.55–1.02)
DIFFERENTIAL METHOD BLD: ABNORMAL
EKG DIAGNOSIS: NORMAL
EKG Q-T INTERVAL: 308 MS
EKG QRS DURATION: 74 MS
EKG QTC CALCULATION (BAZETT): 458 MS
EKG R AXIS: -16 DEGREES
EKG T AXIS: 18 DEGREES
EKG VENTRICULAR RATE: 133 BPM
EOSINOPHIL # BLD: 0 K/UL (ref 0–0.4)
EOSINOPHIL NFR BLD: 0 % (ref 0–7)
EPITH CASTS URNS QL MICRO: ABNORMAL /LPF
ERYTHROCYTE [DISTWIDTH] IN BLOOD BY AUTOMATED COUNT: 13 % (ref 11.5–14.5)
EST. AVERAGE GLUCOSE BLD GHB EST-MCNC: 177 MG/DL
GLOBULIN SER CALC-MCNC: 3.1 G/DL (ref 2–4)
GLUCOSE BLD STRIP.AUTO-MCNC: 180 MG/DL (ref 65–117)
GLUCOSE BLD STRIP.AUTO-MCNC: 214 MG/DL (ref 65–117)
GLUCOSE BLD STRIP.AUTO-MCNC: 218 MG/DL (ref 65–117)
GLUCOSE BLD STRIP.AUTO-MCNC: 229 MG/DL (ref 65–117)
GLUCOSE SERPL-MCNC: 225 MG/DL (ref 65–100)
GLUCOSE UR STRIP.AUTO-MCNC: NEGATIVE MG/DL
HBA1C MFR BLD: 7.8 % (ref 4–5.6)
HCT VFR BLD AUTO: 37.3 % (ref 35–47)
HGB BLD-MCNC: 12.5 G/DL (ref 11.5–16)
HGB UR QL STRIP: ABNORMAL
HYALINE CASTS URNS QL MICRO: ABNORMAL /LPF (ref 0–5)
IMM GRANULOCYTES # BLD AUTO: 0.1 K/UL (ref 0–0.04)
IMM GRANULOCYTES NFR BLD AUTO: 1 % (ref 0–0.5)
KETONES UR QL STRIP.AUTO: NEGATIVE MG/DL
LEUKOCYTE ESTERASE UR QL STRIP.AUTO: ABNORMAL
LYMPHOCYTES # BLD: 1.7 K/UL (ref 0.8–3.5)
LYMPHOCYTES NFR BLD: 19 % (ref 12–49)
MAGNESIUM SERPL-MCNC: 1.9 MG/DL (ref 1.6–2.4)
MCH RBC QN AUTO: 31.7 PG (ref 26–34)
MCHC RBC AUTO-ENTMCNC: 33.5 G/DL (ref 30–36.5)
MCV RBC AUTO: 94.7 FL (ref 80–99)
MONOCYTES # BLD: 0.9 K/UL (ref 0–1)
MONOCYTES NFR BLD: 10 % (ref 5–13)
NEUTS SEG # BLD: 6.2 K/UL (ref 1.8–8)
NEUTS SEG NFR BLD: 70 % (ref 32–75)
NITRITE UR QL STRIP.AUTO: NEGATIVE
NRBC # BLD: 0 K/UL (ref 0–0.01)
NRBC BLD-RTO: 0 PER 100 WBC
NT PRO BNP: 1306 PG/ML
PH UR STRIP: 7 (ref 5–8)
PHOSPHATE SERPL-MCNC: 2.4 MG/DL (ref 2.6–4.7)
PLATELET # BLD AUTO: 186 K/UL (ref 150–400)
PMV BLD AUTO: 10.8 FL (ref 8.9–12.9)
POTASSIUM SERPL-SCNC: 4.2 MMOL/L (ref 3.5–5.1)
PROT SERPL-MCNC: 6.5 G/DL (ref 6.4–8.2)
PROT UR STRIP-MCNC: 100 MG/DL
RBC # BLD AUTO: 3.94 M/UL (ref 3.8–5.2)
RBC #/AREA URNS HPF: ABNORMAL /HPF (ref 0–5)
SERVICE CMNT-IMP: ABNORMAL
SODIUM SERPL-SCNC: 137 MMOL/L (ref 136–145)
SP GR UR REFRACTOMETRY: 1.01 (ref 1–1.03)
TROPONIN I SERPL HS-MCNC: 193 NG/L (ref 0–51)
TROPONIN I SERPL HS-MCNC: 195 NG/L (ref 0–51)
TROPONIN I SERPL HS-MCNC: 213 NG/L (ref 0–51)
TSH SERPL DL<=0.05 MIU/L-ACNC: 1.29 UIU/ML (ref 0.36–3.74)
URINE CULTURE IF INDICATED: ABNORMAL
UROBILINOGEN UR QL STRIP.AUTO: 1 EU/DL (ref 0.2–1)
WBC # BLD AUTO: 9 K/UL (ref 3.6–11)
WBC URNS QL MICRO: ABNORMAL /HPF (ref 0–4)

## 2023-10-04 PROCEDURE — 84443 ASSAY THYROID STIM HORMONE: CPT

## 2023-10-04 PROCEDURE — 94761 N-INVAS EAR/PLS OXIMETRY MLT: CPT

## 2023-10-04 PROCEDURE — 6360000002 HC RX W HCPCS: Performed by: NURSE PRACTITIONER

## 2023-10-04 PROCEDURE — 97162 PT EVAL MOD COMPLEX 30 MIN: CPT

## 2023-10-04 PROCEDURE — 2500000003 HC RX 250 WO HCPCS: Performed by: INTERNAL MEDICINE

## 2023-10-04 PROCEDURE — 2580000003 HC RX 258: Performed by: INTERNAL MEDICINE

## 2023-10-04 PROCEDURE — APPSS30 APP SPLIT SHARED TIME 16-30 MINUTES: Performed by: NURSE PRACTITIONER

## 2023-10-04 PROCEDURE — 1100000003 HC PRIVATE W/ TELEMETRY

## 2023-10-04 PROCEDURE — 84484 ASSAY OF TROPONIN QUANT: CPT

## 2023-10-04 PROCEDURE — 2580000003 HC RX 258: Performed by: NURSE PRACTITIONER

## 2023-10-04 PROCEDURE — 87086 URINE CULTURE/COLONY COUNT: CPT

## 2023-10-04 PROCEDURE — 87077 CULTURE AEROBIC IDENTIFY: CPT

## 2023-10-04 PROCEDURE — 82550 ASSAY OF CK (CPK): CPT

## 2023-10-04 PROCEDURE — 81001 URINALYSIS AUTO W/SCOPE: CPT

## 2023-10-04 PROCEDURE — 6370000000 HC RX 637 (ALT 250 FOR IP): Performed by: INTERNAL MEDICINE

## 2023-10-04 PROCEDURE — 97530 THERAPEUTIC ACTIVITIES: CPT

## 2023-10-04 PROCEDURE — 97535 SELF CARE MNGMENT TRAINING: CPT

## 2023-10-04 PROCEDURE — 82962 GLUCOSE BLOOD TEST: CPT

## 2023-10-04 PROCEDURE — 2060000000 HC ICU INTERMEDIATE R&B

## 2023-10-04 PROCEDURE — 85025 COMPLETE CBC W/AUTO DIFF WBC: CPT

## 2023-10-04 PROCEDURE — 83036 HEMOGLOBIN GLYCOSYLATED A1C: CPT

## 2023-10-04 PROCEDURE — 93005 ELECTROCARDIOGRAM TRACING: CPT | Performed by: INTERNAL MEDICINE

## 2023-10-04 PROCEDURE — 97116 GAIT TRAINING THERAPY: CPT

## 2023-10-04 PROCEDURE — 84100 ASSAY OF PHOSPHORUS: CPT

## 2023-10-04 PROCEDURE — 6360000002 HC RX W HCPCS: Performed by: INTERNAL MEDICINE

## 2023-10-04 PROCEDURE — 80053 COMPREHEN METABOLIC PANEL: CPT

## 2023-10-04 PROCEDURE — 87186 SC STD MICRODIL/AGAR DIL: CPT

## 2023-10-04 PROCEDURE — 97165 OT EVAL LOW COMPLEX 30 MIN: CPT

## 2023-10-04 PROCEDURE — 93010 ELECTROCARDIOGRAM REPORT: CPT | Performed by: STUDENT IN AN ORGANIZED HEALTH CARE EDUCATION/TRAINING PROGRAM

## 2023-10-04 PROCEDURE — 36415 COLL VENOUS BLD VENIPUNCTURE: CPT

## 2023-10-04 PROCEDURE — 83735 ASSAY OF MAGNESIUM: CPT

## 2023-10-04 PROCEDURE — 99223 1ST HOSP IP/OBS HIGH 75: CPT | Performed by: INTERNAL MEDICINE

## 2023-10-04 PROCEDURE — 2000000000 HC ICU R&B

## 2023-10-04 PROCEDURE — 83880 ASSAY OF NATRIURETIC PEPTIDE: CPT

## 2023-10-04 PROCEDURE — 6370000000 HC RX 637 (ALT 250 FOR IP): Performed by: NURSE PRACTITIONER

## 2023-10-04 RX ORDER — CLOPIDOGREL BISULFATE 75 MG/1
75 TABLET ORAL DAILY
Status: DISCONTINUED | OUTPATIENT
Start: 2023-10-04 | End: 2023-10-07 | Stop reason: HOSPADM

## 2023-10-04 RX ORDER — LISINOPRIL 20 MG/1
20 TABLET ORAL DAILY
Status: DISCONTINUED | OUTPATIENT
Start: 2023-10-04 | End: 2023-10-05

## 2023-10-04 RX ORDER — AMIODARONE HYDROCHLORIDE 200 MG/1
200 TABLET ORAL DAILY
Status: DISCONTINUED | OUTPATIENT
Start: 2023-10-05 | End: 2023-10-05

## 2023-10-04 RX ADMIN — WATER 1000 MG: 1 INJECTION INTRAMUSCULAR; INTRAVENOUS; SUBCUTANEOUS at 12:54

## 2023-10-04 RX ADMIN — INSULIN LISPRO 2 UNITS: 100 INJECTION, SOLUTION INTRAVENOUS; SUBCUTANEOUS at 11:52

## 2023-10-04 RX ADMIN — SENNOSIDES AND DOCUSATE SODIUM 1 TABLET: 50; 8.6 TABLET ORAL at 20:15

## 2023-10-04 RX ADMIN — SODIUM CHLORIDE 15 MG/HR: 900 INJECTION, SOLUTION INTRAVENOUS at 02:27

## 2023-10-04 RX ADMIN — INSULIN LISPRO 2 UNITS: 100 INJECTION, SOLUTION INTRAVENOUS; SUBCUTANEOUS at 09:24

## 2023-10-04 RX ADMIN — SENNOSIDES AND DOCUSATE SODIUM 1 TABLET: 50; 8.6 TABLET ORAL at 09:23

## 2023-10-04 RX ADMIN — SODIUM CHLORIDE: 9 INJECTION, SOLUTION INTRAVENOUS at 16:06

## 2023-10-04 RX ADMIN — SODIUM CHLORIDE, PRESERVATIVE FREE 10 ML: 5 INJECTION INTRAVENOUS at 20:15

## 2023-10-04 RX ADMIN — AMIODARONE HYDROCHLORIDE 0.5 MG/MIN: 50 INJECTION, SOLUTION INTRAVENOUS at 22:36

## 2023-10-04 RX ADMIN — SODIUM CHLORIDE, PRESERVATIVE FREE 10 ML: 5 INJECTION INTRAVENOUS at 09:24

## 2023-10-04 RX ADMIN — LISINOPRIL 20 MG: 20 TABLET ORAL at 11:58

## 2023-10-04 RX ADMIN — ASPIRIN 81 MG: 81 TABLET, COATED ORAL at 09:23

## 2023-10-04 RX ADMIN — PANTOPRAZOLE SODIUM 40 MG: 40 TABLET, DELAYED RELEASE ORAL at 09:23

## 2023-10-04 RX ADMIN — SERTRALINE HYDROCHLORIDE 25 MG: 25 TABLET ORAL at 09:23

## 2023-10-04 RX ADMIN — AMIODARONE HYDROCHLORIDE 1 MG/MIN: 50 INJECTION, SOLUTION INTRAVENOUS at 14:32

## 2023-10-04 RX ADMIN — LEVOTHYROXINE SODIUM 88 MCG: 88 TABLET ORAL at 09:24

## 2023-10-04 RX ADMIN — ROSUVASTATIN CALCIUM 10 MG: 10 TABLET, COATED ORAL at 20:15

## 2023-10-04 NOTE — FLOWSHEET NOTE
Trop 195 - will trend. Patient is w/o chest pain. Nursing to continue to monitor. ADDENDUM: Repeat trop - 213. Will continue to trend. Patient w/o chest pain. Nursing to continue to monitor.

## 2023-10-04 NOTE — CARE COORDINATION
10/04/23 1411   Service Assessment   Patient Orientation Alert and Oriented   Cognition Alert   History Provided By Patient   Primary [address] is: Legal Next of Kin  ([address] @ [phone])   PCP Verified by CM Yes  (Dr Huseyin Meraz)   Last Visit to PCP Within last 6 months   Prior Functional Level Assistance with the following:;Mobility  (uses rollator in her home)   Current Functional Level Assistance with the following:;Mobility   Can patient return to prior living arrangement Yes   Ability to make needs known: Good   Family able to assist with home care needs: Yes   Would you like for me to discuss the discharge plan with any other family members/significant others, and if so, who? Yes  (my daughter)   Financial Resources Medicare; Other (Comment)  ()   Community Resources None   CM/SW Referral Disease Management Education   Social/Functional History   Lives With Alone   Type of [address] [place] [address] Dr One level   Home Access Stairs to enter with rails; Level entry   Entrance Stairs - Number of Steps 4   Entrance Stairs - Rails Both   Bathroom Shower/Tub Walk-in shower   Bathroom Equipment Grab bars in [address] Help From Family   ADL Assistance Independent   Homemaking Assistance Independent   Ambulation Assistance Independent   Transfer Assistance Independent   Active  No   Occupation Retired   Discharge Planning   Type of [address] [place] [address]  Prior To Admission None   Potential [address]  (may need home PT)   Potential DME Needed Walker   DME Ordered? Other (comment)  (once PT recommends RW)   Potential Assistance Purchasing Medications No   Type of Home Care Services PT   Patient expects to be discharged to: House     I met with pt and her daughter to discuss discharge needs.   Pt told

## 2023-10-04 NOTE — PROGRESS NOTES
[id number]: Pt denying chest pain for remainder of shift. Discussed AM troponin with overnight provider, Jennifer Paulino. On 2L NC. Of note, pt did have some confusion/restlessness overnight and removed PIV's. Sitter at bedside to maintain safety. Diltiazem is infusing, see MAR.

## 2023-10-04 NOTE — CONSULTS
[facility] CARDIOLOGY  Cardiac Electrophysiology Note     [x]Initial Encounter     []Follow-up    Patient Name: Janette Mandujano - TZZ:[id number] - KTL:[id number]  Primary Cardiologist: Jourdan Mccarthy DO  Consulting Cardiologist: Ca Eid MD, [facility]       Reason for encounter:   Tachybradycardia syndrome      HPI:  82-year-old woman with a history of hypertension, diabetes, atrial fibrillation status post watchman implantation (Dr. Juventino Corrales), history of GI bleed, prior small bowel obstruction status post resection presented with chest pain and atrial fibrillation with RVR. Found to have elevated heart rate in the 140s. Diltiazem started with subsequent bradycardia and pauses. EP was consulted regarding the role of pacemaker implantation and management of atrial fibrillation. Patient currently remains in sinus rhythm. Significantly symptomatic in atrial fibrillation. Telemetry demonstrated. No pauses greater than 3 seconds during atrial fibrillation episode. SUBJECTIVE:  Currently denies of any chest pain, shortness of breath, palpitations while she remains in normal sinus rhythm. Assessment and Plan     Tachybradycardia syndrome  Evidence of rapid A-fib with RVR with periods of pauses greater than 3 seconds as well as bradycardia and sinus rhythm. Previously on atenolol which was discontinued for bradycardia as an outpatient. Remains in sinus rhythm right now in the 60s. - I spoke to her in great details regarding various treatment options.   Obviously her beta-blocker tolerance is limited by history of bradycardia  - We will trial rhythm control management with amiodarone low-dose to prevent rapid A-fib with RVR  - We will stop diltiazem drip at this time  - Initiate 24-hour of IV amiodarone load followed by initiation amiodarone 172 mg daily  - Implications, risks associated with amiodarone therapy was explained to patient and her mother in great details. They are concerned about potential risks of dizziness lightheadedness and unsteady gait which she has had issues before  - I explained that if she demonstrated evidence of ongoing bradycardia or unable to tolerate further medical management, the only alternative option would be to proceed with dual-chamber pacer implantation to facilitate medical management of atrial fibrillation  - Pacemaker can be considered as an inpatient if she is unable to tolerate amiodarone. Otherwise we can reassess as an outpatient    Paroxysmal atrial fibrillation  Discovered in March 2023. Had GI bleeding on Eliquis. Status post watchman implantation with Dr. Lilo Pizano.  - Currently on aspirin Plavix we will plan to continue Plavix through 10/18/2023, at that point she can continue aspirin 81 mg daily    Anemia  Due to history of GI bleeding, hemoglobin stable    Hypertension  Resume home lisinopril and Norvasc when heart rate stabilized    If patient demonstrate evidence of ongoing bradycardia, would recommend making n.p.o. after midnight on Thursday for possible pacemaker implantation on Friday      _________________________________  An Cachorro Evans MD, [facility], [address] and Vascular [place]         ____________________________________________________________    Cardiac testing  08/18/23    GABI (CONTRAST/3D PRN) 08/18/2023  1:11 PM (Final)    Interpretation Summary    Left Ventricle: Normal left ventricular systolic function with a visually estimated EF of 55 - 60%. Left ventricle size is normal. Normal wall thickness. Mitral Valve: Mild regurgitation. Left Atrium: Left atrium is mildly dilated. Device closure in the appendage with a Watchman. No pedridevice leak noted. Plavix can be stopped in 2 months. Subsequent follow up with Dr Severo Valencia    Signed by: Rachel Snider MD on 8/18/2023  1:11 PM    No results found for this or any previous visit.     No results found for standardized protocol tailored for this  examination and automatic exposure control for dose modulation. FINDINGS:  HEART: Left atrial appendage is patent. Left atrial appendage ostium measures 23  x 15 mm. Long axis depth is 38 mm. No left atrial thrombus. No pulmonary vein  ostial stenosis. Coronary calcifications are present. No pericardial effusion. LUNGS: The lungs are clear of mass, nodule, air space disease or edema. PLEURA: There is no pleural effusion or pneumothorax. AORTA: The aorta enhances normally without evidence of aneurysm or dissection. MEDIASTINUM: There is no mediastinal or hilar adenopathy or mass. UPPER ABDOMEN: The visualized portions of the upper abdominal organs are normal.    BONES AND SOFT TISSUES: T12 vertebral augmentation. Chronic T10 compression  fracture. Impression  1. Patent left atrial appendage without thrombus or mass. 2.  No pericardial effusion. 3.  No acute pulmonary pathology. MRI Result (most recent):  MRI BRAIN WO CONTRAST 08/25/2021    Narrative  EXAM: MRI BRAIN WO CONT    INDICATION: Memory impairment. COMPARISON: MRI brain 1/3/2019. CONTRAST: None. TECHNIQUE:  Multiplanar multisequence acquisition without contrast of the brain. FINDINGS:  The ventricles are normal in size and position. Unchanged scattered  periventricular and deep white matter T2/FLAIR hyperintensities, consistent with  mild chronic microvascular ischemic disease. There is no acute infarct,  hemorrhage, extra-axial fluid collection, or mass effect. There is no cerebellar  tonsillar herniation. Expected arterial flow-voids are present. The paranasal sinuses, mastoid air cells, and middle ears are clear. The orbital  contents are within normal limits with bilateral lens implants. No significant  osseous or scalp lesions are identified. Impression  1. No acute intracranial abnormality. 2. Unchanged mild chronic microvascular ischemic disease.       No results for

## 2023-10-04 NOTE — PROGRESS NOTES
1040 - Received critical result - troponin 193 - notified Dr. Elizabeth Andrade and Snehal Marino NP - received orders to discontinue trending troponins. Notified primary RN - Jennifer Peters.

## 2023-10-04 NOTE — PLAN OF CARE
Problem: Physical Therapy - Adult  Goal: By Discharge: Performs mobility at highest level of function for planned discharge setting. See evaluation for individualized goals. Description: FUNCTIONAL STATUS PRIOR TO ADMISSION: Patient was independent and active without use of DME.    HOME SUPPORT PRIOR TO ADMISSION: The patient lived alone with local daughter to provide assistance. Physical Therapy Goals  Initiated 10/4/2023  1. Patient will move from supine to sit and sit to supine, scoot up and down, and roll side to side in bed with independence within 7 day(s). 2.  Patient will perform sit to stand with modified independence within 7 day(s). 3.  Patient will transfer from bed to chair and chair to bed with modified independence using the least restrictive device within 7 day(s). 4.  Patient will ambulate with modified independence for 300 feet with the least restrictive device within 7 day(s). 5.  Patient will ascend/descend 3 stairs with 1 handrail(s) with contact guard assist within 7 day(s). Outcome: Progressing   PHYSICAL THERAPY EVALUATION    Patient: Ibis Mari (82 y.o. female)  Date: 10/4/2023  Primary Diagnosis: Chest pain at rest [R07.9]  Atrial fibrillation with rapid ventricular response [facility]) [I48.91]       Precautions: Fall Risk                    ASSESSMENT :   DEFICITS/IMPAIRMENTS:   The patient is limited by decreased functional mobility, independence in ADLs, ROM, strength, activity tolerance, balance in the setting of hospital admission for chest pain with afib RVR. Troponin elevated at admission, peaked at time of evaluation and patient cleared to participate by RN. She denies adverse symptoms throughout evaluation including chest pain. Patient today highly pleasant, but requires cuing to slow down and activity pace. She tolerates >100 ft ambulation around ICU with SBA and RW. Attempted ambulation without RW and CGA however patient benefits from further support at this time. Roe [place] Activity Measure for Post-Acute Care \"6-Clicks\" Basic Mobility Scores Predict Discharge Destination After Acute Care Hospitalization in Select Patient Groups: A Retrospective, Observational Study. Arch Rehabil Res Clin Transl. 2022;4(3):196859. doi: 10.1016/j.arrct. 5753.500755. PMID: 78811848; PMCID: [id number]. 4. Jeff Martinez Ni P. AM-PAC Short Forms Manual 4.0. Revised 2020. Pain Ratin/10   Pain Intervention(s):        Activity Tolerance:   Good and tolerates ADLS without rest breaks    After treatment:   Patient left in no apparent distress sitting up in chair, Call bell within reach, and Caregiver / family present    COMMUNICATION/EDUCATION:   The patient's plan of care was discussed with: occupational therapist and registered nurse    Patient Education  Education Given To: Patient  Education Provided: Role of Therapy;Plan of Care;Family Education  Education Method: Demonstration;Verbal  Barriers to Learning: None  Education Outcome: Verbalized understanding;Demonstrated understanding    Thank you for this referral  Laura Higgins, PT, DPT  Minutes: 30

## 2023-10-05 ENCOUNTER — APPOINTMENT (OUTPATIENT)
Facility: HOSPITAL | Age: 88
End: 2023-10-05
Attending: INTERNAL MEDICINE
Payer: MEDICARE

## 2023-10-05 PROBLEM — I48.91 ATRIAL FIBRILLATION WITH RAPID VENTRICULAR RESPONSE (HCC): Status: ACTIVE | Noted: 2023-03-13

## 2023-10-05 PROBLEM — E11.9 DM (DIABETES MELLITUS), TYPE 2 (HCC): Status: ACTIVE | Noted: 2023-10-05

## 2023-10-05 PROBLEM — N39.0 UTI (URINARY TRACT INFECTION): Status: ACTIVE | Noted: 2023-10-05

## 2023-10-05 LAB
ANION GAP SERPL CALC-SCNC: 5 MMOL/L (ref 5–15)
BUN SERPL-MCNC: 8 MG/DL (ref 6–20)
BUN/CREAT SERPL: 10 (ref 12–20)
CALCIUM SERPL-MCNC: 8.4 MG/DL (ref 8.5–10.1)
CHLORIDE SERPL-SCNC: 106 MMOL/L (ref 97–108)
CO2 SERPL-SCNC: 23 MMOL/L (ref 21–32)
CREAT SERPL-MCNC: 0.84 MG/DL (ref 0.55–1.02)
ECHO AO ASC DIAM: 3 CM
ECHO AO ASCENDING AORTA INDEX: 1.81 CM/M2
ECHO AV AREA PEAK VELOCITY: 2.9 CM2
ECHO AV AREA VTI: 3.1 CM2
ECHO AV AREA/BSA PEAK VELOCITY: 1.7 CM2/M2
ECHO AV AREA/BSA VTI: 1.9 CM2/M2
ECHO AV MEAN GRADIENT: 5 MMHG
ECHO AV MEAN VELOCITY: 1.1 M/S
ECHO AV PEAK GRADIENT: 8 MMHG
ECHO AV PEAK VELOCITY: 1.4 M/S
ECHO AV VELOCITY RATIO: 0.93
ECHO AV VTI: 29.3 CM
ECHO BSA: 1.68 M2
ECHO LA DIAMETER INDEX: 2.11 CM/M2
ECHO LA DIAMETER: 3.5 CM
ECHO LA VOL 2C: 27 ML (ref 22–52)
ECHO LA VOL 2C: 29 ML (ref 22–52)
ECHO LA VOL 4C: 32 ML (ref 22–52)
ECHO LA VOL 4C: 38 ML (ref 22–52)
ECHO LA VOL BP: 33 ML (ref 22–52)
ECHO LA VOL/BSA BIPLANE: 20 ML/M2 (ref 16–34)
ECHO LA VOLUME AREA LENGTH: 37 ML
ECHO LA VOLUME INDEX AREA LENGTH: 22 ML/M2 (ref 16–34)
ECHO LV E' LATERAL VELOCITY: 5 CM/S
ECHO LV E' SEPTAL VELOCITY: 3 CM/S
ECHO LV EDV A2C: 50 ML
ECHO LV EDV A4C: 63 ML
ECHO LV EDV BP: 58 ML (ref 56–104)
ECHO LV EDV INDEX A4C: 38 ML/M2
ECHO LV EDV INDEX BP: 35 ML/M2
ECHO LV EDV NDEX A2C: 30 ML/M2
ECHO LV EJECTION FRACTION A2C: 61 %
ECHO LV EJECTION FRACTION A4C: 62 %
ECHO LV EJECTION FRACTION BIPLANE: 61 % (ref 55–100)
ECHO LV ESV A2C: 19 ML
ECHO LV ESV A4C: 24 ML
ECHO LV ESV BP: 22 ML (ref 19–49)
ECHO LV ESV INDEX A2C: 11 ML/M2
ECHO LV ESV INDEX A4C: 14 ML/M2
ECHO LV ESV INDEX BP: 13 ML/M2
ECHO LV FRACTIONAL SHORTENING: 35 % (ref 28–44)
ECHO LV INTERNAL DIMENSION DIASTOLE INDEX: 2.89 CM/M2
ECHO LV INTERNAL DIMENSION DIASTOLIC: 4.8 CM (ref 3.9–5.3)
ECHO LV INTERNAL DIMENSION SYSTOLIC INDEX: 1.87 CM/M2
ECHO LV INTERNAL DIMENSION SYSTOLIC: 3.1 CM
ECHO LV IVSD: 1.3 CM (ref 0.6–0.9)
ECHO LV MASS 2D: 245.7 G (ref 67–162)
ECHO LV MASS INDEX 2D: 148 G/M2 (ref 43–95)
ECHO LV POSTERIOR WALL DIASTOLIC: 1.3 CM (ref 0.6–0.9)
ECHO LV RELATIVE WALL THICKNESS RATIO: 0.54
ECHO LVOT AREA: 3.1 CM2
ECHO LVOT AV VTI INDEX: 1
ECHO LVOT DIAM: 2 CM
ECHO LVOT MEAN GRADIENT: 4 MMHG
ECHO LVOT PEAK GRADIENT: 7 MMHG
ECHO LVOT PEAK VELOCITY: 1.3 M/S
ECHO LVOT STROKE VOLUME INDEX: 55.2 ML/M2
ECHO LVOT SV: 91.7 ML
ECHO LVOT VTI: 29.2 CM
ECHO MV A VELOCITY: 0.96 M/S
ECHO MV E DECELERATION TIME (DT): 226.1 MS
ECHO MV E VELOCITY: 0.68 M/S
ECHO MV E/A RATIO: 0.71
ECHO MV E/E' LATERAL: 13.6
ECHO MV E/E' RATIO (AVERAGED): 18.13
ECHO MV E/E' SEPTAL: 22.67
ECHO PV MAX VELOCITY: 0.8 M/S
ECHO PV PEAK GRADIENT: 3 MMHG
ECHO RV INTERNAL DIMENSION: 3.6 CM
ECHO RV TAPSE: 1.8 CM (ref 1.7–?)
ECHO RVOT PEAK GRADIENT: 1 MMHG
ECHO RVOT PEAK VELOCITY: 0.5 M/S
ECHO TV REGURGITANT MAX VELOCITY: 2.29 M/S
ECHO TV REGURGITANT PEAK GRADIENT: 21 MMHG
EKG ATRIAL RATE: 50 BPM
EKG DIAGNOSIS: NORMAL
EKG P AXIS: 99 DEGREES
EKG P-R INTERVAL: 168 MS
EKG Q-T INTERVAL: 450 MS
EKG QRS DURATION: 82 MS
EKG QTC CALCULATION (BAZETT): 410 MS
EKG R AXIS: -28 DEGREES
EKG T AXIS: 38 DEGREES
EKG VENTRICULAR RATE: 50 BPM
GLUCOSE BLD STRIP.AUTO-MCNC: 164 MG/DL (ref 65–117)
GLUCOSE BLD STRIP.AUTO-MCNC: 181 MG/DL (ref 65–117)
GLUCOSE BLD STRIP.AUTO-MCNC: 222 MG/DL (ref 65–117)
GLUCOSE BLD STRIP.AUTO-MCNC: 227 MG/DL (ref 65–117)
GLUCOSE SERPL-MCNC: 287 MG/DL (ref 65–100)
MAGNESIUM SERPL-MCNC: 1.6 MG/DL (ref 1.6–2.4)
POTASSIUM SERPL-SCNC: 3 MMOL/L (ref 3.5–5.1)
SERVICE CMNT-IMP: ABNORMAL
SODIUM SERPL-SCNC: 134 MMOL/L (ref 136–145)

## 2023-10-05 PROCEDURE — 97535 SELF CARE MNGMENT TRAINING: CPT

## 2023-10-05 PROCEDURE — 2580000003 HC RX 258: Performed by: INTERNAL MEDICINE

## 2023-10-05 PROCEDURE — 82962 GLUCOSE BLOOD TEST: CPT

## 2023-10-05 PROCEDURE — 97116 GAIT TRAINING THERAPY: CPT

## 2023-10-05 PROCEDURE — 6370000000 HC RX 637 (ALT 250 FOR IP)

## 2023-10-05 PROCEDURE — 93010 ELECTROCARDIOGRAM REPORT: CPT | Performed by: SPECIALIST

## 2023-10-05 PROCEDURE — APPSS30 APP SPLIT SHARED TIME 16-30 MINUTES: Performed by: NURSE PRACTITIONER

## 2023-10-05 PROCEDURE — 6370000000 HC RX 637 (ALT 250 FOR IP): Performed by: INTERNAL MEDICINE

## 2023-10-05 PROCEDURE — 93306 TTE W/DOPPLER COMPLETE: CPT | Performed by: SPECIALIST

## 2023-10-05 PROCEDURE — 83735 ASSAY OF MAGNESIUM: CPT

## 2023-10-05 PROCEDURE — 99233 SBSQ HOSP IP/OBS HIGH 50: CPT | Performed by: INTERNAL MEDICINE

## 2023-10-05 PROCEDURE — 93306 TTE W/DOPPLER COMPLETE: CPT

## 2023-10-05 PROCEDURE — 6360000002 HC RX W HCPCS: Performed by: INTERNAL MEDICINE

## 2023-10-05 PROCEDURE — 97530 THERAPEUTIC ACTIVITIES: CPT

## 2023-10-05 PROCEDURE — 94761 N-INVAS EAR/PLS OXIMETRY MLT: CPT

## 2023-10-05 PROCEDURE — 1100000003 HC PRIVATE W/ TELEMETRY

## 2023-10-05 PROCEDURE — 80048 BASIC METABOLIC PNL TOTAL CA: CPT

## 2023-10-05 PROCEDURE — 2060000000 HC ICU INTERMEDIATE R&B

## 2023-10-05 PROCEDURE — 36415 COLL VENOUS BLD VENIPUNCTURE: CPT

## 2023-10-05 RX ORDER — LISINOPRIL 20 MG/1
20 TABLET ORAL DAILY
Status: DISCONTINUED | OUTPATIENT
Start: 2023-10-05 | End: 2023-10-07 | Stop reason: HOSPADM

## 2023-10-05 RX ORDER — AMIODARONE HYDROCHLORIDE 200 MG/1
200 TABLET ORAL DAILY
Status: DISCONTINUED | OUTPATIENT
Start: 2023-10-05 | End: 2023-10-06

## 2023-10-05 RX ORDER — INSULIN GLARGINE 100 [IU]/ML
0.2 INJECTION, SOLUTION SUBCUTANEOUS DAILY
Status: DISCONTINUED | OUTPATIENT
Start: 2023-10-05 | End: 2023-10-07 | Stop reason: HOSPADM

## 2023-10-05 RX ADMIN — SODIUM CHLORIDE, PRESERVATIVE FREE 10 ML: 5 INJECTION INTRAVENOUS at 08:50

## 2023-10-05 RX ADMIN — INSULIN GLARGINE 13 UNITS: 100 INJECTION, SOLUTION SUBCUTANEOUS at 08:50

## 2023-10-05 RX ADMIN — ROSUVASTATIN CALCIUM 10 MG: 10 TABLET, COATED ORAL at 22:46

## 2023-10-05 RX ADMIN — ASPIRIN 81 MG: 81 TABLET, COATED ORAL at 08:49

## 2023-10-05 RX ADMIN — SODIUM CHLORIDE, PRESERVATIVE FREE 10 ML: 5 INJECTION INTRAVENOUS at 22:47

## 2023-10-05 RX ADMIN — LEVOTHYROXINE SODIUM 88 MCG: 88 TABLET ORAL at 06:08

## 2023-10-05 RX ADMIN — INSULIN LISPRO 2 UNITS: 100 INJECTION, SOLUTION INTRAVENOUS; SUBCUTANEOUS at 08:50

## 2023-10-05 RX ADMIN — SENNOSIDES AND DOCUSATE SODIUM 1 TABLET: 50; 8.6 TABLET ORAL at 08:49

## 2023-10-05 RX ADMIN — AMIODARONE HYDROCHLORIDE 200 MG: 200 TABLET ORAL at 13:37

## 2023-10-05 RX ADMIN — WATER 1000 MG: 1 INJECTION INTRAMUSCULAR; INTRAVENOUS; SUBCUTANEOUS at 13:37

## 2023-10-05 RX ADMIN — LISINOPRIL 20 MG: 20 TABLET ORAL at 06:07

## 2023-10-05 RX ADMIN — SENNOSIDES AND DOCUSATE SODIUM 1 TABLET: 50; 8.6 TABLET ORAL at 22:46

## 2023-10-05 RX ADMIN — PANTOPRAZOLE SODIUM 40 MG: 40 TABLET, DELAYED RELEASE ORAL at 06:09

## 2023-10-05 RX ADMIN — SERTRALINE HYDROCHLORIDE 25 MG: 25 TABLET ORAL at 08:49

## 2023-10-05 ASSESSMENT — PAIN SCALES - GENERAL: PAINLEVEL_OUTOF10: 0

## 2023-10-05 NOTE — PLAN OF CARE
Problem: Physical Therapy - Adult  Goal: By Discharge: Performs mobility at highest level of function for planned discharge setting. See evaluation for individualized goals. Description: FUNCTIONAL STATUS PRIOR TO ADMISSION: Patient was independent and active without use of DME.    HOME SUPPORT PRIOR TO ADMISSION: The patient lived alone with local daughter to provide assistance. Physical Therapy Goals  Initiated 10/4/2023  1. Patient will move from supine to sit and sit to supine, scoot up and down, and roll side to side in bed with independence within 7 day(s). 2.  Patient will perform sit to stand with modified independence within 7 day(s). 3.  Patient will transfer from bed to chair and chair to bed with modified independence using the least restrictive device within 7 day(s). 4.  Patient will ambulate with modified independence for 300 feet with the least restrictive device within 7 day(s). 5.  Patient will ascend/descend 3 stairs with 1 handrail(s) with contact guard assist within 7 day(s). Outcome: Progressing   PHYSICAL THERAPY TREATMENT    Patient: Sterling Fountain (82 y.o. female)  Date: 10/5/2023  Diagnosis: Chest pain at rest [R07.9]  Atrial fibrillation with rapid ventricular response ([address]) [I48.91] Tachy-eligio syndrome ([address])      Precautions: Fall Risk                    ASSESSMENT:  Patient continues to benefit from skilled PT services and is progressing towards goals. Reviewed anticipated UE restrictions pending PPM placement tomorrow. She verbalized 1 from prior OT education when prompted. Gait training completed with HHA to simulate a cane and without assist. Noted path deviations during conversation, cervical rotation, and changes of directions while ambulating. She was independent without DME at baseline but noted deviations were worse without HHA. Will re-assess mobility post PPM but overall safety with mobility is much improved while using the RW.  Concern for her ability to Abnormalities: Decreased step clearance; Path deviations  Distance (ft): 200 Feet  Assistive Device: Gait belt (HHA)  Neuro Re-Education:                        Activity Tolerance:    resting to 122 BPM post gait    After treatment:   Patient left in no apparent distress in bed and Call bell within reach      COMMUNICATION/EDUCATION:   The patient's plan of care was discussed with: registered nurse           oRsalinda Liz, PT  Minutes: 21

## 2023-10-05 NOTE — PLAN OF CARE
Problem: Occupational Therapy - Adult  Goal: By Discharge: Performs self-care activities at highest level of function for planned discharge setting. See evaluation for individualized goals. Description: FUNCTIONAL STATUS PRIOR TO ADMISSION:  Pt mobilizes with SPC (has a rollator but does not use it). Pt is MOD I for ALDs and manages simple meal prep. Pt drives only locally to do groceries, otherwise family drives pt to appointments. Family endorses multiple falls. HOME SUPPORT: Patient lived alone with supportive family locally. Occupational Therapy Goals:  Initiated 10/4/2023  1. Patient will perform grooming, standing at sink, with Modified [place] within 7 day(s). 2.  Patient will perform lower body dressing with Modified [place] within 7 day(s). 3.  Patient will perform bathing, standing PRN, with Supervision within 7 day(s). 4.  Patient will perform toilet transfers with Modified [place]  within 7 day(s). 5.  Patient will perform all aspects of toileting with Modified [place] within 7 day(s). 6.  Patient will participate in upper extremity therapeutic exercise/activities with [place] for 10 minutes within 7 day(s). Outcome: Progressing     OCCUPATIONAL THERAPY TREATMENT  Patient: Tan Mensah (82 y.o. female)  Date: 10/5/2023  Primary Diagnosis: Chest pain at rest [R07.9]  Atrial fibrillation with rapid ventricular response [facility]) [I48.91]       Precautions: Fall Risk                Chart, occupational therapy assessment, plan of care, and goals were reviewed. ASSESSMENT  Patient continues to benefit from skilled OT services and is progressing towards goals. Patient continues to be extremely pleasant and agreeable to participate with therapy. She is alert and oriented but intermittently confused, talkative and requiring occasional redirection to task at hand.  She is performing functional ADLs with overall set-up to CGA with RW and today requires min A to don her

## 2023-10-05 NOTE — CARE COORDINATION
Eventual needs are home with PT.  Pt.'s first choice is [facility] Home [facility]. I have contacted attending to obtain a home PT order. Fransico Berger    Addendum-Home health order obtained and sent to [facility]. -acceptance pending    Fransico Berger

## 2023-10-05 NOTE — PLAN OF CARE
Problem: Discharge Planning  Goal: Discharge to home or other facility with appropriate resources  Outcome: Progressing     Problem: Pain  Goal: Verbalizes/displays adequate comfort level or baseline comfort level  Outcome: Progressing     Problem: ABCDS Injury Assessment  Goal: Absence of physical injury  Outcome: Progressing     Problem: Safety - Adult  Goal: Free from fall injury  Outcome: Progressing     Problem: Chronic Conditions and Co-morbidities  Goal: Patient's chronic conditions and co-morbidity symptoms are monitored and maintained or improved  Outcome: Progressing     Problem: Occupational Therapy - Adult  Goal: By Discharge: Performs self-care activities at highest level of function for planned discharge setting. See evaluation for individualized goals. Description: FUNCTIONAL STATUS PRIOR TO ADMISSION:  Pt mobilizes with SPC (has a rollator but does not use it). Pt is MOD I for ALDs and manages simple meal prep. Pt drives only locally to do groceries, otherwise family drives pt to appointments. Family endorses multiple falls. HOME SUPPORT: Patient lived alone with supportive family locally. Occupational Therapy Goals:  Initiated 10/4/2023  1. Patient will perform grooming, standing at sink, with Modified [place] within 7 day(s). 2.  Patient will perform lower body dressing with Modified [place] within 7 day(s). 3.  Patient will perform bathing, standing PRN, with Supervision within 7 day(s). 4.  Patient will perform toilet transfers with Modified [place]  within 7 day(s). 5.  Patient will perform all aspects of toileting with Modified [place] within 7 day(s). 6.  Patient will participate in upper extremity therapeutic exercise/activities with [place] for 10 minutes within 7 day(s).     10/5/2023 1425 by Antonieta Bernstein OT  Outcome: Progressing

## 2023-10-05 NOTE — PROGRESS NOTES
[facility]                    Cardiology Care Note     []Initial Encounter     [x]Follow-up    Patient Name: Sterling Fountain - XJ - NDE:[id number]  Primary Cardiologist: Quentin Nevarez DO  Consulting Cardiologist: Tara Chauhan MD     Reason for encounter: a fib RVR    HPI:       Sterling Fountain is a 82 y.o. female with PMH significant for HTN, DM, Afib s/p watchman, GI bleed, SBO s/p resection, presented w/ chest pain, afib RVR. The patient was otherwise healthy when she developed chest pain tonight. Described as \"dull\", 6/10, non-radiating, not associated with dyspnea or diaphoresis. Denied cough, fevers, chills, nausea, vomiting. In the ED, Trop was neg, but was found to have afib RVR, rate ~140s. Dilt gtt started       Subjective: Sterling Fountain reports none. Assessment and Plan     1. .Tachybradycardia syndrome  Evidence of rapid A-fib with RVR with periods of pauses greater than 3 seconds as well as bradycardia and sinus rhythm. - Previously on atenolol which was discontinued for bradycardia as an outpatient in August.  - in and out of a fib overnight with periods of bradycardia when in SR  - cont IV amio  - avoid av jo agents   - EP recs:   - Initiate 24-hour of IV amiodarone load followed by initiation amiodarone 200 mg daily  - demonstrated evidence of ongoing bradycardia or unable to tolerate further medical management, the only alternative option would be to proceed with dual-chamber pacer implantation to facilitate medical management of atrial fibrillation  - Pacemaker can be considered as an inpatient if she is unable to tolerate amiodarone. 2. Paroxysmal atrial fibrillation  Discovered in 2023. Had GI bleeding on Eliquis. Status post watchman implantation with Dr. Yun De Leon.  - Currently on aspirin Plavix we will plan to continue Plavix through 10/18/2023, at that point she can continue aspirin 81 mg daily     3.  Anemia  Due to history of GI bleeding, Juventino Corrales MD at [facility] CATH LAB    INVASIVE VASCULAR N/A 6/20/2023    Ultrasound guided vascular access performed by Ulices Remy MD at [facility] Left     Open Repair    ORTHOPEDIC SURGERY Left     Trigger finger    ROTATOR CUFF REPAIR Right     TOTAL HIP ARTHROPLASTY Right     TUBAL LIGATION       Social Hx:  reports that she quit smoking about 37 years ago. Her smoking use included cigarettes. She smoked an average of 2 packs per day. She has never used smokeless tobacco. She reports that she does not currently use alcohol. She reports that she does not use drugs. Family Hx: family history includes Alzheimer's Disease in her father and paternal grandfather; Heart Disease in her mother; Ovarian Cancer in her sister. Allergies   Allergen Reactions    Oxycodone-Acetaminophen      Other reaction(s): Unknown (comments)  Can't remember reaction due to length of time of occurrence. OBJECTIVE:  Wt Readings from Last 3 Encounters:   10/03/23 63.5 kg (140 lb)   08/18/23 65.3 kg (144 lb)   08/16/23 65.3 kg (144 lb)     I/O last 3 completed shifts: In: 3333.7 [I.V.:2833.7; IV [id number]:346]  Out: 2200 [Urine:2200]  No intake/output data recorded. Physical Exam:    Vitals:   Vitals:    10/05/23 0430 10/05/23 0500 10/05/23 0607 10/05/23 0630   BP: (!) 158/64 (!) 161/62 (!) 177/65 (!) 163/78   Pulse: 73 81  82   Resp: 17 14  15   Temp:       TempSrc:       SpO2: 95% 97%  95%   Weight:       Height:         Telemetry: AFIB    Gen: Well-developed, well-nourished, in no acute distress  Neck: Supple, No JVD, No Carotid Bruit  Resp: No accessory muscle use, Clear breath sounds, No rales or rhonchi  Card: irregular Rythm, Normal S1, S2, No murmurs, rubs or gallop. No thrills.    Abd:   Soft, non-tender, non-distended, BS+   MSK: No cyanosis  Skin: No rashes    Neuro: Moving all four extremities, follows commands appropriately  Psych: Good insight, oriented to person, place, alert, Nml Affect  LE: No edema    Data Review:     Radiology:   XR Results (most recent):Xray Result (most recent):  XR CHEST PORTABLE 10/03/2023    Narrative  INDICATION: Chest pain. Portable AP view of the chest.    Direct comparison made to prior chest x-ray dated March 2023. Cardiomediastinal silhouette is stable. Lungs are grossly clear bilaterally. Pleural spaces are normal and there is no pneumothorax. Osseous structures are  intact. Impression  No acute cardiopulmonary disease. CT Result (most recent):  CTA CHEST W WO CONTRAST 05/24/2023    Narrative  EXAM:  CTA CHEST W WO CONTRAST    INDICATION: Atrial fibrillation, Watchman protocol. COMPARISON: None. CONTRAST: 100 cc [id number]    TECHNIQUE: Multislice helical CT of the chest was performed on a 64-slice  multiple detector row CT system ([employer]). Unenhanced  images were obtained  to localize the volume for acquisition. Bolus tracking software was used for  timing. Retrospectively EKG gated thin section images of the heart were acquired  during uneventful rapid bolus intravenous administration of contrast followed by  50 mL of saline. Coronal reformations were generated and 3-D shaded surface  display rendering of the pulmonary veins and left atrium were generated. CT dose  reduction was achieved through use of a standardized protocol tailored for this  examination and automatic exposure control for dose modulation. CT dose  reduction was achieved through use of a standardized protocol tailored for this  examination and automatic exposure control for dose modulation. FINDINGS:  HEART: Left atrial appendage is patent. Left atrial appendage ostium measures 23  x 15 mm. Long axis depth is 38 mm. No left atrial thrombus. No pulmonary vein  ostial stenosis. Coronary calcifications are present. No pericardial effusion. LUNGS: The lungs are clear of mass, nodule, air space disease or edema.     PLEURA: There is no pleural

## 2023-10-06 ENCOUNTER — APPOINTMENT (OUTPATIENT)
Facility: HOSPITAL | Age: 88
End: 2023-10-06
Payer: MEDICARE

## 2023-10-06 LAB
ALBUMIN SERPL-MCNC: 3.5 G/DL (ref 3.5–5)
ANION GAP SERPL CALC-SCNC: 9 MMOL/L (ref 5–15)
BACTERIA SPEC CULT: ABNORMAL
BUN SERPL-MCNC: 13 MG/DL (ref 6–20)
BUN/CREAT SERPL: 16 (ref 12–20)
CALCIUM SERPL-MCNC: 8.9 MG/DL (ref 8.5–10.1)
CC UR VC: ABNORMAL
CHLORIDE SERPL-SCNC: 105 MMOL/L (ref 97–108)
CO2 SERPL-SCNC: 23 MMOL/L (ref 21–32)
CREAT SERPL-MCNC: 0.8 MG/DL (ref 0.55–1.02)
ECHO BSA: 1.68 M2
GLUCOSE BLD STRIP.AUTO-MCNC: 140 MG/DL (ref 65–117)
GLUCOSE BLD STRIP.AUTO-MCNC: 188 MG/DL (ref 65–117)
GLUCOSE BLD STRIP.AUTO-MCNC: 197 MG/DL (ref 65–117)
GLUCOSE BLD STRIP.AUTO-MCNC: 198 MG/DL (ref 65–117)
GLUCOSE BLD STRIP.AUTO-MCNC: 289 MG/DL (ref 65–117)
GLUCOSE SERPL-MCNC: 220 MG/DL (ref 65–100)
PHOSPHATE SERPL-MCNC: 1.8 MG/DL (ref 2.6–4.7)
POTASSIUM SERPL-SCNC: 2.9 MMOL/L (ref 3.5–5.1)
SERVICE CMNT-IMP: ABNORMAL
SODIUM SERPL-SCNC: 137 MMOL/L (ref 136–145)

## 2023-10-06 PROCEDURE — 0JH606Z INSERTION OF PACEMAKER, DUAL CHAMBER INTO CHEST SUBCUTANEOUS TISSUE AND FASCIA, OPEN APPROACH: ICD-10-PCS | Performed by: INTERNAL MEDICINE

## 2023-10-06 PROCEDURE — 6370000000 HC RX 637 (ALT 250 FOR IP): Performed by: INTERNAL MEDICINE

## 2023-10-06 PROCEDURE — 6370000000 HC RX 637 (ALT 250 FOR IP)

## 2023-10-06 PROCEDURE — 2709999900 HC NON-CHARGEABLE SUPPLY: Performed by: INTERNAL MEDICINE

## 2023-10-06 PROCEDURE — 99152 MOD SED SAME PHYS/QHP 5/>YRS: CPT | Performed by: INTERNAL MEDICINE

## 2023-10-06 PROCEDURE — C1785 PMKR, DUAL, RATE-RESP: HCPCS | Performed by: INTERNAL MEDICINE

## 2023-10-06 PROCEDURE — 6360000002 HC RX W HCPCS: Performed by: INTERNAL MEDICINE

## 2023-10-06 PROCEDURE — C1894 INTRO/SHEATH, NON-LASER: HCPCS | Performed by: INTERNAL MEDICINE

## 2023-10-06 PROCEDURE — 6360000002 HC RX W HCPCS

## 2023-10-06 PROCEDURE — 1100000003 HC PRIVATE W/ TELEMETRY

## 2023-10-06 PROCEDURE — 80069 RENAL FUNCTION PANEL: CPT

## 2023-10-06 PROCEDURE — 33208 INSRT HEART PM ATRIAL & VENT: CPT | Performed by: INTERNAL MEDICINE

## 2023-10-06 PROCEDURE — 76937 US GUIDE VASCULAR ACCESS: CPT | Performed by: INTERNAL MEDICINE

## 2023-10-06 PROCEDURE — C1769 GUIDE WIRE: HCPCS | Performed by: INTERNAL MEDICINE

## 2023-10-06 PROCEDURE — C1892 INTRO/SHEATH,FIXED,PEEL-AWAY: HCPCS | Performed by: INTERNAL MEDICINE

## 2023-10-06 PROCEDURE — 6370000000 HC RX 637 (ALT 250 FOR IP): Performed by: NURSE PRACTITIONER

## 2023-10-06 PROCEDURE — A4217 STERILE WATER/SALINE, 500 ML: HCPCS | Performed by: INTERNAL MEDICINE

## 2023-10-06 PROCEDURE — 99233 SBSQ HOSP IP/OBS HIGH 50: CPT | Performed by: INTERNAL MEDICINE

## 2023-10-06 PROCEDURE — 36415 COLL VENOUS BLD VENIPUNCTURE: CPT

## 2023-10-06 PROCEDURE — 2500000003 HC RX 250 WO HCPCS: Performed by: INTERNAL MEDICINE

## 2023-10-06 PROCEDURE — 2580000003 HC RX 258: Performed by: INTERNAL MEDICINE

## 2023-10-06 PROCEDURE — 02H63JZ INSERTION OF PACEMAKER LEAD INTO RIGHT ATRIUM, PERCUTANEOUS APPROACH: ICD-10-PCS | Performed by: INTERNAL MEDICINE

## 2023-10-06 PROCEDURE — 99153 MOD SED SAME PHYS/QHP EA: CPT | Performed by: INTERNAL MEDICINE

## 2023-10-06 PROCEDURE — 94761 N-INVAS EAR/PLS OXIMETRY MLT: CPT

## 2023-10-06 PROCEDURE — 02HK3JZ INSERTION OF PACEMAKER LEAD INTO RIGHT VENTRICLE, PERCUTANEOUS APPROACH: ICD-10-PCS | Performed by: INTERNAL MEDICINE

## 2023-10-06 PROCEDURE — 99238 HOSP IP/OBS DSCHRG MGMT 30/<: CPT | Performed by: INTERNAL MEDICINE

## 2023-10-06 PROCEDURE — 71045 X-RAY EXAM CHEST 1 VIEW: CPT

## 2023-10-06 PROCEDURE — 82962 GLUCOSE BLOOD TEST: CPT

## 2023-10-06 PROCEDURE — C1898 LEAD, PMKR, OTHER THAN TRANS: HCPCS | Performed by: INTERNAL MEDICINE

## 2023-10-06 DEVICE — IPG W1DR01 AZURE XT DR MRI USA
Type: IMPLANTABLE DEVICE | Status: FUNCTIONAL
Brand: AZURE™ XT DR MRI SURESCAN™

## 2023-10-06 DEVICE — LEAD 5076-58 MRI US RCMCRD
Type: IMPLANTABLE DEVICE | Status: FUNCTIONAL
Brand: CAPSUREFIX NOVUS MRI™ SURESCAN®

## 2023-10-06 DEVICE — LEAD 5076-52 MRI US RCMCRD
Type: IMPLANTABLE DEVICE | Status: FUNCTIONAL
Brand: CAPSUREFIX NOVUS MRI™ SURESCAN®

## 2023-10-06 RX ORDER — AMLODIPINE BESYLATE 5 MG/1
5 TABLET ORAL DAILY
Status: DISCONTINUED | OUTPATIENT
Start: 2023-10-06 | End: 2023-10-06

## 2023-10-06 RX ORDER — AMIODARONE HYDROCHLORIDE 200 MG/1
200 TABLET ORAL DAILY
Status: DISCONTINUED | OUTPATIENT
Start: 2023-10-06 | End: 2023-10-06

## 2023-10-06 RX ORDER — FENTANYL CITRATE 50 UG/ML
INJECTION, SOLUTION INTRAMUSCULAR; INTRAVENOUS PRN
Status: DISCONTINUED | OUTPATIENT
Start: 2023-10-06 | End: 2023-10-06 | Stop reason: HOSPADM

## 2023-10-06 RX ORDER — POTASSIUM CHLORIDE 750 MG/1
20 TABLET, FILM COATED, EXTENDED RELEASE ORAL ONCE
Status: COMPLETED | OUTPATIENT
Start: 2023-10-06 | End: 2023-10-06

## 2023-10-06 RX ORDER — BUPIVACAINE HYDROCHLORIDE 2.5 MG/ML
INJECTION, SOLUTION EPIDURAL; INFILTRATION; INTRACAUDAL PRN
Status: DISCONTINUED | OUTPATIENT
Start: 2023-10-06 | End: 2023-10-06 | Stop reason: HOSPADM

## 2023-10-06 RX ORDER — CEFAZOLIN SODIUM 1 G/3ML
INJECTION, POWDER, FOR SOLUTION INTRAMUSCULAR; INTRAVENOUS PRN
Status: DISCONTINUED | OUTPATIENT
Start: 2023-10-06 | End: 2023-10-06 | Stop reason: HOSPADM

## 2023-10-06 RX ORDER — LIDOCAINE HYDROCHLORIDE AND EPINEPHRINE BITARTRATE 20; .01 MG/ML; MG/ML
INJECTION, SOLUTION SUBCUTANEOUS PRN
Status: DISCONTINUED | OUTPATIENT
Start: 2023-10-06 | End: 2023-10-06 | Stop reason: HOSPADM

## 2023-10-06 RX ORDER — POTASSIUM CHLORIDE 7.45 MG/ML
10 INJECTION INTRAVENOUS
Status: COMPLETED | OUTPATIENT
Start: 2023-10-06 | End: 2023-10-06

## 2023-10-06 RX ADMIN — ACETAMINOPHEN 650 MG: 325 TABLET ORAL at 21:03

## 2023-10-06 RX ADMIN — POTASSIUM CHLORIDE 10 MEQ: 7.46 INJECTION, SOLUTION INTRAVENOUS at 09:43

## 2023-10-06 RX ADMIN — ROSUVASTATIN CALCIUM 10 MG: 10 TABLET, COATED ORAL at 21:03

## 2023-10-06 RX ADMIN — AMLODIPINE BESYLATE 5 MG: 5 TABLET ORAL at 08:27

## 2023-10-06 RX ADMIN — SERTRALINE HYDROCHLORIDE 25 MG: 25 TABLET ORAL at 08:26

## 2023-10-06 RX ADMIN — WATER 1000 MG: 1 INJECTION INTRAMUSCULAR; INTRAVENOUS; SUBCUTANEOUS at 13:06

## 2023-10-06 RX ADMIN — SODIUM CHLORIDE, PRESERVATIVE FREE 10 ML: 5 INJECTION INTRAVENOUS at 08:29

## 2023-10-06 RX ADMIN — PANTOPRAZOLE SODIUM 40 MG: 40 TABLET, DELAYED RELEASE ORAL at 07:00

## 2023-10-06 RX ADMIN — POTASSIUM CHLORIDE 20 MEQ: 750 TABLET, FILM COATED, EXTENDED RELEASE ORAL at 05:52

## 2023-10-06 RX ADMIN — ASPIRIN 81 MG: 81 TABLET, COATED ORAL at 08:26

## 2023-10-06 RX ADMIN — LISINOPRIL 20 MG: 20 TABLET ORAL at 08:27

## 2023-10-06 RX ADMIN — POTASSIUM CHLORIDE 10 MEQ: 7.46 INJECTION, SOLUTION INTRAVENOUS at 06:54

## 2023-10-06 RX ADMIN — POTASSIUM CHLORIDE 10 MEQ: 7.46 INJECTION, SOLUTION INTRAVENOUS at 05:51

## 2023-10-06 RX ADMIN — INSULIN GLARGINE 13 UNITS: 100 INJECTION, SOLUTION SUBCUTANEOUS at 08:49

## 2023-10-06 RX ADMIN — LEVOTHYROXINE SODIUM 88 MCG: 88 TABLET ORAL at 07:00

## 2023-10-06 RX ADMIN — METOPROLOL TARTRATE 37.5 MG: 25 TABLET, FILM COATED ORAL at 21:03

## 2023-10-06 RX ADMIN — POTASSIUM CHLORIDE 10 MEQ: 7.46 INJECTION, SOLUTION INTRAVENOUS at 08:27

## 2023-10-06 RX ADMIN — AMIODARONE HYDROCHLORIDE 200 MG: 200 TABLET ORAL at 08:27

## 2023-10-06 ASSESSMENT — PAIN DESCRIPTION - LOCATION: LOCATION: CHEST

## 2023-10-06 ASSESSMENT — PAIN DESCRIPTION - ORIENTATION: ORIENTATION: LEFT;UPPER

## 2023-10-06 ASSESSMENT — PAIN DESCRIPTION - DESCRIPTORS: DESCRIPTORS: ACHING

## 2023-10-06 ASSESSMENT — PAIN - FUNCTIONAL ASSESSMENT: PAIN_FUNCTIONAL_ASSESSMENT: PREVENTS OR INTERFERES SOME ACTIVE ACTIVITIES AND ADLS

## 2023-10-06 ASSESSMENT — PAIN SCALES - GENERAL: PAINLEVEL_OUTOF10: 0

## 2023-10-06 NOTE — PROCEDURES
Pacemaker Implantation    Procedure Date: 10/6/2023  Lab Physician: Ca Short MD, [facility]    INDICATIONS:  81 yo woman with a history of hypertension, diabetes, atrial fibrillation status post watchman implantation (Dr. Matthias Mayorga), history of GI bleed, prior small bowel obstruction status post resection presented with chest pain and atrial fibrillation with RVR. Found to have elevated heart rate in the 140s. Diltiazem started with subsequent bradycardia and pauses. In the setting of SSS/tachy-eligio syndrome patient was referred for dual chamber PPM implantation to help facilitate medical management of AF. COMMENTS:  After informed consent was obtained, the patient was brought to the electrophysiology laboratory in the fasting state, and was prepped and draped in the usual sterile fashion. IV antibiotic was administered prophylactically. Conscious sedation was administered by nursing staff independent of those performing the procedure under my supervision with intermittent dosing of anxiolytics and narcotics. Ultrasound-guided Access  Local anesthetic was delivered to the left pectoral region and an incision was made in the left deltopectoral groove. The axillary vein was accessed using a micropuncture needle with ultrasound guidance (ultrasound evaluation of possible access sites. Patency of the selected vessel. Realtime visualization of the vascular needle entry was performed) and the vein was cannulated and a retaining wire was placed in the IVC under fluoroscopy. A 7F peel-away sheath was placed in the vein and a Medtronic lead was advanced to the RV apex under fluoroscopic guidance. Ventricular sensing and pacing thresholds were checked and were good. A 7F peel away sheath was then placed in the vein along with the ventricular lead over the retained glide wire, and a Medtronic lead was placed in the RAA under fluoroscopic guidance. Atrial pacing and sensing thresholds were checked and were good. Pacing at 10V was performed from the ventricular lead without diaphragmatic or intercostal capture. The leads were sutured to the underlying pectoralis muscle using 0 Silk suture. Final pacing and sensing thresholds were checked and were good. Wound Closure  A subcutaneous pocket was then created using blunt dissection and it was copiously irrigated with a saline solution containing antibiotics. The leads were connected to a Medtronic generator and the entire system was implanted into the pocket. The subcutaneous tissues were then closed with 2 continuous layers of 2-0 Stratafix sutures. The skin was closed with a running 4-0 Stratafix subcuticular stitch. Steri strips were placed over the incision. The wound was covered with a dry sterile dressing. The patient tolerated the procedure well and left the laboratory in good condition. Conscious sedation was provided and appropriate monitoring performed by members of the EP nursing staff. CONCLUSIONS:  Successful implantation of a Medtronic dual chamber Pacemaker. RECOMMENDATIONS:  1. CXR to assess for leads placement and rule out complications  2. F/U device clinic/wound check in 10-14 days  3. EP clinic follow-up in 4 months.

## 2023-10-06 NOTE — PROGRESS NOTES
[facility] CARDIOLOGY  Cardiac Electrophysiology Note     []Initial Encounter     [x]Follow-up    Patient Name: Tan Mensah - UPS:[birth year] - EZM:[id number]  Primary Cardiologist: Jearld Hodgkin, DO  Consulting Cardiologist: Ca Ojeda MD, [facility]       Reason for encounter:   Tachybradycardia syndrome      HPI:  82-year-old woman with a history of hypertension, diabetes, atrial fibrillation status post watchman implantation (Dr. Lawanda Cruz), history of GI bleed, prior small bowel obstruction status post resection presented with chest pain and atrial fibrillation with RVR. Found to have elevated heart rate in the 140s. Diltiazem started with subsequent bradycardia and pauses. EP was consulted regarding the role of pacemaker implantation and management of atrial fibrillation. Patient currently remains in sinus rhythm. Significantly symptomatic in atrial fibrillation. Telemetry demonstrated. No pauses greater than 3 seconds during atrial fibrillation episode. SUBJECTIVE:  Currently denies of any chest pain, shortness of breath, palpitations while she remains in normal sinus rhythm. Assessment and Plan     Tachybradycardia syndrome  Evidence of rapid A-fib with RVR with periods of pauses greater than 3 seconds as well as bradycardia and sinus rhythm. Previously on atenolol which was discontinued for bradycardia as an outpatient well before hospital adminssion. Remains in sinus rhythm right now in the 60s. - I spoke to her in great details regarding various treatment options. Obviously her beta-blocker tolerance is limited by history of bradycardia  - Trial of Amiodarone resulted in bradycardia in the 40s.  Daughter also is worried about the long term risk of gait instability, tremors, and confusion  - currently on low dose amiodarone 105 mg daily  - Implications, risks associated with amiodarone therapy was explained to patient and her mother lens implants. No significant  osseous or scalp lesions are identified. Impression  1. No acute intracranial abnormality. 2. Unchanged mild chronic microvascular ischemic disease. No results for input(s): \"CPK\" in the last 72 hours. Invalid input(s): \"CKQMB\", \"CPKMB\", \"TROIQ\", \"BMPP\"  Recent Labs     10/04/23  0154 10/05/23  0853 10/06/23  0053    134* 137   K 4.2 3.0* 2.9*    106 105   CO2 25 23 23   BUN 15 8 13   PHOS 2.4*  --  1.8*     Recent Labs     10/03/23  1658 10/04/23  0154   WBC 11.2* 9.0   HGB 13.3 12.5   HCT 39.1 37.3    186     No results for input(s): \"INR\" in the last 72 hours. Invalid input(s): \"PTTP\", \"PTP\", \"GPT\", \"SGOT\", \"AP\"  No results for input(s): \"CHOL\", \"HDLC\", \"LDLC\", \"HBA1C\" in the last 72 hours. Invalid input(s): \"TGL\"  No results for input(s): \"ESR\", \"CRP\", \"TSH\" in the last 72 hours.         Current meds:    Current Facility-Administered Medications:     potassium chloride 10 mEq/100 mL IVPB (Peripheral Line), 10 mEq, IntraVENous, Q1H, ADRI Bond - NP, Last Rate: 100 mL/hr at 10/06/23 0654, 10 mEq at 10/06/23 0654    amLODIPine (NORVASC) tablet 5 mg, 5 mg, Oral, Daily, Rolando Apodaca APRN - NP    lisinopril (PRINIVIL;ZESTRIL) tablet 20 mg, 20 mg, Oral, Daily, ADRI Bond - NP, 20 mg at 10/05/23 0607    insulin glargine (LANTUS) injection vial 13 Units, 0.2 Units/kg, SubCUTAneous, Daily, Luzmaria Chavarria MD, 13 Units at 10/05/23 6336    amiodarone (CORDARONE) tablet 200 mg, 200 mg, Oral, Daily, Ca Braga MD, 200 mg at 10/05/23 1337    cefTRIAXone (ROCEPHIN) 1,000 mg in sterile water 10 mL IV syringe, 1,000 mg, IntraVENous, Q24H, Luzmaria Chavarria MD, 1,000 mg at 10/05/23 1337    clopidogrel (PLAVIX) tablet 75 mg, 75 mg, Oral, Daily, Luzmaria Chavarria MD    insulin lispro (HUMALOG) injection vial 0-8 Units, 0-8 Units, SubCUTAneous, TID WC, DoDouglas MD, 2 Units at 10/05/23 0850    insulin lispro (HUMALOG) injection

## 2023-10-06 NOTE — PROGRESS NOTES
2:23 PM  TRANSFER - OUT REPORT:    Verbal report given to Joo Pak on Stephanie Matt  being transferred to EP lab for ordered procedure       Report consisted of patient's Situation, Background, Assessment and   Recommendations(SBAR). Information from the following report(s) Nurse Handoff Report was reviewed with the receiving nurse. Lines:   Peripheral IV 87/57/89 Right Cephalic (Active)   Site Assessment Clean, dry & intact 10/06/23 1200   Line Status Flushed; Infusing 10/06/23 1200   Line Care Connections checked and tightened 10/06/23 1200   Phlebitis Assessment No symptoms 10/06/23 1200   Infiltration Assessment 0 10/06/23 1200   Alcohol Cap Used Yes 10/06/23 1200   Dressing Status Clean, dry & intact 10/06/23 1200   Dressing Type Transparent 10/06/23 1200       Peripheral IV 10/06/23 Left;Proximal Forearm (Active)        Opportunity for questions and clarification was provided. Patient transported with:  Registered Nurse and Tech    3:44 PM  TRANSFER - IN REPORT:    Verbal report received from [address] on Stephanie Matt  being received from EP lab for routine post-op      Report consisted of patient's Situation, Background, Assessment and   Recommendations(SBAR). Information from the following report(s) Nurse Handoff Report was reviewed with the receiving nurse. Opportunity for questions and clarification was provided. Assessment completed upon patient's arrival to unit and care assumed. 30 minute heads up message sent to [facility] charge nurse. 4:25 PM  TRANSFER - OUT REPORT:    Verbal report given to Mariella Seymour RN on Stephanie Matt  being transferred to [facility] for routine progression of patient care       Report consisted of patient's Situation, Background, Assessment and   Recommendations(SBAR). Information from the following report(s) Nurse Handoff Report was reviewed with the receiving nurse.            Lines:   Peripheral IV 46/61/24 Right Cephalic (Active)   Site Assessment

## 2023-10-06 NOTE — PLAN OF CARE
Problem: Discharge Planning  Goal: Discharge to home or other facility with appropriate resources  10/6/2023 0207 by Irene Flores RN  Outcome: Progressing  10/5/2023 1526 by Xiomara Garcia RN  Outcome: Progressing     Problem: Pain  Goal: Verbalizes/displays adequate comfort level or baseline comfort level  10/6/2023 0207 by Irene Flores RN  Outcome: Progressing  Flowsheets (Taken 10/5/2023 2000 by Florence Mendenhall RN)  Verbalizes/displays adequate comfort level or baseline comfort level:   Encourage patient to monitor pain and request assistance   Assess pain using appropriate pain scale   Administer analgesics based on type and severity of pain and evaluate response  10/5/2023 1526 by Xiomara Garcia RN  Outcome: Progressing     Problem: ABCDS Injury Assessment  Goal: Absence of physical injury  10/6/2023 0207 by Irene Flores RN  Outcome: Progressing  10/5/2023 1526 by Xiomara Garcia RN  Outcome: Progressing     Problem: Safety - Adult  Goal: Free from fall injury  10/6/2023 0207 by Irene Flores RN  Outcome: Progressing  10/5/2023 1526 by Xiomara Garcia RN  Outcome: Progressing     Problem: Chronic Conditions and Co-morbidities  Goal: Patient's chronic conditions and co-morbidity symptoms are monitored and maintained or improved  10/6/2023 0207 by Irene Flores RN  Outcome: Progressing  10/5/2023 1526 by Xiomara Garcia RN  Outcome: Progressing     Problem: Occupational Therapy - Adult  Goal: By Discharge: Performs self-care activities at highest level of function for planned discharge setting. See evaluation for individualized goals. Description: FUNCTIONAL STATUS PRIOR TO ADMISSION:  Pt mobilizes with SPC (has a rollator but does not use it). Pt is MOD I for ALDs and manages simple meal prep. Pt drives only locally to do groceries, otherwise family drives pt to appointments. Family endorses multiple falls.        HOME SUPPORT: Patient lived alone with supportive family locally. Occupational Therapy Goals:  Initiated 10/4/2023  1. Patient will perform grooming, standing at sink, with Modified [place] within 7 day(s). 2.  Patient will perform lower body dressing with Modified [place] within 7 day(s). 3.  Patient will perform bathing, standing PRN, with Supervision within 7 day(s). 4.  Patient will perform toilet transfers with Modified [place]  within 7 day(s). 5.  Patient will perform all aspects of toileting with Modified [place] within 7 day(s). 6.  Patient will participate in upper extremity therapeutic exercise/activities with [place] for 10 minutes within 7 day(s). 10/5/2023 1425 by Junior Shima OT  Outcome: Progressing     Problem: Physical Therapy - Adult  Goal: By Discharge: Performs mobility at highest level of function for planned discharge setting. See evaluation for individualized goals. Description: FUNCTIONAL STATUS PRIOR TO ADMISSION: Patient was independent and active without use of DME.    HOME SUPPORT PRIOR TO ADMISSION: The patient lived alone with local daughter to provide assistance. Physical Therapy Goals  Initiated 10/4/2023  1. Patient will move from supine to sit and sit to supine, scoot up and down, and roll side to side in bed with independence within 7 day(s). 2.  Patient will perform sit to stand with modified independence within 7 day(s). 3.  Patient will transfer from bed to chair and chair to bed with modified independence using the least restrictive device within 7 day(s). 4.  Patient will ambulate with modified independence for 300 feet with the least restrictive device within 7 day(s). 5.  Patient will ascend/descend 3 stairs with 1 handrail(s) with contact guard assist within 7 day(s). 10/5/2023 1736 by Kelvin Fagan PT  Outcome: Progressing     Problem: Skin/Tissue Integrity  Goal: Absence of new skin breakdown  Description: 1.   Monitor for areas of redness and/or skin breakdown  2. Assess vascular access sites hourly  3. Every 4-6 hours minimum:  Change oxygen saturation probe site  4. Every 4-6 hours:  If on nasal continuous positive airway pressure, respiratory therapy assess nares and determine need for appliance change or resting period.   Outcome: Progressing

## 2023-10-06 NOTE — PROGRESS NOTES
[facility]                    Cardiology Care Note     []Initial Encounter     [x]Follow-up    Patient Name: Stacy Massey - GJF:[birth year] - GXO:[id number]  Primary Cardiologist: Tomy Irizarry DO  Consulting Cardiologist: Connie Salvador MD     Reason for encounter: a fib RVR    HPI:       Stacy Massey is a 82 y.o. female with PMH significant for HTN, DM, Afib s/p watchman, GI bleed, SBO s/p resection, presented w/ chest pain, afib RVR. The patient was otherwise healthy when she developed chest pain tonight. Described as \"dull\", 6/10, non-radiating, not associated with dyspnea or diaphoresis. Denied cough, fevers, chills, nausea, vomiting. In the ED, Trop was neg, but was found to have afib RVR, rate ~140s. Dilt gtt started    Subjective: Stacy Massey reports none. Assessment and Plan     1. .Tachybradycardia syndrome  Evidence of rapid A-fib with RVR with periods of pauses greater than 3 seconds as well as bradycardia and sinus rhythm. - Previously on atenolol which was discontinued for bradycardia as an outpatient in August.  - cont PO amio  - avoid av jo agents for now  - plans for PPM this afternoon with Dr. Basia Gaston      2. Paroxysmal atrial fibrillation  Discovered in March 2023. Had GI bleeding on Eliquis. Status post watchman implantation with Dr. Sharron Grover.  - Currently on aspirin/Plavix we will plan to continue Plavix through 10/18/2023, at that point she can continue aspirin 81 mg daily     3. Anemia  Due to history of GI bleeding, hemoglobin stable     4. Hypertension  - lisinopril resumed  - start norvasc    5. Hypokalemia  - currently receiving PO and IV repletion         ____________________________________________________________    Cardiac testing  08/18/23    GABI (CONTRAST/3D PRN) 08/18/2023  1:11 PM (Final)    Interpretation Summary    Left Ventricle: Normal left ventricular systolic function with a visually estimated EF of 55 - 60%.  Left ventricle size is normal. 10/04/2023    HCT 37.3 10/04/2023    MCV 94.7 10/04/2023     10/04/2023       No results for input(s): \"CHOL\", \"HDLC\", \"LDLC\", \"HBA1C\" in the last 72 hours. Invalid input(s):  \"TGL\"    Lab Results   Component Value Date/Time     10/06/2023 12:53 AM    K 2.9 10/06/2023 12:53 AM     10/06/2023 12:53 AM    CO2 23 10/06/2023 12:53 AM    BUN 13 10/06/2023 12:53 AM    CREATININE 0.80 10/06/2023 12:53 AM    GLUCOSE 220 10/06/2023 12:53 AM    CALCIUM 8.9 10/06/2023 12:53 AM    LABGLOM >60 10/06/2023 12:53 AM      Lab Results   Component Value Date     03/13/2023           Current meds:    Current Facility-Administered Medications:     potassium chloride 10 mEq/100 mL IVPB (Peripheral Line), 10 mEq, IntraVENous, Q1H, ADRI Rodriguez NP, Last Rate: 100 mL/hr at 10/06/23 0827, 10 mEq at 10/06/23 0827    amLODIPine (NORVASC) tablet 5 mg, 5 mg, Oral, Daily, Jamir Apodaca APRN - NP, 5 mg at 10/06/23 0827    lisinopril (PRINIVIL;ZESTRIL) tablet 20 mg, 20 mg, Oral, Daily, ADRI Rodriguez NP, 20 mg at 10/06/23 0827    insulin glargine (LANTUS) injection vial 13 Units, 0.2 Units/kg, SubCUTAneous, Daily, Ede Crespo MD, 13 Units at 10/06/23 0849    amiodarone (CORDARONE) tablet 200 mg, 200 mg, Oral, Daily, Ca Braga MD, 200 mg at 10/06/23 0827    cefTRIAXone (ROCEPHIN) 1,000 mg in sterile water 10 mL IV syringe, 1,000 mg, IntraVENous, Q24H, Ede Crespo MD, 1,000 mg at 10/05/23 1337    clopidogrel (PLAVIX) tablet 75 mg, 75 mg, Oral, Daily, Ede Crespo MD    insulin lispro (HUMALOG) injection vial 0-8 Units, 0-8 Units, SubCUTAneous, TID WC, Douglas Echevarria MD, 2 Units at 10/05/23 0850    insulin lispro (HUMALOG) injection vial 0-4 Units, 0-4 Units, SubCUTAneous, Nightly, Douglas Echevarria MD    HYDROmorphone (DILAUDID) injection 0.5 mg, 0.5 mg, IntraVENous, Q4H PRN, Douglas Echevarria MD    prochlorperazine (COMPAZINE) injection 10 mg, 10 mg, IntraVENous, Q6H PRN, Douglas Echevarria,

## 2023-10-06 NOTE — CARE COORDINATION
CM Note:  Pt to have pacemaker placed today. She is set up with home health PT/OT and nursing with [facility])  Family to transport her home at d/c  Pt to follow up OP with providers  GABRIEL Harvey

## 2023-10-06 NOTE — DISCHARGE INSTRUCTIONS
Pacemaker  Discharge Instructions      Please make sure you have received your Temporary Pacemaker identification card with your discharge instructions      MEDICATIONS        Take only the medications prescribed to you at discharge. ACTIVITY        Return to your normal activity, except as noted below. Wear the sling for the first 24 hours. You may remove the sling after 24 hours and wear it as needed. It is important to move the affected arm to prevent shoulder stiffness and locking. Do not lift anything heavier than 10 pounds for 4 weeks with the affected arm. This is how long it takes the muscles to heal, and the leads inside your heart to stabilize their position. Do not reach above your head with the affected arm for 4 weeks, doing so increases the risk of lead dislodgement. Avoid tight clothes or unnecessary pressure over your incision (such as bra straps or seat belts). If it is tender or sensitive to clothing, cover the incision with a soft dressing or pad. Avoid driving for 48 hours. You may resume all other activities after 4 weeks (including exercise, driving, sex)      SHOWERING        Leave the bandage over your incision until your clinic follow up in 10-14 days after the Pacemaker implant. You bandage will be removed in clinic during that appointment. It is important to keep the bandaged area clean and dry. You may shower around the site until the bandage is removed in clinic. Thereafter, you may shower after the bandage is removed, washing it gently with soap and water. Do not apply any lotions, powders, or perfumes to the incision line. Avoid submerging your incision in water (tub baths, hot tubs, or swimming) for four weeks. Underneath the dressing. If you have white steri-strips over your incision (underneath the gauze dressing), they will curl up at the end and fall off, usually within 10 days.   Do not pull them off.  - OR -   You may have a different type of closure for the incision including a dermabond adhesive which will slowly peel and come off on its own once you are able to shower. DISCHARGE PRECAUTIONS        You can have an MRI after 6 weeks. You must be aware that any strong magnet or magnetic field can affect your Pacemaker. In general, be careful of metal detectors, heavy machinery, and any area where arc-welding is performed. When approaching a security checkpoint show your Pacemaker ID Card to security personnel. Always tell your doctor or dentist that you have a Pacemaker. In some cases, antibiotics may be prescribed before certain procedures. Your temporary identification will be given to you with these instructions. Keep your Pacemaker card in your wallet or on your person at all times. You should receive your permanent card, although this may take up to 8-12 weeks. If you do not receive your permanent card, please call the office at [phone] or the phone number provided on your temporary card for the pacemaker company. SYMPTOMS THAT NEED TO BE REPORTED IMMEDIATELY        Temperature more than 100.4 F    Redness or warmth at the incision site, or pain for longer than the first 5 days after the implant. Drainage from the incision site. Swelling around the incision site. Shortness of breath. Dizziness, lightheadedness, fainting. Slow pulse below 40 beats per minute. REMEMBER: If you feel something is an emergency or cannot be handled over the phone, call 911 or go to the closest emergency room.       FOLLOW-UP        Pacemaker Nurse 10/17/23 at 10:40 Sherry Prajapati 2/7/24 at 1:20 at [address], MD  Cardiac Electrophysiology / Cardiology    [address] [facility],[address]  [facility], [address] [facility] [address], [address] [facility] Kumar Medina [address]  [address]

## 2023-10-06 NOTE — PROGRESS NOTES
Spiritual Care Partner Volunteer visited patient at [address] [place] [address] in [address] [place] [address],  [address] on 10/6/2023   Documented by:  Kelly Sheffield Spiritual Care to [phone]

## 2023-10-07 VITALS
TEMPERATURE: 99.1 F | SYSTOLIC BLOOD PRESSURE: 155 MMHG | WEIGHT: 145.94 LBS | HEIGHT: 63 IN | HEART RATE: 72 BPM | OXYGEN SATURATION: 100 % | BODY MASS INDEX: 25.86 KG/M2 | RESPIRATION RATE: 18 BRPM | DIASTOLIC BLOOD PRESSURE: 83 MMHG

## 2023-10-07 LAB
ALBUMIN SERPL-MCNC: 3.1 G/DL (ref 3.5–5)
ANION GAP SERPL CALC-SCNC: 7 MMOL/L (ref 5–15)
BUN SERPL-MCNC: 19 MG/DL (ref 6–20)
BUN/CREAT SERPL: 22 (ref 12–20)
CALCIUM SERPL-MCNC: 8.9 MG/DL (ref 8.5–10.1)
CHLORIDE SERPL-SCNC: 109 MMOL/L (ref 97–108)
CO2 SERPL-SCNC: 22 MMOL/L (ref 21–32)
CREAT SERPL-MCNC: 0.87 MG/DL (ref 0.55–1.02)
GLUCOSE BLD STRIP.AUTO-MCNC: 201 MG/DL (ref 65–117)
GLUCOSE SERPL-MCNC: 207 MG/DL (ref 65–100)
PHOSPHATE SERPL-MCNC: 2.5 MG/DL (ref 2.6–4.7)
POTASSIUM SERPL-SCNC: 3.6 MMOL/L (ref 3.5–5.1)
SERVICE CMNT-IMP: ABNORMAL
SODIUM SERPL-SCNC: 138 MMOL/L (ref 136–145)

## 2023-10-07 PROCEDURE — 82962 GLUCOSE BLOOD TEST: CPT

## 2023-10-07 PROCEDURE — 6370000000 HC RX 637 (ALT 250 FOR IP): Performed by: INTERNAL MEDICINE

## 2023-10-07 PROCEDURE — 80069 RENAL FUNCTION PANEL: CPT

## 2023-10-07 PROCEDURE — 6370000000 HC RX 637 (ALT 250 FOR IP)

## 2023-10-07 PROCEDURE — 36415 COLL VENOUS BLD VENIPUNCTURE: CPT

## 2023-10-07 PROCEDURE — 6370000000 HC RX 637 (ALT 250 FOR IP): Performed by: NURSE PRACTITIONER

## 2023-10-07 RX ORDER — GLIPIZIDE 5 MG/1
5 TABLET, FILM COATED, EXTENDED RELEASE ORAL DAILY
Qty: 30 TABLET | Refills: 1 | Status: SHIPPED | OUTPATIENT
Start: 2023-10-07 | End: 2023-11-06

## 2023-10-07 RX ORDER — METOPROLOL TARTRATE 37.5 MG/1
37.5 TABLET, FILM COATED ORAL 2 TIMES DAILY
Qty: 60 TABLET | Refills: 1 | Status: SHIPPED | OUTPATIENT
Start: 2023-10-07 | End: 2023-11-06

## 2023-10-07 RX ADMIN — SERTRALINE HYDROCHLORIDE 25 MG: 25 TABLET ORAL at 08:21

## 2023-10-07 RX ADMIN — CLOPIDOGREL BISULFATE 75 MG: 75 TABLET ORAL at 08:20

## 2023-10-07 RX ADMIN — PANTOPRAZOLE SODIUM 40 MG: 40 TABLET, DELAYED RELEASE ORAL at 05:24

## 2023-10-07 RX ADMIN — INSULIN GLARGINE 13 UNITS: 100 INJECTION, SOLUTION SUBCUTANEOUS at 08:23

## 2023-10-07 RX ADMIN — LEVOTHYROXINE SODIUM 88 MCG: 88 TABLET ORAL at 05:24

## 2023-10-07 RX ADMIN — LISINOPRIL 20 MG: 20 TABLET ORAL at 08:21

## 2023-10-07 RX ADMIN — ACETAMINOPHEN 650 MG: 325 TABLET ORAL at 10:24

## 2023-10-07 RX ADMIN — METOPROLOL TARTRATE 37.5 MG: 25 TABLET, FILM COATED ORAL at 08:19

## 2023-10-07 RX ADMIN — ASPIRIN 81 MG: 81 TABLET, COATED ORAL at 08:19

## 2023-10-07 RX ADMIN — INSULIN LISPRO 2 UNITS: 100 INJECTION, SOLUTION INTRAVENOUS; SUBCUTANEOUS at 08:21

## 2023-10-07 ASSESSMENT — PAIN DESCRIPTION - LOCATION: LOCATION: ARM

## 2023-10-07 ASSESSMENT — PAIN SCALES - GENERAL: PAINLEVEL_OUTOF10: 3

## 2023-10-07 NOTE — DISCHARGE SUMMARY
[address] [place] [address]  Tel: [phone]    Hospital Medicine Discharge Summary    Patient ID:    Ottoniel Preciado  Age:              82 y.o.    : 1935  MRN:             [id number]     PCP: Bia Daugherty     Date of Admission: 10/3/2023    Date of Discharge:  10/7/2023    Discharge Diagnoses:  Principal Problem:    Tachy-eligio syndrome ([address])  Active Problems:    Atrial fibrillation with rapid ventricular response ([address])    Presence of Watchman left atrial appendage closure device    Chest pain at rest    HTN (hypertension), benign    Dyslipidemia    High risk medication use    UTI (urinary tract infection)    DM (diabetes mellitus), type 2 ([address])  Resolved Problems:    * No resolved hospital problems. *       Reason for admission:    Chest pain at rest [R07.9]  Atrial fibrillation with rapid ventricular response [facility]) [I48.91]    Hospital Course:     Ms. Sly Bledsoe is a 82 y.o. admitted to [facility] and treated for the following: Tachy-eligio syndrome  POA: admitted with atrial fibrillation with rapid ventricular response. She has a Watchman device. Not on anticoagulation due to Gi bleed. On DAPT post Watchman. She had episodes of bradycardia while on Diltiazem gtt. Seen by Cardiology and EP and had a dual chamber pacemaker placed 10/6. Follow up CXR was normal. Continue Metoprolol, Asprin, Plavix. Outpatient follow up with EP and cardiology. Chest pain at rest POA: likely from the A fib. Troponin peaked at 213. Symptoms have resolved. No further testing. On Asprin      HTN (hypertension), benign / Dyslipidemia POA: BP variable. Continue Lisinopril, Amlodipine and Metoprolol     DM (diabetes mellitus), type 2 POA: A1c 7.8. BG stable. Continue adjusted Glucotrol and follow up with her PCP. DM diet. UTI (urinary tract infection) POA: urine cultures isolated pan-sensitive E coli.  Completed course of (NORVASC) 10 MG tablet Take 1 tablet by mouth daily  Qty: 90 tablet, Refills: 3    Comments: NEW MEDICATION DOSAGE AS OF 8/29/2023      clopidogrel (PLAVIX) 75 MG tablet Take 1 tablet by mouth daily  Qty: 30 tablet, Refills: 1      pantoprazole (PROTONIX) 40 MG tablet Take 1 tablet by mouth daily      aspirin 81 MG EC tablet Take 1 tablet by mouth daily  Qty: 30 tablet, Refills: 3      sertraline (ZOLOFT) 25 MG tablet Take 1 tablet by mouth daily      Omega-3 Fatty Acids (FISH OIL PO) Take 1,500 mg by mouth daily      ascorbic acid (VITAMIN C) 500 MG tablet Take 1 tablet by mouth      calcium carbonate 1500 (600 Ca) MG TABS tablet Take 1 tablet by mouth 2 times daily      vitamin D 25 MCG (1000 UT) CAPS Take 1 capsule by mouth      levothyroxine (SYNTHROID) 88 MCG tablet Take 1 tablet by mouth every morning (before breakfast)      lidocaine (LIDODERM) 5 % Place 1 patch onto the skin every 24 hours      lisinopril (PRINIVIL;ZESTRIL) 20 MG tablet Take 1 tablet by mouth daily      potassium chloride (MICRO-K) 10 MEQ extended release capsule Take 1 capsule by mouth daily      rosuvastatin (CRESTOR) 10 MG tablet Take 1 tablet by mouth nightly           STOP taking these medications       topiramate (TOPAMAX) 25 MG tablet Comments:   Reason for Stopping:         topiramate (TOPAMAX) 100 MG tablet Comments:   Reason for Stopping:               Uriel Saunders APRN - NP  [id number] [facility] [address] [place] [address]  [phone]    Follow up on 10/27/2023  11:20 am    Darlene Mann [address]  [phone]    Go to  As needed      Follow-up tests or labs: As noted above    Discharge Condition: Stable    Disposition: home    Time taken to co-ordinate and arrange discharge:  35 minutes.     Signed:  Raisa Howard MD       10/7/2023   8:50 AM

## 2023-10-07 NOTE — PROGRESS NOTES
1930 Bedside shift change report given to 7 [facility] (oncoming nurse) by Franklin Prasad (offgoing nurse). Report included the following information SBAR, Intake/Output, MAR, Recent Results, and Cardiac Rhythm NSR . Mike Number

## 2023-10-07 NOTE — CARE COORDINATION
CM Discharge note:    RUR 12 (Score %) low   Is This a Readmission NO    POLLO Plan:  Patient will be transported via her daughter by personal car. Discharge disposition:  Home with [facility] [place] office. AVS updated. Patient has follow up appointment with PCP. Discussed with patient, she is agreeable with plan.     Aimee Barton RN, MSN/Care manager

## 2023-10-14 NOTE — PROGRESS NOTES
Physician Progress Note      PATIENT:               Lotus Nunez  CSN #:                  [id number]  :                       1935  ADMIT DATE:       10/3/2023 4:30 PM  [address] [facility] DATE:        10/7/2023 10:54 AM  RESPONDING  PROVIDER #:        Brown Dexter MD          QUERY TEXT:    Patient admitted with chest pain which is noted to likely be from A-fib. If   possible, please document in the progress notes and discharge summary if you   are evaluating and/or treating any of the following: The medical record reflects the following:  Risk Factors: A-fib, advanced age  Clinical Indicators: to ED with tachycardia and CP  - admitted with A-fib with RVR  - H&P notes CP likely related to A-fib  - Troponin 17, 51, 195, 213, 193  - DCS: \"Chest pain at rest POA: likely from the A fib. Troponin peaked at 213. Symptoms have resolved. No further testing. 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     Query created by: Olamide Goldberg on 10/13/2023 8:25 AM      Electronically signed by:  Brown Dexter MD 10/14/2023 6:48 PM

## 2023-10-17 ENCOUNTER — TELEPHONE (OUTPATIENT)
Age: 88
End: 2023-10-17

## 2023-10-17 ENCOUNTER — PROCEDURE VISIT (OUTPATIENT)
Age: 88
End: 2023-10-17
Payer: MEDICARE

## 2023-10-17 DIAGNOSIS — Z95.0 CARDIAC PACEMAKER IN SITU: Primary | ICD-10-CM

## 2023-10-17 PROCEDURE — 93280 PM DEVICE PROGR EVAL DUAL: CPT | Performed by: INTERNAL MEDICINE

## 2023-10-17 NOTE — TELEPHONE ENCOUNTER
Per Leland Younger NP:    Can you check her med list please. If it is correct increase her lisinopril to 40mg for her elevated BPs. Thank you. I see her next week     Lisinopril 20mg   Amlodipine 10mg   Metoprolol tartrate 37.5mg BID         Spoke with the pts daughter, Charley Carpio, identified the pt with name and . I verified her medications, they are as stated above. I instructed her to increas lisinopril to 40 mg daily, keep a log to bring in on the , and to call us if the pt's B/P drops below 100/50's. Dotty expressed understanding and agreement. Pt has no further questions or concerns at this time.

## 2023-10-17 NOTE — PROGRESS NOTES
Patient presents for wound check post-device implantation. The dressing was removed and the site was inspected. The site appeared to be well-healing without ecchymosis/tenderness/erythema. Denies pain, fevers, discharge. Future Appointments   Date Time Provider [address]   10/27/2023 11:20 AM Longest, Carmelo Goodell, APRN - NP CAV[facility] BS AMB   1/9/2024 10:00 AM Dipesh Vasquez APRN - NP [id number] BS AMB   2/7/2024  2:00 PM PACEMAKER, STFRJAY CAV[facility] BS AMB   2/7/2024  2:00 PM Ca Braga MD CAV[facility] BS AMB     Pt expressed concerns about BP readings, sent Dr. Marcelo Persaud and NP staff message for recs    Continue follow up in device clinic as planned.

## 2023-10-18 ENCOUNTER — APPOINTMENT (OUTPATIENT)
Facility: HOSPITAL | Age: 88
End: 2023-10-18
Payer: MEDICARE

## 2023-10-18 ENCOUNTER — HOSPITAL ENCOUNTER (EMERGENCY)
Facility: HOSPITAL | Age: 88
Discharge: HOME | End: 2023-10-18
Attending: EMERGENCY MEDICINE
Payer: MEDICARE

## 2023-10-18 VITALS
BODY MASS INDEX: 24.29 KG/M2 | HEART RATE: 65 BPM | TEMPERATURE: 98.1 F | SYSTOLIC BLOOD PRESSURE: 150 MMHG | OXYGEN SATURATION: 92 % | DIASTOLIC BLOOD PRESSURE: 60 MMHG | RESPIRATION RATE: 20 BRPM | WEIGHT: 137.13 LBS

## 2023-10-18 DIAGNOSIS — S70.02XA HEMATOMA OF LEFT HIP, INITIAL ENCOUNTER: ICD-10-CM

## 2023-10-18 DIAGNOSIS — S09.90XA CLOSED HEAD INJURY, INITIAL ENCOUNTER: Primary | ICD-10-CM

## 2023-10-18 DIAGNOSIS — S51.011A SKIN TEAR OF RIGHT ELBOW WITHOUT COMPLICATION, INITIAL ENCOUNTER: ICD-10-CM

## 2023-10-18 LAB
APPEARANCE UR: CLEAR
BACTERIA URNS QL MICRO: NEGATIVE /HPF
BILIRUB UR QL: NEGATIVE
COLOR UR: ABNORMAL
EKG ATRIAL RATE: 60 BPM
EKG DIAGNOSIS: NORMAL
EKG P-R INTERVAL: 184 MS
EKG Q-T INTERVAL: 396 MS
EKG QRS DURATION: 74 MS
EKG QTC CALCULATION (BAZETT): 396 MS
EKG R AXIS: -28 DEGREES
EKG T AXIS: -2 DEGREES
EKG VENTRICULAR RATE: 60 BPM
EPITH CASTS URNS QL MICRO: ABNORMAL /LPF
GLUCOSE UR STRIP.AUTO-MCNC: NEGATIVE MG/DL
HGB UR QL STRIP: NEGATIVE
KETONES UR QL STRIP.AUTO: NEGATIVE MG/DL
LEUKOCYTE ESTERASE UR QL STRIP.AUTO: NEGATIVE
NITRITE UR QL STRIP.AUTO: NEGATIVE
PH UR STRIP: 6 (ref 5–8)
PROT UR STRIP-MCNC: 100 MG/DL
RBC #/AREA URNS HPF: ABNORMAL /HPF (ref 0–5)
SP GR UR REFRACTOMETRY: 1.03 (ref 1–1.03)
UROBILINOGEN UR QL STRIP.AUTO: 0.2 EU/DL (ref 0.2–1)
WBC URNS QL MICRO: ABNORMAL /HPF (ref 0–4)

## 2023-10-18 PROCEDURE — 90471 IMMUNIZATION ADMIN: CPT | Performed by: EMERGENCY MEDICINE

## 2023-10-18 PROCEDURE — 70450 CT HEAD/BRAIN W/O DYE: CPT

## 2023-10-18 PROCEDURE — 73521 X-RAY EXAM HIPS BI 2 VIEWS: CPT

## 2023-10-18 PROCEDURE — 99285 EMERGENCY DEPT VISIT HI MDM: CPT

## 2023-10-18 PROCEDURE — 93010 ELECTROCARDIOGRAM REPORT: CPT | Performed by: INTERNAL MEDICINE

## 2023-10-18 PROCEDURE — 6360000002 HC RX W HCPCS: Performed by: EMERGENCY MEDICINE

## 2023-10-18 PROCEDURE — 81001 URINALYSIS AUTO W/SCOPE: CPT

## 2023-10-18 PROCEDURE — 12001 RPR S/N/AX/GEN/TRNK 2.5CM/<: CPT

## 2023-10-18 PROCEDURE — 90714 TD VACC NO PRESV 7 YRS+ IM: CPT | Performed by: EMERGENCY MEDICINE

## 2023-10-18 PROCEDURE — 93280 PM DEVICE PROGR EVAL DUAL: CPT | Performed by: INTERNAL MEDICINE

## 2023-10-18 PROCEDURE — 93005 ELECTROCARDIOGRAM TRACING: CPT | Performed by: EMERGENCY MEDICINE

## 2023-10-18 RX ORDER — TETANUS AND DIPHTHERIA TOXOIDS ADSORBED 2; 2 [LF]/.5ML; [LF]/.5ML
0.5 INJECTION INTRAMUSCULAR
Status: COMPLETED | OUTPATIENT
Start: 2023-10-18 | End: 2023-10-18

## 2023-10-18 RX ADMIN — TETANUS AND DIPHTHERIA TOXOIDS ADSORBED 0.5 ML: 2; 2 INJECTION INTRAMUSCULAR at 12:41

## 2023-10-18 ASSESSMENT — ENCOUNTER SYMPTOMS: SHORTNESS OF BREATH: 0

## 2023-10-18 ASSESSMENT — LIFESTYLE VARIABLES: HOW OFTEN DO YOU HAVE A DRINK CONTAINING ALCOHOL: NEVER

## 2023-10-18 NOTE — DISCHARGE INSTRUCTIONS
There was no evidence of intracranial hemorrhage noted on CT scan today. No evidence of skull fracture. No evidence of hip fracture. Please apply ice wrapped in a towel or using the ice pack for 20 minutes at a time several times a day for the next 3 days to the hematoma on the left hip.

## 2023-10-18 NOTE — ED PROVIDER NOTES
was reviewed and negative.        PAST MEDICAL HISTORY     Past Medical History:   Diagnosis Date    Arthritis     Atrial fibrillation ([address])     detected 3/13/2023    Depression     Diabetes (HCC)     GERD (gastroesophageal reflux disease)     High cholesterol     Hypertension     Snoring     Transient ischemic attack (TIA) 2003         SURGICAL HISTORY       Past Surgical History:   Procedure Laterality Date    APPENDECTOMY      CATARACT REMOVAL Bilateral     CHOLECYSTECTOMY      EP DEVICE PROCEDURE N/A 6/20/2023    Watchman yvan closure device [facility]    EP DEVICE PROCEDURE N/A 10/6/2023    Insert PPM dual performed by Mika Mendoza MD at [facility] C[facility] LAB    INVASIVE VASCULAR N/A 6/20/2023    Ultrasound guided [facility]    ORTHOPEDIC SURGERY Left     Open Repair    ORTHOPEDIC SURGERY Left     Trigger finger    ROTATOR CUFF REPAIR Right     TOTAL HIP ARTHROPLASTY Right     TUBAL LIGATION           CURRENT MEDICATIONS       Previous Medications    AMLODIPINE (NORVASC) 10 MG TABLET    Take 1 tablet by mouth daily    ASCORBIC ACID (VITAMIN C) 500 MG TABLET    Take 1 tablet by mouth    ASPIRIN 81 MG EC TABLET    Take 1 tablet by mouth daily    CALCIUM CARBONATE 1500 (600 CA) MG TABS TABLET    Take 1 tablet by mouth 2 times daily    CLOPIDOGREL (PLAVIX) 75 MG TABLET    Take 1 tablet by mouth daily    GLIPIZIDE (GLUCOTROL XL) 5 MG EXTENDED RELEASE TABLET    Take 1 tablet by mouth daily    LEVOTHYROXINE (SYNTHROID) 88 MCG TABLET    Take 1 tablet by mouth every morning (before breakfast)    LIDOCAINE (LIDODERM) 5 %    Place 1 patch onto the skin every 24 hours    LISINOPRIL (PRINIVIL;ZESTRIL) 20 MG TABLET    Take 1 tablet by mouth daily    METOPROLOL TARTRATE 37.5 MG TABS    Take 37.5 mg by mouth 2 times daily    OMEGA-3 FATTY ACIDS (FISH OIL PO)    Take 1,500 mg by mouth daily    PANTOPRAZOLE (PROTONIX) 40 MG TABLET interpretation performed. Decision-making details documented in ED Course. Risk  Prescription drug management. REASSESSMENT     ED Course as of 10/18/23 1307   Wed Oct 18, 2023   1143 ECG obtained at 1048 showing atrial paced rhythm, rate 60, normal intervals, nonspecific T wave inversion in lead III, rate improved compared to prior EKG. [JE]      ED Course User Index  [JE] Girish Dejesus MD           CONSULTS:  None    PROCEDURES:  Unless otherwise noted below, none     Lac Repair    Date/Time: 10/18/2023 1:07 PM    Performed by: Girish Dejesus MD  Authorized by: Girsih Dejesus MD    Consent:     Consent obtained:  Verbal    Consent given by:  Patient    Risks, benefits, and alternatives were discussed: yes      Risks discussed:  Infection, need for additional repair, poor wound healing and poor cosmetic result    Alternatives discussed:  No treatment and observation  Universal protocol:     Procedure explained and questions answered to patient or proxy's satisfaction: yes      Immediately prior to procedure, a time out was called: yes      Patient identity confirmed:  Verbally with patient  Anesthesia:     Anesthesia method:  None  Laceration details:     Location:  Shoulder/arm    Shoulder/arm location:  R elbow    Length (cm):  2    Depth (mm):  2  Exploration:     Limited defect created (wound extended): no      Hemostasis achieved with:  Direct pressure    Imaging outcome: foreign body not noted      Wound exploration: entire depth of wound visualized      Contaminated: no    Treatment:     Area cleansed with:  Chlorhexidine    Amount of cleaning:  Standard    Debridement:  None  Skin repair:     Repair method:  Tissue adhesive  Approximation:     Approximation:  Close  Repair type:     Repair type:  Simple  Post-procedure details:     Procedure completion:  Tolerated well, no immediate complications        FINAL IMPRESSION    No diagnosis found.       DISPOSITION/PLAN   DISPOSITION

## 2023-10-18 NOTE — ED TRIAGE NOTES
Pt ambulatory to treatment room with rollator. Pt reports getting up to the restroom last night, fell in the hallway, hit her head on the wall. Pt complains of left hip pain, slight right hip pain, has right elbow skin tear which is covered. Pt denies head pain. Does not remember all events surrounding the fall. Pt is on Plavix for Afib. Pt had left chest wall pacemaker placed recently, had a check up yesterday, dressing dry and intact.

## 2023-10-27 ENCOUNTER — OFFICE VISIT (OUTPATIENT)
Age: 88
End: 2023-10-27
Payer: MEDICARE

## 2023-10-27 VITALS
BODY MASS INDEX: 26.04 KG/M2 | HEART RATE: 66 BPM | DIASTOLIC BLOOD PRESSURE: 72 MMHG | WEIGHT: 147 LBS | OXYGEN SATURATION: 99 % | SYSTOLIC BLOOD PRESSURE: 130 MMHG

## 2023-10-27 DIAGNOSIS — E03.9 ACQUIRED HYPOTHYROIDISM: ICD-10-CM

## 2023-10-27 DIAGNOSIS — F41.9 ANXIETY: ICD-10-CM

## 2023-10-27 DIAGNOSIS — I49.5 TACHY-BRADY SYNDROME (HCC): ICD-10-CM

## 2023-10-27 DIAGNOSIS — Z95.0 PACEMAKER: ICD-10-CM

## 2023-10-27 DIAGNOSIS — I48.0 PAROXYSMAL ATRIAL FIBRILLATION (HCC): Primary | ICD-10-CM

## 2023-10-27 DIAGNOSIS — Z95.818 PRESENCE OF WATCHMAN LEFT ATRIAL APPENDAGE CLOSURE DEVICE: ICD-10-CM

## 2023-10-27 DIAGNOSIS — I48.91 ATRIAL FIBRILLATION WITH RAPID VENTRICULAR RESPONSE (HCC): ICD-10-CM

## 2023-10-27 DIAGNOSIS — R07.9 CHEST PAIN, UNSPECIFIED TYPE: ICD-10-CM

## 2023-10-27 DIAGNOSIS — E78.2 MIXED HYPERLIPIDEMIA: ICD-10-CM

## 2023-10-27 DIAGNOSIS — I10 ESSENTIAL (PRIMARY) HYPERTENSION: ICD-10-CM

## 2023-10-27 DIAGNOSIS — I95.1 ORTHOSTASIS: ICD-10-CM

## 2023-10-27 DIAGNOSIS — Z86.73 PERSONAL HISTORY OF TRANSIENT ISCHEMIC ATTACK (TIA), AND CEREBRAL INFARCTION WITHOUT RESIDUAL DEFICITS: ICD-10-CM

## 2023-10-27 PROCEDURE — 99214 OFFICE O/P EST MOD 30 MIN: CPT

## 2023-10-27 PROCEDURE — 3078F DIAST BP <80 MM HG: CPT

## 2023-10-27 PROCEDURE — 1090F PRES/ABSN URINE INCON ASSESS: CPT

## 2023-10-27 PROCEDURE — G8484 FLU IMMUNIZE NO ADMIN: HCPCS

## 2023-10-27 PROCEDURE — 1111F DSCHRG MED/CURRENT MED MERGE: CPT

## 2023-10-27 PROCEDURE — 3075F SYST BP GE 130 - 139MM HG: CPT

## 2023-10-27 PROCEDURE — G8400 PT W/DXA NO RESULTS DOC: HCPCS

## 2023-10-27 PROCEDURE — G8419 CALC BMI OUT NRM PARAM NOF/U: HCPCS

## 2023-10-27 PROCEDURE — 1036F TOBACCO NON-USER: CPT

## 2023-10-27 PROCEDURE — 1123F ACP DISCUSS/DSCN MKR DOCD: CPT

## 2023-10-27 PROCEDURE — G8427 DOCREV CUR MEDS BY ELIG CLIN: HCPCS

## 2023-10-27 RX ORDER — METOPROLOL SUCCINATE 50 MG/1
75 TABLET, EXTENDED RELEASE ORAL DAILY
Qty: 135 TABLET | Refills: 3 | Status: SHIPPED | OUTPATIENT
Start: 2023-10-27

## 2023-10-27 RX ORDER — INSULIN GLARGINE 100 [IU]/ML
20 INJECTION, SOLUTION SUBCUTANEOUS
COMMUNITY
Start: 2023-10-23

## 2023-10-27 RX ORDER — HYDRALAZINE HYDROCHLORIDE 10 MG/1
10 TABLET, FILM COATED ORAL 2 TIMES DAILY
Qty: 180 TABLET | Refills: 3 | Status: SHIPPED | OUTPATIENT
Start: 2023-10-27 | End: 2024-10-26

## 2023-10-27 NOTE — PATIENT INSTRUCTIONS
Please stop the Plavix  Continue the Asprin daily, amlodipine 10mg daily, lisinopril 40 mg daily,    Change the Metoprolol tartrate 37.5mg to metoprolol succinate/Toprol 75mg daily. You may move this to take at night if you notice you are more tired after the change.     Please start Hydralazine 10mg twice a day    Please continue to monitor your BP    Next time please bring your cuff in so we can check it against ours

## 2023-10-27 NOTE — PROGRESS NOTES
Tan Mensah is a 82 y.o. female    had concerns including Atrial Fibrillation (PAF). Vitals:    10/27/23 1148   BP: 130/72   Site: Left Lower Arm   Position: Sitting   Pulse: 66   SpO2: 99%   Weight: 66.7 kg (147 lb)        Chest pain No    SOB No    Dizziness No    Swelling No    Refills No    Covid Vaccinated No      1. Have you been to the ER, urgent care clinic since your last visit? Hospitalized since your last visit? no    2. Have you seen or consulted any other health care providers outside of the [address] [place] [address] since your last visit? Include any pap smears or colon screening.  no

## 2023-11-04 PROBLEM — N39.0 UTI (URINARY TRACT INFECTION): Status: RESOLVED | Noted: 2023-10-05 | Resolved: 2023-11-04

## 2023-11-08 NOTE — PROGRESS NOTES
Primary Cardiologist:  DO Dontrell Greene APRN - NP   [address]  [facility]    Office [phone],[facility] [phone]           Marcellus Boo is a 82 y.o. female presents the office for follow-up evaluation      Assessment/Recommendations:     Diagnosis Orders   1. Paroxysmal atrial fibrillation (HCC)        2. Essential (primary) hypertension        3. Tachy-eligio syndrome ([address])        4. Orthostasis        5. Presence of Watchman left atrial appendage closure device        6. Mixed hyperlipidemia        7. Personal history of transient ischemic attack (TIA), and cerebral infarction without residual deficits        8. Chest pain, unspecified type        9. Anxiety        10. Acquired hypothyroidism        11. Pacemaker                 pAfib. Discovered in March 2023. Had GI bleeding on Eliquis. , converted to NSR spontaneously 3/2023   - s/p watchman due high chadsvasc with high fall risk. - Currently on aspirin/Plavix we will plan to continue Plavix through 10/18/2023, at that point she can continue aspirin 81 mg daily   -10/27 advised to stop Plavix and remain on ASA    Tachybradycardia syndrome  Evidence of rapid A-fib with RVR with periods of pauses greater than 3 seconds as well as bradycardia and sinus rhythm. - Previously on atenolol which was discontinued for bradycardia as an outpatient in August.  - cont PO amio  - s/p PPM with Dr. Joe Cruz 10/6  - Cont ASA, and BB per Dr. Joe Cruz  - On Metoprolol 37.5mg BID, changed to Toprol 75mg  -FU with EP     Essential hypertension with ongoing symptoms of orthostasis  -On amlodipine 10mg daily, lisinopril 40 mg daily, BB, 10mg hydralazine BID   -BP controlled in office again today. Pt feels better. - Home cuff reads SBP 25mmHg higher and DBP at least 5mmHg higher. They will get a new cuff today and bring new cuff next visit  -Home BP adjusted are 115-155/70-85.  Cont same meds     HLD    - cont

## 2023-11-13 ENCOUNTER — OFFICE VISIT (OUTPATIENT)
Age: 88
End: 2023-11-13
Payer: MEDICARE

## 2023-11-13 VITALS
SYSTOLIC BLOOD PRESSURE: 130 MMHG | HEIGHT: 63 IN | DIASTOLIC BLOOD PRESSURE: 80 MMHG | OXYGEN SATURATION: 92 % | WEIGHT: 145 LBS | BODY MASS INDEX: 25.69 KG/M2 | HEART RATE: 62 BPM

## 2023-11-13 DIAGNOSIS — I48.0 PAROXYSMAL ATRIAL FIBRILLATION (HCC): Primary | ICD-10-CM

## 2023-11-13 DIAGNOSIS — R07.9 CHEST PAIN, UNSPECIFIED TYPE: ICD-10-CM

## 2023-11-13 DIAGNOSIS — F41.9 ANXIETY: ICD-10-CM

## 2023-11-13 DIAGNOSIS — I95.1 ORTHOSTASIS: ICD-10-CM

## 2023-11-13 DIAGNOSIS — E03.9 ACQUIRED HYPOTHYROIDISM: ICD-10-CM

## 2023-11-13 DIAGNOSIS — I10 ESSENTIAL (PRIMARY) HYPERTENSION: ICD-10-CM

## 2023-11-13 DIAGNOSIS — Z86.73 PERSONAL HISTORY OF TRANSIENT ISCHEMIC ATTACK (TIA), AND CEREBRAL INFARCTION WITHOUT RESIDUAL DEFICITS: ICD-10-CM

## 2023-11-13 DIAGNOSIS — E78.2 MIXED HYPERLIPIDEMIA: ICD-10-CM

## 2023-11-13 DIAGNOSIS — Z95.818 PRESENCE OF WATCHMAN LEFT ATRIAL APPENDAGE CLOSURE DEVICE: ICD-10-CM

## 2023-11-13 DIAGNOSIS — I49.5 TACHY-BRADY SYNDROME (HCC): ICD-10-CM

## 2023-11-13 DIAGNOSIS — Z95.0 PACEMAKER: ICD-10-CM

## 2023-11-13 PROCEDURE — 1090F PRES/ABSN URINE INCON ASSESS: CPT

## 2023-11-13 PROCEDURE — 1036F TOBACCO NON-USER: CPT

## 2023-11-13 PROCEDURE — 99214 OFFICE O/P EST MOD 30 MIN: CPT

## 2023-11-13 PROCEDURE — 3079F DIAST BP 80-89 MM HG: CPT

## 2023-11-13 PROCEDURE — G8427 DOCREV CUR MEDS BY ELIG CLIN: HCPCS

## 2023-11-13 PROCEDURE — G8419 CALC BMI OUT NRM PARAM NOF/U: HCPCS

## 2023-11-13 PROCEDURE — 3075F SYST BP GE 130 - 139MM HG: CPT

## 2023-11-13 PROCEDURE — 1123F ACP DISCUSS/DSCN MKR DOCD: CPT

## 2023-11-13 PROCEDURE — G8484 FLU IMMUNIZE NO ADMIN: HCPCS

## 2023-11-13 PROCEDURE — G8400 PT W/DXA NO RESULTS DOC: HCPCS

## 2023-11-13 RX ORDER — ACETAMINOPHEN 325 MG/1
650 TABLET ORAL EVERY 6 HOURS PRN
COMMUNITY

## 2023-11-13 ASSESSMENT — PATIENT HEALTH QUESTIONNAIRE - PHQ9
2. FEELING DOWN, DEPRESSED OR HOPELESS: 0
SUM OF ALL RESPONSES TO PHQ9 QUESTIONS 1 & 2: 0
SUM OF ALL RESPONSES TO PHQ QUESTIONS 1-9: 0
1. LITTLE INTEREST OR PLEASURE IN DOING THINGS: 0
SUM OF ALL RESPONSES TO PHQ QUESTIONS 1-9: 0

## 2023-11-13 NOTE — PROGRESS NOTES
Chief Complaint   Patient presents with    Follow-up     1 mo     Atrial Fibrillation     Vitals:    11/13/23 1435   BP: 130/80   Site: Left Upper Arm   Position: Sitting   Pulse: 62   SpO2: 92%   Weight: 65.8 kg (145 lb)   Height: 1.6 m (5' 3\")         Chest pain denied     SOB denied     Palpitations denied     Swelling in hands/feet denied     Dizziness denied     Recent hospital stays denied     Refills denied

## 2023-11-13 NOTE — PATIENT INSTRUCTIONS
Continue amlodipine 10mg daily, lisinopril 40 mg daily, Toprol 75mg daily, 10mg hydralazine twice a day    Your BP cuff reads 25 points higher on the top and a few on the bottom.  Please consider getting a new [employer] Upper arm cuff

## 2024-01-08 NOTE — PROGRESS NOTES
Primary Cardiologist:  DO Risa Leslie, APRN - NP   [address] [facility], [address]  [place] [address]    Office [phone],Fax [phone]           Imelda Wright is a 88 y.o. female presents the office for follow-up evaluation      Assessment/Recommendations:     Diagnosis Orders   1. Essential (primary) hypertension        2. Paroxysmal atrial fibrillation (HCC)        3. Tachy-eligio syndrome (HCC)        4. Orthostasis        5. Mixed hyperlipidemia        6. Personal history of transient ischemic attack (TIA), and cerebral infarction without residual deficits        7. Presence of Watchman left atrial appendage closure device        8. Chest pain, unspecified type        9. Anxiety        10. Pacemaker        11. Acquired hypothyroidism                   pAfib. Discovered in March 2023.  Had GI bleeding on Eliquis., converted to NSR spontaneously 3/2023   - s/p watchman due high chadsvasc with high fall risk.   - Currently on aspirin/Plavix we will plan to continue Plavix through 10/18/2023, at that point she can continue aspirin 81 mg daily   -10/27 advised to stop Plavix and remain on ASA    Tachybradycardia syndrome, s/p PPM   Evidence of rapid A-fib with RVR with periods of pauses greater than 3 seconds as well as bradycardia and sinus rhythm.    - Previously on atenolol which was discontinued for bradycardia as an outpatient in August.  - cont PO amio  - s/p PPM with Dr. Braga 10/6  - Cont ASA, and BB per Dr. Braga  - Cont Toprol 75mg  - FU with EP     Essential hypertension with ongoing symptoms of orthostasis  -On amlodipine 10mg daily, lisinopril 40 mg daily, BB, 10mg hydralazine BID   -BP controlled in office again today. Pt feels better.  - Home cuff reads SBP 25mmHg higher and DBP at least 5mmHg higher. They will get a new cuff today and bring new cuff next visit  -Stable today. Home BP adjusted are 105-150/62-80. Home manual -140/72. Cont same

## 2024-01-09 ENCOUNTER — OFFICE VISIT (OUTPATIENT)
Age: 89
End: 2024-01-09
Payer: MEDICARE

## 2024-01-09 ENCOUNTER — TELEPHONE (OUTPATIENT)
Age: 89
End: 2024-01-09

## 2024-01-09 VITALS
RESPIRATION RATE: 16 BRPM | HEART RATE: 84 BPM | SYSTOLIC BLOOD PRESSURE: 124 MMHG | BODY MASS INDEX: 25.69 KG/M2 | DIASTOLIC BLOOD PRESSURE: 74 MMHG | HEIGHT: 63 IN | OXYGEN SATURATION: 99 %

## 2024-01-09 DIAGNOSIS — R25.9 MIXED ACTION AND RESTING TREMOR: Primary | ICD-10-CM

## 2024-01-09 PROCEDURE — 99215 OFFICE O/P EST HI 40 MIN: CPT | Performed by: NURSE PRACTITIONER

## 2024-01-09 PROCEDURE — G8419 CALC BMI OUT NRM PARAM NOF/U: HCPCS | Performed by: NURSE PRACTITIONER

## 2024-01-09 PROCEDURE — G8484 FLU IMMUNIZE NO ADMIN: HCPCS | Performed by: NURSE PRACTITIONER

## 2024-01-09 PROCEDURE — 1090F PRES/ABSN URINE INCON ASSESS: CPT | Performed by: NURSE PRACTITIONER

## 2024-01-09 PROCEDURE — 1036F TOBACCO NON-USER: CPT | Performed by: NURSE PRACTITIONER

## 2024-01-09 PROCEDURE — 1123F ACP DISCUSS/DSCN MKR DOCD: CPT | Performed by: NURSE PRACTITIONER

## 2024-01-09 PROCEDURE — G8427 DOCREV CUR MEDS BY ELIG CLIN: HCPCS | Performed by: NURSE PRACTITIONER

## 2024-01-09 RX ORDER — ACETAMINOPHEN/DIPHENHYDRAMINE 500MG-25MG
1 TABLET ORAL NIGHTLY PRN
COMMUNITY
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
## 2024-01-10 NOTE — PROGRESS NOTES
Imelda Wright is a 88 y.o. female who presents with the following  Chief Complaint   Patient presents with    Tremors     Patient has a history of a resting tremor, daughter states she had to stop medication due to dizziness, and states tremor is better.       HPI    Patient comes in with daughter for follow-up for mixed tremor  She was seeing Dr. Paniagua and then saw Daysi Bryant  She was started on Topamax but had some significant side effects so she stopped    She feels good today with no concerns in regard to her tremor  She does use a walker with long distance  She last year had multiple falls due to balance coordination and strength  She is getting some in-home PT with [facility] and is still working with them for a couple more weeks  She is very interested in driving and has had to have DMV forms multiple times in her stay with us  We did discuss this and we will move towards a driving eval with occupational therapy at [facility]    She currently has somebody with her at all times from her family  She has no real issues with doing anything in particular  Her tremor does get worse with anxiety or stress or sleep deprivation  It is not really inhibiting her so she does not really want to do any kind of medications    She is also worried about her memory  She did have memory testing a couple years ago and was found to be more of a psychological thing  We discussed that we could retest or rescan and she does not want to do any of this  We discussed other types of supplements and we will try something like this first  She does stay very active while playing cards and socializing and using her brain  She is eating well  She is sleeping well  She has no real other concerns in regard to her health other than trying to drive locally and [place]        Allergies   Allergen Reactions    Oxycodone-Acetaminophen      Other reaction(s): Unknown (comments)  Can't remember reaction due to length of time of

## 2024-01-18 ENCOUNTER — TELEPHONE (OUTPATIENT)
Age: 89
End: 2024-01-18

## 2024-01-18 ENCOUNTER — OFFICE VISIT (OUTPATIENT)
Age: 89
End: 2024-01-18
Payer: MEDICARE

## 2024-01-18 VITALS
HEIGHT: 63 IN | SYSTOLIC BLOOD PRESSURE: 138 MMHG | HEART RATE: 70 BPM | OXYGEN SATURATION: 98 % | DIASTOLIC BLOOD PRESSURE: 70 MMHG | BODY MASS INDEX: 26.05 KG/M2 | WEIGHT: 147 LBS

## 2024-01-18 DIAGNOSIS — I10 ESSENTIAL (PRIMARY) HYPERTENSION: Primary | ICD-10-CM

## 2024-01-18 DIAGNOSIS — E03.9 ACQUIRED HYPOTHYROIDISM: ICD-10-CM

## 2024-01-18 DIAGNOSIS — I95.1 ORTHOSTASIS: ICD-10-CM

## 2024-01-18 DIAGNOSIS — E78.2 MIXED HYPERLIPIDEMIA: ICD-10-CM

## 2024-01-18 DIAGNOSIS — I48.0 PAROXYSMAL ATRIAL FIBRILLATION (HCC): ICD-10-CM

## 2024-01-18 DIAGNOSIS — I49.5 TACHY-BRADY SYNDROME (HCC): ICD-10-CM

## 2024-01-18 DIAGNOSIS — R07.9 CHEST PAIN, UNSPECIFIED TYPE: ICD-10-CM

## 2024-01-18 DIAGNOSIS — Z95.0 PACEMAKER: ICD-10-CM

## 2024-01-18 DIAGNOSIS — F41.9 ANXIETY: ICD-10-CM

## 2024-01-18 DIAGNOSIS — Z86.73 PERSONAL HISTORY OF TRANSIENT ISCHEMIC ATTACK (TIA), AND CEREBRAL INFARCTION WITHOUT RESIDUAL DEFICITS: ICD-10-CM

## 2024-01-18 DIAGNOSIS — Z95.818 PRESENCE OF WATCHMAN LEFT ATRIAL APPENDAGE CLOSURE DEVICE: ICD-10-CM

## 2024-01-18 PROCEDURE — G8484 FLU IMMUNIZE NO ADMIN: HCPCS

## 2024-01-18 PROCEDURE — G8427 DOCREV CUR MEDS BY ELIG CLIN: HCPCS

## 2024-01-18 PROCEDURE — G8419 CALC BMI OUT NRM PARAM NOF/U: HCPCS

## 2024-01-18 PROCEDURE — 99214 OFFICE O/P EST MOD 30 MIN: CPT

## 2024-01-18 PROCEDURE — 1123F ACP DISCUSS/DSCN MKR DOCD: CPT

## 2024-01-18 PROCEDURE — 1090F PRES/ABSN URINE INCON ASSESS: CPT

## 2024-01-18 PROCEDURE — 1036F TOBACCO NON-USER: CPT

## 2024-01-18 NOTE — PROGRESS NOTES
had concerns including Hypertension, Cholesterol Problem, and Other (Tachy- eligio /PAF).    Vitals:    01/18/24 1251   BP: 138/70   Site: Left Upper Arm   Position: Sitting   Pulse: 70   SpO2: 98%   Weight: 66.7 kg (147 lb)   Height: 1.6 m (5' 3\")        Chest pain No    Refills No        1. Have you been to the ER, urgent care clinic since your last visit? No Hospitalized since your last visit? No

## 2024-01-18 NOTE — TELEPHONE ENCOUNTER
Is calling to confirm if order for a driving assessment was sent to [facility].     Please contact to advise if needed.       [facility]  P- [phone]  F- [phone]

## 2024-01-23 NOTE — ANESTHESIA PREPROCEDURE EVALUATION
NST Performed due to FGR.   reviewed efm tracing. See NST/BPP Doc Flowsheet tab.    Relevant Problems   CARDIOVASCULAR   (+) Atrial fibrillation with rapid ventricular response (HCC)       Anesthetic History               Review of Systems / Medical History  Patient summary reviewed, nursing notes reviewed and pertinent labs reviewed    Pulmonary                   Neuro/Psych       CVA: no residual symptoms       Cardiovascular    Hypertension: well controlled                   GI/Hepatic/Renal     GERD: well controlled           Endo/Other    Diabetes: well controlled, type 2    Arthritis     Other Findings   Comments: P/h paroxysmal A.fib, ECG shows NSR in recent stripe.   Small bowel perforation  EF- 55- 60%  Right hip replacement  Remote h/o CVA         Physical Exam    Airway  Mallampati: I  TM Distance: > 6 cm  Neck ROM: normal range of motion   Mouth opening: Normal     Cardiovascular    Rhythm: regular  Rate: normal         Dental    Dentition: Upper partial plate     Pulmonary  Breath sounds clear to auscultation               Abdominal  GI exam deferred       Other Findings            Anesthetic Plan    ASA: 3  Anesthesia type: general  ETT please        Induction: Intravenous  Anesthetic plan and risks discussed with: Patient

## 2024-02-05 ENCOUNTER — PROCEDURE VISIT (OUTPATIENT)
Age: 89
End: 2024-02-05
Payer: MEDICARE

## 2024-02-05 ENCOUNTER — OFFICE VISIT (OUTPATIENT)
Age: 89
End: 2024-02-05
Payer: MEDICARE

## 2024-02-05 VITALS
RESPIRATION RATE: 18 BRPM | BODY MASS INDEX: 26.93 KG/M2 | HEIGHT: 63 IN | WEIGHT: 152 LBS | SYSTOLIC BLOOD PRESSURE: 116 MMHG | OXYGEN SATURATION: 96 % | DIASTOLIC BLOOD PRESSURE: 56 MMHG | HEART RATE: 62 BPM

## 2024-02-05 DIAGNOSIS — Z95.0 PACEMAKER: Primary | ICD-10-CM

## 2024-02-05 DIAGNOSIS — I10 HTN (HYPERTENSION), BENIGN: ICD-10-CM

## 2024-02-05 DIAGNOSIS — Z95.0 CARDIAC PACEMAKER IN SITU: Primary | ICD-10-CM

## 2024-02-05 DIAGNOSIS — I49.5 TACHY-BRADY SYNDROME (HCC): ICD-10-CM

## 2024-02-05 DIAGNOSIS — I48.91 ATRIAL FIBRILLATION WITH RAPID VENTRICULAR RESPONSE (HCC): ICD-10-CM

## 2024-02-05 DIAGNOSIS — Z95.818 PRESENCE OF WATCHMAN LEFT ATRIAL APPENDAGE CLOSURE DEVICE: ICD-10-CM

## 2024-02-05 PROCEDURE — 1123F ACP DISCUSS/DSCN MKR DOCD: CPT | Performed by: NURSE PRACTITIONER

## 2024-02-05 PROCEDURE — 99214 OFFICE O/P EST MOD 30 MIN: CPT | Performed by: NURSE PRACTITIONER

## 2024-02-05 PROCEDURE — 1090F PRES/ABSN URINE INCON ASSESS: CPT | Performed by: NURSE PRACTITIONER

## 2024-02-05 PROCEDURE — 1036F TOBACCO NON-USER: CPT | Performed by: NURSE PRACTITIONER

## 2024-02-05 PROCEDURE — G8427 DOCREV CUR MEDS BY ELIG CLIN: HCPCS | Performed by: NURSE PRACTITIONER

## 2024-02-05 PROCEDURE — 93280 PM DEVICE PROGR EVAL DUAL: CPT | Performed by: INTERNAL MEDICINE

## 2024-02-05 PROCEDURE — G8419 CALC BMI OUT NRM PARAM NOF/U: HCPCS | Performed by: NURSE PRACTITIONER

## 2024-02-05 PROCEDURE — G8484 FLU IMMUNIZE NO ADMIN: HCPCS | Performed by: NURSE PRACTITIONER

## 2024-02-05 RX ORDER — LOPERAMIDE HYDROCHLORIDE 2 MG/1
2 CAPSULE ORAL 4 TIMES DAILY PRN
COMMUNITY

## 2024-02-05 ASSESSMENT — PATIENT HEALTH QUESTIONNAIRE - PHQ9
SUM OF ALL RESPONSES TO PHQ9 QUESTIONS 1 & 2: 0
SUM OF ALL RESPONSES TO PHQ QUESTIONS 1-9: 0
SUM OF ALL RESPONSES TO PHQ QUESTIONS 1-9: 0
2. FEELING DOWN, DEPRESSED OR HOPELESS: 0
SUM OF ALL RESPONSES TO PHQ QUESTIONS 1-9: 0
SUM OF ALL RESPONSES TO PHQ QUESTIONS 1-9: 0
1. LITTLE INTEREST OR PLEASURE IN DOING THINGS: 0

## 2024-02-05 NOTE — PROGRESS NOTES
Cardiac Electrophysiology OFFICE Follow Up Note             Assessment/Plan:   1. Pacemaker  2. Tachy-eligio syndrome (HCC)  3. Atrial fibrillation with rapid ventricular response (HCC)  4. HTN (hypertension), benign  5. Presence of Watchman left atrial appendage closure device         Medtronic dual chamber pacemaker  13.2 years on battery   78.2% A-paced  Less than 0.1% V-paced  No AF  - Continue Q3 month remote device transmissions. Follow-up in the EP clinic in one year, or sooner for any concerns.     Sick Sinus Syndrome  - S/p Medtronic dual chamber pacemaker 10/6/23     Paroxysmal atrial fibrillation  Discovered in March 2023.  Had GIB/anemia on Eliquis.    Status post watchman implantation 6/20/23 with Dr. Gutierrez.  - Echo 10/3/23: EF 55-60%, no significant valvular disease   - Continue aspirin   - Continue metoprolol     Hypertension  BP controlled  - Continue hydralazine, amlodipine, metoprolol and lisinopril        Subjective:         Imelda Wright is a 88 y.o. patient who is seen for follow up of pacemaker and AF.     She was admitted October 2023 with chest pain and AF with RVR. Found to have elevated heart rate in the 140s. Diltiazem started with subsequent bradycardia and pauses. Significantly symptomatic in atrial fibrillation.     Now s/p Medtronic dual chamber pacemaker 10/6/23.    She feels very well and denies chest pain, palpitations, dyspnea, dizziness or syncope.       Patient Active Problem List   Diagnosis    Primary osteoarthritis of right hip    Atrial fibrillation with rapid ventricular response (HCC)    Small bowel perforation (HCC)    Melena    Rectal bleed    Presence of Watchman left atrial appendage closure device    Chest pain at rest    HTN (hypertension), benign    Dyslipidemia    Tachy-eligio syndrome (HCC)    High risk medication use    DM (diabetes mellitus), type 2 (HCC)    Pacemaker         Current Outpatient Medications   Medication Sig Dispense Refill    loperamide

## 2024-02-05 NOTE — PROGRESS NOTES
Room #: 2    Chief Complaint   Patient presents with    Follow-up     3 mo post implant    Device Check       Vitals:    02/05/24 0857   BP: (!) 116/56   Site: Right Upper Arm   Position: Sitting   Cuff Size: Medium Adult   Pulse: 62   Resp: 18   SpO2: 96%   Weight: 68.9 kg (152 lb)   Height: 1.6 m (5' 3\")         Chest pain:  NO    Have you been to the ER, urgent care, or hospitalized outside of [facility] since your last visit?   NO    Refills:  NO

## 2024-02-16 ENCOUNTER — TELEPHONE (OUTPATIENT)
Age: 89
End: 2024-02-16

## 2024-02-16 NOTE — TELEPHONE ENCOUNTER
Transmission received, ID verified using two patient identifiers   No events correlating with sx, 1 episode of SVT last week lasting 60sec. Follow up as scheduled    Opportunities for questions, clarifications, and concerns provided.  Pt expressed understanding.
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
## 2024-02-16 NOTE — TELEPHONE ENCOUNTER
Spoke with patient's daughter, ID verified using two patient identifiers   States patient is in [place] with family and noticed an episode of  \"fluttering\" that \"didn't last long\". States that this has happened before and they assume that it is episodes of AF but now that she has a pacemaker they would like to know for sure. Advised to send manual transmission. Will review and call back [phone].    Opportunities for questions, clarifications, and concerns provided.  Pt expressed understanding.

## 2024-05-13 ENCOUNTER — OFFICE VISIT (OUTPATIENT)
Age: 89
End: 2024-05-13
Payer: MEDICARE

## 2024-05-13 VITALS
RESPIRATION RATE: 18 BRPM | OXYGEN SATURATION: 96 % | BODY MASS INDEX: 27.25 KG/M2 | WEIGHT: 153.8 LBS | HEIGHT: 63 IN | SYSTOLIC BLOOD PRESSURE: 124 MMHG | DIASTOLIC BLOOD PRESSURE: 78 MMHG | HEART RATE: 68 BPM

## 2024-05-13 DIAGNOSIS — I48.91 ATRIAL FIBRILLATION WITH RAPID VENTRICULAR RESPONSE (HCC): Primary | ICD-10-CM

## 2024-05-13 DIAGNOSIS — I10 HTN (HYPERTENSION), BENIGN: ICD-10-CM

## 2024-05-13 DIAGNOSIS — I49.5 TACHY-BRADY SYNDROME (HCC): ICD-10-CM

## 2024-05-13 DIAGNOSIS — Z95.818 PRESENCE OF WATCHMAN LEFT ATRIAL APPENDAGE CLOSURE DEVICE: ICD-10-CM

## 2024-05-13 PROCEDURE — 1036F TOBACCO NON-USER: CPT | Performed by: STUDENT IN AN ORGANIZED HEALTH CARE EDUCATION/TRAINING PROGRAM

## 2024-05-13 PROCEDURE — 1123F ACP DISCUSS/DSCN MKR DOCD: CPT | Performed by: STUDENT IN AN ORGANIZED HEALTH CARE EDUCATION/TRAINING PROGRAM

## 2024-05-13 PROCEDURE — G8427 DOCREV CUR MEDS BY ELIG CLIN: HCPCS | Performed by: STUDENT IN AN ORGANIZED HEALTH CARE EDUCATION/TRAINING PROGRAM

## 2024-05-13 PROCEDURE — 99214 OFFICE O/P EST MOD 30 MIN: CPT | Performed by: STUDENT IN AN ORGANIZED HEALTH CARE EDUCATION/TRAINING PROGRAM

## 2024-05-13 PROCEDURE — G8419 CALC BMI OUT NRM PARAM NOF/U: HCPCS | Performed by: STUDENT IN AN ORGANIZED HEALTH CARE EDUCATION/TRAINING PROGRAM

## 2024-05-13 PROCEDURE — 1090F PRES/ABSN URINE INCON ASSESS: CPT | Performed by: STUDENT IN AN ORGANIZED HEALTH CARE EDUCATION/TRAINING PROGRAM

## 2024-05-13 NOTE — PROGRESS NOTES
[place]  [address] [facility], [address]  [place] [address]    Office [phone],Fax [phone]           Imelda Wright is a 88 y.o. female presents the office for follow-up evaluation      Assessment/Recommendations:     Diagnosis Orders   1. Atrial fibrillation with rapid ventricular response (HCC)        2. Tachy-eligio syndrome (HCC)        3. HTN (hypertension), benign        4. Presence of Watchman left atrial appendage closure device              pAfib.  newly discovered, converted to NSR spontaneously 3/2023.  No Afib on most recent device interogation   - s/p watchman  - continue metoprolol XL 75mg/d    Tachy-eligio syndrome.  A-fib with RVR with periods of pauses greater than 3 seconds as well as bradycardia and sinus rhythm. S/p ppm     Essential hypertension with ongoing symptoms of orthostasis  - continue current regimen     HLD    - cont rosouvastatin 10 mg daily      hx TIA       Primary Care Physician- Torrie Mann, ADRI - NP    Follow-up 6 months        Subjective:  88 y.o. presents the office for follow-up evaluation.  Clinically doing well. No angina, mendoza.  No symptomatic recurrence of afib.    Past Medical History:   Diagnosis Date    Arthritis     Atrial fibrillation (HCC)     detected 3/13/2023    Depression     Diabetes (HCC)     GERD (gastroesophageal reflux disease)     High cholesterol     Hypertension     Snoring     Transient ischemic attack (TIA) 2003        Past Surgical History:   Procedure Laterality Date    APPENDECTOMY      CATARACT REMOVAL Bilateral     CHOLECYSTECTOMY      EP DEVICE PROCEDURE N/A 6/20/2023    Watchman yvan closure device performed by Estrada Gutierrez MD at [facility] CARDIAC CATH LAB    EP DEVICE PROCEDURE N/A 10/6/2023    Insert PPM dual performed by Ca Braga MD at [facility] CARDIAC CATH LAB    INVASIVE VASCULAR N/A 6/20/2023    Ultrasound guided vascular access performed by Estrada Gutierrez MD at [facility] CARDIAC CATH LAB    INVASIVE

## 2024-05-13 NOTE — PROGRESS NOTES
had concerns including Atrial Fibrillation (PAF with watch ), Hypertension, and Cholesterol Problem.    Vitals:    05/13/24 0933   BP: 124/78   Site: Left Upper Arm   Position: Sitting   Pulse: 68   Resp: 18   SpO2: 96%   Weight: 69.8 kg (153 lb 12.8 oz)   Height: 1.6 m (5' 3\")        Chest pain No    Refills No        1. Have you been to the ER, urgent care clinic since your last visit? No       Hospitalized since your last visit? No       Where?        Date?

## 2024-05-20 ENCOUNTER — TELEPHONE (OUTPATIENT)
Age: 89
End: 2024-05-20

## 2024-05-20 NOTE — TELEPHONE ENCOUNTER
Patient is scheduled as a NP on 5/21/24 but she's a F/U Patient.    Her daughter in law requesting a call to discuss some concerns before her appt on tomorrow.

## 2024-05-21 ENCOUNTER — OFFICE VISIT (OUTPATIENT)
Age: 89
End: 2024-05-21
Payer: MEDICARE

## 2024-05-21 ENCOUNTER — TELEPHONE (OUTPATIENT)
Age: 89
End: 2024-05-21

## 2024-05-21 VITALS
DIASTOLIC BLOOD PRESSURE: 72 MMHG | HEART RATE: 64 BPM | SYSTOLIC BLOOD PRESSURE: 156 MMHG | RESPIRATION RATE: 16 BRPM | OXYGEN SATURATION: 97 %

## 2024-05-21 DIAGNOSIS — G31.84 MCI (MILD COGNITIVE IMPAIRMENT): ICD-10-CM

## 2024-05-21 DIAGNOSIS — R25.9 MIXED ACTION AND RESTING TREMOR: Primary | ICD-10-CM

## 2024-05-21 PROCEDURE — 1123F ACP DISCUSS/DSCN MKR DOCD: CPT | Performed by: NURSE PRACTITIONER

## 2024-05-21 PROCEDURE — G8428 CUR MEDS NOT DOCUMENT: HCPCS | Performed by: NURSE PRACTITIONER

## 2024-05-21 PROCEDURE — G8419 CALC BMI OUT NRM PARAM NOF/U: HCPCS | Performed by: NURSE PRACTITIONER

## 2024-05-21 PROCEDURE — 1090F PRES/ABSN URINE INCON ASSESS: CPT | Performed by: NURSE PRACTITIONER

## 2024-05-21 PROCEDURE — 99215 OFFICE O/P EST HI 40 MIN: CPT | Performed by: NURSE PRACTITIONER

## 2024-05-21 PROCEDURE — 1036F TOBACCO NON-USER: CPT | Performed by: NURSE PRACTITIONER

## 2024-05-21 ASSESSMENT — PATIENT HEALTH QUESTIONNAIRE - PHQ9
SUM OF ALL RESPONSES TO PHQ9 QUESTIONS 1 & 2: 0
1. LITTLE INTEREST OR PLEASURE IN DOING THINGS: NOT AT ALL
SUM OF ALL RESPONSES TO PHQ QUESTIONS 1-9: 0
SUM OF ALL RESPONSES TO PHQ QUESTIONS 1-9: 0
2. FEELING DOWN, DEPRESSED OR HOPELESS: NOT AT ALL
SUM OF ALL RESPONSES TO PHQ QUESTIONS 1-9: 0
SUM OF ALL RESPONSES TO PHQ QUESTIONS 1-9: 0

## 2024-05-21 NOTE — PROGRESS NOTES
time: 35 min   > 50% counseling / coordination?: Yes re: treatment        MRI Result (most recent):  MRI BRAIN WO CONTRAST 08/25/2021    Narrative  EXAM: MRI BRAIN WO CONT    INDICATION: Memory impairment.    COMPARISON: MRI brain 1/3/2019.    CONTRAST: None.    TECHNIQUE:  Multiplanar multisequence acquisition without contrast of the brain.    FINDINGS:  The ventricles are normal in size and position. Unchanged scattered  periventricular and deep white matter T2/FLAIR hyperintensities, consistent with  mild chronic microvascular ischemic disease. There is no acute infarct,  hemorrhage, extra-axial fluid collection, or mass effect. There is no cerebellar  tonsillar herniation. Expected arterial flow-voids are present.    The paranasal sinuses, mastoid air cells, and middle ears are clear. The orbital  contents are within normal limits with bilateral lens implants. No significant  osseous or scalp lesions are identified.    Impression  1. No acute intracranial abnormality.  2. Unchanged mild chronic microvascular ischemic disease.      CT Result (most recent):  CT HEAD WO CONTRAST 10/18/2023    Narrative  EXAM: CT HEAD WO CONTRAST    INDICATION: closed head injury, elderly on plavix    COMPARISON: None.    CONTRAST: None.    TECHNIQUE: Unenhanced CT of the head was performed using 5 mm images. Brain and  bone windows were generated. Coronal and sagittal reformats. CT dose reduction  was achieved through use of a standardized protocol tailored for this  examination and automatic exposure control for dose modulation.    FINDINGS:  Cerebral volume loss is age-appropriate. No hydrocephalus. There is no  significant white matter disease. There is no intracranial hemorrhage,  extra-axial collection, or mass effect. The basilar cisterns are open. No CT  evidence of acute infarct.    The bone windows demonstrate no abnormalities. The visualized portions of the  paranasal sinuses and mastoid air cells are

## 2024-05-21 NOTE — TELEPHONE ENCOUNTER
Patient's daughter, Dotty, called stating that she thinks she missed a call from our office in regards to scheduling Pt for neuropsych. Dotty requested a call back to schedule the appts. Please call [phone]

## 2024-05-31 RX ORDER — AMLODIPINE BESYLATE 10 MG/1
10 TABLET ORAL DAILY
Qty: 90 TABLET | Refills: 3 | Status: SHIPPED | OUTPATIENT
Start: 2024-05-31

## 2024-05-31 NOTE — TELEPHONE ENCOUNTER
Refill per VO of Dr. GONZÁLEZ Foote, OD:    Last appt:  5/13/2024    Future Appointments   Date Time Provider Department Center   6/17/2024 11:00 AM [facility]   6/20/2024 12:30 PM NEUROPSYCH TESTING [facility] AMB   7/3/2024  1:00 PM Jayda Lowe PSYD [facility] AMB   10/24/2024 10:40 AM Charly Vasquez, APRN - NP [facility]  AMB   11/13/2024  9:40 AM [facility]   3/5/2025 10:40 [facility]   3/5/2025 11:00 AM Ca Braga MD CAV[facility]  AMB       Requested Prescriptions     Pending Prescriptions Disp Refills    amLODIPine (NORVASC) 10 MG tablet [Pharmacy Med Name: AMLODIPINE BESYLATE 10MG TABLETS] 90 tablet 3     Sig: TAKE 1 TABLET BY MOUTH DAILY

## 2024-06-16 NOTE — PROGRESS NOTES
abnormality. Unchanged mild chronic microvascular ischemic disease.   Head CT (10/18/2023): (indicated for closed head injury, elderly on Plavix) Age-appropriate cerebral volume loss. No acute process.     Family Medical/Psychiatric History: Ms. Wright has a family history of Alzheimer's disease (AD) in her father (lived to his 90s) and paternal grandfather (lived to his 80s). There is also a medical history of heart disease in her mother.     SOCIAL HISTORY:   Ms. Wright was born and raised in [place]. She did not report to any academic or learning difficulties in school, nor did she report to any grade retention. 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 Ms. Wright is  and has 4 children, 12 grandchildren, and 22 great-grandchildren. She described a robust social support system.     MENTAL STATUS     Arrival: On time and accompanied by her daughter.   General appearance: Appropriately dressed and well-groomed.   Level of consciousness: Alert, attentive.   Ambulation/Gait: Ambulated slowly with a rollater.   Motor: Mild tremors in her hands when performing graphomotor tasks.   Sensory: Vision and hearing were adequate for the purposes of the evaluation.  Speech: Slow rate of speech, normal tone and prosody. Minimal word finding difficulties in conversation.   Language comprehension: Intact.   Thought processes/Insight: Logical; fair to good level of insight.   Mood and Affect: Euthymic mood; normal range of affect.   Current suicidality/homicidality: Did not endorse.   Rapport: Easily established.     ASSESSMENT & PLAN     Ms. Imelda Wright is a 88 y.o., right-handed, , White (non-) female who presented with progressive cognitive declines since her last evaluation which was noted to be more mood-based in nature rather than neurodegenerative. Given the progression of cognitive functions, an underlying neurodegenerative disorder should not be fully ruled out. The goal of

## 2024-06-17 ENCOUNTER — OFFICE VISIT (OUTPATIENT)
Age: 89
End: 2024-06-17
Payer: MEDICARE

## 2024-06-17 DIAGNOSIS — F32.A DEPRESSION, UNSPECIFIED DEPRESSION TYPE: ICD-10-CM

## 2024-06-17 DIAGNOSIS — R41.9 COGNITIVE COMPLAINTS: Primary | ICD-10-CM

## 2024-06-17 DIAGNOSIS — F41.9 ANXIETY: ICD-10-CM

## 2024-06-17 PROCEDURE — 1123F ACP DISCUSS/DSCN MKR DOCD: CPT | Performed by: STUDENT IN AN ORGANIZED HEALTH CARE EDUCATION/TRAINING PROGRAM

## 2024-06-17 PROCEDURE — 90791 PSYCH DIAGNOSTIC EVALUATION: CPT | Performed by: STUDENT IN AN ORGANIZED HEALTH CARE EDUCATION/TRAINING PROGRAM

## 2024-06-17 NOTE — PATIENT INSTRUCTIONS
Thank you for choosing us for your neuropsychological evaluation today. This evaluation will examine your overall cognitive functioning, including areas such as memory, attention, concentration, language, and problem solving. You were seen for an intake appointment by Dr. Jayda Lowe (Neuropsychologist) today. You will return for testing and feedback of the results on the following dates:    Date of testing appointment: 06/20/2024 at 12:30 pm.   If you wear glasses/contacts and/or hearing assistive devices, please be sure to bring them with you to your testing appointment.     Date of follow-up / feedback: 7/3/2024 at 1:00 pm via in-person.    To reschedule or cancel your appointment, please call [phone].   In case of an emergency, call [phone] or report to the nearest emergency department.  It has been our pleasure to work with you, and we look forward to speaking with you soon.

## 2024-06-20 ENCOUNTER — PROCEDURE VISIT (OUTPATIENT)
Age: 89
End: 2024-06-20
Payer: MEDICARE

## 2024-06-20 DIAGNOSIS — G31.84 MILD COGNITIVE IMPAIRMENT OF UNCERTAIN OR UNKNOWN ETIOLOGY: ICD-10-CM

## 2024-06-20 DIAGNOSIS — F32.1 CURRENT MODERATE EPISODE OF MAJOR DEPRESSIVE DISORDER, UNSPECIFIED WHETHER RECURRENT (HCC): ICD-10-CM

## 2024-06-20 DIAGNOSIS — F41.1 GENERALIZED ANXIETY DISORDER: Primary | ICD-10-CM

## 2024-06-20 PROCEDURE — 96138 PSYCL/NRPSYC TECH 1ST: CPT | Performed by: STUDENT IN AN ORGANIZED HEALTH CARE EDUCATION/TRAINING PROGRAM

## 2024-06-20 PROCEDURE — 96133 NRPSYC TST EVAL PHYS/QHP EA: CPT | Performed by: STUDENT IN AN ORGANIZED HEALTH CARE EDUCATION/TRAINING PROGRAM

## 2024-06-20 PROCEDURE — 96136 PSYCL/NRPSYC TST PHY/QHP 1ST: CPT | Performed by: STUDENT IN AN ORGANIZED HEALTH CARE EDUCATION/TRAINING PROGRAM

## 2024-06-20 PROCEDURE — 96132 NRPSYC TST EVAL PHYS/QHP 1ST: CPT | Performed by: STUDENT IN AN ORGANIZED HEALTH CARE EDUCATION/TRAINING PROGRAM

## 2024-06-20 PROCEDURE — 96139 PSYCL/NRPSYC TST TECH EA: CPT | Performed by: STUDENT IN AN ORGANIZED HEALTH CARE EDUCATION/TRAINING PROGRAM

## 2024-06-27 NOTE — PROGRESS NOTES
Fluency   X (21) X (28/31)      Mental Flexibility (140”)    X (0 err)      Learning and Memory    Verbal Word List   Learning (2, 2, 4, 3) X         Recall (1; 25% retained)   X       Recognition (8 hits; 0 FP)    X      Verbal Story   Immediate Recall (4, 7)   X       Delayed Recall (4; 57% retained)  X        Recognition (10/12)     X      Visual Figure   Recall  X        Recognition 10 hits; 2 false-positive errors.      Adaptive Knowledge & Reasoning: Low functioning. Consistent with individuals who are not able to live independently. She was inconsistent with her level of reasoning in certain health and safety-related scenarios; at times, she did not demonstrate the appropriate reasoning to remove herself from a potentially dangerous situation nor could she explain how to take the appropriate steps to ensure her health and well-being.     Mood/Neurobehavioral Functioning:   Depression (GDS): 19 = Mild, but cusping moderate range. Symptoms were diffuse in nature; she endorsed more than half of the symptoms on the questionnaire as it related to emotional, behavioral, and cognitive-related symptoms of depression.   Anxiety (INDER): 18 = Moderate. Endorsed moderate symptoms of dread, nervousness, shakiness, fear, and anxiety-related sweating.    Interval Change: When comparing Ms. Wright's current performance to her 2022 performance using qualitative descriptors, her scores remained largely stable; although a slight depression of scores was noted, these declines were not significant. Although the exact test measure for word list memory was not replicated in the current year, Ms. Wright demonstrated a decline with learning, but her recall and recognition remained within normal limits. Ms. Wright continued to endorse mild to moderate range depression. She endorsed worsened anxiety in the current evaluation (increased from mild to moderate range).     SUMMARY     Ms. Imelda Wright is a 88 y.o., right-handed, , White

## 2024-07-03 ENCOUNTER — OFFICE VISIT (OUTPATIENT)
Age: 89
End: 2024-07-03
Payer: MEDICARE

## 2024-07-03 DIAGNOSIS — G31.84 MILD COGNITIVE IMPAIRMENT OF UNCERTAIN OR UNKNOWN ETIOLOGY: ICD-10-CM

## 2024-07-03 DIAGNOSIS — F41.1 GENERALIZED ANXIETY DISORDER: Primary | ICD-10-CM

## 2024-07-03 DIAGNOSIS — F32.1 CURRENT MODERATE EPISODE OF MAJOR DEPRESSIVE DISORDER, UNSPECIFIED WHETHER RECURRENT (HCC): ICD-10-CM

## 2024-07-03 PROCEDURE — 96132 NRPSYC TST EVAL PHYS/QHP 1ST: CPT | Performed by: STUDENT IN AN ORGANIZED HEALTH CARE EDUCATION/TRAINING PROGRAM

## 2024-07-03 NOTE — PROGRESS NOTES
NEUROPSYCHOLOGICAL EVALUATION   Feedback     Prior to seeing the patient for today's visit, I reviewed pertinent records, including the previously completed report, the records in Epic, and any updated visits from other providers since the patient's last visit. Ms. Imelda Wright was seen for a feedback appointment via in-person to discuss the results of her neuropsychological evaluation. Her daughter-in-law (DIL), Janny Wright, was also present.     DISCUSSION OF RESULTS AND RECOMMENDATIONS:   I provided feedback that Ms. Wright's neuropsychological testing results did not raise concern for a progression to dementia. Despite her modest declines compared to 2022, these did not fall into the range that was considered significantly impaired for her age. We discussed her variable scores on memory measures which was reflective of reduced learning (likely due to reduced hearing which Ms. Wright and her DIL agreed), but overall did not indicate that she was losing information (in other words, no signs of amnestic impairment). We discussed likely contributing factors which included mood factors (e.g., depression and anxiety) as well as her recurrent UTIs. In fact, Janny mentioned that following the intake, Ms. Wright was found to have a UTI which may have contributed to some signs of confusion during her first visit; however, this was promptly treated and she recovered fully before her testing appointment. Nonetheless, I stated that continued monitoring of cognitive functions is encouraged especially given her age. Ms. Wright and Janny expressed agreement with the results and that the findings were consistent with what they notice in daily life.     Recommendations from the report were discussed in detail. Highlights included:   Janny will reach out to Charly Del Angel about sending a referral to psychiatry (preferably geriatric psychiatry) for Ms. Wright. She agreed that there may need to be a medication adjustment given that Ms. Wright is

## 2024-10-03 RX ORDER — HYDRALAZINE HYDROCHLORIDE 10 MG/1
10 TABLET, FILM COATED ORAL 2 TIMES DAILY
Qty: 180 TABLET | Refills: 3 | Status: SHIPPED | OUTPATIENT
Start: 2024-10-03 | End: 2025-10-03

## 2024-10-04 RX ORDER — METOPROLOL SUCCINATE 50 MG/1
75 TABLET, EXTENDED RELEASE ORAL DAILY
Qty: 135 TABLET | Refills: 0 | Status: SHIPPED | OUTPATIENT
Start: 2024-10-04

## 2024-10-24 ENCOUNTER — OFFICE VISIT (OUTPATIENT)
Age: 89
End: 2024-10-24

## 2024-10-24 ENCOUNTER — TRANSCRIBE ORDERS (OUTPATIENT)
Dept: SCHEDULING | Age: 89
End: 2024-10-24

## 2024-10-24 VITALS
RESPIRATION RATE: 17 BRPM | HEART RATE: 65 BPM | OXYGEN SATURATION: 96 % | SYSTOLIC BLOOD PRESSURE: 153 MMHG | DIASTOLIC BLOOD PRESSURE: 76 MMHG

## 2024-10-24 DIAGNOSIS — G31.84 MILD COGNITIVE IMPAIRMENT OF UNCERTAIN OR UNKNOWN ETIOLOGY: Primary | ICD-10-CM

## 2024-10-24 DIAGNOSIS — R10.32 ABDOMINAL PAIN, LEFT LOWER QUADRANT: Primary | ICD-10-CM

## 2024-10-24 DIAGNOSIS — F41.1 GENERALIZED ANXIETY DISORDER: ICD-10-CM

## 2024-10-24 RX ORDER — NITROFURANTOIN MACROCRYSTALS 50 MG/1
CAPSULE ORAL DAILY
COMMUNITY
Start: 2024-09-30

## 2024-10-24 NOTE — PROGRESS NOTES
Patient's daughter stated that her BP has been high lately. I recommended that they reach out to the PCP if it continues to be high.

## 2024-10-25 NOTE — PROGRESS NOTES
Imelda Wright is a 88 y.o. female who presents with the following  Chief Complaint   Patient presents with    Follow-up     Patient is here following up for tremors. Patient reports they are about the same.        HPI    She feels good today with no concerns in regard to her tremor  Here with her son.     She does use a walker with long distance  She last year had multiple falls due to balance coordination and strength    Is trying to continue to stay active.   Living alone in [place] but family close by.   She was being rotated through family but they are now coming to stay with her  Seeing improvements in her memory.   she currently has somebody with her at all times from her family  She has no real issues with doing anything in particular  Her tremor does get worse with anxiety or stress or sleep deprivation  It is not really inhibiting her so she does not really want to do any kind of medications     She is also worried about her memory  We discussed other types of supplements and we will try something like this first  She does stay very active while playing cards and socializing and using her brain  She is eating well  She is sleeping well      Allergies   Allergen Reactions    Oxycodone-Acetaminophen      Other reaction(s): Unknown (comments)  Can't remember reaction due to length of time of occurrence.       Current Outpatient Medications   Medication Sig Dispense Refill    nitrofurantoin (MACRODANTIN) 50 MG capsule Take by mouth daily      metoprolol succinate (TOPROL XL) 50 MG extended release tablet Take 1.5 tablets by mouth daily 135 tablet 0    hydrALAZINE (APRESOLINE) 10 MG tablet Take 1 tablet by mouth in the morning and at bedtime 180 tablet 3    amLODIPine (NORVASC) 10 MG tablet TAKE 1 TABLET BY MOUTH DAILY 90 tablet 3    LANTUS SOLOSTAR 100 UNIT/ML injection pen Inject 18 Units into the skin      glipiZIDE (GLUCOTROL XL) 5 MG extended release tablet Take 1 tablet by mouth daily 30 tablet 1

## 2024-10-31 ENCOUNTER — HOSPITAL ENCOUNTER (OUTPATIENT)
Facility: HOSPITAL | Age: 89
Discharge: HOME | End: 2024-11-03
Payer: MEDICARE

## 2024-10-31 DIAGNOSIS — R10.32 ABDOMINAL PAIN, LEFT LOWER QUADRANT: ICD-10-CM

## 2024-10-31 PROCEDURE — 6360000004 HC RX CONTRAST MEDICATION: Performed by: NURSE PRACTITIONER

## 2024-10-31 PROCEDURE — 74177 CT ABD & PELVIS W/CONTRAST: CPT

## 2024-10-31 PROCEDURE — 82565 ASSAY OF CREATININE: CPT

## 2024-10-31 RX ORDER — IOPAMIDOL 755 MG/ML
100 INJECTION, SOLUTION INTRAVASCULAR
Status: COMPLETED | OUTPATIENT
Start: 2024-10-31 | End: 2024-10-31

## 2024-10-31 RX ADMIN — IOPAMIDOL 100 ML: 755 INJECTION, SOLUTION INTRAVENOUS at 13:32

## 2024-11-01 LAB — CREAT BLD-MCNC: 1 MG/DL (ref 0.6–1.3)

## 2024-11-13 ENCOUNTER — OFFICE VISIT (OUTPATIENT)
Age: 89
End: 2024-11-13
Payer: MEDICARE

## 2024-11-13 VITALS
OXYGEN SATURATION: 96 % | HEIGHT: 63 IN | HEART RATE: 68 BPM | WEIGHT: 146 LBS | DIASTOLIC BLOOD PRESSURE: 70 MMHG | SYSTOLIC BLOOD PRESSURE: 124 MMHG | BODY MASS INDEX: 25.87 KG/M2

## 2024-11-13 DIAGNOSIS — Z95.0 CARDIAC PACEMAKER IN SITU: ICD-10-CM

## 2024-11-13 DIAGNOSIS — I48.0 PAROXYSMAL ATRIAL FIBRILLATION (HCC): Primary | ICD-10-CM

## 2024-11-13 DIAGNOSIS — Z95.818 PRESENCE OF WATCHMAN LEFT ATRIAL APPENDAGE CLOSURE DEVICE: ICD-10-CM

## 2024-11-13 DIAGNOSIS — I10 HTN (HYPERTENSION), BENIGN: ICD-10-CM

## 2024-11-13 DIAGNOSIS — I49.5 TACHY-BRADY SYNDROME (HCC): ICD-10-CM

## 2024-11-13 PROCEDURE — 99214 OFFICE O/P EST MOD 30 MIN: CPT | Performed by: STUDENT IN AN ORGANIZED HEALTH CARE EDUCATION/TRAINING PROGRAM

## 2024-11-13 PROCEDURE — 1090F PRES/ABSN URINE INCON ASSESS: CPT | Performed by: STUDENT IN AN ORGANIZED HEALTH CARE EDUCATION/TRAINING PROGRAM

## 2024-11-13 PROCEDURE — G8484 FLU IMMUNIZE NO ADMIN: HCPCS | Performed by: STUDENT IN AN ORGANIZED HEALTH CARE EDUCATION/TRAINING PROGRAM

## 2024-11-13 PROCEDURE — G8427 DOCREV CUR MEDS BY ELIG CLIN: HCPCS | Performed by: STUDENT IN AN ORGANIZED HEALTH CARE EDUCATION/TRAINING PROGRAM

## 2024-11-13 PROCEDURE — 1036F TOBACCO NON-USER: CPT | Performed by: STUDENT IN AN ORGANIZED HEALTH CARE EDUCATION/TRAINING PROGRAM

## 2024-11-13 PROCEDURE — 1126F AMNT PAIN NOTED NONE PRSNT: CPT | Performed by: STUDENT IN AN ORGANIZED HEALTH CARE EDUCATION/TRAINING PROGRAM

## 2024-11-13 PROCEDURE — 93005 ELECTROCARDIOGRAM TRACING: CPT | Performed by: STUDENT IN AN ORGANIZED HEALTH CARE EDUCATION/TRAINING PROGRAM

## 2024-11-13 PROCEDURE — G8419 CALC BMI OUT NRM PARAM NOF/U: HCPCS | Performed by: STUDENT IN AN ORGANIZED HEALTH CARE EDUCATION/TRAINING PROGRAM

## 2024-11-13 PROCEDURE — 93010 ELECTROCARDIOGRAM REPORT: CPT | Performed by: STUDENT IN AN ORGANIZED HEALTH CARE EDUCATION/TRAINING PROGRAM

## 2024-11-13 PROCEDURE — 1159F MED LIST DOCD IN RCRD: CPT | Performed by: STUDENT IN AN ORGANIZED HEALTH CARE EDUCATION/TRAINING PROGRAM

## 2024-11-13 PROCEDURE — 1123F ACP DISCUSS/DSCN MKR DOCD: CPT | Performed by: STUDENT IN AN ORGANIZED HEALTH CARE EDUCATION/TRAINING PROGRAM

## 2024-11-13 NOTE — PROGRESS NOTES
Chief Complaint   Patient presents with    Atrial Fibrillation     W/ watchman    Hypertension    Other     Tachy/eligio syndrom     Vitals:    11/13/24 0954   BP: 124/70   Site: Right Upper Arm   Position: Sitting   Pulse: 68   SpO2: 96%   Weight: 66.2 kg (146 lb)   Height: 1.6 m (5' 3\")       Chest pain NO     ER, urgent care, or hospitalized outside of [facility] since your last visit?  NO     Refills NO

## 2024-11-13 NOTE — PROGRESS NOTES
[place]  [address], [address]  [place] [address]    Office [phone],Fax [phone]           Imelda Wright is a 88 y.o. female presents the office for follow-up evaluation      Assessment/Recommendations:     Diagnosis Orders   1. Paroxysmal atrial fibrillation (HCC)  EKG 12 Lead      2. Tachy-eligio syndrome (HCC)        3. HTN (hypertension), benign        4. Presence of Watchman left atrial appendage closure device        5. Cardiac pacemaker in situ              pAfib.  newly discovered, converted to NSR spontaneously 3/2023.  No Afib on most recent device interogation   - s/p watchman   - continue metoprolol XL 75mg/d    Tachy-eligio syndrome.  A-fib with RVR with periods of pauses greater than 3 seconds as well as bradycardia and sinus rhythm. S/p ppm     Essential hypertension with ongoing symptoms of orthostasis  - continue current regimen     HLD    - cont rosouvastatin 10 mg daily      hx TIA       Primary Care Physician- Torrie Mann, ADRI - NP    Follow-up 6 months        Subjective:  88 y.o. presents the office for follow-up evaluation.  Clinically doing well. No angina, mendoza.  No symptomatic recurrence of afib.  Device w/o recurrent afib.      Past Medical History:   Diagnosis Date    Arthritis     Atrial fibrillation (HCC)     detected 3/13/2023    Depression     Diabetes (HCC)     GERD (gastroesophageal reflux disease)     High cholesterol     Hypertension     Snoring     Transient ischemic attack (TIA) 2003        Past Surgical History:   Procedure Laterality Date    APPENDECTOMY      CATARACT REMOVAL Bilateral     CHOLECYSTECTOMY      EP DEVICE PROCEDURE N/A 6/20/2023    Watchman yvan closure device performed by Estrada Gutierrez MD at [facility] CARDIAC CATH LAB    EP DEVICE PROCEDURE N/A 10/6/2023    Insert PPM dual performed by Ca Braga MD at [facility] CARDIAC CATH LAB    INVASIVE VASCULAR N/A 6/20/2023    Ultrasound guided vascular access performed

## 2024-12-30 ENCOUNTER — TELEPHONE (OUTPATIENT)
Age: 89
End: 2024-12-30

## 2024-12-30 DIAGNOSIS — R41.9 COGNITIVE COMPLAINTS: ICD-10-CM

## 2024-12-30 DIAGNOSIS — R41.0 CONFUSION: Primary | ICD-10-CM

## 2024-12-30 NOTE — TELEPHONE ENCOUNTER
Patient daughter stated she sent a message in My Chart but I don't see it right now.    She's requesting a call stating on 12/24/24 her mother woke up 3 am she very confused and disorientated. Went back to bed and was up at 6 am the same way.

## 2024-12-31 RX ORDER — METOPROLOL SUCCINATE 50 MG/1
75 TABLET, EXTENDED RELEASE ORAL DAILY
Qty: 135 TABLET | Refills: 3 | Status: SHIPPED | OUTPATIENT
Start: 2024-12-31

## 2024-12-31 NOTE — TELEPHONE ENCOUNTER
Refill per VO of Dr. GONZÁLEZ Foote, OD:    Last appt:  11/13/2024    Future Appointments   Date Time Provider Department Center   4/18/2025  9:00 AM [facility]   4/18/2025  9:20 AM [facility]   5/13/2025 11:00 AM Risa Saunders, APRN - NP [facility]   6/3/2025 10:45 AM Charly Vasquez, APRN - NP [facility]       Requested Prescriptions     Pending Prescriptions Disp Refills    metoprolol succinate (TOPROL XL) 50 MG extended release tablet [Pharmacy Med Name: METOPROLOL ER SUCCINATE 50MG TABS] 135 tablet 0     Sig: TAKE 1 AND 1/2 TABLETS BY MOUTH DAILY

## 2024-12-31 NOTE — TELEPHONE ENCOUNTER
SPOKE WITH DAUGHTER SHE PATIENT WAS SEEN IN THE [facility] ER AND CHECKED FOR A UTI, WHICH WAS NEGATIVE, DAUGHTER DECLINED IMAGING.   ADVISED DAUGHTER THAT HER MOM MIGHT BE HAVING TIA'S AND THEY MAY NOT BE ABLE TO SEE TIA'S ON IMAGING. KEEP A CLOSE EYE ON HER AND KEEP US POSTED.

## 2025-01-06 NOTE — TELEPHONE ENCOUNTER
Should have gotten a CT   I can order OP if they want to get it   Just to ensure no bleeding, etc.       I would say can come in in the next 4-6 weeks   If she is no better, I would want to order a CT

## 2025-01-20 ENCOUNTER — HOSPITAL ENCOUNTER (OUTPATIENT)
Facility: HOSPITAL | Age: 89
Discharge: HOME | End: 2025-01-23
Payer: MEDICARE

## 2025-01-20 DIAGNOSIS — R41.0 CONFUSION: ICD-10-CM

## 2025-01-20 DIAGNOSIS — R41.9 COGNITIVE COMPLAINTS: ICD-10-CM

## 2025-01-20 PROCEDURE — 70450 CT HEAD/BRAIN W/O DYE: CPT

## 2025-02-25 ENCOUNTER — TELEPHONE (OUTPATIENT)
Age: 89
End: 2025-02-25

## 2025-02-25 NOTE — TELEPHONE ENCOUNTER
IMPRESSION:  No acute intracranial process.  There is no intracranial mass or hemorrhage.      CT negative

## 2025-02-25 NOTE — TELEPHONE ENCOUNTER
Patient daughter called and stated her mother currently at [facility] as of 2/24/25 due to confusion/ non verbal/  zone out    She wants to discuss the CT scan also her mother had back in January 2025

## 2025-02-27 ENCOUNTER — TELEPHONE (OUTPATIENT)
Age: 89
End: 2025-02-27

## 2025-02-27 DIAGNOSIS — R41.9 COGNITIVE COMPLAINTS: Primary | ICD-10-CM

## 2025-02-27 DIAGNOSIS — R41.3 OTHER AMNESIA: ICD-10-CM

## 2025-02-27 NOTE — TELEPHONE ENCOUNTER
Patient daughter requesting a call to discuss getting a sooner follow up appt than June due to patient currently in the hospital at [facility].

## 2025-03-05 NOTE — TELEPHONE ENCOUNTER
I spoke with Dotty Barbour, patient's daughter. Dotty stated that the patient had an another episode stroke like symptoms or like she was having a seizures.    Patient was confused, blank stare, non verbal at times, was talking but not using words.  Patient was taken to [facility]. Dotty will try to get notes sent to the office. Dotty was requesting a sooner appointment with Richard

## 2025-03-06 NOTE — TELEPHONE ENCOUNTER
I will order a 24 hour EEG to try to catch a spell     If seizures, will catch    Neuropsychiatry indicated it was mostly psychological     She needs  a psychological treatment

## 2025-03-18 ENCOUNTER — HOSPITAL ENCOUNTER (OUTPATIENT)
Facility: HOSPITAL | Age: 89
Discharge: HOME | End: 2025-03-21

## 2025-03-18 DIAGNOSIS — R41.9 COGNITIVE COMPLAINTS: ICD-10-CM

## 2025-03-18 DIAGNOSIS — R41.3 OTHER AMNESIA: ICD-10-CM

## 2025-03-19 NOTE — PROGRESS NOTES
This Ambulatory EEG was unmonitored, with no video captured. Electrodes placed according to the 10-20 International System. Standard sensitivity 10 uV/mm and high filter 70 Hz and low filter 1 Hz settings were set at initiation of the study. Baseline electrode impedances were at or below 10k Ohms. Per protocol, this recording data is uploaded/transferred from the EEG equipment to a central storage location at the end of the recording. Duration of this EEG was 21:25:30 hours. Photic Stimulation was not performed and hyperventilation was not performed.

## 2025-03-21 ENCOUNTER — TELEPHONE (OUTPATIENT)
Age: 89
End: 2025-03-21

## 2025-03-21 NOTE — TELEPHONE ENCOUNTER
Dotty called in requesting to speak with Myesha. She states the patient had a 24hr EEG done this week and she knows she had a couple episodes during the EEG and would like to know if the results showed anything.

## 2025-04-05 PROBLEM — R41.82 ACUTE ALTERATION IN MENTAL STATUS: Status: ACTIVE | Noted: 2025-04-05

## 2025-04-05 PROBLEM — R55 CONVULSIVE SYNCOPE: Status: ACTIVE | Noted: 2025-04-05

## 2025-04-18 ENCOUNTER — PROCEDURE VISIT (OUTPATIENT)
Age: 89
End: 2025-04-18
Payer: MEDICARE

## 2025-04-18 ENCOUNTER — OFFICE VISIT (OUTPATIENT)
Age: 89
End: 2025-04-18
Payer: MEDICARE

## 2025-04-18 VITALS
SYSTOLIC BLOOD PRESSURE: 118 MMHG | DIASTOLIC BLOOD PRESSURE: 72 MMHG | HEART RATE: 61 BPM | BODY MASS INDEX: 26.58 KG/M2 | OXYGEN SATURATION: 96 % | WEIGHT: 150 LBS | HEIGHT: 63 IN | RESPIRATION RATE: 17 BRPM

## 2025-04-18 DIAGNOSIS — I10 HTN (HYPERTENSION), BENIGN: ICD-10-CM

## 2025-04-18 DIAGNOSIS — E78.5 DYSLIPIDEMIA: ICD-10-CM

## 2025-04-18 DIAGNOSIS — Z95.818 PRESENCE OF WATCHMAN LEFT ATRIAL APPENDAGE CLOSURE DEVICE: ICD-10-CM

## 2025-04-18 DIAGNOSIS — K92.1 MELENA: ICD-10-CM

## 2025-04-18 DIAGNOSIS — I48.91 ATRIAL FIBRILLATION WITH RAPID VENTRICULAR RESPONSE (HCC): Primary | ICD-10-CM

## 2025-04-18 DIAGNOSIS — Z95.0 PACEMAKER: ICD-10-CM

## 2025-04-18 DIAGNOSIS — Z95.0 CARDIAC PACEMAKER IN SITU: Primary | ICD-10-CM

## 2025-04-18 DIAGNOSIS — I49.5 TACHY-BRADY SYNDROME (HCC): ICD-10-CM

## 2025-04-18 PROCEDURE — 1159F MED LIST DOCD IN RCRD: CPT | Performed by: INTERNAL MEDICINE

## 2025-04-18 PROCEDURE — 1036F TOBACCO NON-USER: CPT | Performed by: INTERNAL MEDICINE

## 2025-04-18 PROCEDURE — G8419 CALC BMI OUT NRM PARAM NOF/U: HCPCS | Performed by: INTERNAL MEDICINE

## 2025-04-18 PROCEDURE — 93280 PM DEVICE PROGR EVAL DUAL: CPT | Performed by: INTERNAL MEDICINE

## 2025-04-18 PROCEDURE — G2211 COMPLEX E/M VISIT ADD ON: HCPCS | Performed by: INTERNAL MEDICINE

## 2025-04-18 PROCEDURE — 99214 OFFICE O/P EST MOD 30 MIN: CPT | Performed by: INTERNAL MEDICINE

## 2025-04-18 PROCEDURE — G8427 DOCREV CUR MEDS BY ELIG CLIN: HCPCS | Performed by: INTERNAL MEDICINE

## 2025-04-18 PROCEDURE — 1126F AMNT PAIN NOTED NONE PRSNT: CPT | Performed by: INTERNAL MEDICINE

## 2025-04-18 PROCEDURE — 1090F PRES/ABSN URINE INCON ASSESS: CPT | Performed by: INTERNAL MEDICINE

## 2025-04-18 PROCEDURE — 1123F ACP DISCUSS/DSCN MKR DOCD: CPT | Performed by: INTERNAL MEDICINE

## 2025-04-18 NOTE — PATIENT INSTRUCTIONS
Continue remote transmission every 3 months  FU with NP in 1 year  FU with Dr. Braga in 2 years

## 2025-04-18 NOTE — PROGRESS NOTES
had concerns including Atrial Fibrillation.    Vitals:    04/18/25 0910   BP: 118/72   BP Site: Left Upper Arm   Patient Position: Sitting   Pulse: 61   Resp: 17   SpO2: 96%   Weight: 68 kg (150 lb)   Height: 1.6 m (5' 3\")        Chest pain No    Refills No        1. Have you been to the ER, urgent care clinic since your last visit? No       Hospitalized since your last visit? No       Where?        Date?  
Take 1 tablet by mouth daily      potassium chloride (MICRO-K) 10 MEQ extended release capsule Take 2 capsules by mouth daily      rosuvastatin (CRESTOR) 10 MG tablet Take 1 tablet by mouth nightly      LANTUS SOLOSTAR 100 UNIT/ML injection pen Inject 14 Units into the skin      sertraline (ZOLOFT) 25 MG tablet Take 2 tablets by mouth daily       No current facility-administered medications for this visit.          Allergies   Allergen Reactions    Oxycodone-Acetaminophen      Other reaction(s): Unknown (comments)  Can't remember reaction due to length of time of occurrence.          Past Medical History:   Diagnosis Date    Arthritis     Atrial fibrillation (HCC)     detected 3/13/2023    Depression     Diabetes (HCC)     GERD (gastroesophageal reflux disease)     High cholesterol     Hypertension     Snoring     Transient ischemic attack (TIA) 2003           Past Surgical History:   Procedure Laterality Date    APPENDECTOMY      CATARACT REMOVAL Bilateral     CHOLECYSTECTOMY      EP DEVICE PROCEDURE N/A 6/20/2023    Watchman yvan closure device performed by Estrada Gutierrez MD at [facility] CARDIAC CATH LAB    EP DEVICE PROCEDURE N/A 10/6/2023    Insert PPM dual performed by Ca Braga MD at [facility] CARDIAC CATH LAB    INVASIVE VASCULAR N/A 6/20/2023    Ultrasound guided vascular access performed by Estrada Gutierrez MD at [facility] CARDIAC CATH LAB    INVASIVE VASCULAR N/A 10/6/2023    Ultrasound guided vascular access performed by Ca Braga MD at [facility] CARDIAC CATH LAB    ORTHOPEDIC SURGERY Left     Open Repair    ORTHOPEDIC SURGERY Left     Trigger finger    ROTATOR CUFF REPAIR Right     TOTAL HIP ARTHROPLASTY Right     TUBAL LIGATION           Family History   Problem Relation Age of Onset    Ovarian Cancer Sister     Alzheimer's Disease Father     Alzheimer's Disease Paternal Grandfather     Heart Disease Mother        Social Connections: Not on file            Review of Systems:    12 point review of systems was performed. All

## 2025-06-03 ENCOUNTER — OFFICE VISIT (OUTPATIENT)
Age: 89
End: 2025-06-03
Payer: MEDICARE

## 2025-06-03 ENCOUNTER — TELEPHONE (OUTPATIENT)
Age: 89
End: 2025-06-03

## 2025-06-03 VITALS
DIASTOLIC BLOOD PRESSURE: 74 MMHG | RESPIRATION RATE: 18 BRPM | TEMPERATURE: 97.4 F | SYSTOLIC BLOOD PRESSURE: 124 MMHG | HEART RATE: 78 BPM | OXYGEN SATURATION: 100 %

## 2025-06-03 DIAGNOSIS — R25.1 TREMOR: ICD-10-CM

## 2025-06-03 DIAGNOSIS — R41.9 COGNITIVE COMPLAINTS: Primary | ICD-10-CM

## 2025-06-03 DIAGNOSIS — G25.2 RESTING TREMOR: ICD-10-CM

## 2025-06-03 DIAGNOSIS — G31.84 MCI (MILD COGNITIVE IMPAIRMENT): ICD-10-CM

## 2025-06-03 PROCEDURE — 1123F ACP DISCUSS/DSCN MKR DOCD: CPT | Performed by: NURSE PRACTITIONER

## 2025-06-03 PROCEDURE — 1159F MED LIST DOCD IN RCRD: CPT | Performed by: NURSE PRACTITIONER

## 2025-06-03 PROCEDURE — G8427 DOCREV CUR MEDS BY ELIG CLIN: HCPCS | Performed by: NURSE PRACTITIONER

## 2025-06-03 PROCEDURE — 99215 OFFICE O/P EST HI 40 MIN: CPT | Performed by: NURSE PRACTITIONER

## 2025-06-03 PROCEDURE — 1036F TOBACCO NON-USER: CPT | Performed by: NURSE PRACTITIONER

## 2025-06-03 PROCEDURE — G8419 CALC BMI OUT NRM PARAM NOF/U: HCPCS | Performed by: NURSE PRACTITIONER

## 2025-06-03 PROCEDURE — 1126F AMNT PAIN NOTED NONE PRSNT: CPT | Performed by: NURSE PRACTITIONER

## 2025-06-03 PROCEDURE — 1090F PRES/ABSN URINE INCON ASSESS: CPT | Performed by: NURSE PRACTITIONER

## 2025-06-03 RX ORDER — MEMANTINE HYDROCHLORIDE 10 MG/1
10 TABLET ORAL 2 TIMES DAILY
Qty: 60 TABLET | Refills: 5 | Status: SHIPPED | OUTPATIENT
Start: 2025-06-03

## 2025-06-03 NOTE — TELEPHONE ENCOUNTER
Patient's daughter would like a call back to reschedule her mother's upcoming procedure appointment on 6/9.

## 2025-06-04 NOTE — PROGRESS NOTES
Imelda Wright is a 89 y.o. female who presents with the following  Chief Complaint   Patient presents with    Tremors     Patient's tremors are present and she has good days and bad. Patient is accompanied by her daughter.        HPI    FU EEG 24 hour   With daughter    Symptoms continue to decline.   Memory, balance, coordination   Short term continues to decline.   Good days and bad though     Tremor is RUE resting mostly   Shuffling, stiff.   She does use a walker with long distance  She last year had multiple falls due to balance coordination and strength     Is trying to continue to stay active.   she currently has somebody with her at all times from her family  She has no real issues with doing anything in particular  Her tremor does get worse with anxiety or stress or sleep deprivation     She is also worried about her memory  We discussed other types of supplements and we will try something like this first  She does stay very active while playing cards and socializing and using her brain  She is eating well  She is sleeping well        Allergies   Allergen Reactions    Oxycodone-Acetaminophen      Other reaction(s): Unknown (comments)  Can't remember reaction due to length of time of occurrence.       Current Outpatient Medications   Medication Sig Dispense Refill    memantine (NAMENDA) 10 MG tablet Take 1 tablet by mouth 2 times daily 60 tablet 5    metoprolol succinate (TOPROL XL) 50 MG extended release tablet TAKE 1 AND 1/2 TABLETS BY MOUTH DAILY 135 tablet 3    nitrofurantoin (MACRODANTIN) 50 MG capsule Take by mouth daily      hydrALAZINE (APRESOLINE) 10 MG tablet Take 1 tablet by mouth in the morning and at bedtime 180 tablet 3    amLODIPine (NORVASC) 10 MG tablet TAKE 1 TABLET BY MOUTH DAILY 90 tablet 3    diphenhydrAMINE-APAP, sleep, (TYLENOL PM EXTRA STRENGTH)  MG tablet Take 1 tablet by mouth nightly as needed for Sleep      LANTUS SOLOSTAR 100 UNIT/ML injection pen Inject 14 Units into

## 2025-06-04 NOTE — TELEPHONE ENCOUNTER
Call placed to Mrs Shelley AYON advising that AMARJIT Ambriz had an appointment available on 6/17/25 @ 10:30.  Requested return call to confirm.

## 2025-06-09 ENCOUNTER — PROCEDURE VISIT (OUTPATIENT)
Age: 89
End: 2025-06-09
Payer: MEDICARE

## 2025-06-09 VITALS
WEIGHT: 150 LBS | SYSTOLIC BLOOD PRESSURE: 126 MMHG | RESPIRATION RATE: 17 BRPM | OXYGEN SATURATION: 98 % | HEIGHT: 63 IN | BODY MASS INDEX: 26.58 KG/M2 | HEART RATE: 70 BPM | DIASTOLIC BLOOD PRESSURE: 72 MMHG

## 2025-06-09 DIAGNOSIS — R25.1 TREMOR: Primary | ICD-10-CM

## 2025-06-09 DIAGNOSIS — G25.2 RESTING TREMOR: ICD-10-CM

## 2025-06-09 PROCEDURE — 11104 PUNCH BX SKIN SINGLE LESION: CPT

## 2025-06-09 PROCEDURE — 11105 PUNCH BX SKIN EA SEP/ADDL: CPT

## 2025-06-09 RX ORDER — LIDOCAINE HYDROCHLORIDE AND EPINEPHRINE BITARTRATE 20; .01 MG/ML; MG/ML
1 INJECTION, SOLUTION SUBCUTANEOUS ONCE
Status: COMPLETED | OUTPATIENT
Start: 2025-06-09 | End: 2025-06-09

## 2025-06-09 RX ADMIN — LIDOCAINE HYDROCHLORIDE AND EPINEPHRINE BITARTRATE 1 ML: 20; .01 INJECTION, SOLUTION SUBCUTANEOUS at 11:29

## 2025-06-09 NOTE — PROGRESS NOTES
Procedure note    Procedure:  Syn-One skin biopsy  Indication:  Dyskinsia     Under informed consent, using sterile technique and aseptic precautions punch skin biopsy was performed at the right distal leg site, right thigh area and right para-cervical area. 2% lidocaine with epinephrine was used as a local anesthetic.  Samples were obtained and sent to the lab as per direction.  Post procedure care was discussed.  Patient tolerated well.  No complications.     KYLER Horvath FNP-BC

## 2025-06-17 ENCOUNTER — OFFICE VISIT (OUTPATIENT)
Age: 89
End: 2025-06-17
Payer: MEDICARE

## 2025-06-17 VITALS
HEIGHT: 63 IN | BODY MASS INDEX: 25.66 KG/M2 | DIASTOLIC BLOOD PRESSURE: 62 MMHG | RESPIRATION RATE: 18 BRPM | HEART RATE: 62 BPM | WEIGHT: 144.8 LBS | SYSTOLIC BLOOD PRESSURE: 118 MMHG | OXYGEN SATURATION: 94 %

## 2025-06-17 DIAGNOSIS — Z95.0 CARDIAC PACEMAKER IN SITU: ICD-10-CM

## 2025-06-17 DIAGNOSIS — Z95.818 PRESENCE OF WATCHMAN LEFT ATRIAL APPENDAGE CLOSURE DEVICE: ICD-10-CM

## 2025-06-17 DIAGNOSIS — I10 HTN (HYPERTENSION), BENIGN: ICD-10-CM

## 2025-06-17 DIAGNOSIS — Z95.0 PACEMAKER: ICD-10-CM

## 2025-06-17 DIAGNOSIS — E78.2 MIXED HYPERLIPIDEMIA: ICD-10-CM

## 2025-06-17 DIAGNOSIS — I48.91 ATRIAL FIBRILLATION WITH RAPID VENTRICULAR RESPONSE (HCC): Primary | ICD-10-CM

## 2025-06-17 DIAGNOSIS — E78.5 DYSLIPIDEMIA: ICD-10-CM

## 2025-06-17 DIAGNOSIS — Z86.73 PERSONAL HISTORY OF TRANSIENT ISCHEMIC ATTACK (TIA), AND CEREBRAL INFARCTION WITHOUT RESIDUAL DEFICITS: ICD-10-CM

## 2025-06-17 DIAGNOSIS — I49.5 TACHY-BRADY SYNDROME (HCC): ICD-10-CM

## 2025-06-17 DIAGNOSIS — I95.1 ORTHOSTASIS: ICD-10-CM

## 2025-06-17 PROCEDURE — 1159F MED LIST DOCD IN RCRD: CPT

## 2025-06-17 PROCEDURE — 1126F AMNT PAIN NOTED NONE PRSNT: CPT

## 2025-06-17 PROCEDURE — 99214 OFFICE O/P EST MOD 30 MIN: CPT

## 2025-06-17 PROCEDURE — 1123F ACP DISCUSS/DSCN MKR DOCD: CPT

## 2025-06-17 PROCEDURE — 1090F PRES/ABSN URINE INCON ASSESS: CPT

## 2025-06-17 PROCEDURE — 1036F TOBACCO NON-USER: CPT

## 2025-06-17 PROCEDURE — G8427 DOCREV CUR MEDS BY ELIG CLIN: HCPCS

## 2025-06-17 PROCEDURE — 1160F RVW MEDS BY RX/DR IN RCRD: CPT

## 2025-06-17 PROCEDURE — G8419 CALC BMI OUT NRM PARAM NOF/U: HCPCS

## 2025-06-17 RX ORDER — ACETAMINOPHEN 500 MG
1000 TABLET ORAL NIGHTLY
Qty: 120 TABLET | Refills: 5 | Status: SHIPPED
Start: 2025-06-17

## 2025-06-17 NOTE — PROGRESS NOTES
had concerns including Atrial Fibrillation (With RVR) and Hypertension.    Vitals:    06/17/25 1105   BP: 118/62   BP Site: Left Upper Arm   Patient Position: Sitting   BP Cuff Size: Medium Adult   Pulse: 62   Resp: 18   SpO2: 94%   Weight: 65.7 kg (144 lb 12.8 oz)   Height: 1.6 m (5' 3\")        Chest pain No    Refills No        1. Have you been to the ER, urgent care clinic since your last visit? No       Hospitalized since your last visit? No       Where?        Date?  
sertraline (ZOLOFT) 25 MG tablet, Take 2 tablets by mouth daily, Disp: , Rfl:     Omega-3 Fatty Acids (FISH OIL PO), Take 1,200 mg by mouth daily, Disp: , Rfl:     ascorbic acid (VITAMIN C) 1000 MG tablet, Take 1 tablet by mouth daily, Disp: , Rfl:     calcium carbonate 1500 (600 Ca) MG TABS tablet, Take 1 tablet by mouth 2 times daily, Disp: , Rfl:     vitamin D 25 MCG (1000 UT) CAPS, Take 1 capsule by mouth, Disp: , Rfl:     levothyroxine (SYNTHROID) 88 MCG tablet, Take 1 tablet by mouth every morning (before breakfast), Disp: , Rfl:     lidocaine (LIDODERM) 5 %, Place 1 patch onto the skin every 24 hours, Disp: , Rfl:     lisinopril (PRINIVIL;ZESTRIL) 40 MG tablet, Take 1 tablet by mouth daily, Disp: , Rfl:     potassium chloride (MICRO-K) 10 MEQ extended release capsule, Take 2 capsules by mouth daily, Disp: , Rfl:     rosuvastatin (CRESTOR) 10 MG tablet, Take 1 tablet by mouth nightly, Disp: , Rfl:     Allergies   Allergen Reactions    Oxycodone-Acetaminophen      Other reaction(s): Unknown (comments)  Can't remember reaction due to length of time of occurrence.        Family History   Problem Relation Age of Onset    Ovarian Cancer Sister     Alzheimer's Disease Father     Alzheimer's Disease Paternal Grandfather     Heart Disease Mother        Social History     Tobacco Use    Smoking status: Former     Current packs/day: 0.00     Types: Cigarettes     Quit date: 1986     Years since quittin.7     Passive exposure: Past    Smokeless tobacco: Never   Substance Use Topics    Alcohol use: Not Currently    Drug use: No       Review of Symptoms:  Pertinent Positive:orthostasis  Pertinent Negative:No chest pain, dyspnea on exertion, shortness of breath, orthopnea, PND  All Other systems reviewed and are negative for a Comprehensive ROS (10+)    Physical Exam    Vitals:    25 1105   BP: 118/62   BP Site: Left Upper Arm   Patient Position: Sitting   BP Cuff Size: Medium Adult   Pulse: 62   Resp: 18

## 2025-06-22 PROCEDURE — 93294 REM INTERROG EVL PM/LDLS PM: CPT | Performed by: INTERNAL MEDICINE

## 2025-06-25 NOTE — TELEPHONE ENCOUNTER
Refill per VO of Risa Saunders NP  Last appt: 6/17/2025    Future Appointments   Date Time Provider Department Center   8/5/2025 10:00 AM Charly Vasquez DNP [facility]  AMB   12/16/2025 11:00 AM Risa Saunders APRN - NP [facility] AMB   12/16/2025  2:00 PM Charly Vasquez DNP [facility]  AMB   4/24/2026  9:20 AM [facility]ER, [facility]  AMB   4/24/2026  9:40 AM Nohelia Mccarthy APRN - NP [facility]  AMB       Requested Prescriptions     Pending Prescriptions Disp Refills    hydrALAZINE (APRESOLINE) 10 MG tablet 180 tablet 3     Sig: Take 1 tablet by mouth in the morning and at bedtime

## 2025-06-26 RX ORDER — HYDRALAZINE HYDROCHLORIDE 10 MG/1
10 TABLET, FILM COATED ORAL 2 TIMES DAILY
Qty: 180 TABLET | Refills: 3 | Status: SHIPPED | OUTPATIENT
Start: 2025-06-26 | End: 2026-06-26

## 2025-08-05 ENCOUNTER — OFFICE VISIT (OUTPATIENT)
Age: 89
End: 2025-08-05
Payer: MEDICARE

## 2025-08-05 VITALS
SYSTOLIC BLOOD PRESSURE: 144 MMHG | DIASTOLIC BLOOD PRESSURE: 76 MMHG | OXYGEN SATURATION: 94 % | HEART RATE: 63 BPM | RESPIRATION RATE: 16 BRPM

## 2025-08-05 DIAGNOSIS — R26.9 GAIT ABNORMALITY: ICD-10-CM

## 2025-08-05 DIAGNOSIS — G31.84 MCI (MILD COGNITIVE IMPAIRMENT): Primary | ICD-10-CM

## 2025-08-05 DIAGNOSIS — R41.3 OTHER AMNESIA: ICD-10-CM

## 2025-08-05 PROCEDURE — 1036F TOBACCO NON-USER: CPT | Performed by: NURSE PRACTITIONER

## 2025-08-05 PROCEDURE — 99215 OFFICE O/P EST HI 40 MIN: CPT | Performed by: NURSE PRACTITIONER

## 2025-08-05 PROCEDURE — 1123F ACP DISCUSS/DSCN MKR DOCD: CPT | Performed by: NURSE PRACTITIONER

## 2025-08-05 PROCEDURE — G8428 CUR MEDS NOT DOCUMENT: HCPCS | Performed by: NURSE PRACTITIONER

## 2025-08-05 PROCEDURE — 1090F PRES/ABSN URINE INCON ASSESS: CPT | Performed by: NURSE PRACTITIONER

## 2025-08-05 PROCEDURE — G8419 CALC BMI OUT NRM PARAM NOF/U: HCPCS | Performed by: NURSE PRACTITIONER

## 2025-08-05 RX ORDER — MEMANTINE HYDROCHLORIDE 28 MG/1
28 CAPSULE, EXTENDED RELEASE ORAL DAILY
Qty: 30 CAPSULE | Refills: 4 | Status: SHIPPED | OUTPATIENT
Start: 2025-08-05

## 2025-08-07 ENCOUNTER — TELEPHONE (OUTPATIENT)
Age: 89
End: 2025-08-07

## 2025-08-08 RX ORDER — AMLODIPINE BESYLATE 10 MG/1
10 TABLET ORAL DAILY
Qty: 90 TABLET | Refills: 3 | Status: SHIPPED | OUTPATIENT
Start: 2025-08-08

## 2025-08-11 ENCOUNTER — TELEPHONE (OUTPATIENT)
Age: 89
End: 2025-08-11

## 2025-08-20 ENCOUNTER — HOSPITAL ENCOUNTER (OUTPATIENT)
Facility: HOSPITAL | Age: 89
Discharge: HOME | End: 2025-08-23
Payer: MEDICARE

## 2025-08-20 DIAGNOSIS — G31.84 MCI (MILD COGNITIVE IMPAIRMENT): ICD-10-CM

## 2025-08-20 DIAGNOSIS — R41.3 OTHER AMNESIA: ICD-10-CM

## 2025-08-20 PROCEDURE — A9586 FLORBETAPIR F18: HCPCS | Performed by: NURSE PRACTITIONER

## 2025-08-20 PROCEDURE — 78814 PET IMAGE W/CT LMTD: CPT

## 2025-08-20 PROCEDURE — 6360000002 HC RX W HCPCS: Performed by: NURSE PRACTITIONER

## 2025-08-20 RX ORDER — FLORBETAPIR F 18 51 MCI/ML
10 INJECTION, SOLUTION INTRAVENOUS ONCE
Status: COMPLETED | OUTPATIENT
Start: 2025-08-20 | End: 2025-08-20

## 2025-08-20 RX ADMIN — FLORBETAPIR F 18 10 MILLICURIE: 51 INJECTION, SOLUTION INTRAVENOUS at 15:00

## (undated) PROCEDURE — 0DT80ZZ RESECTION OF SMALL INTESTINE, OPEN APPROACH: ICD-10-PCS

## (undated) DEVICE — SYR 10ML LUER LOK 1/5ML GRAD --

## (undated) DEVICE — SOLUTION IRRIG 1000ML 0.9% SOD CHL USP POUR PLAS BTL

## (undated) DEVICE — MEDI-TRACE CADENCE ADULT, DEFIBRILLATION ELECTRODE -RTS  (10 PR/PK) - PHYSIO-CONTROL: Brand: MEDI-TRACE CADENCE

## (undated) DEVICE — MARKER,SKIN,WI/RULER AND LABELS: Brand: MEDLINE

## (undated) DEVICE — BLANKET WRM W35.4XL86.6IN FULL UNDERBODY + FORC AIR

## (undated) DEVICE — MTS LEFT HEART KIT ST MARY'S RICHMOND: Brand: NAMIC

## (undated) DEVICE — HOOD: Brand: FLYTE

## (undated) DEVICE — GLOVE ORTHO 8   MSG9480

## (undated) DEVICE — GOWN,SIRUS,NONRNF,SETINSLV,2XL,18/CS: Brand: MEDLINE

## (undated) DEVICE — SUTURE VCRL + SZ 1-0 L36IN ABSRB UD CTX 1/2 CIR TAPR PNT VCP977H

## (undated) DEVICE — Device

## (undated) DEVICE — SUTURE PDS II SZ 2-0 L27IN ABSRB UD CT-1 L36MM 1/2 CIR Z259H

## (undated) DEVICE — TRANSFER SET 3": Brand: MEDLINE INDUSTRIES, INC.

## (undated) DEVICE — GLOVE ORANGE PI 7 1/2   MSG9075

## (undated) DEVICE — STERILE POLYISOPRENE POWDER-FREE SURGICAL GLOVES WITH EMOLLIENT COATING: Brand: PROTEXIS

## (undated) DEVICE — SUTURE PERMA-HAND SZ 0 L30IN NONABSORBABLE BLK L36MM CT-1 424H

## (undated) DEVICE — 3M™ TEGADERM™ TRANSPARENT FILM DRESSING FRAME STYLE, 1624W, 2-3/8 IN X 2-3/4 IN (6 CM X 7 CM), 100/CT 4CT/CASE: Brand: 3M™ TEGADERM™

## (undated) DEVICE — 3M™ MEDIPORE™ H SOFT CLOTH TAPE SHORT ROLL TAPE 6INCHES X 2 YARDS 16 ROLLS/CASE 2866S: Brand: 3M™ MEDIPORE™

## (undated) DEVICE — SHEET, DRAPE, SPLIT, STERILE: Brand: MEDLINE

## (undated) DEVICE — PERCLOSE PROGLIDE™ SUTURE-MEDIATED CLOSURE SYSTEM: Brand: PERCLOSE PROGLIDE™

## (undated) DEVICE — SUTURE ABSORBABLE MONOFILAMENT 2-0 CT1 12 IN UD MONOCRYL + SXMP1B412

## (undated) DEVICE — PADPRO DEFIBRILLATION/PACING/CARDIOVERSION/MONITORING ELECTRODES, ADULT/CHILD GREATER THAN 10 KG RADIOTRANSPARENT ELECTRODE, PHYSIO-CONTROL QUIK-COMBO (M) 60" (152 CM): Brand: PADPRO

## (undated) DEVICE — STRYKER PERFORMANCE SERIES SAGITTAL BLADE: Brand: STRYKER PERFORMANCE SERIES

## (undated) DEVICE — (D)PREP SKN CHLRAPRP APPL 26ML -- CONVERT TO ITEM 371833

## (undated) DEVICE — 4-PORT MANIFOLD: Brand: NEPTUNE 2

## (undated) DEVICE — 3M™ STERI-DRAPE™ U-DRAPE 1015: Brand: STERI-DRAPE™

## (undated) DEVICE — CONTAINER,SPECIMEN,3OZ,OR STRL: Brand: MEDLINE

## (undated) DEVICE — INTRODUCER SHTH L13CM OD7FR SH ORNG HUB SEAMLESS SAFSHTH

## (undated) DEVICE — DRSG AQUACEL SURG 3.5 X 10IN -- CONVERT TO ITEM 370183

## (undated) DEVICE — SYR LR LCK 1ML GRAD NSAF 30ML --

## (undated) DEVICE — PAD NON-ADHERENT 3X4 STRL LF --

## (undated) DEVICE — 6619 2 PTNT ISO SYS INCISE AREA&LT;(&GT;&&LT;)&GT;P: Brand: STERI-DRAPE™ IOBAN™ 2

## (undated) DEVICE — LAPAROTOMY-SFMC: Brand: MEDLINE INDUSTRIES, INC.

## (undated) DEVICE — SUTURE STRATAFIX SPRL MCRYL + SZ 4-0 L12IN ABSRB UD PS-2 SXMP1B117

## (undated) DEVICE — CEMENT MIXING SYSTEM WITH FEMORAL BREAKWAY NOZZLE: Brand: REVOLUTION

## (undated) DEVICE — 3M™ IOBAN™ 2 ANTIMICROBIAL INCISE DRAPE 6650EZ: Brand: IOBAN™ 2

## (undated) DEVICE — PINNACLE INTRODUCER SHEATH: Brand: PINNACLE

## (undated) DEVICE — STERILE POLYISOPRENE POWDER-FREE SURGICAL GLOVES: Brand: PROTEXIS

## (undated) DEVICE — SUTURE MNFLMNT SH 26 MM 1/2 CI CRCLE TPR PNT SYMTRC PD PLU

## (undated) DEVICE — STRIP,CLOSURE,WOUND,MEDI-STRIP,1/2X4: Brand: MEDLINE

## (undated) DEVICE — Z DISCONTINUED LINER BOOT TRACTION NS LINDY LF

## (undated) DEVICE — SUTURE MCRYL SZ 3-0 L27IN ABSRB UD L24MM PS-1 3/8 CIR PRIM Y936H

## (undated) DEVICE — STRAP POS KNEE BODY VELC

## (undated) DEVICE — SMOKE EVACUATION PENCIL: Brand: VALLEYLAB

## (undated) DEVICE — 1 X VERSACROSS TRANSSEPTAL SHEATH (INCLUDING  1 X J-TIP GUIDEWIRE); 1 X VERSACROSS RF WIRE (INCLUDING 1 X CONNECTOR CABLE (SINGLE USE)); 1 X DISPERSIVE ELECTRODE: Brand: VERSACROSS ACCESS SOLUTION

## (undated) DEVICE — GUIDEWIRE VASC L80CM DIA0.018IN TIP L5CM 15DEG ANG NIT

## (undated) DEVICE — PREP KIT PEEL PTCH POVIDONE IOD

## (undated) DEVICE — NDL PRT INJ NSAF BLNT 18GX1.5 --

## (undated) DEVICE — CANISTER, RIGID, 3000CC: Brand: MEDLINE INDUSTRIES, INC.

## (undated) DEVICE — NEEDLE HYPO 18GA L1.5IN PNK S STL HUB POLYPR SHLD REG BVL

## (undated) DEVICE — PAD,ABDOMINAL,5"X9",ST,LF,25/BX: Brand: MEDLINE INDUSTRIES, INC.

## (undated) DEVICE — ELECTRODE BLDE L4IN NONINSULATED EDGE

## (undated) DEVICE — KIT DRP FOR RIO ROBOTIC ARM ASST SYS

## (undated) DEVICE — SOLUTION IRRIG 3000ML 0.9% SOD CHL FLX CONT 0797208] ICU MEDICAL INC]

## (undated) DEVICE — MICROPUNCTURE INTRODUCER SET SILHOUETTE TRANSITIONLESS WITH NITINOL WIRE GUIDE: Brand: MICROPUNCTURE

## (undated) DEVICE — DRESSING ANTIMIC FOAM OPTIFOAM POSTOP ADH 4 X 6 IN

## (undated) DEVICE — PROVE COVER: Brand: UNBRANDED

## (undated) DEVICE — STAPLER SKN FIX HD COUNT STRL -- SUB STAPLER35WA  6/CA $58.16

## (undated) DEVICE — PENCIL ES CRD L10FT HND SWCHING ROCK SWCH W/ EDGE COAT BLDE

## (undated) DEVICE — PACK PROCEDURE SURG HRT CATH

## (undated) DEVICE — SUTURE STRATAFIX SYMMETRIC SZ 1 L18IN ABSRB VLT CT1 L36CM SXPP1A404

## (undated) DEVICE — DUAL IRRIGATION ADAPTOR

## (undated) DEVICE — SPONGE GZ W4XL4IN COT 12 PLY TYP VII WVN C FLD DSGN STERILE

## (undated) DEVICE — HYPODERMIC SAFETY NEEDLE: Brand: MAGELLAN

## (undated) DEVICE — DRAPE THERMABASIN INTRATEMP --

## (undated) DEVICE — PACEMAKER PACK: Brand: MEDLINE INDUSTRIES, INC.

## (undated) DEVICE — SLING ORTHOPEDIC PCH UNIV 19.5X9 IN 2-39 IN ARM W/ FOAM STRP

## (undated) DEVICE — STIFFEN MICRO-INTRODUCER KIT: Brand: STIFFEN MICRO-INTRODUCER KIT

## (undated) DEVICE — KIT POS FOAM HANA TBL

## (undated) DEVICE — APPLICATOR BNDG 1MM ADH PREMIERPRO EXOFIN

## (undated) DEVICE — RADIFOCUS GLIDEWIRE: Brand: GLIDEWIRE

## (undated) DEVICE — TOTAL TRAY, 16FR 10ML SIL FOLEY, URN: Brand: MEDLINE

## (undated) DEVICE — SUTURE VCRL SZ 2-0 L36IN ABSRB UD L36MM CT-1 1/2 CIR J945H

## (undated) DEVICE — SPONGE GZ W4XL4IN COT 12 PLY TYP VII WVN C FLD DSGN

## (undated) DEVICE — 3M™ TEGADERM™ TRANSPARENT FILM DRESSING FRAME STYLE, 1626W, 4 IN X 4-3/4 IN (10 CM X 12 CM), 50/CT 4CT/CASE: Brand: 3M™ TEGADERM™

## (undated) DEVICE — SUTURE PERMAHAND SZ 3-0 L30IN NONABSORBABLE BLK SILK BRAID A304H

## (undated) DEVICE — KIT TRK HIP PROC VIZADISC

## (undated) DEVICE — REM POLYHESIVE ADULT PATIENT RETURN ELECTRODE: Brand: VALLEYLAB

## (undated) DEVICE — ELECTRODE PT RET AD L9FT HI MOIST COND ADH HYDRGEL CORDED

## (undated) DEVICE — COVER LT HNDL PLAS RIG 1 PER PK

## (undated) DEVICE — RELOAD STPL H1.5X3.6XL60MM REG TISS BLU B FRM AUTO RET PIN

## (undated) DEVICE — KIT COR DIAG CATH ANGIO CURVES 6FR JL 4.0 100CM JR 4.0

## (undated) DEVICE — PIN FIX 4X170MM STRL -- 2/PK MAKO

## (undated) DEVICE — INFECTION CONTROL KIT SYS

## (undated) DEVICE — HANDPIECE SET WITH COAXIAL HIGH FLOW TIP AND SUCTION TUBE: Brand: INTERPULSE

## (undated) DEVICE — WASTE KIT - ST MARY: Brand: MEDLINE INDUSTRIES, INC.

## (undated) DEVICE — ACCESS SHEATH WITH DILATOR: Brand: WATCHMAN FXD CURVE™ ACCESS SYSTEM

## (undated) DEVICE — COVER US PRB W15XL120CM W/ GEL RUBBERBAND TAPE STRP FLD GEN

## (undated) DEVICE — STAPLER INT L60MM REG TISS BLU B FRM 8 FIRING 2 ROW AUTO

## (undated) DEVICE — MARKER RAD KNEE TIB CKPT STEREOTAXIC IMAG LESION LOC